# Patient Record
Sex: FEMALE | Race: BLACK OR AFRICAN AMERICAN | Employment: OTHER | URBAN - METROPOLITAN AREA
[De-identification: names, ages, dates, MRNs, and addresses within clinical notes are randomized per-mention and may not be internally consistent; named-entity substitution may affect disease eponyms.]

---

## 2017-02-03 PROBLEM — E87.70 FLUID OVERLOAD: Status: ACTIVE | Noted: 2017-02-03

## 2017-02-04 PROBLEM — I50.33 ACUTE ON CHRONIC DIASTOLIC HEART FAILURE (HCC): Status: ACTIVE | Noted: 2017-02-04

## 2017-03-07 ENCOUNTER — HOSPITAL ENCOUNTER (OUTPATIENT)
Dept: PHYSICAL THERAPY | Age: 67
Discharge: HOME | End: 2017-03-31

## 2017-03-07 DIAGNOSIS — R91.1 SOLITARY PULMONARY NODULE: ICD-10-CM

## 2017-04-01 ENCOUNTER — HOSPITAL ENCOUNTER (OUTPATIENT)
Dept: OTHER | Age: 67
Discharge: HOME | End: 2017-04-30

## 2017-04-21 ENCOUNTER — HOSPITAL ENCOUNTER (OUTPATIENT)
Dept: WOMENS IMAGING | Age: 67
Discharge: HOME | End: 2017-04-21
Attending: FAMILY MEDICINE | Admitting: FAMILY MEDICINE

## 2017-04-21 DIAGNOSIS — Z12.31 VISIT FOR SCREENING MAMMOGRAM: ICD-10-CM

## 2017-05-17 ENCOUNTER — OFFICE VISIT (OUTPATIENT)
Dept: BARIATRICS/WEIGHT MGMT | Age: 67
End: 2017-05-17

## 2017-05-17 VITALS
HEIGHT: 65 IN | SYSTOLIC BLOOD PRESSURE: 143 MMHG | DIASTOLIC BLOOD PRESSURE: 74 MMHG | BODY MASS INDEX: 47.3 KG/M2 | WEIGHT: 283.9 LBS | HEART RATE: 103 BPM

## 2017-05-17 DIAGNOSIS — I87.323 CHRONIC VENOUS HYPERTENSION (IDIOPATHIC) WITH INFLAMMATION OF BILATERAL LOWER EXTREMITY: ICD-10-CM

## 2017-05-17 DIAGNOSIS — I12.9 HYPERTENSIVE KIDNEY DISEASE WITH CKD STAGE III (HCC): ICD-10-CM

## 2017-05-17 DIAGNOSIS — N18.30 HYPERTENSIVE KIDNEY DISEASE WITH CKD STAGE III (HCC): ICD-10-CM

## 2017-05-17 DIAGNOSIS — I25.10 CORONARY ARTERY DISEASE INVOLVING NATIVE CORONARY ARTERY OF NATIVE HEART WITHOUT ANGINA PECTORIS: Chronic | ICD-10-CM

## 2017-05-17 DIAGNOSIS — N18.30 CHRONIC KIDNEY DISEASE, STAGE III (MODERATE) (HCC): ICD-10-CM

## 2017-05-17 DIAGNOSIS — Z79.4 TYPE 2 DIABETES MELLITUS WITHOUT COMPLICATION, WITH LONG-TERM CURRENT USE OF INSULIN (HCC): ICD-10-CM

## 2017-05-17 DIAGNOSIS — E11.29 TYPE 2 DIABETES MELLITUS WITH OTHER KIDNEY COMPLICATION: Chronic | ICD-10-CM

## 2017-05-17 DIAGNOSIS — E11.9 TYPE 2 DIABETES MELLITUS WITHOUT COMPLICATION, WITH LONG-TERM CURRENT USE OF INSULIN (HCC): ICD-10-CM

## 2017-05-17 DIAGNOSIS — E13.9 DIABETES 1.5, MANAGED AS TYPE 2 (HCC): ICD-10-CM

## 2017-05-17 DIAGNOSIS — E66.01 MORBID OBESITY WITH BMI OF 45.0-49.9, ADULT (HCC): Primary | ICD-10-CM

## 2017-05-17 DIAGNOSIS — R53.82 CHRONIC FATIGUE: ICD-10-CM

## 2017-05-17 DIAGNOSIS — R73.9 HYPERGLYCEMIA: ICD-10-CM

## 2017-05-17 DIAGNOSIS — E87.6 HYPOKALEMIA: ICD-10-CM

## 2017-05-17 DIAGNOSIS — E03.9 HYPOTHYROIDISM, ADULT: Chronic | ICD-10-CM

## 2017-05-17 PROCEDURE — MISCLOD MISC IP SERVICE NONBILLABLE: Performed by: SURGERY

## 2017-05-17 ASSESSMENT — ENCOUNTER SYMPTOMS
DIARRHEA: 0
EYE REDNESS: 0
DOUBLE VISION: 0
EYE PAIN: 0
COUGH: 0
BACK PAIN: 1
BLURRED VISION: 0
VOMITING: 0
EYE DISCHARGE: 0
ORTHOPNEA: 0
ABDOMINAL PAIN: 1
CONSTIPATION: 0
HEARTBURN: 1
STRIDOR: 0
HEMOPTYSIS: 0
SHORTNESS OF BREATH: 1
NAUSEA: 1
SPUTUM PRODUCTION: 0
PHOTOPHOBIA: 0
WHEEZING: 0
SORE THROAT: 0
BLOOD IN STOOL: 0

## 2017-05-22 ENCOUNTER — OFFICE VISIT (OUTPATIENT)
Dept: BARIATRICS/WEIGHT MGMT | Age: 67
End: 2017-05-22

## 2017-05-22 VITALS
DIASTOLIC BLOOD PRESSURE: 68 MMHG | BODY MASS INDEX: 47.25 KG/M2 | SYSTOLIC BLOOD PRESSURE: 139 MMHG | HEART RATE: 95 BPM | HEIGHT: 65 IN | WEIGHT: 283.6 LBS

## 2017-05-22 DIAGNOSIS — E66.01 OBESITY, CLASS III, BMI 40-49.9 (MORBID OBESITY) (HCC): Primary | ICD-10-CM

## 2017-05-22 PROCEDURE — 99999 PR OFFICE/OUTPT VISIT,PROCEDURE ONLY: CPT

## 2017-05-30 ENCOUNTER — HOSPITAL ENCOUNTER (OUTPATIENT)
Dept: PHYSICAL THERAPY | Age: 67
Discharge: HOME | End: 2017-05-31
Attending: SURGERY

## 2017-05-30 ENCOUNTER — HOSPITAL ENCOUNTER (OUTPATIENT)
Dept: GENERAL RADIOLOGY | Age: 67
Discharge: HOME | End: 2017-05-30
Attending: SURGERY | Admitting: SURGERY

## 2017-05-30 DIAGNOSIS — R91.1 SOLITARY PULMONARY NODULE: ICD-10-CM

## 2017-05-30 PROCEDURE — 97161 PT EVAL LOW COMPLEX 20 MIN: CPT | Performed by: SURGERY

## 2017-05-30 ASSESSMENT — PAIN DESCRIPTION - LOCATION
LOCATION: BACK
LOCATION: BACK

## 2017-05-30 ASSESSMENT — PAIN DESCRIPTION - PAIN TYPE
TYPE: CHRONIC PAIN
TYPE: CHRONIC PAIN

## 2017-05-30 ASSESSMENT — PAIN DESCRIPTION - DESCRIPTORS: DESCRIPTORS: DULL;ACHING

## 2017-05-30 ASSESSMENT — PAIN DESCRIPTION - ORIENTATION: ORIENTATION: RIGHT;LEFT

## 2017-06-01 ENCOUNTER — HOSPITAL ENCOUNTER (OUTPATIENT)
Dept: OTHER | Age: 67
Discharge: HOME | End: 2017-06-30
Attending: SURGERY | Admitting: PODIATRIST

## 2017-06-02 ENCOUNTER — HOSPITAL ENCOUNTER (OUTPATIENT)
Dept: GENERAL RADIOLOGY | Age: 67
Discharge: HOME | End: 2017-06-02
Attending: INTERNAL MEDICINE | Admitting: INTERNAL MEDICINE

## 2017-06-02 ENCOUNTER — HOSPITAL ENCOUNTER (OUTPATIENT)
Dept: GENERAL RADIOLOGY | Age: 67
Discharge: HOME | End: 2017-06-02
Attending: SURGERY | Admitting: SURGERY

## 2017-06-02 LAB
ALBUMIN SERPL-MCNC: 4.2 GM/DL (ref 3.4–5)
ALP BLD-CCNC: 173 IU/L (ref 40–128)
ALT SERPL-CCNC: 22 U/L (ref 10–40)
ANION GAP SERPL CALCULATED.3IONS-SCNC: 13 MMOL/L (ref 4–16)
AST SERPL-CCNC: 18 IU/L (ref 15–37)
BASOPHILS ABSOLUTE: 0 K/CU MM
BASOPHILS RELATIVE PERCENT: 0.3 % (ref 0–1)
BILIRUB SERPL-MCNC: 0.2 MG/DL (ref 0–1)
BUN BLDV-MCNC: 31 MG/DL (ref 6–23)
CALCIUM SERPL-MCNC: 10.6 MG/DL (ref 8.3–10.6)
CHLORIDE BLD-SCNC: 104 MMOL/L (ref 99–110)
CHOLESTEROL: 156 MG/DL
CO2: 26 MMOL/L (ref 21–32)
CREAT SERPL-MCNC: 2.2 MG/DL (ref 0.6–1.1)
DIFFERENTIAL TYPE: ABNORMAL
EOSINOPHILS ABSOLUTE: 0.2 K/CU MM
EOSINOPHILS RELATIVE PERCENT: 3.3 % (ref 0–3)
ERYTHROCYTE SEDIMENTATION RATE: 19 MM/HR (ref 0–30)
ESTIMATED AVERAGE GLUCOSE: 194 MG/DL
GFR AFRICAN AMERICAN: 27 ML/MIN/1.73M2
GFR NON-AFRICAN AMERICAN: 22 ML/MIN/1.73M2
GLUCOSE BLD-MCNC: 127 MG/DL (ref 70–140)
HBA1C MFR BLD: 8.4 % (ref 4.2–6.3)
HCT VFR BLD CALC: 30.3 % (ref 37–47)
HDLC SERPL-MCNC: 54 MG/DL
HEMOGLOBIN: 9.3 GM/DL (ref 12.5–16)
HIGH SENSITIVE C-REACTIVE PROTEIN: 2 MG/L
IMMATURE NEUTROPHIL %: 0.3 % (ref 0–0.43)
LDL CHOLESTEROL DIRECT: 77 MG/DL
LYMPHOCYTES ABSOLUTE: 1.3 K/CU MM
LYMPHOCYTES RELATIVE PERCENT: 21.2 % (ref 24–44)
MCH RBC QN AUTO: 25.4 PG (ref 27–31)
MCHC RBC AUTO-ENTMCNC: 30.7 % (ref 32–36)
MCV RBC AUTO: 82.8 FL (ref 78–100)
MONOCYTES ABSOLUTE: 0.6 K/CU MM
MONOCYTES RELATIVE PERCENT: 9.1 % (ref 0–4)
NUCLEATED RBC %: 0 %
PDW BLD-RTO: 20 % (ref 11.7–14.9)
PLATELET # BLD: 247 K/CU MM (ref 140–440)
PMV BLD AUTO: 12.7 FL (ref 7.5–11.1)
POTASSIUM SERPL-SCNC: 4.3 MMOL/L (ref 3.5–5.1)
RBC # BLD: 3.66 M/CU MM (ref 4.2–5.4)
SEGMENTED NEUTROPHILS ABSOLUTE COUNT: 4 K/CU MM
SEGMENTED NEUTROPHILS RELATIVE PERCENT: 65.8 % (ref 36–66)
SODIUM BLD-SCNC: 143 MMOL/L (ref 135–145)
TOTAL IMMATURE NEUTOROPHIL: 0.02 K/CU MM
TOTAL NUCLEATED RBC: 0 K/CU MM
TOTAL PROTEIN: 6.4 GM/DL (ref 6.4–8.2)
TRIGL SERPL-MCNC: 149 MG/DL
TSH HIGH SENSITIVITY: 4.06 UIU/ML (ref 0.27–4.2)
WBC # BLD: 6 K/CU MM (ref 4–10.5)

## 2017-06-04 LAB — PARATHYROID HORMONE INTACT: 139

## 2017-06-22 ENCOUNTER — OFFICE VISIT (OUTPATIENT)
Dept: BARIATRICS/WEIGHT MGMT | Age: 67
End: 2017-06-22

## 2017-06-22 VITALS
WEIGHT: 290.3 LBS | SYSTOLIC BLOOD PRESSURE: 126 MMHG | OXYGEN SATURATION: 95 % | HEART RATE: 101 BPM | BODY MASS INDEX: 49.56 KG/M2 | HEIGHT: 64 IN | DIASTOLIC BLOOD PRESSURE: 72 MMHG

## 2017-06-22 DIAGNOSIS — I25.10 CORONARY ARTERY DISEASE INVOLVING NATIVE CORONARY ARTERY OF NATIVE HEART WITHOUT ANGINA PECTORIS: Chronic | ICD-10-CM

## 2017-06-22 DIAGNOSIS — Z01.818 PRE-OP EVALUATION: ICD-10-CM

## 2017-06-22 DIAGNOSIS — N18.30 HYPERTENSIVE KIDNEY DISEASE WITH CKD STAGE III (HCC): ICD-10-CM

## 2017-06-22 DIAGNOSIS — E87.79 OTHER HYPERVOLEMIA: ICD-10-CM

## 2017-06-22 DIAGNOSIS — I12.9 HYPERTENSIVE KIDNEY DISEASE WITH CKD STAGE III (HCC): ICD-10-CM

## 2017-06-22 DIAGNOSIS — I87.323 CHRONIC VENOUS HYPERTENSION (IDIOPATHIC) WITH INFLAMMATION OF BILATERAL LOWER EXTREMITY: ICD-10-CM

## 2017-06-22 DIAGNOSIS — G47.33 OSA (OBSTRUCTIVE SLEEP APNEA): ICD-10-CM

## 2017-06-22 DIAGNOSIS — Z79.4 TYPE 2 DIABETES MELLITUS WITHOUT COMPLICATION, WITH LONG-TERM CURRENT USE OF INSULIN (HCC): ICD-10-CM

## 2017-06-22 DIAGNOSIS — E66.09 NON MORBID OBESITY DUE TO EXCESS CALORIES: Primary | ICD-10-CM

## 2017-06-22 DIAGNOSIS — E11.9 TYPE 2 DIABETES MELLITUS WITHOUT COMPLICATION, WITH LONG-TERM CURRENT USE OF INSULIN (HCC): ICD-10-CM

## 2017-06-22 PROCEDURE — 99214 OFFICE O/P EST MOD 30 MIN: CPT | Performed by: NURSE PRACTITIONER

## 2017-06-22 ASSESSMENT — ENCOUNTER SYMPTOMS
BACK PAIN: 1
EYE PAIN: 0
RHINORRHEA: 0
SHORTNESS OF BREATH: 0
TROUBLE SWALLOWING: 0
WHEEZING: 0
ABDOMINAL PAIN: 0
CHEST TIGHTNESS: 0
NAUSEA: 0
DIARRHEA: 0
ABDOMINAL DISTENTION: 0

## 2017-07-01 ENCOUNTER — HOSPITAL ENCOUNTER (OUTPATIENT)
Dept: OTHER | Age: 67
Discharge: HOME | End: 2017-07-31
Attending: SURGERY | Admitting: PODIATRIST

## 2017-08-02 ENCOUNTER — OFFICE VISIT (OUTPATIENT)
Dept: BARIATRICS/WEIGHT MGMT | Age: 67
End: 2017-08-02

## 2017-08-02 VITALS
RESPIRATION RATE: 16 BRPM | HEART RATE: 81 BPM | WEIGHT: 289.3 LBS | DIASTOLIC BLOOD PRESSURE: 56 MMHG | BODY MASS INDEX: 48.2 KG/M2 | HEIGHT: 65 IN | SYSTOLIC BLOOD PRESSURE: 111 MMHG

## 2017-08-02 DIAGNOSIS — I25.10 CORONARY ARTERY DISEASE INVOLVING NATIVE CORONARY ARTERY OF NATIVE HEART WITHOUT ANGINA PECTORIS: Chronic | ICD-10-CM

## 2017-08-02 DIAGNOSIS — E83.52 HYPERCALCEMIA: ICD-10-CM

## 2017-08-02 DIAGNOSIS — N18.30 CHRONIC KIDNEY DISEASE, STAGE III (MODERATE) (HCC): ICD-10-CM

## 2017-08-02 DIAGNOSIS — E87.6 HYPOKALEMIA: ICD-10-CM

## 2017-08-02 DIAGNOSIS — R53.82 CHRONIC FATIGUE: ICD-10-CM

## 2017-08-02 DIAGNOSIS — R73.9 HYPERGLYCEMIA: ICD-10-CM

## 2017-08-02 DIAGNOSIS — E66.01 MORBID OBESITY WITH BMI OF 40.0-44.9, ADULT (HCC): ICD-10-CM

## 2017-08-02 DIAGNOSIS — N17.9 ACUTE ON CHRONIC RENAL FAILURE (HCC): ICD-10-CM

## 2017-08-02 DIAGNOSIS — I12.9 HYPERTENSIVE KIDNEY DISEASE WITH CKD STAGE III (HCC): ICD-10-CM

## 2017-08-02 DIAGNOSIS — N18.30 HYPERTENSIVE KIDNEY DISEASE WITH CKD STAGE III (HCC): ICD-10-CM

## 2017-08-02 DIAGNOSIS — I10 ESSENTIAL HYPERTENSION: Chronic | ICD-10-CM

## 2017-08-02 DIAGNOSIS — N18.9 ACUTE ON CHRONIC RENAL FAILURE (HCC): ICD-10-CM

## 2017-08-02 DIAGNOSIS — R52 PAIN: ICD-10-CM

## 2017-08-02 DIAGNOSIS — G43.A0 CYCLICAL VOMITING WITH NAUSEA, INTRACTABILITY OF VOMITING NOT SPECIFIED: ICD-10-CM

## 2017-08-02 DIAGNOSIS — E86.0 DEHYDRATION: ICD-10-CM

## 2017-08-02 DIAGNOSIS — E11.9 TYPE 2 DIABETES MELLITUS WITHOUT COMPLICATION, WITH LONG-TERM CURRENT USE OF INSULIN (HCC): ICD-10-CM

## 2017-08-02 DIAGNOSIS — E87.70 HYPERVOLEMIA, UNSPECIFIED HYPERVOLEMIA TYPE: ICD-10-CM

## 2017-08-02 DIAGNOSIS — Z79.4 TYPE 2 DIABETES MELLITUS WITHOUT COMPLICATION, WITH LONG-TERM CURRENT USE OF INSULIN (HCC): ICD-10-CM

## 2017-08-02 DIAGNOSIS — R91.1 SOLITARY PULMONARY NODULE: ICD-10-CM

## 2017-08-02 DIAGNOSIS — E66.09 NON MORBID OBESITY DUE TO EXCESS CALORIES: ICD-10-CM

## 2017-08-02 DIAGNOSIS — E13.9 DIABETES 1.5, MANAGED AS TYPE 2 (HCC): ICD-10-CM

## 2017-08-02 DIAGNOSIS — I50.33 ACUTE ON CHRONIC DIASTOLIC HEART FAILURE (HCC): Primary | ICD-10-CM

## 2017-08-02 DIAGNOSIS — E03.9 HYPOTHYROIDISM, ADULT: Chronic | ICD-10-CM

## 2017-08-02 DIAGNOSIS — I87.323 CHRONIC VENOUS HYPERTENSION (IDIOPATHIC) WITH INFLAMMATION OF BILATERAL LOWER EXTREMITY: ICD-10-CM

## 2017-08-02 DIAGNOSIS — E66.01 MORBID OBESITY WITH BMI OF 45.0-49.9, ADULT (HCC): ICD-10-CM

## 2017-08-02 PROCEDURE — 99214 OFFICE O/P EST MOD 30 MIN: CPT | Performed by: SURGERY

## 2017-08-02 RX ORDER — SIMETHICONE 80 MG
TABLET,CHEWABLE ORAL
COMMUNITY
Start: 2017-08-01 | End: 2018-03-05

## 2017-08-02 RX ORDER — POLYETHYLENE GLYCOL 3350 17 G/17G
POWDER, FOR SOLUTION ORAL
COMMUNITY
Start: 2017-08-01 | End: 2018-03-05

## 2017-08-02 RX ORDER — LINACLOTIDE 290 UG/1
1 CAPSULE, GELATIN COATED ORAL DAILY
COMMUNITY
Start: 2017-08-01 | End: 2018-03-05

## 2017-08-02 RX ORDER — BISACODYL 5 MG
TABLET, DELAYED RELEASE (ENTERIC COATED) ORAL
COMMUNITY
Start: 2017-08-01 | End: 2018-03-05

## 2017-08-02 ASSESSMENT — ENCOUNTER SYMPTOMS
COUGH: 0
TROUBLE SWALLOWING: 0
PHOTOPHOBIA: 0
ABDOMINAL DISTENTION: 1
DIARRHEA: 0
NAUSEA: 0
BLOOD IN STOOL: 0
CONSTIPATION: 0
SORE THROAT: 0
SHORTNESS OF BREATH: 1
VOMITING: 0
BACK PAIN: 1
ANAL BLEEDING: 0
COLOR CHANGE: 0
ABDOMINAL PAIN: 1
WHEEZING: 0
VOICE CHANGE: 0

## 2017-09-01 ENCOUNTER — TELEPHONE (OUTPATIENT)
Dept: BARIATRICS/WEIGHT MGMT | Age: 67
End: 2017-09-01

## 2017-09-08 ENCOUNTER — OFFICE VISIT (OUTPATIENT)
Dept: BARIATRICS/WEIGHT MGMT | Age: 67
End: 2017-09-08

## 2017-09-08 VITALS
HEIGHT: 64 IN | OXYGEN SATURATION: 97 % | BODY MASS INDEX: 46.51 KG/M2 | WEIGHT: 272.4 LBS | HEART RATE: 76 BPM | DIASTOLIC BLOOD PRESSURE: 58 MMHG | SYSTOLIC BLOOD PRESSURE: 105 MMHG

## 2017-09-08 DIAGNOSIS — I12.9 HYPERTENSIVE KIDNEY DISEASE WITH CKD STAGE III (HCC): ICD-10-CM

## 2017-09-08 DIAGNOSIS — Z01.818 PRE-OP EVALUATION: ICD-10-CM

## 2017-09-08 DIAGNOSIS — N18.30 HYPERTENSIVE KIDNEY DISEASE WITH CKD STAGE III (HCC): ICD-10-CM

## 2017-09-08 DIAGNOSIS — F32.A DEPRESSION, UNSPECIFIED DEPRESSION TYPE: ICD-10-CM

## 2017-09-08 DIAGNOSIS — E13.9 DIABETES 1.5, MANAGED AS TYPE 2 (HCC): ICD-10-CM

## 2017-09-08 DIAGNOSIS — E66.01 MORBID OBESITY WITH BMI OF 45.0-49.9, ADULT (HCC): Primary | ICD-10-CM

## 2017-09-08 DIAGNOSIS — E11.29 TYPE 2 DIABETES MELLITUS WITH OTHER KIDNEY COMPLICATION, UNSPECIFIED LONG TERM INSULIN USE STATUS: Chronic | ICD-10-CM

## 2017-09-08 PROCEDURE — 99214 OFFICE O/P EST MOD 30 MIN: CPT | Performed by: NURSE PRACTITIONER

## 2017-09-08 ASSESSMENT — ENCOUNTER SYMPTOMS
TROUBLE SWALLOWING: 0
CHEST TIGHTNESS: 0
SHORTNESS OF BREATH: 0
ABDOMINAL PAIN: 0
NAUSEA: 0
RHINORRHEA: 0
EYE PAIN: 0
BACK PAIN: 1
ABDOMINAL DISTENTION: 0
DIARRHEA: 0
WHEEZING: 0

## 2017-09-11 ENCOUNTER — TELEPHONE (OUTPATIENT)
Dept: BARIATRICS/WEIGHT MGMT | Age: 67
End: 2017-09-11

## 2017-09-19 PROBLEM — J96.02 ACUTE HYPERCAPNIC RESPIRATORY FAILURE (HCC): Status: ACTIVE | Noted: 2017-09-19

## 2017-09-19 PROBLEM — I50.32 CHRONIC DIASTOLIC HEART FAILURE (HCC): Chronic | Status: ACTIVE | Noted: 2017-09-19

## 2017-09-19 PROBLEM — G93.41 ACUTE METABOLIC ENCEPHALOPATHY: Chronic | Status: ACTIVE | Noted: 2017-09-19

## 2017-10-11 PROBLEM — N17.9 ACUTE KIDNEY INJURY (HCC): Status: ACTIVE | Noted: 2017-10-11

## 2017-10-13 PROBLEM — G93.40 ACUTE ENCEPHALOPATHY: Status: ACTIVE | Noted: 2017-10-13

## 2017-10-13 PROBLEM — N17.9 ACUTE RENAL FAILURE (ARF) (HCC): Status: ACTIVE | Noted: 2017-10-13

## 2017-10-20 ENCOUNTER — HOSPITAL ENCOUNTER (OUTPATIENT)
Dept: ULTRASOUND IMAGING | Age: 67
Discharge: HOME | End: 2017-10-20
Attending: NURSE PRACTITIONER | Admitting: NURSE PRACTITIONER

## 2017-10-20 DIAGNOSIS — R91.1 SOLITARY PULMONARY NODULE: ICD-10-CM

## 2017-10-20 DIAGNOSIS — Z01.818 PREOP EXAMINATION: ICD-10-CM

## 2017-11-06 ENCOUNTER — OFFICE VISIT (OUTPATIENT)
Dept: BARIATRICS/WEIGHT MGMT | Age: 67
End: 2017-11-06

## 2017-11-06 VITALS
WEIGHT: 278.1 LBS | HEIGHT: 64 IN | DIASTOLIC BLOOD PRESSURE: 60 MMHG | BODY MASS INDEX: 47.48 KG/M2 | OXYGEN SATURATION: 98 % | SYSTOLIC BLOOD PRESSURE: 128 MMHG | HEART RATE: 85 BPM

## 2017-11-06 DIAGNOSIS — I87.323 CHRONIC VENOUS HYPERTENSION (IDIOPATHIC) WITH INFLAMMATION OF BILATERAL LOWER EXTREMITY: ICD-10-CM

## 2017-11-06 DIAGNOSIS — N18.30 CHRONIC KIDNEY DISEASE, STAGE III (MODERATE) (HCC): Chronic | ICD-10-CM

## 2017-11-06 DIAGNOSIS — E87.70 HYPERVOLEMIA, UNSPECIFIED HYPERVOLEMIA TYPE: ICD-10-CM

## 2017-11-06 DIAGNOSIS — G93.41 ACUTE METABOLIC ENCEPHALOPATHY: Chronic | ICD-10-CM

## 2017-11-06 DIAGNOSIS — N17.9 ACUTE KIDNEY INJURY (HCC): Primary | ICD-10-CM

## 2017-11-06 DIAGNOSIS — E83.52 HYPERCALCEMIA: Chronic | ICD-10-CM

## 2017-11-06 DIAGNOSIS — I50.33 ACUTE ON CHRONIC DIASTOLIC HEART FAILURE (HCC): ICD-10-CM

## 2017-11-06 DIAGNOSIS — R73.9 HYPERGLYCEMIA: ICD-10-CM

## 2017-11-06 DIAGNOSIS — R53.82 CHRONIC FATIGUE: ICD-10-CM

## 2017-11-06 DIAGNOSIS — I12.9 HYPERTENSIVE KIDNEY DISEASE WITH CKD STAGE III (HCC): ICD-10-CM

## 2017-11-06 DIAGNOSIS — E11.29 TYPE 2 DIABETES MELLITUS WITH OTHER KIDNEY COMPLICATION, UNSPECIFIED LONG TERM INSULIN USE STATUS: Chronic | ICD-10-CM

## 2017-11-06 DIAGNOSIS — E66.01 MORBID OBESITY WITH BMI OF 40.0-44.9, ADULT (HCC): ICD-10-CM

## 2017-11-06 DIAGNOSIS — R91.1 SOLITARY PULMONARY NODULE: ICD-10-CM

## 2017-11-06 DIAGNOSIS — E87.6 HYPOKALEMIA: ICD-10-CM

## 2017-11-06 DIAGNOSIS — E11.9 TYPE 2 DIABETES MELLITUS WITHOUT COMPLICATION, WITH LONG-TERM CURRENT USE OF INSULIN (HCC): ICD-10-CM

## 2017-11-06 DIAGNOSIS — I50.32 CHRONIC DIASTOLIC HEART FAILURE (HCC): Chronic | ICD-10-CM

## 2017-11-06 DIAGNOSIS — G43.A0 CYCLICAL VOMITING WITH NAUSEA, INTRACTABILITY OF VOMITING NOT SPECIFIED: ICD-10-CM

## 2017-11-06 DIAGNOSIS — Z79.4 TYPE 2 DIABETES MELLITUS WITHOUT COMPLICATION, WITH LONG-TERM CURRENT USE OF INSULIN (HCC): ICD-10-CM

## 2017-11-06 DIAGNOSIS — N18.30 HYPERTENSIVE KIDNEY DISEASE WITH CKD STAGE III (HCC): ICD-10-CM

## 2017-11-06 DIAGNOSIS — E03.9 HYPOTHYROIDISM, ADULT: Chronic | ICD-10-CM

## 2017-11-06 DIAGNOSIS — E66.01 MORBID OBESITY WITH BMI OF 45.0-49.9, ADULT (HCC): ICD-10-CM

## 2017-11-06 DIAGNOSIS — E86.0 DEHYDRATION: ICD-10-CM

## 2017-11-06 DIAGNOSIS — I25.10 CORONARY ARTERY DISEASE INVOLVING NATIVE CORONARY ARTERY OF NATIVE HEART WITHOUT ANGINA PECTORIS: Chronic | ICD-10-CM

## 2017-11-06 DIAGNOSIS — I10 ESSENTIAL HYPERTENSION: Chronic | ICD-10-CM

## 2017-11-06 DIAGNOSIS — R52 PAIN: ICD-10-CM

## 2017-11-06 DIAGNOSIS — E13.9 DIABETES 1.5, MANAGED AS TYPE 2 (HCC): ICD-10-CM

## 2017-11-06 PROCEDURE — G8427 DOCREV CUR MEDS BY ELIG CLIN: HCPCS | Performed by: SURGERY

## 2017-11-06 PROCEDURE — 3045F PR MOST RECENT HEMOGLOBIN A1C LEVEL 7.0-9.0%: CPT | Performed by: SURGERY

## 2017-11-06 PROCEDURE — G8484 FLU IMMUNIZE NO ADMIN: HCPCS | Performed by: SURGERY

## 2017-11-06 PROCEDURE — 1111F DSCHRG MED/CURRENT MED MERGE: CPT | Performed by: SURGERY

## 2017-11-06 PROCEDURE — 99214 OFFICE O/P EST MOD 30 MIN: CPT | Performed by: SURGERY

## 2017-11-06 PROCEDURE — 1090F PRES/ABSN URINE INCON ASSESS: CPT | Performed by: SURGERY

## 2017-11-06 PROCEDURE — G8399 PT W/DXA RESULTS DOCUMENT: HCPCS | Performed by: SURGERY

## 2017-11-06 PROCEDURE — G8417 CALC BMI ABV UP PARAM F/U: HCPCS | Performed by: SURGERY

## 2017-11-06 PROCEDURE — 4040F PNEUMOC VAC/ADMIN/RCVD: CPT | Performed by: SURGERY

## 2017-11-06 PROCEDURE — 3017F COLORECTAL CA SCREEN DOC REV: CPT | Performed by: SURGERY

## 2017-11-06 PROCEDURE — 1036F TOBACCO NON-USER: CPT | Performed by: SURGERY

## 2017-11-06 PROCEDURE — G8598 ASA/ANTIPLAT THER USED: HCPCS | Performed by: SURGERY

## 2017-11-06 PROCEDURE — 3014F SCREEN MAMMO DOC REV: CPT | Performed by: SURGERY

## 2017-11-06 PROCEDURE — 1123F ACP DISCUSS/DSCN MKR DOCD: CPT | Performed by: SURGERY

## 2017-11-06 RX ORDER — LUBIPROSTONE 8 UG/1
8 CAPSULE, GELATIN COATED ORAL DAILY
COMMUNITY
End: 2018-03-05

## 2017-11-06 RX ORDER — QUETIAPINE 300 MG/1
50 TABLET, FILM COATED, EXTENDED RELEASE ORAL NIGHTLY
COMMUNITY

## 2017-11-06 ASSESSMENT — ENCOUNTER SYMPTOMS
VOICE CHANGE: 0
ABDOMINAL DISTENTION: 1
TROUBLE SWALLOWING: 0
WHEEZING: 0
SORE THROAT: 0
DIARRHEA: 0
ABDOMINAL PAIN: 1
SHORTNESS OF BREATH: 1
COUGH: 0
CONSTIPATION: 0
BLOOD IN STOOL: 0
NAUSEA: 0
COLOR CHANGE: 0
BACK PAIN: 1
VOMITING: 0
ANAL BLEEDING: 0
PHOTOPHOBIA: 0

## 2017-11-06 NOTE — PROGRESS NOTES
disease) ([facility]     Degenerative disc disease     C4,5,6,7    Diabetes mellitus ([facility]     type II    Hypertension     Obesity     Osteoarthritis     right thumb, left foot    Thyroid disease       Past Surgical History:   Procedure Laterality Date    APPENDECTOMY      BACK SURGERY      CARPAL TUNNEL RELEASE      right release     SECTION      HYSTERECTOMY      LUMBAR FUSION      ROTATOR CUFF REPAIR      left shoulder    THYROIDECTOMY       Current Outpatient Prescriptions   Medication Sig Dispense Refill    QUEtiapine (SEROQUEL XR) 300 MG extended release tablet Take 300 mg by mouth nightly      lubiprostone (AMITIZA) 8 MCG CAPS capsule Take 8 mcg by mouth daily      cloNIDine (CATAPRES) 0.1 MG tablet TAKE ONE TABLET BY MOUTH TWICE A DAY AS NEEDED FOR SYSTOLIC BLOOD PRESSURE > 165 60 tablet 2    buPROPion (WELLBUTRIN XL) 300 MG extended release tablet Take 300 mg by mouth every morning      loratadine (CLARITIN) 10 MG capsule Take 10 mg by mouth daily      losartan (COZAAR) 50 MG tablet Take 50 mg by mouth daily      potassium chloride (MICRO-K) 10 MEQ extended release capsule Take 10 mEq by mouth 2 times daily 2 capsules each day      predniSONE (DELTASONE) 5 MG tablet Take 5 mg by mouth daily      linagliptin (TRADJENTA) 5 MG tablet Take 5 mg by mouth daily      metoprolol succinate (TOPROL XL) 100 MG extended release tablet Take 100 mg by mouth daily      levomilnacipran (FETZIMA) 40 MG CP24 extended release capsule Take 1 capsule by mouth daily (Patient taking differently: Take 20 mg by mouth daily ) 30 capsule 0    chlorthalidone (HYGROTON) 25 MG tablet Take 1 tablet by mouth daily 30 tablet 0    insulin lispro (HUMALOG) 100 UNIT/ML injection vial Inject 0-12 Units into the skin 3 times daily (with meals) 1 vial 0    allopurinol (ZYLOPRIM) 100 MG tablet Take 3 tablets by mouth daily 30 tablet 0    insulin glargine (LANTUS SOLOSTAR) 100 UNIT/ML injection pen failure ([address]    4. Coronary artery disease involving native coronary artery of native heart without angina pectoris    5. Chronic diastolic heart failure ([address]    6. Chronic fatigue    7. Chronic kidney disease, stage III (moderate)    8. Chronic venous hypertension (idiopathic) with inflammation of bilateral lower extremity    9. Dehydration    10. Diabetes 1.5, managed as type 2 ([address]    11. Type 2 diabetes mellitus with other kidney complication, unspecified long term insulin use status ([address]    12. Hypervolemia, unspecified hypervolemia type    13. Essential hypertension    14. Hypercalcemia    15. Hyperglycemia    16. Hypertensive kidney disease with CKD stage III    17. Hypokalemia-mild    18. Hypothyroidism, adult    23. Morbid obesity with BMI of 40.0-44.9, adult ([address]    20. Morbid obesity with BMI of 45.0-49.9, adult ([address]    21. Cyclical vomiting with nausea, intractability of vomiting not specified    22. Pain    23. Solitary pulmonary nodule    24. Type 2 diabetes mellitus without complication, with long-term current use of insulin (HCC)         MOST likely fluid shifts account for the wt gain - counseled in length - NO salt would be advisable, her last K+ 2 wks ago was 3.5 despite her elevated Cr. GI referral Dr Talisha Butt / Dr Mohit Núñez for EGD DR. TAFOYA[facility]. Next month bariatric consent. US of thyroid pending. Patient was encouraged to journal all food intake. Keep calorie level at approximately 8515-2861. Protein intake is to be a minimum of 60-80 grams per day. Water drinking was encouraged with a goal of 64oz-128oz daily. Beverages to be calorie free except for milk. Every other beverage should be water. They are to avoid soda. Continue to increase level of physical activity. More than 50% of the office visit today was spent in face to face counseling regarding diet and exercise, in preparation for her planned Robotic Sleeve Gastrectomy.   Counting calories, complying with the dietitian's

## 2017-11-10 ENCOUNTER — HOSPITAL ENCOUNTER (OUTPATIENT)
Dept: NUCLEAR MEDICINE | Age: 67
Discharge: HOME | End: 2017-11-10
Attending: INTERNAL MEDICINE | Admitting: INTERNAL MEDICINE

## 2017-11-10 ENCOUNTER — HOSPITAL ENCOUNTER (OUTPATIENT)
Dept: GENERAL RADIOLOGY | Age: 67
Discharge: HOME | End: 2017-11-10
Attending: INTERNAL MEDICINE | Admitting: INTERNAL MEDICINE

## 2017-11-10 DIAGNOSIS — M54.5 LOW BACK PAIN, UNSPECIFIED BACK PAIN LATERALITY, UNSPECIFIED CHRONICITY, WITH SCIATICA PRESENCE UNSPECIFIED: ICD-10-CM

## 2017-11-10 DIAGNOSIS — E21.0 PRIMARY HYPERPARATHYROIDISM (HCC): ICD-10-CM

## 2017-11-10 RX ADMIN — Medication 20 MILLICURIE: at 13:03

## 2017-11-29 ENCOUNTER — HOSPITAL ENCOUNTER (OUTPATIENT)
Dept: LAB | Age: 67
Discharge: HOME | End: 2017-11-30
Attending: INTERNAL MEDICINE | Admitting: INTERNAL MEDICINE

## 2017-12-01 ENCOUNTER — HOSPITAL ENCOUNTER (OUTPATIENT)
Dept: LAB | Age: 67
Discharge: HOME | End: 2017-12-31
Attending: INTERNAL MEDICINE | Admitting: INTERNAL MEDICINE

## 2017-12-01 LAB
ALBUMIN SERPL-MCNC: 4 GM/DL (ref 3.4–5)
ALP BLD-CCNC: 235 IU/L (ref 40–129)
ALT SERPL-CCNC: 23 U/L (ref 10–40)
ANION GAP SERPL CALCULATED.3IONS-SCNC: 12 MMOL/L (ref 4–16)
ANISOCYTOSIS: ABNORMAL
AST SERPL-CCNC: 24 IU/L (ref 15–37)
BASOPHILS ABSOLUTE: 0 K/CU MM
BASOPHILS RELATIVE PERCENT: 0.4 % (ref 0–1)
BILIRUB SERPL-MCNC: 0.2 MG/DL (ref 0–1)
BUN BLDV-MCNC: 13 MG/DL (ref 6–23)
CALCIUM SERPL-MCNC: 10.6 MG/DL (ref 8.3–10.6)
CHLORIDE BLD-SCNC: 103 MMOL/L (ref 99–110)
CO2: 27 MMOL/L (ref 21–32)
CREAT SERPL-MCNC: 1.6 MG/DL (ref 0.6–1.1)
DIFFERENTIAL TYPE: ABNORMAL
DIFFERENTIAL TYPE: ABNORMAL
EOSINOPHILS ABSOLUTE: 0.4 K/CU MM
EOSINOPHILS RELATIVE PERCENT: 4.8 % (ref 0–3)
FERRITIN: 17 NG/ML (ref 15–150)
FOLATE: >20 NG/ML (ref 3.1–17.5)
GFR AFRICAN AMERICAN: 39 ML/MIN/1.73M2
GFR NON-AFRICAN AMERICAN: 32 ML/MIN/1.73M2
GLUCOSE BLD-MCNC: 135 MG/DL (ref 70–99)
HCT VFR BLD CALC: 32.2 % (ref 37–47)
HEMOGLOBIN: 9.9 GM/DL (ref 12.5–16)
HYPOCHROMIA: ABNORMAL
IMMATURE NEUTROPHIL %: 0.3 % (ref 0–0.43)
IRON: 53 UG/DL (ref 37–145)
LYMPHOCYTES ABSOLUTE: 1.2 K/CU MM
LYMPHOCYTES RELATIVE PERCENT: 16.8 % (ref 24–44)
MCH RBC QN AUTO: 25 PG (ref 27–31)
MCHC RBC AUTO-ENTMCNC: 30.7 % (ref 32–36)
MCV RBC AUTO: 81.3 FL (ref 78–100)
MONOCYTES ABSOLUTE: 0.7 K/CU MM
MONOCYTES RELATIVE PERCENT: 9.5 % (ref 0–4)
NUCLEATED RBC %: 0 %
OVALOCYTES: ABNORMAL
PCT TRANSFERRIN: 14 % (ref 10–44)
PDW BLD-RTO: 18.8 % (ref 11.7–14.9)
PLATELET # BLD: 277 K/CU MM (ref 140–440)
PMV BLD AUTO: 12.2 FL (ref 7.5–11.1)
POLYCHROMASIA: ABNORMAL
POTASSIUM SERPL-SCNC: 3.5 MMOL/L (ref 3.5–5.1)
RBC # BLD: 3.96 M/CU MM (ref 4.2–5.4)
RETICULOCYTE COUNT PCT: 2.2 % (ref 0.2–2.2)
SEGMENTED NEUTROPHILS ABSOLUTE COUNT: 5 K/CU MM
SEGMENTED NEUTROPHILS RELATIVE PERCENT: 68.2 % (ref 36–66)
SODIUM BLD-SCNC: 142 MMOL/L (ref 135–145)
TEAR DROP CELLS: ABNORMAL
TOTAL IMMATURE NEUTOROPHIL: 0.02 K/CU MM
TOTAL IRON BINDING CAPACITY: 367 UG/DL (ref 250–450)
TOTAL NUCLEATED RBC: 0 K/CU MM
TOTAL PROTEIN: 6.4 GM/DL (ref 6.4–8.2)
UNSATURATED IRON BINDING CAPACITY: 314 UG/DL (ref 110–370)
VITAMIN B-12: 559.7 PG/ML (ref 211–911)
WBC # BLD: 7.3 K/CU MM (ref 4–10.5)

## 2017-12-15 ENCOUNTER — TELEPHONE (OUTPATIENT)
Dept: BARIATRICS/WEIGHT MGMT | Age: 67
End: 2017-12-15

## 2018-01-01 ENCOUNTER — HOSPITAL ENCOUNTER (OUTPATIENT)
Dept: LAB | Age: 68
Discharge: HOME | End: 2018-01-31
Attending: INTERNAL MEDICINE | Admitting: INTERNAL MEDICINE

## 2018-01-03 ENCOUNTER — OFFICE VISIT (OUTPATIENT)
Dept: BARIATRICS/WEIGHT MGMT | Age: 68
End: 2018-01-03

## 2018-01-03 VITALS
SYSTOLIC BLOOD PRESSURE: 126 MMHG | BODY MASS INDEX: 45.48 KG/M2 | DIASTOLIC BLOOD PRESSURE: 62 MMHG | HEART RATE: 94 BPM | WEIGHT: 273 LBS | OXYGEN SATURATION: 96 % | HEIGHT: 65 IN

## 2018-01-03 DIAGNOSIS — E66.01 MORBID OBESITY WITH BMI OF 45.0-49.9, ADULT (HCC): Primary | ICD-10-CM

## 2018-01-03 DIAGNOSIS — E13.9 DIABETES 1.5, MANAGED AS TYPE 2 (HCC): ICD-10-CM

## 2018-01-03 PROCEDURE — 1123F ACP DISCUSS/DSCN MKR DOCD: CPT | Performed by: SURGERY

## 2018-01-03 PROCEDURE — 3046F HEMOGLOBIN A1C LEVEL >9.0%: CPT | Performed by: SURGERY

## 2018-01-03 PROCEDURE — 3017F COLORECTAL CA SCREEN DOC REV: CPT | Performed by: SURGERY

## 2018-01-03 PROCEDURE — G8598 ASA/ANTIPLAT THER USED: HCPCS | Performed by: SURGERY

## 2018-01-03 PROCEDURE — G8399 PT W/DXA RESULTS DOCUMENT: HCPCS | Performed by: SURGERY

## 2018-01-03 PROCEDURE — 1090F PRES/ABSN URINE INCON ASSESS: CPT | Performed by: SURGERY

## 2018-01-03 PROCEDURE — 3014F SCREEN MAMMO DOC REV: CPT | Performed by: SURGERY

## 2018-01-03 PROCEDURE — G8417 CALC BMI ABV UP PARAM F/U: HCPCS | Performed by: SURGERY

## 2018-01-03 PROCEDURE — 1036F TOBACCO NON-USER: CPT | Performed by: SURGERY

## 2018-01-03 PROCEDURE — G8484 FLU IMMUNIZE NO ADMIN: HCPCS | Performed by: SURGERY

## 2018-01-03 PROCEDURE — 99214 OFFICE O/P EST MOD 30 MIN: CPT | Performed by: SURGERY

## 2018-01-03 PROCEDURE — G8427 DOCREV CUR MEDS BY ELIG CLIN: HCPCS | Performed by: SURGERY

## 2018-01-03 PROCEDURE — 4040F PNEUMOC VAC/ADMIN/RCVD: CPT | Performed by: SURGERY

## 2018-01-03 RX ORDER — FERROUS SULFATE 325(65) MG
325 TABLET ORAL
COMMUNITY
End: 2018-04-10

## 2018-01-03 NOTE — PROGRESS NOTES
in consideration for bariatric surgery. BMI: Body mass index is 45.43 kg/m². Obesity Classification: 3 Morbid Obesity. Weight History: Wt Readings from Last 3 Encounters:   01/03/18 273 lb (123.8 kg)   12/13/17 269 lb 3.2 oz (122.1 kg)   11/06/17 278 lb 1.6 oz (126.1 kg)     Total weight loss/gain 10.9 Lbs over 1 month. Patient dines out to a sit down restaurant 1 times per month. Patient eats fast food meals 0 times per month. Drinks mostly water    Total daily calories: 1800      Exercises 0   0 min 0 Times per week. Patient was encouraged to journal all food intake. Keep calorie level at approximately 3185-7748. Protein intake is to be a minimum of 60-80 grams per day. Water drinking was encouraged with a goal of 64oz-128oz daily. Beverages to be calorie free except for milk. Every other beverage should be water. They are to avoid soda. Continue to increase level of physical activity. More than 50% of the office visit today (25 min) was spent in face to face counseling regarding diet and exercise, in preparation for her planned Robotic Sleeve Gastrectomy. Counting calories, complying with the dietitian's recommendations, and complying with the preoperative workup including the dietitian counseling, exercise physiologist counseling, cardiologist evaluation and pre-operative optimization, pulmonologist evaluation and pre operative optimization, pre operative EGD evaluation. The patient expressed understanding and willingness to comply nicely; all questions and concerns addressed in details. Patient counseled on the risks, benefits, and alternatives of treatment plan at length while in the office today. Patient states an understanding and willingness to proceed with the plan. Follow Up:  Return in about 4 weeks (around 1/31/2018) for Surgery.       Carleton Collet, MD, FACS, FICS  Member of the [employer] of [employer] and [employer]    [phone]    1/3/18

## 2018-01-07 ASSESSMENT — ENCOUNTER SYMPTOMS
TROUBLE SWALLOWING: 0
ABDOMINAL DISTENTION: 1
WHEEZING: 0
PHOTOPHOBIA: 0
ABDOMINAL PAIN: 1
NAUSEA: 0
BACK PAIN: 1
ANAL BLEEDING: 0
CONSTIPATION: 0
SORE THROAT: 0
VOICE CHANGE: 0
DIARRHEA: 0
VOMITING: 0
SHORTNESS OF BREATH: 1
COLOR CHANGE: 0
COUGH: 0
BLOOD IN STOOL: 0

## 2018-01-08 ENCOUNTER — TELEPHONE (OUTPATIENT)
Dept: BARIATRICS/WEIGHT MGMT | Age: 68
End: 2018-01-08

## 2018-01-09 DIAGNOSIS — E66.01 MORBID OBESITY WITH BMI OF 45.0-49.9, ADULT (HCC): Primary | ICD-10-CM

## 2018-02-02 PROBLEM — G47.33 OSA (OBSTRUCTIVE SLEEP APNEA): Status: ACTIVE | Noted: 2018-02-02

## 2018-02-12 ENCOUNTER — HOSPITAL ENCOUNTER (OUTPATIENT)
Dept: PHYSICAL THERAPY | Age: 68
Discharge: HOME | End: 2018-02-28
Attending: NURSE PRACTITIONER | Admitting: NURSE PRACTITIONER

## 2018-02-12 NOTE — PROGRESS NOTES
Physical Therapy  Initial Assessment  Date: 2018  Patient Name: Rodney Hall  MRN: [id number]  : 1950     Treatment Diagnosis: Obesity; chronic LBP s/p fusion ; generalized weakness with decreased endurance for physical activity    Restrictions       Subjective   General  Patient assessed for rehabilitation services?: Yes  Additional Pertinent Hx: Lumbar fusion ; neck fusion ; Kidney failure; CHF; RA; HTN; DM Type II  Diagnosis: Bariatric Pre-Op; Obesity  Follows Commands: Within Functional Limits  Subjective  Subjective: Pt reporting progressive wt gain over the previous 15-16 months limiting her functional activity level. She also reports she understands losing wt will improve all the medical conditions she takes medication for. Pt reports standing and walking gracy < 15 min secondary to her legs feeling weak and tired. She reports SOB only if she pushes it. Walks 1 - 2 times/week with a wheeled walker through her apt building.  She reports she has no concerns about beginning an exercise program, \"I need to see what I can do\"  Pain Screening  Patient Currently in Pain: No (Intermittent LBP with prolonged sitting or standing)  Vital Signs  Patient Currently in Pain: No (Intermittent LBP with prolonged sitting or standing)    Vision/Hearing  Vision  Vision: Impaired (wears glasses most of the time, but not to drive)  Hearing  Hearing: Exceptions to [facility]  Hearing Exceptions: Hard of hearing/hearing concerns (previous hearing aids)    Orientation  Orientation  Overall Orientation Status: Within Normal Limits    Social/Functional History  Social/Functional History  Lives With: Alone  Type of Home: Apartment  Home Access: Stairs to enter with rails  Entrance Stairs - Number of Steps: 6-7 stairs  Entrance Stairs - Rails: Both  Home Equipment: 4 wheeled walker;Cane (C-PAP; uses a chair on wheels in the kitchen to cook)  ADL Assistance: Needs assistance (care giver Mon - Friday)  Homemaking functional mobility ; Decreased ROM; Decreased strength;Decreased balance;Decreased endurance  Assessment: Pt is a 75 yo female who presents with obesity, chronic LBP s/p fusion and prior neck fusion all of which impacts on all mobility tolerance, endurance, and ADL's;patient's goal is to lose wt, require less medications, improve her standing and walking tolerance, and be able to walk/exercise 3-4 times each week ;patient reports that obesity and pain  limits activities including those noted; PT to address patient's goals, impairments and activity limitations with skilled interventions checked in plan of care;patient's level of function prior to onset of 1-2 years was painful with better endurance for ADL's; did not observe any barriers to learning during PT eval; learning preferences include demonstration, practice, and handouts; patient expressed understanding of HEP; patient appears to be motivated to participate in an active PT program and to be compliant with HEP expectations;patient assisted in developing treatment plan and goals; DME includes SPC and 4 wheeled walker      Current functional level (based on )   :   Treatment Diagnosis: Obesity; chronic LBP s/p fusion 2016; generalized weakness with decreased endurance for physical activity  Prognosis: Fair  Decision Making: Medium Complexity  History: as above  Exam: as above  Clinical Presentation: evolving  Patient Education: Role of PT Pre-Op Bariatric; importance of life long compliance with walking/exercise program  Barriers to Learning: none noted  REQUIRES PT FOLLOW UP: Yes  Activity Tolerance  Activity Tolerance: Patient limited by fatigue         Plan   Plan  Times per week: 2  Plan weeks: 5 weeks  Current Treatment Recommendations: Strengthening, ROM, Balance Training, Functional Mobility Training, Endurance Training, Gait Training, Neuromuscular Re-education, Home Exercise Program    G-Code  PT G-Codes  Functional Assessment Tool Used:  Mod Oswestry

## 2018-03-01 ENCOUNTER — HOSPITAL ENCOUNTER (OUTPATIENT)
Dept: OTHER | Age: 68
Discharge: HOME | End: 2018-03-31
Attending: NURSE PRACTITIONER | Admitting: NURSE PRACTITIONER

## 2018-03-13 ENCOUNTER — TELEPHONE (OUTPATIENT)
Dept: BARIATRICS/WEIGHT MGMT | Age: 68
End: 2018-03-13

## 2018-03-13 NOTE — TELEPHONE ENCOUNTER
Called patient advised that Bety Valenzuela just approved procedure. Gave information to surgery scheduler and she will get patient scheduled and call her with all her date.  Patient voiced understanding

## 2018-03-14 ENCOUNTER — TELEPHONE (OUTPATIENT)
Dept: BARIATRICS/WEIGHT MGMT | Age: 68
End: 2018-03-14

## 2018-03-14 NOTE — TELEPHONE ENCOUNTER
Called patient and went over surgery date and time as well as prep instructions. Patient verbalized understanding.

## 2018-03-30 ENCOUNTER — OFFICE VISIT (OUTPATIENT)
Dept: BARIATRICS/WEIGHT MGMT | Age: 68
End: 2018-03-30

## 2018-03-30 VITALS
SYSTOLIC BLOOD PRESSURE: 141 MMHG | BODY MASS INDEX: 48.82 KG/M2 | WEIGHT: 293 LBS | HEART RATE: 99 BPM | DIASTOLIC BLOOD PRESSURE: 66 MMHG | HEIGHT: 65 IN

## 2018-03-30 DIAGNOSIS — E66.01 MORBID OBESITY WITH BMI OF 45.0-49.9, ADULT (HCC): Primary | ICD-10-CM

## 2018-03-30 PROCEDURE — 99999 PR OFFICE/OUTPT VISIT,PROCEDURE ONLY: CPT

## 2018-03-30 NOTE — PROGRESS NOTES
Outpatient Nutrition Counseling -Surgical Weight Loss Program    REASON FOR VISIT: Pre-Op Diet Education    Chief Complaint:    Chief Complaint   Patient presents with    Weight Management       SUBJECTIVE:  Pt here for instruction on 2 week pre-op diet and post-op diet progression. Pt has gained 25 lbs since last seen in January. Accompanied by [facility] aide at visit. Instructed on 2 week pre-op liquid protein shake diet and post-op diet progression. Provided with binder, recipe book, and fluid activity logs. Pt voiced understanding of all. The patient is a 74 y.o. female being seen for obesity, enrolled in Surgical Weight Loss Program; Madhavi's, Height: 5' 5\" (165.1 cm), Weight: 298 lb 12.8 oz (135.5 kg), Current Body mass index is 49.72 kg/m². The patient's PCP is No primary care provider on file. Comorbid Conditions:  Significant diseases affecting this patient are   Past Medical History:   Diagnosis Date    CAD (coronary artery disease)     Carpal tunnel syndrome, bilateral     [facility]     Chronic back pain     Chronic kidney disease     Congenital heart disease     COPD (chronic obstructive pulmonary disease) ([facility])     Degenerative disc disease     C4,5,6,7    Diabetes mellitus ([facility]     type II    Hypertension     Obesity     Osteoarthritis     right thumb, left foot    Thyroid disease    . Review of Systems - ROS  Otherwise per HPI. Allergies: Allergies   Allergen Reactions    Ibuprofen     Percocet [Oxycodone-Acetaminophen] Nausea Only     Taking with zofran to prevent nausea.      Tramadol Itching    Vicodin [Hydrocodone-Acetaminophen] Itching       Past Surgical History:  Past Surgical History:   Procedure Laterality Date    APPENDECTOMY      BACK SURGERY      CARPAL TUNNEL RELEASE      right release     SECTION      HYSTERECTOMY      LUMBAR FUSION      ROTATOR CUFF REPAIR      left shoulder    THYROIDECTOMY Family History:  Family History   Problem Relation Age of Onset    Diabetes Mother     Heart Disease Mother     Depression Mother     Diabetes Father     Heart Disease Father     Diabetes Sister     High Blood Pressure Sister        Social History:  Social History     Social History    Marital status: Single     Spouse name: N/A    Number of children: 2    Years of education: N/A     Occupational History    Not on file.      Social History Main Topics    Smoking status: Never Smoker    Smokeless tobacco: Never Used    Alcohol use No    Drug use: No    Sexual activity: No     Other Topics Concern    Not on file     Social History Narrative    No narrative on file         OBJECTIVE:  Physical Exam   BP (!) 141/66 (Site: Right Arm, Position: Sitting, Cuff Size: Large Adult)   Pulse 99   Ht 5' 5\" (1.651 m)   Wt 298 lb 12.8 oz (135.5 kg)   BMI 49.72 kg/m²        NUTRITION DIAGNOSIS: Overweight / Obesity   Problem: Increased adiposity compared to reference standard or established norm   Etiology: Excess intake compared to output over time   S/S: Ht: 65\" Wt: 298.8 lbs BMI: 49.72    NUTRITION INTERVENTIONS:    Individualized treatment goals to address nutrition diagnosis:   Instructed on Pre and Post Op diet for Sleeve gastrectomy   Provided sample menus, pt education binder, and recipe book   Encouraged Physical activity as approved by physician    MONITORING/ EVALUATION/ PLAN:   Pt verbalized understanding of all materials covered   Pt asked pertinent questions throughout the session - expect compliance with nutrition guidelines presented   Provided pt with contact information should questions arise prior to next visit   Will f/u with pt weekly  Janeen Singh MS, RDN, LD  3/30/2018

## 2018-04-01 ENCOUNTER — HOSPITAL ENCOUNTER (OUTPATIENT)
Dept: OTHER | Age: 68
Discharge: HOME | End: 2018-04-30
Attending: NURSE PRACTITIONER | Admitting: NURSE PRACTITIONER

## 2018-04-10 ENCOUNTER — TELEPHONE (OUTPATIENT)
Dept: BARIATRICS/WEIGHT MGMT | Age: 68
End: 2018-04-10

## 2018-04-10 ENCOUNTER — HOSPITAL ENCOUNTER (OUTPATIENT)
Dept: PREADMISSION TESTING | Age: 68
Discharge: HOME | End: 2018-04-10
Attending: SURGERY | Admitting: SURGERY

## 2018-04-10 VITALS
WEIGHT: 284.25 LBS | HEART RATE: 80 BPM | SYSTOLIC BLOOD PRESSURE: 130 MMHG | DIASTOLIC BLOOD PRESSURE: 67 MMHG | HEIGHT: 64 IN | OXYGEN SATURATION: 96 % | TEMPERATURE: 96.8 F | BODY MASS INDEX: 48.53 KG/M2 | RESPIRATION RATE: 16 BRPM

## 2018-04-10 LAB
ALBUMIN SERPL-MCNC: 4.1 GM/DL (ref 3.4–5)
ALP BLD-CCNC: 129 IU/L (ref 40–129)
ALT SERPL-CCNC: 26 U/L (ref 10–40)
ANION GAP SERPL CALCULATED.3IONS-SCNC: 11 MMOL/L (ref 4–16)
AST SERPL-CCNC: 28 IU/L (ref 15–37)
BILIRUB SERPL-MCNC: 0.3 MG/DL (ref 0–1)
BUN BLDV-MCNC: 30 MG/DL (ref 6–23)
CALCIUM SERPL-MCNC: 10.8 MG/DL (ref 8.3–10.6)
CHLORIDE BLD-SCNC: 102 MMOL/L (ref 99–110)
CO2: 32 MMOL/L (ref 21–32)
CREAT SERPL-MCNC: 2.6 MG/DL (ref 0.6–1.1)
ESTIMATED AVERAGE GLUCOSE: 157 MG/DL
GFR AFRICAN AMERICAN: 22 ML/MIN/1.73M2
GFR NON-AFRICAN AMERICAN: 18 ML/MIN/1.73M2
GLUCOSE BLD-MCNC: 91 MG/DL (ref 70–99)
HBA1C MFR BLD: 7.1 % (ref 4.2–6.3)
HCT VFR BLD CALC: 34.7 % (ref 37–47)
HEMOGLOBIN: 10.7 GM/DL (ref 12.5–16)
MCH RBC QN AUTO: 27.2 PG (ref 27–31)
MCHC RBC AUTO-ENTMCNC: 30.8 % (ref 32–36)
MCV RBC AUTO: 88.3 FL (ref 78–100)
PDW BLD-RTO: 18.6 % (ref 11.7–14.9)
PLATELET # BLD: 239 K/CU MM (ref 140–440)
PMV BLD AUTO: 11.4 FL (ref 7.5–11.1)
POTASSIUM SERPL-SCNC: 3 MMOL/L (ref 3.5–5.1)
RBC # BLD: 3.93 M/CU MM (ref 4.2–5.4)
SODIUM BLD-SCNC: 145 MMOL/L (ref 135–145)
TOTAL PROTEIN: 6.5 GM/DL (ref 6.4–8.2)
WBC # BLD: 7.8 K/CU MM (ref 4–10.5)

## 2018-04-10 RX ORDER — RANOLAZINE 500 MG/1
500 TABLET, EXTENDED RELEASE ORAL 2 TIMES DAILY
COMMUNITY

## 2018-04-10 RX ORDER — HEPARIN SODIUM 5000 [USP'U]/ML
5000 INJECTION, SOLUTION INTRAVENOUS; SUBCUTANEOUS ONCE
Status: CANCELLED | OUTPATIENT
Start: 2018-04-16

## 2018-04-10 RX ORDER — BUPROPION HYDROCHLORIDE 300 MG/1
300 TABLET ORAL EVERY MORNING
COMMUNITY
End: 2021-02-15

## 2018-04-10 RX ORDER — LORAZEPAM 0.5 MG/1
0.5 TABLET ORAL 2 TIMES DAILY
COMMUNITY

## 2018-04-10 RX ORDER — PREDNISONE 1 MG/1
5 TABLET ORAL 2 TIMES DAILY
Status: ON HOLD | COMMUNITY
End: 2018-04-20 | Stop reason: HOSPADM

## 2018-04-10 RX ORDER — CARVEDILOL 6.25 MG/1
6.25 TABLET ORAL 2 TIMES DAILY
COMMUNITY
End: 2020-10-08

## 2018-04-10 RX ORDER — HYDROXYZINE HYDROCHLORIDE 25 MG/1
25 TABLET, FILM COATED ORAL PRN
Status: ON HOLD | COMMUNITY
End: 2019-07-24 | Stop reason: HOSPADM

## 2018-04-10 RX ORDER — POLYETHYLENE GLYCOL 3350 17 G/17G
17 POWDER, FOR SOLUTION ORAL DAILY
COMMUNITY
End: 2021-06-09

## 2018-04-10 RX ORDER — OMEPRAZOLE 20 MG/1
20 CAPSULE, DELAYED RELEASE ORAL 2 TIMES DAILY
Status: ON HOLD | COMMUNITY
End: 2018-04-20 | Stop reason: HOSPADM

## 2018-04-10 RX ORDER — ATORVASTATIN CALCIUM 80 MG/1
80 TABLET, FILM COATED ORAL NIGHTLY
COMMUNITY

## 2018-04-11 LAB
EKG ATRIAL RATE: 79 BPM
EKG DIAGNOSIS: NORMAL
EKG P AXIS: 62 DEGREES
EKG P-R INTERVAL: 176 MS
EKG Q-T INTERVAL: 444 MS
EKG QRS DURATION: 108 MS
EKG QTC CALCULATION (BAZETT): 509 MS
EKG R AXIS: 96 DEGREES
EKG T AXIS: 43 DEGREES
EKG VENTRICULAR RATE: 79 BPM

## 2018-04-12 ENCOUNTER — OFFICE VISIT (OUTPATIENT)
Dept: BARIATRICS/WEIGHT MGMT | Age: 68
End: 2018-04-12

## 2018-04-12 VITALS
HEART RATE: 99 BPM | BODY MASS INDEX: 46.72 KG/M2 | DIASTOLIC BLOOD PRESSURE: 72 MMHG | WEIGHT: 280.4 LBS | SYSTOLIC BLOOD PRESSURE: 116 MMHG | HEIGHT: 65 IN | RESPIRATION RATE: 16 BRPM

## 2018-04-12 DIAGNOSIS — Z99.89 DEPENDENT ON WALKER FOR AMBULATION: ICD-10-CM

## 2018-04-12 DIAGNOSIS — N18.30 HYPERTENSIVE KIDNEY DISEASE WITH CKD STAGE III (HCC): ICD-10-CM

## 2018-04-12 DIAGNOSIS — E87.6 HYPOKALEMIA: Primary | ICD-10-CM

## 2018-04-12 DIAGNOSIS — E66.01 MORBID OBESITY WITH BMI OF 45.0-49.9, ADULT (HCC): ICD-10-CM

## 2018-04-12 DIAGNOSIS — I12.9 HYPERTENSIVE KIDNEY DISEASE WITH CKD STAGE III (HCC): ICD-10-CM

## 2018-04-12 PROCEDURE — 1036F TOBACCO NON-USER: CPT | Performed by: NURSE PRACTITIONER

## 2018-04-12 PROCEDURE — G8427 DOCREV CUR MEDS BY ELIG CLIN: HCPCS | Performed by: NURSE PRACTITIONER

## 2018-04-12 PROCEDURE — 99213 OFFICE O/P EST LOW 20 MIN: CPT | Performed by: NURSE PRACTITIONER

## 2018-04-12 PROCEDURE — 3017F COLORECTAL CA SCREEN DOC REV: CPT | Performed by: NURSE PRACTITIONER

## 2018-04-12 PROCEDURE — 1123F ACP DISCUSS/DSCN MKR DOCD: CPT | Performed by: NURSE PRACTITIONER

## 2018-04-12 PROCEDURE — G8417 CALC BMI ABV UP PARAM F/U: HCPCS | Performed by: NURSE PRACTITIONER

## 2018-04-12 PROCEDURE — 4040F PNEUMOC VAC/ADMIN/RCVD: CPT | Performed by: NURSE PRACTITIONER

## 2018-04-12 PROCEDURE — 1090F PRES/ABSN URINE INCON ASSESS: CPT | Performed by: NURSE PRACTITIONER

## 2018-04-12 PROCEDURE — G8399 PT W/DXA RESULTS DOCUMENT: HCPCS | Performed by: NURSE PRACTITIONER

## 2018-04-12 PROCEDURE — G8598 ASA/ANTIPLAT THER USED: HCPCS | Performed by: NURSE PRACTITIONER

## 2018-04-12 PROCEDURE — 3014F SCREEN MAMMO DOC REV: CPT | Performed by: NURSE PRACTITIONER

## 2018-04-12 ASSESSMENT — ENCOUNTER SYMPTOMS
CHOKING: 0
RHINORRHEA: 0
ABDOMINAL DISTENTION: 0
NAUSEA: 0
EYE PAIN: 0
CHEST TIGHTNESS: 0
SHORTNESS OF BREATH: 1
TROUBLE SWALLOWING: 0
ABDOMINAL PAIN: 0
COUGH: 0
DIARRHEA: 0
BACK PAIN: 1
WHEEZING: 0

## 2018-04-13 DIAGNOSIS — E87.6 HYPOKALEMIA: ICD-10-CM

## 2018-04-13 LAB — POTASSIUM SERPL-SCNC: 3.1 MMOL/L (ref 3.5–5.1)

## 2018-04-16 PROBLEM — Z98.890 POST-OPERATIVE STATE: Status: ACTIVE | Noted: 2018-04-16

## 2018-04-20 PROBLEM — Z98.84 STATUS POST LAPAROSCOPIC SLEEVE GASTRECTOMY: Status: ACTIVE | Noted: 2018-04-20

## 2018-04-23 ENCOUNTER — TELEPHONE (OUTPATIENT)
Dept: BARIATRICS/WEIGHT MGMT | Age: 68
End: 2018-04-23

## 2018-04-25 ENCOUNTER — OFFICE VISIT (OUTPATIENT)
Dept: BARIATRICS/WEIGHT MGMT | Age: 68
End: 2018-04-25

## 2018-04-25 VITALS
HEIGHT: 65 IN | RESPIRATION RATE: 16 BRPM | WEIGHT: 266.5 LBS | BODY MASS INDEX: 44.4 KG/M2 | DIASTOLIC BLOOD PRESSURE: 68 MMHG | HEART RATE: 93 BPM | SYSTOLIC BLOOD PRESSURE: 125 MMHG

## 2018-04-25 DIAGNOSIS — Z98.84 STATUS POST LAPAROSCOPIC SLEEVE GASTRECTOMY: Primary | ICD-10-CM

## 2018-04-25 DIAGNOSIS — Z98.84 STATUS POST BARIATRIC SURGERY: ICD-10-CM

## 2018-04-25 PROCEDURE — 99024 POSTOP FOLLOW-UP VISIT: CPT | Performed by: SURGERY

## 2018-04-25 RX ORDER — ONDANSETRON 4 MG/1
4 TABLET, ORALLY DISINTEGRATING ORAL EVERY 4 HOURS PRN
Qty: 30 TABLET | Refills: 3 | Status: SHIPPED | OUTPATIENT
Start: 2018-04-25 | End: 2018-05-09 | Stop reason: ALTCHOICE

## 2018-04-25 RX ORDER — PROMETHAZINE HYDROCHLORIDE 25 MG/1
25 TABLET ORAL EVERY 6 HOURS PRN
Qty: 30 TABLET | Refills: 3 | Status: SHIPPED | OUTPATIENT
Start: 2018-04-25 | End: 2018-05-02

## 2018-04-25 RX ORDER — IBUPROFEN 200 MG
TABLET ORAL
Qty: 1 TUBE | Refills: 3 | Status: ON HOLD | OUTPATIENT
Start: 2018-04-25 | End: 2019-07-18

## 2018-04-25 RX ORDER — METOCLOPRAMIDE 10 MG/1
10 TABLET ORAL
Qty: 120 TABLET | Refills: 3 | Status: SHIPPED | OUTPATIENT
Start: 2018-04-25 | End: 2020-03-24

## 2018-05-09 ENCOUNTER — HOSPITAL ENCOUNTER (OUTPATIENT)
Dept: WOUND CARE | Age: 68
Discharge: HOME | End: 2018-05-09
Attending: ORTHOPAEDIC SURGERY | Admitting: ORTHOPAEDIC SURGERY

## 2018-05-09 VITALS
HEIGHT: 65 IN | BODY MASS INDEX: 42.99 KG/M2 | TEMPERATURE: 98.1 F | SYSTOLIC BLOOD PRESSURE: 100 MMHG | HEART RATE: 99 BPM | DIASTOLIC BLOOD PRESSURE: 68 MMHG | WEIGHT: 258 LBS | RESPIRATION RATE: 16 BRPM

## 2018-05-09 DIAGNOSIS — G89.29 CHRONIC BUTTOCK PAIN: ICD-10-CM

## 2018-05-09 DIAGNOSIS — M79.18 CHRONIC BUTTOCK PAIN: ICD-10-CM

## 2018-05-09 DIAGNOSIS — R91.1 SOLITARY PULMONARY NODULE: ICD-10-CM

## 2018-05-10 ENCOUNTER — OFFICE VISIT (OUTPATIENT)
Dept: BARIATRICS/WEIGHT MGMT | Age: 68
End: 2018-05-10

## 2018-05-10 VITALS
SYSTOLIC BLOOD PRESSURE: 101 MMHG | DIASTOLIC BLOOD PRESSURE: 55 MMHG | HEART RATE: 92 BPM | HEIGHT: 65 IN | WEIGHT: 261.1 LBS | BODY MASS INDEX: 43.5 KG/M2 | RESPIRATION RATE: 12 BRPM

## 2018-05-10 DIAGNOSIS — Z98.84 STATUS POST LAPAROSCOPIC SLEEVE GASTRECTOMY: ICD-10-CM

## 2018-05-10 DIAGNOSIS — E66.01 MORBID OBESITY WITH BMI OF 45.0-49.9, ADULT (HCC): Primary | ICD-10-CM

## 2018-05-10 PROCEDURE — 99024 POSTOP FOLLOW-UP VISIT: CPT | Performed by: NURSE PRACTITIONER

## 2018-05-10 ASSESSMENT — ENCOUNTER SYMPTOMS
TROUBLE SWALLOWING: 0
NAUSEA: 0
CHEST TIGHTNESS: 0
EYE PAIN: 0
RHINORRHEA: 0
ABDOMINAL PAIN: 0
WHEEZING: 0
DIARRHEA: 0
SHORTNESS OF BREATH: 0
ABDOMINAL DISTENTION: 0

## 2018-05-15 RX ORDER — DOCUSATE SODIUM -SENNOSIDES 50; 8.6 MG/1; MG/1
TABLET, COATED ORAL
Qty: 20 TABLET | Refills: 0 | Status: SHIPPED | OUTPATIENT
Start: 2018-05-15 | End: 2021-06-09 | Stop reason: ALTCHOICE

## 2018-05-16 PROBLEM — Z98.890 POST-OPERATIVE STATE: Status: RESOLVED | Noted: 2018-04-16 | Resolved: 2018-05-16

## 2018-05-25 ENCOUNTER — OFFICE VISIT (OUTPATIENT)
Dept: BARIATRICS/WEIGHT MGMT | Age: 68
End: 2018-05-25

## 2018-05-25 VITALS
DIASTOLIC BLOOD PRESSURE: 70 MMHG | HEART RATE: 80 BPM | WEIGHT: 259.2 LBS | HEIGHT: 65 IN | BODY MASS INDEX: 43.18 KG/M2 | SYSTOLIC BLOOD PRESSURE: 110 MMHG

## 2018-05-25 DIAGNOSIS — Z98.84 STATUS POST LAPAROSCOPIC SLEEVE GASTRECTOMY: ICD-10-CM

## 2018-05-25 DIAGNOSIS — Z98.84 STATUS POST BARIATRIC SURGERY: Primary | ICD-10-CM

## 2018-05-25 PROCEDURE — 99024 POSTOP FOLLOW-UP VISIT: CPT | Performed by: SURGERY

## 2018-07-02 ENCOUNTER — OFFICE VISIT (OUTPATIENT)
Dept: BARIATRICS/WEIGHT MGMT | Age: 68
End: 2018-07-02

## 2018-07-02 VITALS
BODY MASS INDEX: 43.28 KG/M2 | RESPIRATION RATE: 20 BRPM | SYSTOLIC BLOOD PRESSURE: 150 MMHG | HEIGHT: 65 IN | HEART RATE: 80 BPM | DIASTOLIC BLOOD PRESSURE: 71 MMHG | WEIGHT: 259.8 LBS

## 2018-07-02 DIAGNOSIS — Z98.84 STATUS POST LAPAROSCOPIC SLEEVE GASTRECTOMY: Primary | ICD-10-CM

## 2018-07-02 DIAGNOSIS — E66.01 MORBID OBESITY WITH BMI OF 45.0-49.9, ADULT (HCC): ICD-10-CM

## 2018-07-02 DIAGNOSIS — E13.9 DIABETES 1.5, MANAGED AS TYPE 2 (HCC): ICD-10-CM

## 2018-07-02 PROCEDURE — 99024 POSTOP FOLLOW-UP VISIT: CPT | Performed by: NURSE PRACTITIONER

## 2018-07-02 ASSESSMENT — ENCOUNTER SYMPTOMS
BACK PAIN: 1
NAUSEA: 0
EYE PAIN: 0
DIARRHEA: 0
WHEEZING: 0
ABDOMINAL DISTENTION: 0
SHORTNESS OF BREATH: 0
RHINORRHEA: 0
TROUBLE SWALLOWING: 0
CHEST TIGHTNESS: 0
ABDOMINAL PAIN: 0

## 2018-07-02 NOTE — PROGRESS NOTES
Supplement: Slim Quick  Patient taking multivitamins: Yes  Does patient exercise: intermittently  What prevents you from exercising: inactivity, usually does home PT. Needs to continue PT. Shake in the am.   Eating filler foods- low protein. Counseled. MVI daily  PT, needs reordered. Protein level unsure. Hydration is good. Current Outpatient Prescriptions:     clopidogrel (PLAVIX) 75 MG tablet, Take 75 mg by mouth daily, Disp: , Rfl:     hydroxychloroquine (PLAQUENIL) 200 MG tablet, Take by mouth 2 times daily, Disp: , Rfl:     promethazine (PHENERGAN) 25 MG tablet, Take 25 mg by mouth every 6 hours as needed for Nausea, Disp: , Rfl:     linagliptin (TRADJENTA) 5 MG tablet, Take 5 mg by mouth daily, Disp: , Rfl:     linaclotide (LINZESS) 290 MCG CAPS capsule, Take 290 mcg by mouth every morning (before breakfast), Disp: , Rfl:     Multiple Vitamin (MVI, BARIATRIC ADVANTAGE MULTI-FORMULA, CHEW TAB), Take 1 tablet by mouth daily, Disp: 30 tablet, Rfl: 5    SENEXON-S 8.6-50 MG per tablet, TAKE ONE TABLET BY MOUTH DAILY WHILE ON NARCOTIC PAIN MEDICATION, Disp: 20 tablet, Rfl: 0    Multiple Vitamin (MVI, CELEBRATE, CHEWABLE TABLET), Take 1 tablet by mouth daily, Disp: 30 tablet, Rfl: 3    CARTIA  MG extended release capsule, TAKE ONE CAPSULE BY MOUTH DAILY, Disp: 30 capsule, Rfl: 5    neomycin-bacitracin-polymyxin (NEOSPORIN) 5-400-5000 ointment, Apply topically 4 times daily. , Disp: 1 Tube, Rfl: 3    metoclopramide (REGLAN) 10 MG tablet, Take 1 tablet by mouth 3 times daily (with meals), Disp: 120 tablet, Rfl: 3    acetaminophen (TYLENOL) 325 MG tablet, Take 2 tablets by mouth every 6 hours as needed for Fever (Fever >100.5 F (38 C)), Disp: 28 tablet, Rfl: 0    pantoprazole (PROTONIX) 40 MG tablet, Take 1 tablet by mouth 2 times daily, Disp: 60 tablet, Rfl: 3    LORazepam (ATIVAN) 0.5 MG tablet, Take 0.5 mg by mouth 2 times daily. , Disp: , Rfl:     carvedilol (COREG) 6.25 MG tablet, Take 6.25 mg by mouth 2 times daily, Disp: , Rfl:     hydrOXYzine (ATARAX) 25 MG tablet, Take 25 mg by mouth as needed for Itching, Disp: , Rfl:     Polysaccharide Iron Complex (PROFE) 391.3 (180 Fe) MG CAPS, Take by mouth every morning, Disp: , Rfl:     atorvastatin (LIPITOR) 80 MG tablet, Take 80 mg by mouth nightly, Disp: , Rfl:     buPROPion (WELLBUTRIN XL) 300 MG extended release tablet, Take 300 mg by mouth every morning, Disp: , Rfl:     ranolazine (RANEXA) 500 MG extended release tablet, Take 500 mg by mouth 2 times daily, Disp: , Rfl:     polyethylene glycol (GLYCOLAX) powder, Take 17 g by mouth daily, Disp: , Rfl:     Nebulizer MISC, Inhale into the lungs as needed, Disp: , Rfl:     potassium chloride (KLOR-CON M) 10 MEQ extended release tablet, Take 1 tablet by mouth 2 times daily, Disp: 60 tablet, Rfl: 5    furosemide (LASIX) 20 MG tablet, Take 1 tablet by mouth 2 times daily, Disp: 60 tablet, Rfl: 5    QUEtiapine (SEROQUEL XR) 300 MG extended release tablet, Take 300 mg by mouth nightly, Disp: , Rfl:     loratadine (CLARITIN) 10 MG capsule, Take 10 mg by mouth every morning , Disp: , Rfl:     levomilnacipran (FETZIMA) 40 MG CP24 extended release capsule, Take 1 capsule by mouth daily (Patient taking differently: Take 20 mg by mouth nightly ), Disp: 30 capsule, Rfl: 0    allopurinol (ZYLOPRIM) 100 MG tablet, Take 3 tablets by mouth daily, Disp: 30 tablet, Rfl: 0    tiotropium (SPIRIVA RESPIMAT) 2.5 MCG/ACT AERS inhaler, Inhale 2 puffs into the lungs daily, Disp: 1 Inhaler, Rfl: 6    albuterol sulfate  (90 Base) MCG/ACT inhaler, Inhale 2 puffs into the lungs every 6 hours as needed for Wheezing, Disp: 1 Inhaler, Rfl: 5    folic acid (FOLVITE) 1 MG tablet, Take 1 mg by mouth every morning , Disp: , Rfl:     nitroGLYCERIN (NITROSTAT) 0.4 MG SL tablet, Place 0.4 mg under the tongue every 5 minutes as needed for Chest pain, Disp: , Rfl:     fluticasone (FLONASE) 50 MCG/ACT nasal spray, 2 sprays by Nasal route every morning , Disp: , Rfl:   Past Medical History:   Diagnosis Date    Acid reflux     Anemia     Arthritis     \"Shoulders, Hips And Knees\"    CAD (coronary artery disease)     Sees Dr. Bayron Spence CHF (congestive heart failure) ([facility]     Chronic back pain     Chronic constipation     Chronic kidney disease     Sees Dr. Asim Pulido COPD (chronic obstructive pulmonary disease) ([facility]     Sees Dr. Mayra Cabral Depression     Diabetes mellitus [facility]) Dx     Sees Dr. Kailee Dailey    Emphysema lung [facility])     Glaucoma     \"Both Eyes\"    Gout     Hiatal hernia     History of blood transfusion 's    No Reaction To Blood Transfusion Received    [place] (hard of hearing)     Bilateral Ears    Hyperlipidemia     Hypertension     Itching     \"Whole Body Itches The Majority Of The Time\"    Morbid obesity ([facility]     Rheumatoid arthritis ([facility]     Shortness of breath on exertion     Sleep apnea     Uses CPAP    Teeth missing     Upper And Lower    Thyroid disease     Thyroidectomy In     Urinary incontinence     WD-Chronic buttock pain 2018    Wears glasses       Past Surgical History:   Procedure Laterality Date    APPENDECTOMY  1968    BACK SURGERY  2017    Lower Back With Hardware    BACK SURGERY  2016    Cervical Back With Hardware    CARPAL TUNNEL RELEASE Right 1993     SECTION  3-30-68 And 10-17-69    COLONOSCOPY  2017    Polyps Removed    CORONARY ANGIOPLASTY  2016    Heart Stent D Circ    DENTAL SURGERY      Teeth Extracted In Past    DILATION AND CURETTAGE OF UTERUS  Last Done In [address] [place] [address]    \"Numerous\"    ENDOSCOPY, COLON, DIAGNOSTIC  2017    HYSTERECTOMY  1990    Partial Abdominal Hysterectomy    ROTATOR CUFF REPAIR Left 2010    SLEEVE GASTRECTOMY  2018    robotic assisted gastric sleeve, hiatal hernia repair    THYROIDECTOMY       Social History     Social History    Marital status: Single     Spouse name: N/A    Number of children: 2    Years of education: N/A     Social History Main Topics    Smoking status: Never Smoker    Smokeless tobacco: Never Used    Alcohol use No    Drug use: No    Sexual activity: No     Other Topics Concern    None     Social History Narrative    None     Review of Systems   Constitutional: Negative. Negative for appetite change, fatigue and fever. HENT: Negative for congestion, dental problem, hearing loss, rhinorrhea and trouble swallowing. Eyes: Negative for pain. Respiratory: Negative for chest tightness, shortness of breath and wheezing. Cardiovascular: Negative for chest pain, palpitations and leg swelling. Gastrointestinal: Negative for abdominal distention, abdominal pain, diarrhea and nausea. Endocrine: Negative for cold intolerance and polydipsia. Genitourinary: Negative for difficulty urinating and frequency. Musculoskeletal: Positive for arthralgias and back pain. Negative for gait problem. Skin: Negative for rash. Allergic/Immunologic: Negative for environmental allergies. Neurological: Negative for dizziness, seizures and syncope. Hematological: Does not bruise/bleed easily. Psychiatric/Behavioral: Negative for behavioral problems and suicidal ideas. Physical Exam   Constitutional: She is oriented to person, place, and time. She appears well-developed and well-nourished. Obese   HENT:   Head: Normocephalic and atraumatic. Right Ear: Hearing and ear canal normal.   Left Ear: Hearing and ear canal normal.   Nose: Nose normal.   Mouth/Throat: Uvula is midline and oropharynx is clear and moist.   Eyes: Conjunctivae are normal. Pupils are equal, round, and reactive to light. Neck: Normal range of motion. Cardiovascular: Normal rate, regular rhythm and normal heart sounds. Pulmonary/Chest: Effort normal and breath sounds normal. She has no decreased breath sounds. She has no wheezes. She has no rhonchi. She has no rales.

## 2018-07-06 ENCOUNTER — HOSPITAL ENCOUNTER (OUTPATIENT)
Dept: GENERAL RADIOLOGY | Age: 68
Discharge: HOME | End: 2018-07-06
Attending: NURSE PRACTITIONER | Admitting: NURSE PRACTITIONER

## 2018-07-06 LAB
CHOLESTEROL: 182 MG/DL
ESTIMATED AVERAGE GLUCOSE: 174 MG/DL
FOLATE: >20 NG/ML (ref 3.1–17.5)
HBA1C MFR BLD: 7.7 % (ref 4.2–6.3)
HDLC SERPL-MCNC: 62 MG/DL
LDL CHOLESTEROL DIRECT: 105 MG/DL
TRIGL SERPL-MCNC: 135 MG/DL
TSH HIGH SENSITIVITY: 4.85 UIU/ML (ref 0.27–4.2)
VITAMIN B-12: 759.8 PG/ML (ref 211–911)
VITAMIN D 25-HYDROXY: 17.42 NG/ML

## 2018-07-08 LAB — PARATHYROID HORMONE INTACT: 166

## 2018-07-09 LAB — ZINC: 85

## 2018-07-10 ENCOUNTER — TELEPHONE (OUTPATIENT)
Dept: BARIATRICS/WEIGHT MGMT | Age: 68
End: 2018-07-10

## 2018-07-10 DIAGNOSIS — E66.01 MORBID OBESITY WITH BMI OF 45.0-49.9, ADULT (HCC): Primary | ICD-10-CM

## 2018-07-10 LAB — VITAMIN B1, PLASMA: 117

## 2018-07-10 NOTE — TELEPHONE ENCOUNTER
Patient returned phone call, discussed labs in detail. Aware to start Vit D/Calcium tablet and continue MVI. Aware of A1C level and will schedule with Jawadi regarding insulin. Also notified on TSH- states aware. No further questions in instructed on medications. Aware to call with any questions/concerns.

## 2018-07-18 ENCOUNTER — TELEPHONE (OUTPATIENT)
Dept: BARIATRICS/WEIGHT MGMT | Age: 68
End: 2018-07-18

## 2018-07-30 DIAGNOSIS — E66.01 MORBID OBESITY WITH BMI OF 45.0-49.9, ADULT (HCC): ICD-10-CM

## 2018-07-30 DIAGNOSIS — E13.9 DIABETES 1.5, MANAGED AS TYPE 2 (HCC): ICD-10-CM

## 2018-08-02 LAB — ZINC: 76 UG/DL (ref 60–120)

## 2018-08-21 ENCOUNTER — OFFICE VISIT (OUTPATIENT)
Dept: BARIATRICS/WEIGHT MGMT | Age: 68
End: 2018-08-21

## 2018-08-21 VITALS
BODY MASS INDEX: 42.78 KG/M2 | WEIGHT: 256.8 LBS | DIASTOLIC BLOOD PRESSURE: 74 MMHG | SYSTOLIC BLOOD PRESSURE: 139 MMHG | HEART RATE: 82 BPM | HEIGHT: 65 IN

## 2018-08-21 DIAGNOSIS — E66.01 MORBID OBESITY WITH BMI OF 40.0-44.9, ADULT (HCC): Primary | ICD-10-CM

## 2018-08-21 PROCEDURE — 99999 PR OFFICE/OUTPT VISIT,PROCEDURE ONLY: CPT

## 2018-08-21 NOTE — LETTER
[address] [address]  Phone: [phone]  Fax: [phone]    Sarah Urias MS, RD, LD        August 21, 2018     Patient: Priya Clark   YOB: 1950   Date of Visit: 8/21/2018       To Whom it May Concern:    Soo Goodman was seen in my clinic on 8/21/2018. Patient kept this appointment. If you have any questions or concerns, please don't hesitate to call.     Sincerely,         Sarah Urias MS, RD, LD

## 2018-08-21 NOTE — PROGRESS NOTES
Outpatient Nutrition Counseling    REASON FOR VISIT: Post-Op F/U    Chief Complaint:    Chief Complaint   Patient presents with    Weight Management       SUBJECTIVE:  Pt here for f/u for slow weight loss. Pt is 4 months s/p sleeve gastrectomy and has lost 28 lbs overall. Since her last visit pt has switched back to drinking protein shakes for 1-2 meals daily. Drinks 48-64 oz of fluid daily. Dinner meal is usually protein and vegetables. Pt eats 3 popsicles daily. Total calorie intake estimated to be between 700-800 kcals daily. Encouraged pt to keep within the range of 600-800 consistently. Continue adequate water and if not using shake make sure meals includes good protein choice. Pt voiced understanding. The patient is a 79 y.o. female being seen for morbid obesity, 4 months s/p weight loss surgery; Madhavi's, Height: 5' 5\" (165.1 cm), Weight: 256 lb 12.8 oz (116.5 kg), Current Body mass index is 42.73 kg/m².    The patient's PCP is Susana Coburn MD     Comorbid Conditions:  Significant diseases affecting this patient are   Past Medical History:   Diagnosis Date    Acid reflux     Anemia     Arthritis     \"Shoulders, Hips And Knees\"    CAD (coronary artery disease)     Sees Dr. Evelyne Nunez CHF (congestive [facility] failure[facility]     Chronic back pain     Chronic constipation     Chronic kidney disease     Sees Dr. Bill Thomas COPD (chronic obstructive pulmonary disease) ([facility]     Sees Dr. Marquita Loera Depression     Diabetes mellitus [facility]) Dx 2012    Sees Dr. Tony Hernandez    Emphysema lung [facility])     Glaucoma     \"Both Eyes\"    Gout     Hiatal hernia     History of blood transfusion 1960's    No Reaction To Blood Transfusion Received    [place] (hard of hearing)     Bilateral Ears    Hyperlipidemia     Hypertension     Itching     \"Whole Body Itches The Majority Of The Time\"    Morbid obesity ([address]     Rheumatoid arthritis ([facility]     Shortness of breath on exertion     Sleep apnea     Uses

## 2018-09-05 ENCOUNTER — OFFICE VISIT (OUTPATIENT)
Dept: BARIATRICS/WEIGHT MGMT | Age: 68
End: 2018-09-05

## 2018-09-05 VITALS
HEIGHT: 65 IN | SYSTOLIC BLOOD PRESSURE: 130 MMHG | BODY MASS INDEX: 42.87 KG/M2 | DIASTOLIC BLOOD PRESSURE: 71 MMHG | WEIGHT: 257.3 LBS | HEART RATE: 86 BPM | RESPIRATION RATE: 16 BRPM

## 2018-09-05 DIAGNOSIS — E13.9 DIABETES 1.5, MANAGED AS TYPE 2 (HCC): Primary | ICD-10-CM

## 2018-09-05 DIAGNOSIS — E66.01 MORBID OBESITY WITH BMI OF 40.0-44.9, ADULT (HCC): ICD-10-CM

## 2018-09-05 PROCEDURE — G8417 CALC BMI ABV UP PARAM F/U: HCPCS | Performed by: SURGERY

## 2018-09-05 PROCEDURE — 1090F PRES/ABSN URINE INCON ASSESS: CPT | Performed by: SURGERY

## 2018-09-05 PROCEDURE — 2022F DILAT RTA XM EVC RTNOPTHY: CPT | Performed by: SURGERY

## 2018-09-05 PROCEDURE — 1036F TOBACCO NON-USER: CPT | Performed by: SURGERY

## 2018-09-05 PROCEDURE — G8399 PT W/DXA RESULTS DOCUMENT: HCPCS | Performed by: SURGERY

## 2018-09-05 PROCEDURE — 99214 OFFICE O/P EST MOD 30 MIN: CPT | Performed by: SURGERY

## 2018-09-05 PROCEDURE — 3017F COLORECTAL CA SCREEN DOC REV: CPT | Performed by: SURGERY

## 2018-09-05 PROCEDURE — 3014F SCREEN MAMMO DOC REV: CPT | Performed by: SURGERY

## 2018-09-05 PROCEDURE — 3045F PR MOST RECENT HEMOGLOBIN A1C LEVEL 7.0-9.0%: CPT | Performed by: SURGERY

## 2018-09-05 PROCEDURE — 1101F PT FALLS ASSESS-DOCD LE1/YR: CPT | Performed by: SURGERY

## 2018-09-05 PROCEDURE — 4040F PNEUMOC VAC/ADMIN/RCVD: CPT | Performed by: SURGERY

## 2018-09-05 PROCEDURE — G8427 DOCREV CUR MEDS BY ELIG CLIN: HCPCS | Performed by: SURGERY

## 2018-09-05 PROCEDURE — G8598 ASA/ANTIPLAT THER USED: HCPCS | Performed by: SURGERY

## 2018-09-05 PROCEDURE — 1123F ACP DISCUSS/DSCN MKR DOCD: CPT | Performed by: SURGERY

## 2018-09-05 ASSESSMENT — ENCOUNTER SYMPTOMS
ANAL BLEEDING: 0
ABDOMINAL PAIN: 0
VOMITING: 0
BLOOD IN STOOL: 0
COUGH: 0
SORE THROAT: 0
PHOTOPHOBIA: 0
COLOR CHANGE: 0
WHEEZING: 0
VOICE CHANGE: 0
NAUSEA: 0
DIARRHEA: 0
TROUBLE SWALLOWING: 0
SHORTNESS OF BREATH: 0
CONSTIPATION: 0

## 2018-09-05 NOTE — PROGRESS NOTES
lungs as needed, Disp: , Rfl:     QUEtiapine (SEROQUEL XR) 300 MG extended release tablet, Take 300 mg by mouth nightly, Disp: , Rfl:     loratadine (CLARITIN) 10 MG capsule, Take 10 mg by mouth every morning , Disp: , Rfl:     levomilnacipran (FETZIMA) 40 MG CP24 extended release capsule, Take 1 capsule by mouth daily (Patient taking differently: Take 20 mg by mouth nightly ), Disp: 30 capsule, Rfl: 0    allopurinol (ZYLOPRIM) 100 MG tablet, Take 3 tablets by mouth daily, Disp: 30 tablet, Rfl: 0    tiotropium (SPIRIVA RESPIMAT) 2.5 MCG/ACT AERS inhaler, Inhale 2 puffs into the lungs daily, Disp: 1 Inhaler, Rfl: 6    folic acid (FOLVITE) 1 MG tablet, Take 1 mg by mouth every morning , Disp: , Rfl:     nitroGLYCERIN (NITROSTAT) 0.4 MG SL tablet, Place 0.4 mg under the tongue every 5 minutes as needed for Chest pain, Disp: , Rfl:     fluticasone (FLONASE) 50 MCG/ACT nasal spray, 2 sprays by Nasal route every morning , Disp: , Rfl:   Past Medical History:   Diagnosis Date    Acid reflux     Anemia     Arthritis     \"Shoulders, Hips And Knees\"    CAD (coronary artery disease)     Sees Dr. Francesco Martin CHF (congestive heart failure) ([facility]     Chronic back pain     Chronic constipation     Chronic kidney disease     [facility] Dr. Art Macedo COPD [facility]chronic obstructive [facility] ([facility]     Sees Dr. Amparo Valentine Depression     Diabetes mellitus [facility]) Dx 2012    Sees Dr. Karyle Speak    Emphysema lung [facility])     Glaucoma     \"Both Eyes\"    Gout     Hiatal hernia     History of blood transfusion 1960's    No Reaction To Blood Transfusion Received    [place] (hard of hearing)     Bilateral Ears    Hyperlipidemia     Hypertension     Itching     \"Whole Body Itches The Majority Of The Time\"    Morbid obesity (HCC)     Rheumatoid arthritis (HCC)     Shortness of breath on exertion     Sleep apnea     Uses CPAP    Teeth missing     Upper And Lower    Thyroid disease     Thyroidectomy In 1993   

## 2018-09-05 NOTE — LETTER
[address] [place] [address]  Phone: [phone]  Fax: [phone]    Sharee Jarrett MD        September 5, 2018     Patient: Juanis Cook   YOB: 1950   Date of Visit: 9/5/2018       To Whom it May Concern:    Mere Read was seen in my clinic on 9/5/2018. Patient kept this appointment. If you have any questions or concerns, please don't hesitate to call.     Sincerely,         Sharee Jarrett MD

## 2018-10-21 ENCOUNTER — APPOINTMENT (OUTPATIENT)
Dept: GENERAL RADIOLOGY | Age: 68
DRG: 683 | End: 2018-10-21
Payer: COMMERCIAL

## 2018-10-21 ENCOUNTER — HOSPITAL ENCOUNTER (INPATIENT)
Age: 68
LOS: 7 days | Discharge: HOME HEALTH | DRG: 683 | End: 2018-10-28
Attending: EMERGENCY MEDICINE | Admitting: HOSPITALIST
Payer: COMMERCIAL

## 2018-10-21 ENCOUNTER — APPOINTMENT (OUTPATIENT)
Dept: CT IMAGING | Age: 68
DRG: 683 | End: 2018-10-21
Payer: COMMERCIAL

## 2018-10-21 DIAGNOSIS — N17.9 ACUTE KIDNEY INJURY (HCC): Primary | ICD-10-CM

## 2018-10-21 DIAGNOSIS — R53.1 GENERAL WEAKNESS: ICD-10-CM

## 2018-10-21 DIAGNOSIS — R77.8 ELEVATED TROPONIN: ICD-10-CM
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
## 2018-10-21 LAB
ALBUMIN SERPL-MCNC: 3.8 GM/DL (ref 3.4–5)
ALP BLD-CCNC: 174 IU/L (ref 40–129)
ALT SERPL-CCNC: 21 U/L (ref 10–40)
ANION GAP SERPL CALCULATED.3IONS-SCNC: 13 MMOL/L (ref 4–16)
AST SERPL-CCNC: 16 IU/L (ref 15–37)
BACTERIA: NEGATIVE /HPF
BASOPHILS ABSOLUTE: 0 K/CU MM
BASOPHILS RELATIVE PERCENT: 0.3 % (ref 0–1)
BILIRUB SERPL-MCNC: 0.2 MG/DL (ref 0–1)
BILIRUBIN URINE: NEGATIVE MG/DL
BLOOD, URINE: NEGATIVE
BUN BLDV-MCNC: 59 MG/DL (ref 6–23)
CALCIUM SERPL-MCNC: 10.9 MG/DL (ref 8.3–10.6)
CHLORIDE BLD-SCNC: 99 MMOL/L (ref 99–110)
CLARITY: CLEAR
CO2: 23 MMOL/L (ref 21–32)
COLOR: YELLOW
CREAT SERPL-MCNC: 3.3 MG/DL (ref 0.6–1.1)
DIFFERENTIAL TYPE: ABNORMAL
EOSINOPHILS ABSOLUTE: 0.2 K/CU MM
EOSINOPHILS RELATIVE PERCENT: 2.8 % (ref 0–3)
FOLATE: >20 NG/ML (ref 3.1–17.5)
GFR AFRICAN AMERICAN: 17 ML/MIN/1.73M2
GFR NON-AFRICAN AMERICAN: 14 ML/MIN/1.73M2
GLUCOSE BLD-MCNC: 209 MG/DL (ref 70–99)
GLUCOSE BLD-MCNC: 225 MG/DL (ref 70–99)
GLUCOSE BLD-MCNC: 252 MG/DL (ref 70–99)
GLUCOSE BLD-MCNC: 286 MG/DL (ref 70–99)
GLUCOSE BLD-MCNC: 300 MG/DL (ref 70–99)
GLUCOSE BLD-MCNC: 360 MG/DL (ref 70–99)
GLUCOSE, URINE: NEGATIVE MG/DL
HCT VFR BLD CALC: 34.3 % (ref 37–47)
HEMOGLOBIN: 11 GM/DL (ref 12.5–16)
IMMATURE NEUTROPHIL %: 0.1 % (ref 0–0.43)
KETONES, URINE: NEGATIVE MG/DL
LEUKOCYTE ESTERASE, URINE: ABNORMAL
LYMPHOCYTES ABSOLUTE: 1 K/CU MM
LYMPHOCYTES RELATIVE PERCENT: 14.2 % (ref 24–44)
MCH RBC QN AUTO: 29.7 PG (ref 27–31)
MCHC RBC AUTO-ENTMCNC: 32.1 % (ref 32–36)
MCV RBC AUTO: 92.7 FL (ref 78–100)
MONOCYTES ABSOLUTE: 0.5 K/CU MM
MONOCYTES RELATIVE PERCENT: 7.7 % (ref 0–4)
MUCUS: ABNORMAL HPF
NITRITE URINE, QUANTITATIVE: NEGATIVE
NUCLEATED RBC %: 0 %
PDW BLD-RTO: 14.6 % (ref 11.7–14.9)
PH, URINE: 5 (ref 5–8)
PLATELET # BLD: 175 K/CU MM (ref 140–440)
PMV BLD AUTO: 11.5 FL (ref 7.5–11.1)
POTASSIUM SERPL-SCNC: 4.1 MMOL/L (ref 3.5–5.1)
PROTEIN UA: NEGATIVE MG/DL
RBC # BLD: 3.7 M/CU MM (ref 4.2–5.4)
RBC URINE: <1 /HPF (ref 0–6)
SEGMENTED NEUTROPHILS ABSOLUTE COUNT: 5.2 K/CU MM
SEGMENTED NEUTROPHILS RELATIVE PERCENT: 74.9 % (ref 36–66)
SODIUM BLD-SCNC: 135 MMOL/L (ref 135–145)
SPECIFIC GRAVITY UA: 1.01 (ref 1–1.03)
SQUAMOUS EPITHELIAL: 1 /HPF
TOTAL CK: 108 IU/L (ref 26–140)
TOTAL IMMATURE NEUTOROPHIL: 0.01 K/CU MM
TOTAL NUCLEATED RBC: 0 K/CU MM
TOTAL PROTEIN: 6.7 GM/DL (ref 6.4–8.2)
TRICHOMONAS: ABNORMAL /HPF
TROPONIN T: 0.02 NG/ML
UROBILINOGEN, URINE: NORMAL MG/DL (ref 0.2–1)
VITAMIN B-12: 730.4 PG/ML (ref 211–911)
WBC # BLD: 6.9 K/CU MM (ref 4–10.5)
WBC UA: 1 /HPF (ref 0–5)

## 2018-10-21 PROCEDURE — G8978 MOBILITY CURRENT STATUS: HCPCS

## 2018-10-21 PROCEDURE — 71045 X-RAY EXAM CHEST 1 VIEW: CPT

## 2018-10-21 PROCEDURE — 99285 EMERGENCY DEPT VISIT HI MDM: CPT

## 2018-10-21 PROCEDURE — 84484 ASSAY OF TROPONIN QUANT: CPT

## 2018-10-21 PROCEDURE — 36415 COLL VENOUS BLD VENIPUNCTURE: CPT

## 2018-10-21 PROCEDURE — 93005 ELECTROCARDIOGRAM TRACING: CPT | Performed by: PHYSICIAN ASSISTANT

## 2018-10-21 PROCEDURE — 82525 ASSAY OF COPPER: CPT

## 2018-10-21 PROCEDURE — 73502 X-RAY EXAM HIP UNI 2-3 VIEWS: CPT

## 2018-10-21 PROCEDURE — 6360000002 HC RX W HCPCS: Performed by: HOSPITALIST

## 2018-10-21 PROCEDURE — 72125 CT NECK SPINE W/O DYE: CPT

## 2018-10-21 PROCEDURE — 97530 THERAPEUTIC ACTIVITIES: CPT

## 2018-10-21 PROCEDURE — 1200000000 HC SEMI PRIVATE

## 2018-10-21 PROCEDURE — 2580000003 HC RX 258: Performed by: HOSPITALIST

## 2018-10-21 PROCEDURE — 6370000000 HC RX 637 (ALT 250 FOR IP): Performed by: HOSPITALIST

## 2018-10-21 PROCEDURE — 2580000003 HC RX 258

## 2018-10-21 PROCEDURE — 81001 URINALYSIS AUTO W/SCOPE: CPT

## 2018-10-21 PROCEDURE — 93010 ELECTROCARDIOGRAM REPORT: CPT | Performed by: INTERNAL MEDICINE

## 2018-10-21 PROCEDURE — 82607 VITAMIN B-12: CPT

## 2018-10-21 PROCEDURE — 97162 PT EVAL MOD COMPLEX 30 MIN: CPT

## 2018-10-21 PROCEDURE — 73501 X-RAY EXAM HIP UNI 1 VIEW: CPT

## 2018-10-21 PROCEDURE — 70450 CT HEAD/BRAIN W/O DYE: CPT

## 2018-10-21 PROCEDURE — 82746 ASSAY OF FOLIC ACID SERUM: CPT

## 2018-10-21 PROCEDURE — 85025 COMPLETE CBC W/AUTO DIFF WBC: CPT

## 2018-10-21 PROCEDURE — 80053 COMPREHEN METABOLIC PANEL: CPT

## 2018-10-21 PROCEDURE — G8979 MOBILITY GOAL STATUS: HCPCS

## 2018-10-21 PROCEDURE — 94761 N-INVAS EAR/PLS OXIMETRY MLT: CPT

## 2018-10-21 PROCEDURE — 82550 ASSAY OF CK (CPK): CPT

## 2018-10-21 RX ORDER — ONDANSETRON 2 MG/ML
4 INJECTION INTRAMUSCULAR; INTRAVENOUS EVERY 6 HOURS PRN
Status: DISCONTINUED | OUTPATIENT
Start: 2018-10-21 | End: 2018-10-28 | Stop reason: HOSPADM

## 2018-10-21 RX ORDER — SODIUM CHLORIDE 9 MG/ML
INJECTION, SOLUTION INTRAVENOUS CONTINUOUS
Status: DISPENSED | OUTPATIENT
Start: 2018-10-21 | End: 2018-10-22

## 2018-10-21 RX ORDER — SODIUM CHLORIDE 9 MG/ML
INJECTION, SOLUTION INTRAVENOUS
Status: COMPLETED
Start: 2018-10-21 | End: 2018-10-21

## 2018-10-21 RX ORDER — RANOLAZINE 500 MG/1
500 TABLET, EXTENDED RELEASE ORAL 2 TIMES DAILY
Status: DISCONTINUED | OUTPATIENT
Start: 2018-10-21 | End: 2018-10-28 | Stop reason: HOSPADM

## 2018-10-21 RX ORDER — NICOTINE POLACRILEX 4 MG
15 LOZENGE BUCCAL PRN
Status: DISCONTINUED | OUTPATIENT
Start: 2018-10-21 | End: 2018-10-28 | Stop reason: HOSPADM

## 2018-10-21 RX ORDER — IPRATROPIUM BROMIDE AND ALBUTEROL SULFATE 2.5; .5 MG/3ML; MG/3ML
1 SOLUTION RESPIRATORY (INHALATION) EVERY 4 HOURS PRN
Status: DISCONTINUED | OUTPATIENT
Start: 2018-10-21 | End: 2018-10-28 | Stop reason: HOSPADM

## 2018-10-21 RX ORDER — FOLIC ACID 1 MG/1
1 TABLET ORAL EVERY MORNING
Status: DISCONTINUED | OUTPATIENT
Start: 2018-10-21 | End: 2018-10-28 | Stop reason: HOSPADM

## 2018-10-21 RX ORDER — 0.9 % SODIUM CHLORIDE 0.9 %
1000 INTRAVENOUS SOLUTION INTRAVENOUS ONCE
Status: DISCONTINUED | OUTPATIENT
Start: 2018-10-21 | End: 2018-10-21

## 2018-10-21 RX ORDER — ACETAMINOPHEN 325 MG/1
650 TABLET ORAL EVERY 6 HOURS PRN
Status: DISCONTINUED | OUTPATIENT
Start: 2018-10-21 | End: 2018-10-28 | Stop reason: HOSPADM

## 2018-10-21 RX ORDER — MONTELUKAST SODIUM 10 MG/1
10 TABLET ORAL NIGHTLY
Status: DISCONTINUED | OUTPATIENT
Start: 2018-10-21 | End: 2018-10-28 | Stop reason: HOSPADM

## 2018-10-21 RX ORDER — LORAZEPAM 0.5 MG/1
0.5 TABLET ORAL 2 TIMES DAILY
Status: DISCONTINUED | OUTPATIENT
Start: 2018-10-21 | End: 2018-10-28 | Stop reason: HOSPADM

## 2018-10-21 RX ORDER — CLOPIDOGREL BISULFATE 75 MG/1
75 TABLET ORAL DAILY
Status: DISCONTINUED | OUTPATIENT
Start: 2018-10-21 | End: 2018-10-28 | Stop reason: HOSPADM

## 2018-10-21 RX ORDER — DEXTROSE MONOHYDRATE 50 MG/ML
100 INJECTION, SOLUTION INTRAVENOUS PRN
Status: DISCONTINUED | OUTPATIENT
Start: 2018-10-21 | End: 2018-10-28 | Stop reason: HOSPADM

## 2018-10-21 RX ORDER — ALLOPURINOL 100 MG/1
200 TABLET ORAL DAILY
Status: DISCONTINUED | OUTPATIENT
Start: 2018-10-22 | End: 2018-10-28 | Stop reason: HOSPADM

## 2018-10-21 RX ORDER — SODIUM CHLORIDE 0.9 % (FLUSH) 0.9 %
10 SYRINGE (ML) INJECTION PRN
Status: DISCONTINUED | OUTPATIENT
Start: 2018-10-21 | End: 2018-10-28 | Stop reason: HOSPADM

## 2018-10-21 RX ORDER — OYSTER SHELL CALCIUM WITH VITAMIN D 500; 200 MG/1; [IU]/1
1 TABLET, FILM COATED ORAL 2 TIMES DAILY WITH MEALS
Status: DISCONTINUED | OUTPATIENT
Start: 2018-10-21 | End: 2018-10-28 | Stop reason: HOSPADM

## 2018-10-21 RX ORDER — HEPARIN SODIUM 5000 [USP'U]/ML
5000 INJECTION, SOLUTION INTRAVENOUS; SUBCUTANEOUS EVERY 8 HOURS SCHEDULED
Status: DISCONTINUED | OUTPATIENT
Start: 2018-10-21 | End: 2018-10-28 | Stop reason: HOSPADM

## 2018-10-21 RX ORDER — DEXTROSE MONOHYDRATE 25 G/50ML
12.5 INJECTION, SOLUTION INTRAVENOUS PRN
Status: DISCONTINUED | OUTPATIENT
Start: 2018-10-21 | End: 2018-10-28 | Stop reason: HOSPADM

## 2018-10-21 RX ORDER — SODIUM CHLORIDE 0.9 % (FLUSH) 0.9 %
10 SYRINGE (ML) INJECTION EVERY 12 HOURS SCHEDULED
Status: DISCONTINUED | OUTPATIENT
Start: 2018-10-21 | End: 2018-10-28 | Stop reason: HOSPADM

## 2018-10-21 RX ORDER — QUETIAPINE 150 MG/1
300 TABLET, FILM COATED, EXTENDED RELEASE ORAL NIGHTLY
Status: DISCONTINUED | OUTPATIENT
Start: 2018-10-21 | End: 2018-10-28 | Stop reason: HOSPADM

## 2018-10-21 RX ORDER — NITROGLYCERIN 0.4 MG/1
0.4 TABLET SUBLINGUAL EVERY 5 MIN PRN
Status: DISCONTINUED | OUTPATIENT
Start: 2018-10-21 | End: 2018-10-28 | Stop reason: HOSPADM

## 2018-10-21 RX ORDER — BUPROPION HYDROCHLORIDE 150 MG/1
300 TABLET ORAL EVERY MORNING
Status: DISCONTINUED | OUTPATIENT
Start: 2018-10-21 | End: 2018-10-28 | Stop reason: HOSPADM

## 2018-10-21 RX ORDER — FLUTICASONE PROPIONATE 50 MCG
2 SPRAY, SUSPENSION (ML) NASAL EVERY MORNING
Status: DISCONTINUED | OUTPATIENT
Start: 2018-10-21 | End: 2018-10-28 | Stop reason: HOSPADM

## 2018-10-21 RX ORDER — ALLOPURINOL 300 MG/1
300 TABLET ORAL DAILY
Status: DISCONTINUED | OUTPATIENT
Start: 2018-10-21 | End: 2018-10-21

## 2018-10-21 RX ORDER — ACETAMINOPHEN 325 MG/1
650 TABLET ORAL EVERY 4 HOURS PRN
Status: DISCONTINUED | OUTPATIENT
Start: 2018-10-21 | End: 2018-10-28 | Stop reason: HOSPADM

## 2018-10-21 RX ORDER — ATORVASTATIN CALCIUM 40 MG/1
80 TABLET, FILM COATED ORAL NIGHTLY
Status: DISCONTINUED | OUTPATIENT
Start: 2018-10-21 | End: 2018-10-28 | Stop reason: HOSPADM

## 2018-10-21 RX ORDER — PANTOPRAZOLE SODIUM 40 MG/1
40 TABLET, DELAYED RELEASE ORAL 2 TIMES DAILY
Status: DISCONTINUED | OUTPATIENT
Start: 2018-10-21 | End: 2018-10-28 | Stop reason: HOSPADM

## 2018-10-21 RX ORDER — CARVEDILOL 6.25 MG/1
6.25 TABLET ORAL 2 TIMES DAILY
Status: DISCONTINUED | OUTPATIENT
Start: 2018-10-21 | End: 2018-10-28 | Stop reason: HOSPADM

## 2018-10-21 RX ORDER — HYDROXYCHLOROQUINE SULFATE 200 MG/1
200 TABLET, FILM COATED ORAL 2 TIMES DAILY
Status: DISCONTINUED | OUTPATIENT
Start: 2018-10-21 | End: 2018-10-28 | Stop reason: HOSPADM

## 2018-10-21 RX ORDER — HYDROXYZINE HYDROCHLORIDE 25 MG/1
25 TABLET, FILM COATED ORAL PRN
Status: DISCONTINUED | OUTPATIENT
Start: 2018-10-21 | End: 2018-10-28 | Stop reason: HOSPADM

## 2018-10-21 RX ORDER — DILTIAZEM HYDROCHLORIDE 180 MG/1
180 CAPSULE, COATED, EXTENDED RELEASE ORAL DAILY
Status: DISCONTINUED | OUTPATIENT
Start: 2018-10-21 | End: 2018-10-28 | Stop reason: HOSPADM

## 2018-10-21 RX ADMIN — RANOLAZINE 500 MG: 500 TABLET, FILM COATED, EXTENDED RELEASE ORAL at 09:17

## 2018-10-21 RX ADMIN — BUPROPION HYDROCHLORIDE 300 MG: 150 TABLET, EXTENDED RELEASE ORAL at 09:17

## 2018-10-21 RX ADMIN — DILTIAZEM HYDROCHLORIDE 180 MG: 180 CAPSULE, COATED, EXTENDED RELEASE ORAL at 09:17

## 2018-10-21 RX ADMIN — HEPARIN SODIUM 5000 UNITS: 5000 INJECTION INTRAVENOUS; SUBCUTANEOUS at 09:36

## 2018-10-21 RX ADMIN — SODIUM CHLORIDE: 900 INJECTION INTRAVENOUS at 09:33

## 2018-10-21 RX ADMIN — ACETAMINOPHEN 650 MG: 325 TABLET, FILM COATED ORAL at 15:39

## 2018-10-21 RX ADMIN — CARVEDILOL 6.25 MG: 6.25 TABLET, FILM COATED ORAL at 22:01

## 2018-10-21 RX ADMIN — HEPARIN SODIUM 5000 UNITS: 5000 INJECTION INTRAVENOUS; SUBCUTANEOUS at 22:02

## 2018-10-21 RX ADMIN — HYDROXYCHLOROQUINE SULFATE 200 MG: 200 TABLET, FILM COATED ENTERAL at 09:17

## 2018-10-21 RX ADMIN — CARVEDILOL 6.25 MG: 6.25 TABLET, FILM COATED ORAL at 09:18

## 2018-10-21 RX ADMIN — INSULIN LISPRO 5 UNITS: 100 INJECTION, SOLUTION INTRAVENOUS; SUBCUTANEOUS at 09:36

## 2018-10-21 RX ADMIN — FOLIC ACID 1 MG: 1 TABLET ORAL at 09:17

## 2018-10-21 RX ADMIN — LORAZEPAM 0.5 MG: 0.5 TABLET ORAL at 22:01

## 2018-10-21 RX ADMIN — PANTOPRAZOLE SODIUM 40 MG: 40 TABLET, DELAYED RELEASE ORAL at 09:18

## 2018-10-21 RX ADMIN — PANTOPRAZOLE SODIUM 40 MG: 40 TABLET, DELAYED RELEASE ORAL at 22:00

## 2018-10-21 RX ADMIN — LINAGLIPTIN 5 MG: 5 TABLET, FILM COATED ORAL at 09:18

## 2018-10-21 RX ADMIN — ATORVASTATIN CALCIUM 80 MG: 40 TABLET, FILM COATED ORAL at 22:01

## 2018-10-21 RX ADMIN — RANOLAZINE 500 MG: 500 TABLET, FILM COATED, EXTENDED RELEASE ORAL at 22:00

## 2018-10-21 RX ADMIN — OYSTER SHELL CALCIUM WITH VITAMIN D 1 TABLET: 500; 200 TABLET, FILM COATED ORAL at 09:17

## 2018-10-21 RX ADMIN — OYSTER SHELL CALCIUM WITH VITAMIN D 1 TABLET: 500; 200 TABLET, FILM COATED ORAL at 19:06

## 2018-10-21 RX ADMIN — QUETIAPINE FUMARATE 300 MG: 150 TABLET, EXTENDED RELEASE ORAL at 22:00

## 2018-10-21 RX ADMIN — HEPARIN SODIUM 5000 UNITS: 5000 INJECTION INTRAVENOUS; SUBCUTANEOUS at 19:07

## 2018-10-21 RX ADMIN — SODIUM CHLORIDE, PRESERVATIVE FREE 10 ML: 5 INJECTION INTRAVENOUS at 22:02

## 2018-10-21 RX ADMIN — HYDROXYCHLOROQUINE SULFATE 200 MG: 200 TABLET, FILM COATED ENTERAL at 22:01

## 2018-10-21 RX ADMIN — SODIUM CHLORIDE: 900 INJECTION INTRAVENOUS at 22:07

## 2018-10-21 RX ADMIN — INSULIN LISPRO 2 UNITS: 100 INJECTION, SOLUTION INTRAVENOUS; SUBCUTANEOUS at 22:02

## 2018-10-21 RX ADMIN — CLOPIDOGREL BISULFATE 75 MG: 75 TABLET ORAL at 09:17

## 2018-10-21 RX ADMIN — MONTELUKAST SODIUM 10 MG: 10 TABLET, COATED ORAL at 22:01

## 2018-10-21 RX ADMIN — INSULIN LISPRO 2 UNITS: 100 INJECTION, SOLUTION INTRAVENOUS; SUBCUTANEOUS at 19:07

## 2018-10-21 RX ADMIN — ALLOPURINOL 300 MG: 300 TABLET ORAL at 09:18

## 2018-10-21 RX ADMIN — LORAZEPAM 0.5 MG: 0.5 TABLET ORAL at 09:18

## 2018-10-21 ASSESSMENT — PAIN DESCRIPTION - PAIN TYPE
TYPE: ACUTE PAIN
TYPE: ACUTE PAIN

## 2018-10-21 ASSESSMENT — PAIN SCALES - GENERAL
PAINLEVEL_OUTOF10: 0
PAINLEVEL_OUTOF10: 3
PAINLEVEL_OUTOF10: 8
PAINLEVEL_OUTOF10: 5

## 2018-10-21 ASSESSMENT — PAIN DESCRIPTION - ORIENTATION: ORIENTATION: LOWER;LEFT

## 2018-10-21 ASSESSMENT — PAIN DESCRIPTION - LOCATION
LOCATION: BACK
LOCATION: ABDOMEN

## 2018-10-21 NOTE — CONSULTS
weakness. Monitor electrolytes, BNP in the morning and  follow clinically.         Steven Vo MD    D: 10/21/2018 12:06:11       T: 10/21/2018 16:45:41     [email or web address]  Job#: [id number]     Doc#: [id number]    CC:

## 2018-10-21 NOTE — H&P
her diuretic every other day. Ten point ROS reviewed negative, unless as noted above    Objective:   No intake or output data in the 24 hours ending 10/21/18 0600   Vitals:   Vitals:    10/21/18 0545   BP: 129/78   Pulse: 81   Resp: 18   Temp:    SpO2: 95%     Physical Exam:    GEN Awake female, sitting upright in bed in no apparent distress. Appears given age. EYES Pupils are equally round. No scleral erythema, discharge, or conjunctivitis. HENT Mucous membranes are moist.   NECK No apparent thyromegaly or masses. RESP Clear to auscultation, no wheezes, rales or rhonchi. Symmetric chest movement while on room air. CARDIO/VASC S1/S2 auscultated. Regular rate without appreciable murmurs, rubs, or gallops. trace peripheral edema. GI Abdomen is soft without significant tenderness, masses, or guarding. Bowel sounds are normoactive. Rectal exam deferred.  Mensah catheter is not present. HEME/LYMPH No petechiae or ecchymoses. MSK No gross joint deformities. Spontaneous movement of all extremities  SKIN Normal coloration, warm, dry. NEURO Cranial nerves appear grossly intact, normal speech, no lateralizing weakness. PSYCH Awake, alert, oriented x 4. Affect appropriate.     Past Medical History:      Past Medical History:   Diagnosis Date    Acid reflux     Anemia     Arthritis     \"Shoulders, Hips And Knees\"    CAD (coronary artery disease)     Sees Dr. Vicki Sifuentes CHF (congestive heart failure) ([facility]     Chronic back pain     Chronic constipation     Chronic kidney disease     Sees Dr. Navin Lazcano COPD (chronic obstructive pulmonary disease) ([facility]     Sees Dr. Greyson Reeder Depression     Diabetes mellitus [facility]) Dx 2012    Sees Dr. Chloé Fong    Emphysema lung [facility])     Glaucoma     \"Both Eyes\"    Gout     Hiatal hernia     History of blood transfusion 1960's    No Reaction To Blood Transfusion Received    [place] (hard of hearing)     Bilateral Ears    Hyperlipidemia     Hypertension

## 2018-10-21 NOTE — ED PROVIDER NOTES
I independently examined and evaluated Melissa Parsons. In brief their history revealed frequent falls due to general weakness for approximately a week. Denies any chest pain or shortness of breath. No nausea, vomiting or diarrhea. States she did hit her head. .. Their exam revealed female in no acute distress. Speaks in full sentences. . All diagnostic, treatment, and disposition decisions were made by myself in conjunction with the mid-level provider. Before disposition, questions were sought and answered with the patient. They have voiced understanding and agree with the plan: Patient's vital signs are stable. CBC is within normal limits. Electrolytes show a elevated calcium of 10.9, acute renal injury with a creatinine of 3.3.. Troponin is elevated at 0.024. CT head, CT C-spine, chest x-ray do not show any acute findings. Patient to be admitted for further eval.     For all further details of the patient's emergency department visit, please see the mid-level practitioner's documentation. The Ekg interpreted by me shows  normal sinus rhythm with a rate of 83  Axis is   Normal  QTc is  normal  Intervals and Durations are unremarkable.       ST Segments: no acute change  No significant change from prior EKG dated 4-              Jerri Akbar MD  10/21/18 818 Loc Reddy MD  10/21/18 2241
Ana Solitario, [address], [place]  10/21/18 3953

## 2018-10-21 NOTE — CONSULTS
structures/functions, activity/participation limitations):  · Observation:  Alert, oriented, pleasant, participatory. Some impaired speech clarity, patient offers no change from baseline speech. · Vision:  Fair to [facility]  · Hearing:  [facility]  · Cardiac:  Stable for eval, tolerant of mobility  · Pulmonary:  [facility]  · Musculoskeletal:  PROM:  WFL. AROM:  WFL with slight hip AROM impairment bilat. Strength:  Functionally appropriate for mobility skills. Grossly 4/5 BLE. Fatigues fairly quickly. · Balance:  Sitting:  indep. Standing:  SBA. Dynamic:  CGA. · Neuro:  Recruitment:  WFL. Control:  WFL. Coordination:  Slight impairment d/t weakness. · Mobility/Transfers:  Pattern/Sequence:  Fair to [facility]. Efficiency:  Impiared.     IRF-CALROS:     Walk 10 Feet  Assistance Needed: Partial/moderate assistance  CARE Score: 3  Discharge Goal: Independent  Walk 50 Feet with Two Turns  Discharge Goal: Independent  Walk 150 Feet  Discharge Goal: Independent  Roll Left and Right  Assistance Needed: Independent  CARE Score: 6  Sit to Lying  Assistance Needed: Independent  CARE Score: 6  Lying to Sitting on Side of Bed  Assistance Needed: Independent  CARE Score: 6  Sit to Stand  Assistance Needed: Independent  CARE Score: 6  Chair/Bed-to-Chair Transfer  Assistance Needed: Supervision or touching assistance  CARE Score: 4  Discharge Goal: Independent     AMPAC 6 Clicks Inpatient Mobility:  AM-PAC Mobility Inpatient   How much difficulty turning over in bed?: None  How much difficulty sitting down on / standing up from a chair with arms?: None  How much difficulty moving from lying on back to sitting on side of bed?: None  How much help from another person moving to and from a bed to a chair?: A Little  How much help from another person needed to walk in hospital room?: A Little  How much help from another person for climbing 3-5 steps with a railing?: A Lot  AM-PAC Inpatient Mobility Raw Score : 20  AM-PAC Inpatient T-Scale Score : 1035  Timed treatment minutes: 10   Total treatment time:  20    Electronically signed by:   Princess Yessi PT  10/21/2018, 10:51 AM

## 2018-10-22 LAB
ANION GAP SERPL CALCULATED.3IONS-SCNC: 12 MMOL/L (ref 4–16)
BASOPHILS ABSOLUTE: 0 K/CU MM
BASOPHILS RELATIVE PERCENT: 0.4 % (ref 0–1)
BUN BLDV-MCNC: 39 MG/DL (ref 6–23)
CALCIUM SERPL-MCNC: 11.4 MG/DL (ref 8.3–10.6)
CHLORIDE BLD-SCNC: 106 MMOL/L (ref 99–110)
CO2: 25 MMOL/L (ref 21–32)
CREAT SERPL-MCNC: 2.6 MG/DL (ref 0.6–1.1)
DIFFERENTIAL TYPE: ABNORMAL
EOSINOPHILS ABSOLUTE: 0.2 K/CU MM
EOSINOPHILS RELATIVE PERCENT: 3.6 % (ref 0–3)
GFR AFRICAN AMERICAN: 22 ML/MIN/1.73M2
GFR NON-AFRICAN AMERICAN: 18 ML/MIN/1.73M2
GLUCOSE BLD-MCNC: 177 MG/DL (ref 70–99)
GLUCOSE BLD-MCNC: 196 MG/DL (ref 70–99)
GLUCOSE BLD-MCNC: 229 MG/DL (ref 70–99)
GLUCOSE BLD-MCNC: 245 MG/DL (ref 70–99)
GLUCOSE BLD-MCNC: 326 MG/DL (ref 70–99)
HCT VFR BLD CALC: 34.3 % (ref 37–47)
HEMOGLOBIN: 10.7 GM/DL (ref 12.5–16)
IMMATURE NEUTROPHIL %: 0.2 % (ref 0–0.43)
LYMPHOCYTES ABSOLUTE: 1.4 K/CU MM
LYMPHOCYTES RELATIVE PERCENT: 25 % (ref 24–44)
MCH RBC QN AUTO: 29 PG (ref 27–31)
MCHC RBC AUTO-ENTMCNC: 31.2 % (ref 32–36)
MCV RBC AUTO: 93 FL (ref 78–100)
MONOCYTES ABSOLUTE: 0.5 K/CU MM
MONOCYTES RELATIVE PERCENT: 9 % (ref 0–4)
NUCLEATED RBC %: 0 %
PDW BLD-RTO: 14.5 % (ref 11.7–14.9)
PLATELET # BLD: 191 K/CU MM (ref 140–440)
PMV BLD AUTO: 11.6 FL (ref 7.5–11.1)
POTASSIUM SERPL-SCNC: 4.1 MMOL/L (ref 3.5–5.1)
RBC # BLD: 3.69 M/CU MM (ref 4.2–5.4)
SEGMENTED NEUTROPHILS ABSOLUTE COUNT: 3.4 K/CU MM
SEGMENTED NEUTROPHILS RELATIVE PERCENT: 61.8 % (ref 36–66)
SODIUM BLD-SCNC: 143 MMOL/L (ref 135–145)
TOTAL IMMATURE NEUTOROPHIL: 0.01 K/CU MM
TOTAL NUCLEATED RBC: 0 K/CU MM
WBC # BLD: 5.5 K/CU MM (ref 4–10.5)

## 2018-10-22 PROCEDURE — 84630 ASSAY OF ZINC: CPT

## 2018-10-22 PROCEDURE — G8987 SELF CARE CURRENT STATUS: HCPCS

## 2018-10-22 PROCEDURE — 97530 THERAPEUTIC ACTIVITIES: CPT

## 2018-10-22 PROCEDURE — 6370000000 HC RX 637 (ALT 250 FOR IP): Performed by: INTERNAL MEDICINE

## 2018-10-22 PROCEDURE — 2580000003 HC RX 258: Performed by: INTERNAL MEDICINE

## 2018-10-22 PROCEDURE — 85025 COMPLETE CBC W/AUTO DIFF WBC: CPT

## 2018-10-22 PROCEDURE — 36415 COLL VENOUS BLD VENIPUNCTURE: CPT

## 2018-10-22 PROCEDURE — G8988 SELF CARE GOAL STATUS: HCPCS

## 2018-10-22 PROCEDURE — 82962 GLUCOSE BLOOD TEST: CPT

## 2018-10-22 PROCEDURE — 80048 BASIC METABOLIC PNL TOTAL CA: CPT

## 2018-10-22 PROCEDURE — 97167 OT EVAL HIGH COMPLEX 60 MIN: CPT

## 2018-10-22 PROCEDURE — 2580000003 HC RX 258: Performed by: HOSPITALIST

## 2018-10-22 PROCEDURE — 94761 N-INVAS EAR/PLS OXIMETRY MLT: CPT

## 2018-10-22 PROCEDURE — 6370000000 HC RX 637 (ALT 250 FOR IP): Performed by: HOSPITALIST

## 2018-10-22 PROCEDURE — 1200000000 HC SEMI PRIVATE

## 2018-10-22 PROCEDURE — 6360000002 HC RX W HCPCS: Performed by: HOSPITALIST

## 2018-10-22 RX ADMIN — HEPARIN SODIUM 5000 UNITS: 5000 INJECTION INTRAVENOUS; SUBCUTANEOUS at 14:22

## 2018-10-22 RX ADMIN — HYDROXYCHLOROQUINE SULFATE 200 MG: 200 TABLET, FILM COATED ENTERAL at 22:21

## 2018-10-22 RX ADMIN — RANOLAZINE 500 MG: 500 TABLET, FILM COATED, EXTENDED RELEASE ORAL at 08:50

## 2018-10-22 RX ADMIN — PANTOPRAZOLE SODIUM 40 MG: 40 TABLET, DELAYED RELEASE ORAL at 08:51

## 2018-10-22 RX ADMIN — ATORVASTATIN CALCIUM 80 MG: 40 TABLET, FILM COATED ORAL at 22:21

## 2018-10-22 RX ADMIN — ACETAMINOPHEN 650 MG: 325 TABLET ORAL at 01:49

## 2018-10-22 RX ADMIN — SODIUM CHLORIDE: 900 INJECTION INTRAVENOUS at 15:54

## 2018-10-22 RX ADMIN — LORAZEPAM 0.5 MG: 0.5 TABLET ORAL at 22:21

## 2018-10-22 RX ADMIN — INSULIN LISPRO 1 UNITS: 100 INJECTION, SOLUTION INTRAVENOUS; SUBCUTANEOUS at 22:22

## 2018-10-22 RX ADMIN — BUPROPION HYDROCHLORIDE 300 MG: 150 TABLET, EXTENDED RELEASE ORAL at 08:50

## 2018-10-22 RX ADMIN — SODIUM CHLORIDE, PRESERVATIVE FREE 10 ML: 5 INJECTION INTRAVENOUS at 08:59

## 2018-10-22 RX ADMIN — RANOLAZINE 500 MG: 500 TABLET, FILM COATED, EXTENDED RELEASE ORAL at 22:21

## 2018-10-22 RX ADMIN — OYSTER SHELL CALCIUM WITH VITAMIN D 1 TABLET: 500; 200 TABLET, FILM COATED ORAL at 08:51

## 2018-10-22 RX ADMIN — FOLIC ACID 1 MG: 1 TABLET ORAL at 08:51

## 2018-10-22 RX ADMIN — HEPARIN SODIUM 5000 UNITS: 5000 INJECTION INTRAVENOUS; SUBCUTANEOUS at 22:22

## 2018-10-22 RX ADMIN — DILTIAZEM HYDROCHLORIDE 180 MG: 180 CAPSULE, COATED, EXTENDED RELEASE ORAL at 08:50

## 2018-10-22 RX ADMIN — HEPARIN SODIUM 5000 UNITS: 5000 INJECTION INTRAVENOUS; SUBCUTANEOUS at 05:55

## 2018-10-22 RX ADMIN — PANTOPRAZOLE SODIUM 40 MG: 40 TABLET, DELAYED RELEASE ORAL at 22:22

## 2018-10-22 RX ADMIN — QUETIAPINE FUMARATE 300 MG: 150 TABLET, EXTENDED RELEASE ORAL at 22:21

## 2018-10-22 RX ADMIN — INSULIN LISPRO 5 UNITS: 100 INJECTION, SOLUTION INTRAVENOUS; SUBCUTANEOUS at 17:01

## 2018-10-22 RX ADMIN — HYDROXYCHLOROQUINE SULFATE 200 MG: 200 TABLET, FILM COATED ENTERAL at 08:50

## 2018-10-22 RX ADMIN — OYSTER SHELL CALCIUM WITH VITAMIN D 1 TABLET: 500; 200 TABLET, FILM COATED ORAL at 17:00

## 2018-10-22 RX ADMIN — CARVEDILOL 6.25 MG: 6.25 TABLET, FILM COATED ORAL at 08:50

## 2018-10-22 RX ADMIN — LINAGLIPTIN 5 MG: 5 TABLET, FILM COATED ORAL at 08:51

## 2018-10-22 RX ADMIN — LORAZEPAM 0.5 MG: 0.5 TABLET ORAL at 10:10

## 2018-10-22 RX ADMIN — CARVEDILOL 6.25 MG: 6.25 TABLET, FILM COATED ORAL at 22:21

## 2018-10-22 RX ADMIN — ALLOPURINOL 200 MG: 100 TABLET ORAL at 08:50

## 2018-10-22 RX ADMIN — CLOPIDOGREL BISULFATE 75 MG: 75 TABLET ORAL at 08:50

## 2018-10-22 RX ADMIN — INSULIN LISPRO 4 UNITS: 100 INJECTION, SOLUTION INTRAVENOUS; SUBCUTANEOUS at 13:23

## 2018-10-22 RX ADMIN — FLUTICASONE PROPIONATE 2 SPRAY: 50 SPRAY, METERED NASAL at 08:55

## 2018-10-22 RX ADMIN — MONTELUKAST SODIUM 10 MG: 10 TABLET, COATED ORAL at 22:22

## 2018-10-22 ASSESSMENT — PAIN SCALES - GENERAL
PAINLEVEL_OUTOF10: 0
PAINLEVEL_OUTOF10: 3
PAINLEVEL_OUTOF10: 0
PAINLEVEL_OUTOF10: 0
PAINLEVEL_OUTOF10: 7
PAINLEVEL_OUTOF10: 0
PAINLEVEL_OUTOF10: 0

## 2018-10-22 ASSESSMENT — PAIN DESCRIPTION - PAIN TYPE: TYPE: ACUTE PAIN

## 2018-10-22 ASSESSMENT — PAIN DESCRIPTION - LOCATION: LOCATION: ABDOMEN

## 2018-10-22 NOTE — PROGRESS NOTES
Regular rate. No peripheral edema. GI Abdomen is soft without significant tenderness, Bowel sounds are normoactive. Rectal exam deferred. MSK No gross joint deformities. Spontaneous movement of all extremities  SKIN Normal coloration, warm, dry. NEURO normal speech, no lateralizing weakness. PSYCH Awake, alert, oriented x 4. Affect appropriate.       Medications:   Medications:    atorvastatin  80 mg Oral Nightly    buPROPion  300 mg Oral QAM    calcium-vitamin D  1 tablet Oral BID WC    diltiazem  180 mg Oral Daily    carvedilol  6.25 mg Oral BID    clopidogrel  75 mg Oral Daily    fluticasone  2 spray Nasal QAM    folic acid  1 mg Oral QAM    hydroxychloroquine  200 mg Oral BID    levomilnacipran  20 mg Oral Nightly    linaclotide  290 mcg Oral QAM AC    linagliptin  5 mg Oral Daily    LORazepam  0.5 mg Oral BID    montelukast  10 mg Oral Nightly    pantoprazole  40 mg Oral BID    QUEtiapine  300 mg Oral Nightly    ranolazine  500 mg Oral BID    insulin lispro  0-6 Units Subcutaneous TID WC    insulin lispro  0-3 Units Subcutaneous Nightly    heparin (porcine)  5,000 Units Subcutaneous 3 times per day    sodium chloride flush  10 mL Intravenous 2 times per day    allopurinol  200 mg Oral Daily      Infusions:    dextrose      sodium chloride 75 mL/hr at 10/21/18 2207     PRN Meds:   acetaminophen 650 mg Q6H PRN   hydrOXYzine 25 mg PRN   ipratropium-albuterol 1 vial Q4H PRN   nitroGLYCERIN 0.4 mg Q5 Min PRN   glucose 15 g PRN   dextrose 12.5 g PRN   glucagon (rDNA) 1 mg PRN   dextrose 100 mL/hr PRN   sodium chloride flush 10 mL PRN   magnesium hydroxide 30 mL Daily PRN   ondansetron 4 mg Q6H PRN   acetaminophen 650 mg Q4H PRN         Electronically signed by Carly Harris MD on 10/22/2018 at 11:02 AM

## 2018-10-22 NOTE — PROGRESS NOTES
Mobility  Functional - Mobility Device:  (Support of IV pole)  Assist Level: Minimal assistance  Functional Mobility Comments: 40 ft; unsteady gait; one episode of posterior LOB requiring mod A for postural correction. Recommend use of RW at all times      ADL  Feeding: Independent  Grooming: Supervision;Setup (with seated grooming task; currently does not have the LE strength to stand >3 minutes safely)  UE Bathing: Stand by assistance;Setup  LE Bathing: Moderate assistance;Setup (for reaching distal LEs)  UE Dressing: Stand by assistance;Setup (for donning houserobe EOB)  LE Dressing: Maximum assistance;Setup (dependent with donning socks, SBA for donning house slippers, anticipate max A for pants/shorts)  Toileting:  Moderate assistance;Setup  Additional Comments: Full ADL performance based on functional observation this date       Tone RUE  RUE Tone: Normotonic  Tone LUE  LUE Tone: Normotonic  Coordination  Movements Are Fluid And Coordinated: Yes        Bed mobility  Supine to Sit: Stand by assistance (with HOB elevated, extended time required, min cues for use of bed features)  Transfers  Sit to stand: Contact guard assistance (with min cues for scooting hips forward and pushing through arms)  Stand to sit: Contact guard assistance (with min cues for feeling legs legs against bed and reaching back with arms)        Cognition  Overall Cognitive Status: WFL  Cognition Comment: Grossly WFL for ADL tasks/conversation, mildly decreased insight and safety awareness           Sensation  Overall Sensation Status: WFL  LUE AROM (degrees)  LUE General AROM: Active shoulder flexion grossly 0-40', elbow flexion/extension WFL  RUE AROM (degrees)  RUE General AROM: Active shoulder flexion grossly 0-60', elbow flexion/extension WFL  LUE Strength  Gross LUE Strength: WFL  L Hand Grasp: 4+/5  LUE Strength Comment: 4+/5 elbow flexors and extensors  RUE Strength  Gross RUE Strength: WFL  R Hand Grasp: 4+/5  RUE Strength Comment: History of blood transfusion; [place] (hard of hearing); Hyperlipidemia; Hypertension; Itching; Morbid obesity ([address] Rheumatoid arthritis ([facility] Shortness of breath on exertion; Sleep apnea; Teeth missing; Thyroid disease; Urinary incontinence; WD-Chronic buttock pain; and Wears glasses. Pt admitted following frequent falls at home and diagnosed with an PEDRO LUIS. Pt lives at home alone but has [facility] aides M-F who assist with bathing/dressing and some  IADL tasks. Pt reported she does do some HH kitchen tasks and ambulates without any AD. Pt currently presents with the above impairments, and is at high risk for continued falls. Recommend continued OT services in ARU at d/c. Pt will tolerate 3 hours of therapy per day. Treatment Diagnosis: Acute kidney injury, Frequent falls  Prognosis: Good  Decision Making: High Complexity  Barriers to Learning: None   REQUIRES OT FOLLOW UP: Yes  Activity Tolerance  Activity Tolerance: Patient Tolerated treatment well  Safety Devices  Safety Devices in place: Yes  Type of devices: All fall risk precautions in place; Patient at risk for falls;Gait belt;Nurse notified;Call light within reach; Left in chair;Chair alarm in place          Time In: 1025  Time Out: 1052  Timed Treatment Minutes: 12  Total Treatment Time: 27       Plan   Plan  Times per week: 2x  Times per day: Daily  Current Treatment Recommendations: Strengthening, ROM, Balance Training, Functional Mobility Training, Endurance Training, Neuromuscular Re-education, Pain Management, Safety Education & Training, Patient/Caregiver Education & Training, Self-Care / ADL, Home Management Training, Equipment Evaluation, Education, & procurement      G-Code  OT G-codes  Functional Limitation: Self care  Self Care Current Status (): At least 40 percent but less than 60 percent impaired, limited or restricted  Self Care Goal Status ():  At least 20 percent but less than 40 percent impaired, limited or restricted      Goals  Short

## 2018-10-22 NOTE — PROGRESS NOTES
Nephrology Progress Note  10/22/2018 9:47 AM  Subjective:   Admit Date: 10/21/2018  PCP: Nadege Taylor MD  Interval History: pt present with weakness and fall at home with arf on ckd and state poor intake and hard care for herself. Has YRIS and on cpap at home. Diet: DIET RENAL;  Pain is:Mild      Data:   Scheduled Meds:   atorvastatin  80 mg Oral Nightly    buPROPion  300 mg Oral QAM    calcium-vitamin D  1 tablet Oral BID WC    diltiazem  180 mg Oral Daily    carvedilol  6.25 mg Oral BID    clopidogrel  75 mg Oral Daily    fluticasone  2 spray Nasal QAM    folic acid  1 mg Oral QAM    hydroxychloroquine  200 mg Oral BID    levomilnacipran  20 mg Oral Nightly    linaclotide  290 mcg Oral QAM AC    linagliptin  5 mg Oral Daily    LORazepam  0.5 mg Oral BID    montelukast  10 mg Oral Nightly    pantoprazole  40 mg Oral BID    QUEtiapine  300 mg Oral Nightly    ranolazine  500 mg Oral BID    insulin lispro  0-6 Units Subcutaneous TID WC    insulin lispro  0-3 Units Subcutaneous Nightly    heparin (porcine)  5,000 Units Subcutaneous 3 times per day    sodium chloride flush  10 mL Intravenous 2 times per day    allopurinol  200 mg Oral Daily     Continuous Infusions:   dextrose      sodium chloride 75 mL/hr at 10/21/18 2207     PRN Meds:acetaminophen, hydrOXYzine, ipratropium-albuterol, nitroGLYCERIN, glucose, dextrose, glucagon (rDNA), dextrose, sodium chloride flush, magnesium hydroxide, ondansetron, acetaminophen  I/O last 3 completed shifts: In: 240 [P.O.:240]  Out: 250 [Urine:250]  No intake/output data recorded.     Intake/Output Summary (Last 24 hours) at 10/22/18 1951  Last data filed at 10/22/18 5602   Gross per 24 hour   Intake                0 ml   Output              250 ml   Net             -250 ml     CBC:   Recent Labs      10/21/18   0130  10/22/18   0413   WBC  6.9  5.5   HGB  11.0*  10.7*   PLT  175  191     BMP:  Recent Labs      10/21/18   0130  10/22/18   0413   NA

## 2018-10-23 ENCOUNTER — APPOINTMENT (OUTPATIENT)
Dept: GENERAL RADIOLOGY | Age: 68
DRG: 683 | End: 2018-10-23
Payer: COMMERCIAL

## 2018-10-23 LAB
ALBUMIN SERPL-MCNC: 3.7 GM/DL (ref 3.4–5)
ANION GAP SERPL CALCULATED.3IONS-SCNC: 10 MMOL/L (ref 4–16)
BACTERIA: NEGATIVE /HPF
BILIRUBIN URINE: NEGATIVE MG/DL
BLOOD, URINE: ABNORMAL
BUN BLDV-MCNC: 29 MG/DL (ref 6–23)
CALCIUM SERPL-MCNC: 10.7 MG/DL (ref 8.3–10.6)
CHLORIDE BLD-SCNC: 107 MMOL/L (ref 99–110)
CLARITY: CLEAR
CO2: 26 MMOL/L (ref 21–32)
COLOR: ABNORMAL
CREAT SERPL-MCNC: 2.3 MG/DL (ref 0.6–1.1)
GFR AFRICAN AMERICAN: 26 ML/MIN/1.73M2
GFR NON-AFRICAN AMERICAN: 21 ML/MIN/1.73M2
GLUCOSE BLD-MCNC: 192 MG/DL (ref 70–99)
GLUCOSE BLD-MCNC: 192 MG/DL (ref 70–99)
GLUCOSE BLD-MCNC: 199 MG/DL (ref 70–99)
GLUCOSE BLD-MCNC: 317 MG/DL (ref 70–99)
GLUCOSE, URINE: 50 MG/DL
KETONES, URINE: NEGATIVE MG/DL
LEUKOCYTE ESTERASE, URINE: NEGATIVE
NITRITE URINE, QUANTITATIVE: NEGATIVE
PH, URINE: 6 (ref 5–8)
PHOSPHORUS: 2.7 MG/DL (ref 2.5–4.9)
POTASSIUM SERPL-SCNC: 4 MMOL/L (ref 3.5–5.1)
PROTEIN UA: NEGATIVE MG/DL
RBC URINE: 2 /HPF (ref 0–6)
SODIUM BLD-SCNC: 143 MMOL/L (ref 135–145)
SPECIFIC GRAVITY UA: 1.01 (ref 1–1.03)
SQUAMOUS EPITHELIAL: <1 /HPF
TRICHOMONAS: ABNORMAL /HPF
UROBILINOGEN, URINE: NORMAL MG/DL (ref 0.2–1)
WBC UA: 1 /HPF (ref 0–5)

## 2018-10-23 PROCEDURE — 94761 N-INVAS EAR/PLS OXIMETRY MLT: CPT

## 2018-10-23 PROCEDURE — 6370000000 HC RX 637 (ALT 250 FOR IP): Performed by: INTERNAL MEDICINE

## 2018-10-23 PROCEDURE — 97116 GAIT TRAINING THERAPY: CPT

## 2018-10-23 PROCEDURE — 1200000000 HC SEMI PRIVATE

## 2018-10-23 PROCEDURE — 80069 RENAL FUNCTION PANEL: CPT

## 2018-10-23 PROCEDURE — 2580000003 HC RX 258: Performed by: HOSPITALIST

## 2018-10-23 PROCEDURE — 6370000000 HC RX 637 (ALT 250 FOR IP): Performed by: HOSPITALIST

## 2018-10-23 PROCEDURE — 6360000002 HC RX W HCPCS: Performed by: HOSPITALIST

## 2018-10-23 PROCEDURE — 71045 X-RAY EXAM CHEST 1 VIEW: CPT

## 2018-10-23 PROCEDURE — 97530 THERAPEUTIC ACTIVITIES: CPT

## 2018-10-23 PROCEDURE — 81001 URINALYSIS AUTO W/SCOPE: CPT

## 2018-10-23 PROCEDURE — 94660 CPAP INITIATION&MGMT: CPT

## 2018-10-23 PROCEDURE — 36415 COLL VENOUS BLD VENIPUNCTURE: CPT

## 2018-10-23 PROCEDURE — 82962 GLUCOSE BLOOD TEST: CPT

## 2018-10-23 PROCEDURE — 97164 PT RE-EVAL EST PLAN CARE: CPT

## 2018-10-23 PROCEDURE — 87086 URINE CULTURE/COLONY COUNT: CPT

## 2018-10-23 RX ORDER — ALLOPURINOL 100 MG/1
200 TABLET ORAL DAILY
Status: CANCELLED | OUTPATIENT
Start: 2018-10-24

## 2018-10-23 RX ORDER — FOLIC ACID 1 MG/1
1 TABLET ORAL EVERY MORNING
Status: CANCELLED | OUTPATIENT
Start: 2018-10-24

## 2018-10-23 RX ORDER — BUPROPION HYDROCHLORIDE 150 MG/1
300 TABLET ORAL EVERY MORNING
Status: CANCELLED | OUTPATIENT
Start: 2018-10-24

## 2018-10-23 RX ORDER — ONDANSETRON 2 MG/ML
4 INJECTION INTRAMUSCULAR; INTRAVENOUS EVERY 6 HOURS PRN
Status: CANCELLED | OUTPATIENT
Start: 2018-10-23

## 2018-10-23 RX ORDER — DEXTROSE MONOHYDRATE 25 G/50ML
12.5 INJECTION, SOLUTION INTRAVENOUS PRN
Status: CANCELLED | OUTPATIENT
Start: 2018-10-23

## 2018-10-23 RX ORDER — FLUTICASONE PROPIONATE 50 MCG
2 SPRAY, SUSPENSION (ML) NASAL EVERY MORNING
Status: CANCELLED | OUTPATIENT
Start: 2018-10-24

## 2018-10-23 RX ORDER — CLOPIDOGREL BISULFATE 75 MG/1
75 TABLET ORAL DAILY
Status: CANCELLED | OUTPATIENT
Start: 2018-10-24

## 2018-10-23 RX ORDER — DEXTROSE MONOHYDRATE 50 MG/ML
100 INJECTION, SOLUTION INTRAVENOUS PRN
Status: CANCELLED | OUTPATIENT
Start: 2018-10-23

## 2018-10-23 RX ORDER — SODIUM CHLORIDE 0.9 % (FLUSH) 0.9 %
10 SYRINGE (ML) INJECTION PRN
Status: CANCELLED | OUTPATIENT
Start: 2018-10-23

## 2018-10-23 RX ORDER — NICOTINE POLACRILEX 4 MG
15 LOZENGE BUCCAL PRN
Status: CANCELLED | OUTPATIENT
Start: 2018-10-23

## 2018-10-23 RX ORDER — ACETAMINOPHEN 325 MG/1
650 TABLET ORAL EVERY 4 HOURS PRN
Status: CANCELLED | OUTPATIENT
Start: 2018-10-23

## 2018-10-23 RX ORDER — POLYETHYLENE GLYCOL 3350 17 G/17G
17 POWDER, FOR SOLUTION ORAL ONCE
Status: COMPLETED | OUTPATIENT
Start: 2018-10-23 | End: 2018-10-25

## 2018-10-23 RX ORDER — DILTIAZEM HYDROCHLORIDE 180 MG/1
180 CAPSULE, COATED, EXTENDED RELEASE ORAL DAILY
Status: CANCELLED | OUTPATIENT
Start: 2018-10-24

## 2018-10-23 RX ORDER — QUETIAPINE 150 MG/1
300 TABLET, FILM COATED, EXTENDED RELEASE ORAL NIGHTLY
Status: CANCELLED | OUTPATIENT
Start: 2018-10-23

## 2018-10-23 RX ORDER — CLONIDINE HYDROCHLORIDE 0.1 MG/1
0.1 TABLET ORAL 2 TIMES DAILY
Status: DISCONTINUED | OUTPATIENT
Start: 2018-10-23 | End: 2018-10-28 | Stop reason: HOSPADM

## 2018-10-23 RX ORDER — HYDROXYZINE HYDROCHLORIDE 25 MG/1
25 TABLET, FILM COATED ORAL PRN
Status: CANCELLED | OUTPATIENT
Start: 2018-10-23

## 2018-10-23 RX ORDER — IPRATROPIUM BROMIDE AND ALBUTEROL SULFATE 2.5; .5 MG/3ML; MG/3ML
1 SOLUTION RESPIRATORY (INHALATION) EVERY 4 HOURS PRN
Status: CANCELLED | OUTPATIENT
Start: 2018-10-23

## 2018-10-23 RX ORDER — LORAZEPAM 0.5 MG/1
0.5 TABLET ORAL 2 TIMES DAILY
Status: CANCELLED | OUTPATIENT
Start: 2018-10-23

## 2018-10-23 RX ORDER — RANOLAZINE 500 MG/1
500 TABLET, EXTENDED RELEASE ORAL 2 TIMES DAILY
Status: CANCELLED | OUTPATIENT
Start: 2018-10-23

## 2018-10-23 RX ORDER — MONTELUKAST SODIUM 10 MG/1
10 TABLET ORAL NIGHTLY
Status: CANCELLED | OUTPATIENT
Start: 2018-10-23

## 2018-10-23 RX ORDER — HYDROXYCHLOROQUINE SULFATE 200 MG/1
200 TABLET, FILM COATED ORAL 2 TIMES DAILY
Status: CANCELLED | OUTPATIENT
Start: 2018-10-23

## 2018-10-23 RX ORDER — ACETAMINOPHEN 325 MG/1
650 TABLET ORAL EVERY 6 HOURS PRN
Status: CANCELLED | OUTPATIENT
Start: 2018-10-23

## 2018-10-23 RX ORDER — SODIUM CHLORIDE 0.9 % (FLUSH) 0.9 %
10 SYRINGE (ML) INJECTION EVERY 12 HOURS SCHEDULED
Status: CANCELLED | OUTPATIENT
Start: 2018-10-23

## 2018-10-23 RX ORDER — NITROGLYCERIN 0.4 MG/1
0.4 TABLET SUBLINGUAL EVERY 5 MIN PRN
Status: CANCELLED | OUTPATIENT
Start: 2018-10-23

## 2018-10-23 RX ORDER — PANTOPRAZOLE SODIUM 40 MG/1
40 TABLET, DELAYED RELEASE ORAL 2 TIMES DAILY
Status: CANCELLED | OUTPATIENT
Start: 2018-10-23

## 2018-10-23 RX ORDER — PREDNISONE 1 MG/1
5 TABLET ORAL DAILY
Status: CANCELLED | OUTPATIENT
Start: 2018-10-24

## 2018-10-23 RX ORDER — ATORVASTATIN CALCIUM 40 MG/1
80 TABLET, FILM COATED ORAL NIGHTLY
Status: CANCELLED | OUTPATIENT
Start: 2018-10-23

## 2018-10-23 RX ORDER — PREDNISONE 1 MG/1
5 TABLET ORAL DAILY
Status: DISCONTINUED | OUTPATIENT
Start: 2018-10-23 | End: 2018-10-28 | Stop reason: HOSPADM

## 2018-10-23 RX ORDER — HEPARIN SODIUM 5000 [USP'U]/ML
5000 INJECTION, SOLUTION INTRAVENOUS; SUBCUTANEOUS EVERY 8 HOURS SCHEDULED
Status: CANCELLED | OUTPATIENT
Start: 2018-10-23

## 2018-10-23 RX ORDER — OYSTER SHELL CALCIUM WITH VITAMIN D 500; 200 MG/1; [IU]/1
1 TABLET, FILM COATED ORAL 2 TIMES DAILY WITH MEALS
Status: CANCELLED | OUTPATIENT
Start: 2018-10-23

## 2018-10-23 RX ORDER — CLONIDINE HYDROCHLORIDE 0.1 MG/1
0.1 TABLET ORAL 2 TIMES DAILY
Status: CANCELLED | OUTPATIENT
Start: 2018-10-23

## 2018-10-23 RX ORDER — CARVEDILOL 6.25 MG/1
6.25 TABLET ORAL 2 TIMES DAILY
Status: CANCELLED | OUTPATIENT
Start: 2018-10-23

## 2018-10-23 RX ADMIN — OYSTER SHELL CALCIUM WITH VITAMIN D 1 TABLET: 500; 200 TABLET, FILM COATED ORAL at 15:16

## 2018-10-23 RX ADMIN — ALLOPURINOL 200 MG: 100 TABLET ORAL at 09:17

## 2018-10-23 RX ADMIN — SODIUM CHLORIDE, PRESERVATIVE FREE 10 ML: 5 INJECTION INTRAVENOUS at 09:23

## 2018-10-23 RX ADMIN — BUPROPION HYDROCHLORIDE 300 MG: 150 TABLET, EXTENDED RELEASE ORAL at 09:18

## 2018-10-23 RX ADMIN — CLOPIDOGREL BISULFATE 75 MG: 75 TABLET ORAL at 09:16

## 2018-10-23 RX ADMIN — CARVEDILOL 6.25 MG: 6.25 TABLET, FILM COATED ORAL at 09:18

## 2018-10-23 RX ADMIN — CARVEDILOL 6.25 MG: 6.25 TABLET, FILM COATED ORAL at 21:49

## 2018-10-23 RX ADMIN — ATORVASTATIN CALCIUM 80 MG: 40 TABLET, FILM COATED ORAL at 21:49

## 2018-10-23 RX ADMIN — PANTOPRAZOLE SODIUM 40 MG: 40 TABLET, DELAYED RELEASE ORAL at 21:49

## 2018-10-23 RX ADMIN — CLONIDINE HYDROCHLORIDE 0.1 MG: 0.1 TABLET ORAL at 09:17

## 2018-10-23 RX ADMIN — FLUTICASONE PROPIONATE 2 SPRAY: 50 SPRAY, METERED NASAL at 09:21

## 2018-10-23 RX ADMIN — LORAZEPAM 0.5 MG: 0.5 TABLET ORAL at 21:49

## 2018-10-23 RX ADMIN — RANOLAZINE 500 MG: 500 TABLET, FILM COATED, EXTENDED RELEASE ORAL at 21:49

## 2018-10-23 RX ADMIN — HEPARIN SODIUM 5000 UNITS: 5000 INJECTION INTRAVENOUS; SUBCUTANEOUS at 21:44

## 2018-10-23 RX ADMIN — CLONIDINE HYDROCHLORIDE 0.1 MG: 0.1 TABLET ORAL at 21:48

## 2018-10-23 RX ADMIN — HEPARIN SODIUM 5000 UNITS: 5000 INJECTION INTRAVENOUS; SUBCUTANEOUS at 06:19

## 2018-10-23 RX ADMIN — FOLIC ACID 1 MG: 1 TABLET ORAL at 09:18

## 2018-10-23 RX ADMIN — QUETIAPINE FUMARATE 300 MG: 150 TABLET, EXTENDED RELEASE ORAL at 21:49

## 2018-10-23 RX ADMIN — DILTIAZEM HYDROCHLORIDE 180 MG: 180 CAPSULE, COATED, EXTENDED RELEASE ORAL at 09:17

## 2018-10-23 RX ADMIN — HYDROXYCHLOROQUINE SULFATE 200 MG: 200 TABLET, FILM COATED ENTERAL at 09:16

## 2018-10-23 RX ADMIN — INSULIN LISPRO 1 UNITS: 100 INJECTION, SOLUTION INTRAVENOUS; SUBCUTANEOUS at 09:18

## 2018-10-23 RX ADMIN — HEPARIN SODIUM 5000 UNITS: 5000 INJECTION INTRAVENOUS; SUBCUTANEOUS at 15:16

## 2018-10-23 RX ADMIN — PANTOPRAZOLE SODIUM 40 MG: 40 TABLET, DELAYED RELEASE ORAL at 09:17

## 2018-10-23 RX ADMIN — MONTELUKAST SODIUM 10 MG: 10 TABLET, COATED ORAL at 21:49

## 2018-10-23 RX ADMIN — LORAZEPAM 0.5 MG: 0.5 TABLET ORAL at 09:17

## 2018-10-23 RX ADMIN — OYSTER SHELL CALCIUM WITH VITAMIN D 1 TABLET: 500; 200 TABLET, FILM COATED ORAL at 09:23

## 2018-10-23 RX ADMIN — SODIUM CHLORIDE, PRESERVATIVE FREE 10 ML: 5 INJECTION INTRAVENOUS at 21:41

## 2018-10-23 RX ADMIN — RANOLAZINE 500 MG: 500 TABLET, FILM COATED, EXTENDED RELEASE ORAL at 09:16

## 2018-10-23 RX ADMIN — INSULIN LISPRO 2 UNITS: 100 INJECTION, SOLUTION INTRAVENOUS; SUBCUTANEOUS at 16:42

## 2018-10-23 RX ADMIN — LINAGLIPTIN 5 MG: 5 TABLET, FILM COATED ORAL at 09:17

## 2018-10-23 RX ADMIN — INSULIN LISPRO 2 UNITS: 100 INJECTION, SOLUTION INTRAVENOUS; SUBCUTANEOUS at 21:42

## 2018-10-23 RX ADMIN — HYDROXYCHLOROQUINE SULFATE 200 MG: 200 TABLET, FILM COATED ENTERAL at 21:49

## 2018-10-23 ASSESSMENT — PAIN SCALES - GENERAL
PAINLEVEL_OUTOF10: 0
PAINLEVEL_OUTOF10: 0
PAINLEVEL_OUTOF10: 4
PAINLEVEL_OUTOF10: 0
PAINLEVEL_OUTOF10: 4
PAINLEVEL_OUTOF10: 0
PAINLEVEL_OUTOF10: 0
PAINLEVEL_OUTOF10: 4

## 2018-10-23 ASSESSMENT — PAIN DESCRIPTION - FREQUENCY: FREQUENCY: INTERMITTENT

## 2018-10-23 ASSESSMENT — PAIN DESCRIPTION - PAIN TYPE: TYPE: CHRONIC PAIN

## 2018-10-23 ASSESSMENT — PAIN DESCRIPTION - LOCATION: LOCATION: BACK

## 2018-10-23 NOTE — DISCHARGE SUMMARY
FINDINGS: The lungs are without acute focal process. There is no effusion or pneumothorax. The cardiomediastinal silhouette is stable. The osseous structures are stable. No acute process. Xr Hip 1 Vw W Pelvis Left    Result Date: 10/21/2018  EXAMINATION: SINGLE XRAY VIEW OF THE PELVIS AND 2 XRAY VIEWS LEFT HIP 10/21/2018 1:28 pm COMPARISON: Pelvis radiograph 10/30/2013. HISTORY: ORDERING SYSTEM PROVIDED HISTORY: hip pain TECHNOLOGIST PROVIDED HISTORY: Reason for exam:->hip pain Ordering Physician Provided Reason for Exam: hip pain Acuity: Acute Type of Exam: Initial Mechanism of Injury: fell Relevant Medical/Surgical History: arthritis; diabetes mellitus FINDINGS: Status post posterior fusion from L4 through S1. The bilateral sacroiliac joints and pubic symphysis are intact. Mild bilateral hip degenerative changes. No fracture or dislocation. The soft tissues are unremarkable. No acute osseous abnormality.        Discharge Time of 25 minutes    Electronically signed by Luis Carlos Schwartz MD on 10/23/2018 at 2:42 PM

## 2018-10-23 NOTE — PROGRESS NOTES
Nephrology Progress Note  10/23/2018 8:27 AM  Subjective:   Admit Date: 10/21/2018  PCP: Mick Mcfarland MD  Interval History: pt doing better prefer to dc to ARU if able,     Diet: DIET RENAL;  Pain is:Mild      Data:   Scheduled Meds:   atorvastatin  80 mg Oral Nightly    buPROPion  300 mg Oral QAM    calcium-vitamin D  1 tablet Oral BID WC    diltiazem  180 mg Oral Daily    carvedilol  6.25 mg Oral BID    clopidogrel  75 mg Oral Daily    fluticasone  2 spray Nasal QAM    folic acid  1 mg Oral QAM    hydroxychloroquine  200 mg Oral BID    levomilnacipran  20 mg Oral Nightly    linaclotide  290 mcg Oral QAM AC    linagliptin  5 mg Oral Daily    LORazepam  0.5 mg Oral BID    montelukast  10 mg Oral Nightly    pantoprazole  40 mg Oral BID    QUEtiapine  300 mg Oral Nightly    ranolazine  500 mg Oral BID    insulin lispro  0-6 Units Subcutaneous TID WC    insulin lispro  0-3 Units Subcutaneous Nightly    heparin (porcine)  5,000 Units Subcutaneous 3 times per day    sodium chloride flush  10 mL Intravenous 2 times per day    allopurinol  200 mg Oral Daily     Continuous Infusions:   dextrose       PRN Meds:acetaminophen, hydrOXYzine, ipratropium-albuterol, nitroGLYCERIN, glucose, dextrose, glucagon (rDNA), dextrose, sodium chloride flush, magnesium hydroxide, ondansetron, acetaminophen  No intake/output data recorded. No intake/output data recorded.   No intake or output data in the 24 hours ending 10/23/18 0827  CBC:   Recent Labs      10/21/18   0130  10/22/18   0413   WBC  6.9  5.5   HGB  11.0*  10.7*   PLT  175  191     BMP:    Recent Labs      10/21/18   0130  10/22/18   0413  10/23/18   0545   NA  135  143  143   K  4.1  4.1  4.0   CL  99  106  107   CO2  23  25  26   BUN  59*  39*  29*   CREATININE  3.3*  2.6*  2.3*   GLUCOSE  300*  196*  199*     Hepatic:   Recent Labs      10/21/18   0130   AST  16   ALT  21   BILITOT  0.2   ALKPHOS  174*     Troponin: No results for input(s):

## 2018-10-24 LAB
COPPER: 101
CULTURE: NORMAL
CULTURE: NORMAL
GLUCOSE BLD-MCNC: 171 MG/DL (ref 70–99)
GLUCOSE BLD-MCNC: 235 MG/DL (ref 70–99)
GLUCOSE BLD-MCNC: 242 MG/DL (ref 70–99)
GLUCOSE BLD-MCNC: 265 MG/DL (ref 70–99)
Lab: NORMAL
REPORT STATUS: NORMAL
SPECIMEN: NORMAL
TOTAL COLONY COUNT: NORMAL
ZINC: 64

## 2018-10-24 PROCEDURE — 6360000002 HC RX W HCPCS: Performed by: HOSPITALIST

## 2018-10-24 PROCEDURE — 1200000000 HC SEMI PRIVATE

## 2018-10-24 PROCEDURE — 82962 GLUCOSE BLOOD TEST: CPT

## 2018-10-24 PROCEDURE — 6370000000 HC RX 637 (ALT 250 FOR IP): Performed by: INTERNAL MEDICINE

## 2018-10-24 PROCEDURE — 2580000003 HC RX 258: Performed by: HOSPITALIST

## 2018-10-24 PROCEDURE — 6370000000 HC RX 637 (ALT 250 FOR IP): Performed by: HOSPITALIST

## 2018-10-24 RX ADMIN — HEPARIN SODIUM 5000 UNITS: 5000 INJECTION INTRAVENOUS; SUBCUTANEOUS at 21:18

## 2018-10-24 RX ADMIN — DILTIAZEM HYDROCHLORIDE 180 MG: 180 CAPSULE, COATED, EXTENDED RELEASE ORAL at 08:58

## 2018-10-24 RX ADMIN — OYSTER SHELL CALCIUM WITH VITAMIN D 1 TABLET: 500; 200 TABLET, FILM COATED ORAL at 08:58

## 2018-10-24 RX ADMIN — INSULIN LISPRO 2 UNITS: 100 INJECTION, SOLUTION INTRAVENOUS; SUBCUTANEOUS at 12:03

## 2018-10-24 RX ADMIN — RANOLAZINE 500 MG: 500 TABLET, FILM COATED, EXTENDED RELEASE ORAL at 08:57

## 2018-10-24 RX ADMIN — HEPARIN SODIUM 5000 UNITS: 5000 INJECTION INTRAVENOUS; SUBCUTANEOUS at 06:11

## 2018-10-24 RX ADMIN — HYDROXYCHLOROQUINE SULFATE 200 MG: 200 TABLET, FILM COATED ENTERAL at 21:18

## 2018-10-24 RX ADMIN — HEPARIN SODIUM 5000 UNITS: 5000 INJECTION INTRAVENOUS; SUBCUTANEOUS at 14:16

## 2018-10-24 RX ADMIN — SODIUM CHLORIDE, PRESERVATIVE FREE 10 ML: 5 INJECTION INTRAVENOUS at 09:07

## 2018-10-24 RX ADMIN — RANOLAZINE 500 MG: 500 TABLET, FILM COATED, EXTENDED RELEASE ORAL at 21:18

## 2018-10-24 RX ADMIN — PANTOPRAZOLE SODIUM 40 MG: 40 TABLET, DELAYED RELEASE ORAL at 08:57

## 2018-10-24 RX ADMIN — QUETIAPINE FUMARATE 300 MG: 150 TABLET, EXTENDED RELEASE ORAL at 21:18

## 2018-10-24 RX ADMIN — PREDNISONE 5 MG: 5 TABLET ORAL at 08:57

## 2018-10-24 RX ADMIN — CARVEDILOL 6.25 MG: 6.25 TABLET, FILM COATED ORAL at 21:18

## 2018-10-24 RX ADMIN — LINAGLIPTIN 5 MG: 5 TABLET, FILM COATED ORAL at 08:58

## 2018-10-24 RX ADMIN — BUPROPION HYDROCHLORIDE 300 MG: 150 TABLET, EXTENDED RELEASE ORAL at 08:57

## 2018-10-24 RX ADMIN — CLOPIDOGREL BISULFATE 75 MG: 75 TABLET ORAL at 08:57

## 2018-10-24 RX ADMIN — CLONIDINE HYDROCHLORIDE 0.1 MG: 0.1 TABLET ORAL at 21:18

## 2018-10-24 RX ADMIN — FLUTICASONE PROPIONATE 2 SPRAY: 50 SPRAY, METERED NASAL at 08:56

## 2018-10-24 RX ADMIN — INSULIN LISPRO 2 UNITS: 100 INJECTION, SOLUTION INTRAVENOUS; SUBCUTANEOUS at 21:21

## 2018-10-24 RX ADMIN — OYSTER SHELL CALCIUM WITH VITAMIN D 1 TABLET: 500; 200 TABLET, FILM COATED ORAL at 16:09

## 2018-10-24 RX ADMIN — PANTOPRAZOLE SODIUM 40 MG: 40 TABLET, DELAYED RELEASE ORAL at 21:18

## 2018-10-24 RX ADMIN — HYDROXYCHLOROQUINE SULFATE 200 MG: 200 TABLET, FILM COATED ENTERAL at 08:57

## 2018-10-24 RX ADMIN — ATORVASTATIN CALCIUM 80 MG: 40 TABLET, FILM COATED ORAL at 21:18

## 2018-10-24 RX ADMIN — SODIUM CHLORIDE, PRESERVATIVE FREE 10 ML: 5 INJECTION INTRAVENOUS at 21:19

## 2018-10-24 RX ADMIN — ALLOPURINOL 200 MG: 100 TABLET ORAL at 08:57

## 2018-10-24 RX ADMIN — LORAZEPAM 0.5 MG: 0.5 TABLET ORAL at 21:18

## 2018-10-24 RX ADMIN — MONTELUKAST SODIUM 10 MG: 10 TABLET, COATED ORAL at 21:18

## 2018-10-24 RX ADMIN — INSULIN LISPRO 2 UNITS: 100 INJECTION, SOLUTION INTRAVENOUS; SUBCUTANEOUS at 16:29

## 2018-10-24 RX ADMIN — ACETAMINOPHEN 650 MG: 325 TABLET, FILM COATED ORAL at 23:27

## 2018-10-24 RX ADMIN — INSULIN LISPRO 1 UNITS: 100 INJECTION, SOLUTION INTRAVENOUS; SUBCUTANEOUS at 08:56

## 2018-10-24 RX ADMIN — FOLIC ACID 1 MG: 1 TABLET ORAL at 08:57

## 2018-10-24 RX ADMIN — LORAZEPAM 0.5 MG: 0.5 TABLET ORAL at 08:57

## 2018-10-24 ASSESSMENT — PAIN SCALES - GENERAL
PAINLEVEL_OUTOF10: 0
PAINLEVEL_OUTOF10: 0
PAINLEVEL_OUTOF10: 8
PAINLEVEL_OUTOF10: 0

## 2018-10-25 LAB
GLUCOSE BLD-MCNC: 168 MG/DL (ref 70–99)
GLUCOSE BLD-MCNC: 215 MG/DL (ref 70–99)
GLUCOSE BLD-MCNC: 239 MG/DL (ref 70–99)
GLUCOSE BLD-MCNC: 245 MG/DL (ref 70–99)
GLUCOSE BLD-MCNC: 267 MG/DL (ref 70–99)

## 2018-10-25 PROCEDURE — 82962 GLUCOSE BLOOD TEST: CPT

## 2018-10-25 PROCEDURE — 6370000000 HC RX 637 (ALT 250 FOR IP): Performed by: INTERNAL MEDICINE

## 2018-10-25 PROCEDURE — 1200000000 HC SEMI PRIVATE

## 2018-10-25 PROCEDURE — 6360000002 HC RX W HCPCS: Performed by: HOSPITALIST

## 2018-10-25 PROCEDURE — 2580000003 HC RX 258: Performed by: HOSPITALIST

## 2018-10-25 PROCEDURE — 6370000000 HC RX 637 (ALT 250 FOR IP): Performed by: HOSPITALIST

## 2018-10-25 RX ORDER — LIDOCAINE 4 G/G
1 PATCH TOPICAL DAILY
Status: DISCONTINUED | OUTPATIENT
Start: 2018-10-25 | End: 2018-10-28 | Stop reason: HOSPADM

## 2018-10-25 RX ADMIN — CARVEDILOL 6.25 MG: 6.25 TABLET, FILM COATED ORAL at 20:01

## 2018-10-25 RX ADMIN — OYSTER SHELL CALCIUM WITH VITAMIN D 1 TABLET: 500; 200 TABLET, FILM COATED ORAL at 16:19

## 2018-10-25 RX ADMIN — HYDROXYCHLOROQUINE SULFATE 200 MG: 200 TABLET, FILM COATED ENTERAL at 08:48

## 2018-10-25 RX ADMIN — ALLOPURINOL 200 MG: 100 TABLET ORAL at 08:47

## 2018-10-25 RX ADMIN — RANOLAZINE 500 MG: 500 TABLET, FILM COATED, EXTENDED RELEASE ORAL at 08:48

## 2018-10-25 RX ADMIN — PANTOPRAZOLE SODIUM 40 MG: 40 TABLET, DELAYED RELEASE ORAL at 08:48

## 2018-10-25 RX ADMIN — HEPARIN SODIUM 5000 UNITS: 5000 INJECTION INTRAVENOUS; SUBCUTANEOUS at 14:56

## 2018-10-25 RX ADMIN — INSULIN LISPRO 2 UNITS: 100 INJECTION, SOLUTION INTRAVENOUS; SUBCUTANEOUS at 11:39

## 2018-10-25 RX ADMIN — HEPARIN SODIUM 5000 UNITS: 5000 INJECTION INTRAVENOUS; SUBCUTANEOUS at 20:08

## 2018-10-25 RX ADMIN — FLUTICASONE PROPIONATE 2 SPRAY: 50 SPRAY, METERED NASAL at 08:46

## 2018-10-25 RX ADMIN — HYDROXYCHLOROQUINE SULFATE 200 MG: 200 TABLET, FILM COATED ENTERAL at 20:02

## 2018-10-25 RX ADMIN — HEPARIN SODIUM 5000 UNITS: 5000 INJECTION INTRAVENOUS; SUBCUTANEOUS at 06:08

## 2018-10-25 RX ADMIN — QUETIAPINE FUMARATE 300 MG: 150 TABLET, EXTENDED RELEASE ORAL at 20:03

## 2018-10-25 RX ADMIN — DILTIAZEM HYDROCHLORIDE 180 MG: 180 CAPSULE, COATED, EXTENDED RELEASE ORAL at 08:48

## 2018-10-25 RX ADMIN — CLONIDINE HYDROCHLORIDE 0.1 MG: 0.1 TABLET ORAL at 08:47

## 2018-10-25 RX ADMIN — RANOLAZINE 500 MG: 500 TABLET, FILM COATED, EXTENDED RELEASE ORAL at 20:02

## 2018-10-25 RX ADMIN — OYSTER SHELL CALCIUM WITH VITAMIN D 1 TABLET: 500; 200 TABLET, FILM COATED ORAL at 08:48

## 2018-10-25 RX ADMIN — LORAZEPAM 0.5 MG: 0.5 TABLET ORAL at 10:43

## 2018-10-25 RX ADMIN — MONTELUKAST SODIUM 10 MG: 10 TABLET, COATED ORAL at 20:02

## 2018-10-25 RX ADMIN — PANTOPRAZOLE SODIUM 40 MG: 40 TABLET, DELAYED RELEASE ORAL at 20:03

## 2018-10-25 RX ADMIN — INSULIN LISPRO 2 UNITS: 100 INJECTION, SOLUTION INTRAVENOUS; SUBCUTANEOUS at 16:18

## 2018-10-25 RX ADMIN — SODIUM CHLORIDE, PRESERVATIVE FREE 10 ML: 5 INJECTION INTRAVENOUS at 20:03

## 2018-10-25 RX ADMIN — INSULIN LISPRO 2 UNITS: 100 INJECTION, SOLUTION INTRAVENOUS; SUBCUTANEOUS at 19:57

## 2018-10-25 RX ADMIN — PREDNISONE 5 MG: 5 TABLET ORAL at 08:47

## 2018-10-25 RX ADMIN — CLONIDINE HYDROCHLORIDE 0.1 MG: 0.1 TABLET ORAL at 20:01

## 2018-10-25 RX ADMIN — CARVEDILOL 6.25 MG: 6.25 TABLET, FILM COATED ORAL at 08:47

## 2018-10-25 RX ADMIN — FOLIC ACID 1 MG: 1 TABLET ORAL at 08:47

## 2018-10-25 RX ADMIN — LINAGLIPTIN 5 MG: 5 TABLET, FILM COATED ORAL at 08:47

## 2018-10-25 RX ADMIN — BUPROPION HYDROCHLORIDE 300 MG: 150 TABLET, EXTENDED RELEASE ORAL at 08:48

## 2018-10-25 RX ADMIN — POLYETHYLENE GLYCOL (3350) 17 G: 17 POWDER, FOR SOLUTION ORAL at 08:49

## 2018-10-25 RX ADMIN — ACETAMINOPHEN 650 MG: 325 TABLET, FILM COATED ORAL at 08:46

## 2018-10-25 RX ADMIN — CLOPIDOGREL BISULFATE 75 MG: 75 TABLET ORAL at 08:47

## 2018-10-25 RX ADMIN — ATORVASTATIN CALCIUM 80 MG: 40 TABLET, FILM COATED ORAL at 20:02

## 2018-10-25 RX ADMIN — SODIUM CHLORIDE, PRESERVATIVE FREE 10 ML: 5 INJECTION INTRAVENOUS at 09:01

## 2018-10-25 RX ADMIN — LORAZEPAM 0.5 MG: 0.5 TABLET ORAL at 20:01

## 2018-10-25 RX ADMIN — INSULIN LISPRO 2 UNITS: 100 INJECTION, SOLUTION INTRAVENOUS; SUBCUTANEOUS at 08:55

## 2018-10-25 ASSESSMENT — PAIN DESCRIPTION - ONSET: ONSET: ON-GOING

## 2018-10-25 ASSESSMENT — PAIN SCALES - GENERAL
PAINLEVEL_OUTOF10: 0
PAINLEVEL_OUTOF10: 6
PAINLEVEL_OUTOF10: 6

## 2018-10-25 ASSESSMENT — PAIN DESCRIPTION - PAIN TYPE: TYPE: CHRONIC PAIN

## 2018-10-25 ASSESSMENT — PAIN DESCRIPTION - FREQUENCY: FREQUENCY: CONTINUOUS

## 2018-10-25 ASSESSMENT — PAIN DESCRIPTION - DESCRIPTORS: DESCRIPTORS: ACHING

## 2018-10-25 ASSESSMENT — PAIN DESCRIPTION - ORIENTATION: ORIENTATION: RIGHT

## 2018-10-25 ASSESSMENT — PAIN DESCRIPTION - LOCATION: LOCATION: SHOULDER

## 2018-10-25 ASSESSMENT — PAIN DESCRIPTION - PROGRESSION: CLINICAL_PROGRESSION: NOT CHANGED

## 2018-10-25 NOTE — CARE COORDINATION
[facility]/ARU called and states precert denied. CM met with pt and she would like to try [facility]. Referral called to [facility]. CM await a call back about referral determination. Dondra Najjar R.N.    Ext: [phone]

## 2018-10-25 NOTE — CARE COORDINATION
[facility] still pending with [facility] at this time. Requested call ASAP with determination. Will continue to follow for determination.

## 2018-10-25 NOTE — CARE COORDINATION
Received call from [facility] stating patient has been denied for ARU.   Notified Shirley HENDRICKSON of denial.

## 2018-10-26 LAB
ANION GAP SERPL CALCULATED.3IONS-SCNC: 10 MMOL/L (ref 4–16)
BUN BLDV-MCNC: 18 MG/DL (ref 6–23)
CALCIUM SERPL-MCNC: 10.6 MG/DL (ref 8.3–10.6)
CHLORIDE BLD-SCNC: 106 MMOL/L (ref 99–110)
CO2: 26 MMOL/L (ref 21–32)
CREAT SERPL-MCNC: 2 MG/DL (ref 0.6–1.1)
GFR AFRICAN AMERICAN: 30 ML/MIN/1.73M2
GFR NON-AFRICAN AMERICAN: 25 ML/MIN/1.73M2
GLUCOSE BLD-MCNC: 178 MG/DL (ref 70–99)
GLUCOSE BLD-MCNC: 192 MG/DL (ref 70–99)
GLUCOSE BLD-MCNC: 229 MG/DL (ref 70–99)
GLUCOSE BLD-MCNC: 263 MG/DL (ref 70–99)
GLUCOSE BLD-MCNC: 321 MG/DL (ref 70–99)
POTASSIUM SERPL-SCNC: 4 MMOL/L (ref 3.5–5.1)
SODIUM BLD-SCNC: 142 MMOL/L (ref 135–145)

## 2018-10-26 PROCEDURE — 36415 COLL VENOUS BLD VENIPUNCTURE: CPT

## 2018-10-26 PROCEDURE — 80048 BASIC METABOLIC PNL TOTAL CA: CPT

## 2018-10-26 PROCEDURE — 6370000000 HC RX 637 (ALT 250 FOR IP): Performed by: INTERNAL MEDICINE

## 2018-10-26 PROCEDURE — 94660 CPAP INITIATION&MGMT: CPT

## 2018-10-26 PROCEDURE — 6360000002 HC RX W HCPCS: Performed by: HOSPITALIST

## 2018-10-26 PROCEDURE — 82962 GLUCOSE BLOOD TEST: CPT

## 2018-10-26 PROCEDURE — 2580000003 HC RX 258: Performed by: HOSPITALIST

## 2018-10-26 PROCEDURE — 6370000000 HC RX 637 (ALT 250 FOR IP): Performed by: HOSPITALIST

## 2018-10-26 PROCEDURE — 1200000000 HC SEMI PRIVATE

## 2018-10-26 RX ORDER — PREDNISONE 1 MG/1
5 TABLET ORAL DAILY
Qty: 10 TABLET | Refills: 0 | Status: SHIPPED | OUTPATIENT
Start: 2018-10-27 | End: 2018-11-06

## 2018-10-26 RX ORDER — LIDOCAINE 4 G/G
1 PATCH TOPICAL DAILY
Qty: 30 PATCH | Refills: 0 | Status: SHIPPED | OUTPATIENT
Start: 2018-10-27 | End: 2019-01-18

## 2018-10-26 RX ORDER — CLONIDINE HYDROCHLORIDE 0.1 MG/1
0.1 TABLET ORAL 2 TIMES DAILY
Qty: 60 TABLET | Refills: 3 | Status: SHIPPED | OUTPATIENT
Start: 2018-10-26 | End: 2019-06-05 | Stop reason: DRUGHIGH

## 2018-10-26 RX ADMIN — CLONIDINE HYDROCHLORIDE 0.1 MG: 0.1 TABLET ORAL at 20:18

## 2018-10-26 RX ADMIN — FLUTICASONE PROPIONATE 2 SPRAY: 50 SPRAY, METERED NASAL at 08:18

## 2018-10-26 RX ADMIN — LORAZEPAM 0.5 MG: 0.5 TABLET ORAL at 08:15

## 2018-10-26 RX ADMIN — ALLOPURINOL 200 MG: 100 TABLET ORAL at 08:17

## 2018-10-26 RX ADMIN — CLOPIDOGREL BISULFATE 75 MG: 75 TABLET ORAL at 08:17

## 2018-10-26 RX ADMIN — OYSTER SHELL CALCIUM WITH VITAMIN D 1 TABLET: 500; 200 TABLET, FILM COATED ORAL at 16:24

## 2018-10-26 RX ADMIN — SODIUM CHLORIDE, PRESERVATIVE FREE 10 ML: 5 INJECTION INTRAVENOUS at 08:15

## 2018-10-26 RX ADMIN — PANTOPRAZOLE SODIUM 40 MG: 40 TABLET, DELAYED RELEASE ORAL at 08:18

## 2018-10-26 RX ADMIN — CARVEDILOL 6.25 MG: 6.25 TABLET, FILM COATED ORAL at 08:17

## 2018-10-26 RX ADMIN — PREDNISONE 5 MG: 5 TABLET ORAL at 08:17

## 2018-10-26 RX ADMIN — FOLIC ACID 1 MG: 1 TABLET ORAL at 08:18

## 2018-10-26 RX ADMIN — CLONIDINE HYDROCHLORIDE 0.1 MG: 0.1 TABLET ORAL at 08:17

## 2018-10-26 RX ADMIN — ACETAMINOPHEN 650 MG: 325 TABLET, FILM COATED ORAL at 08:16

## 2018-10-26 RX ADMIN — PANTOPRAZOLE SODIUM 40 MG: 40 TABLET, DELAYED RELEASE ORAL at 20:18

## 2018-10-26 RX ADMIN — SODIUM CHLORIDE, PRESERVATIVE FREE 10 ML: 5 INJECTION INTRAVENOUS at 20:18

## 2018-10-26 RX ADMIN — INSULIN LISPRO 2 UNITS: 100 INJECTION, SOLUTION INTRAVENOUS; SUBCUTANEOUS at 20:17

## 2018-10-26 RX ADMIN — LORAZEPAM 0.5 MG: 0.5 TABLET ORAL at 20:19

## 2018-10-26 RX ADMIN — HYDROXYCHLOROQUINE SULFATE 200 MG: 200 TABLET, FILM COATED ENTERAL at 20:18

## 2018-10-26 RX ADMIN — RANOLAZINE 500 MG: 500 TABLET, FILM COATED, EXTENDED RELEASE ORAL at 20:18

## 2018-10-26 RX ADMIN — MONTELUKAST SODIUM 10 MG: 10 TABLET, COATED ORAL at 20:18

## 2018-10-26 RX ADMIN — OYSTER SHELL CALCIUM WITH VITAMIN D 1 TABLET: 500; 200 TABLET, FILM COATED ORAL at 08:15

## 2018-10-26 RX ADMIN — INSULIN LISPRO 1 UNITS: 100 INJECTION, SOLUTION INTRAVENOUS; SUBCUTANEOUS at 08:26

## 2018-10-26 RX ADMIN — HEPARIN SODIUM 5000 UNITS: 5000 INJECTION INTRAVENOUS; SUBCUTANEOUS at 20:17

## 2018-10-26 RX ADMIN — BUPROPION HYDROCHLORIDE 300 MG: 150 TABLET, EXTENDED RELEASE ORAL at 08:15

## 2018-10-26 RX ADMIN — QUETIAPINE FUMARATE 300 MG: 150 TABLET, EXTENDED RELEASE ORAL at 20:18

## 2018-10-26 RX ADMIN — RANOLAZINE 500 MG: 500 TABLET, FILM COATED, EXTENDED RELEASE ORAL at 08:18

## 2018-10-26 RX ADMIN — ATORVASTATIN CALCIUM 80 MG: 40 TABLET, FILM COATED ORAL at 20:18

## 2018-10-26 RX ADMIN — INSULIN LISPRO 4 UNITS: 100 INJECTION, SOLUTION INTRAVENOUS; SUBCUTANEOUS at 16:53

## 2018-10-26 RX ADMIN — HYDROXYCHLOROQUINE SULFATE 200 MG: 200 TABLET, FILM COATED ENTERAL at 08:17

## 2018-10-26 RX ADMIN — LINAGLIPTIN 5 MG: 5 TABLET, FILM COATED ORAL at 08:15

## 2018-10-26 RX ADMIN — HEPARIN SODIUM 5000 UNITS: 5000 INJECTION INTRAVENOUS; SUBCUTANEOUS at 06:05

## 2018-10-26 RX ADMIN — CARVEDILOL 6.25 MG: 6.25 TABLET, FILM COATED ORAL at 20:18

## 2018-10-26 RX ADMIN — ACETAMINOPHEN 650 MG: 325 TABLET, FILM COATED ORAL at 18:21

## 2018-10-26 RX ADMIN — INSULIN LISPRO 2 UNITS: 100 INJECTION, SOLUTION INTRAVENOUS; SUBCUTANEOUS at 12:39

## 2018-10-26 RX ADMIN — DILTIAZEM HYDROCHLORIDE 180 MG: 180 CAPSULE, COATED, EXTENDED RELEASE ORAL at 08:17

## 2018-10-26 ASSESSMENT — PAIN DESCRIPTION - ONSET: ONSET: ON-GOING

## 2018-10-26 ASSESSMENT — PAIN SCALES - GENERAL
PAINLEVEL_OUTOF10: 7
PAINLEVEL_OUTOF10: 0
PAINLEVEL_OUTOF10: 4

## 2018-10-26 ASSESSMENT — PAIN DESCRIPTION - DESCRIPTORS: DESCRIPTORS: ACHING;DISCOMFORT

## 2018-10-26 ASSESSMENT — PAIN DESCRIPTION - ORIENTATION: ORIENTATION: RIGHT

## 2018-10-26 ASSESSMENT — PAIN DESCRIPTION - LOCATION: LOCATION: SHOULDER

## 2018-10-26 ASSESSMENT — PAIN DESCRIPTION - PAIN TYPE: TYPE: ACUTE PAIN

## 2018-10-26 ASSESSMENT — PAIN DESCRIPTION - PROGRESSION: CLINICAL_PROGRESSION: NOT CHANGED

## 2018-10-26 ASSESSMENT — PAIN DESCRIPTION - FREQUENCY: FREQUENCY: CONTINUOUS

## 2018-10-26 NOTE — PROGRESS NOTES
10/26/18 0107   NIV Type   $NIV $Daily Charge   Equipment Type resmed   Mode Auto-PAP   Mask Type Nasal prongs   Mask Size Small   Bonnet size Small   Settings/Measurements   Comfort Level Good   Using Accessory Muscles No   CPAP 8.6 cmH2O

## 2018-10-26 NOTE — PROGRESS NOTES
Nutrition Assessment    Type and Reason for Visit: Initial (los 5)    Malnutrition Assessment:  · Malnutrition Status: No malnutrition    Nutrition Diagnosis:   · Problem: No nutrition diagnosis at this time    Nutrition Assessment:  · Subjective Assessment: Adequate intake on Renal Diet, consuming 76% to all of meals. No reported decreased intakenor wt loss PTA. Wt appears stable over past 6 months per Epic history, BMI 44.3. Nutrition Risk Level   Risk Level: Low    Nutrition Intervention  Food and/or Delivery: Continue current diet, ONS not indicated  Nutrition Education/Counseling/Coordination of Care:  Continued Inpatient Monitoring, Education not appropriate at this time, Coordination of Care    Patient assessed for nutrition risk. Deemed to be at low risk at this time. Will continue to follow patient.       Electronically signed by Kyra Jiménez RD, HILLARY on 10/26/18 at 4:13 PM    Contact Number: [phone]

## 2018-10-27 LAB
GLUCOSE BLD-MCNC: 159 MG/DL (ref 70–99)
GLUCOSE BLD-MCNC: 206 MG/DL (ref 70–99)
GLUCOSE BLD-MCNC: 221 MG/DL (ref 70–99)
GLUCOSE BLD-MCNC: 280 MG/DL (ref 70–99)

## 2018-10-27 PROCEDURE — 6370000000 HC RX 637 (ALT 250 FOR IP): Performed by: INTERNAL MEDICINE

## 2018-10-27 PROCEDURE — 1200000000 HC SEMI PRIVATE

## 2018-10-27 PROCEDURE — 94660 CPAP INITIATION&MGMT: CPT

## 2018-10-27 PROCEDURE — 82962 GLUCOSE BLOOD TEST: CPT

## 2018-10-27 PROCEDURE — 6360000002 HC RX W HCPCS: Performed by: HOSPITALIST

## 2018-10-27 PROCEDURE — 2580000003 HC RX 258: Performed by: HOSPITALIST

## 2018-10-27 PROCEDURE — 97116 GAIT TRAINING THERAPY: CPT

## 2018-10-27 PROCEDURE — 6370000000 HC RX 637 (ALT 250 FOR IP): Performed by: HOSPITALIST

## 2018-10-27 PROCEDURE — 97530 THERAPEUTIC ACTIVITIES: CPT

## 2018-10-27 RX ADMIN — INSULIN LISPRO 2 UNITS: 100 INJECTION, SOLUTION INTRAVENOUS; SUBCUTANEOUS at 20:23

## 2018-10-27 RX ADMIN — LORAZEPAM 0.5 MG: 0.5 TABLET ORAL at 20:17

## 2018-10-27 RX ADMIN — CLONIDINE HYDROCHLORIDE 0.1 MG: 0.1 TABLET ORAL at 09:44

## 2018-10-27 RX ADMIN — SODIUM CHLORIDE, PRESERVATIVE FREE 10 ML: 5 INJECTION INTRAVENOUS at 09:29

## 2018-10-27 RX ADMIN — LORAZEPAM 0.5 MG: 0.5 TABLET ORAL at 10:07

## 2018-10-27 RX ADMIN — RANOLAZINE 500 MG: 500 TABLET, FILM COATED, EXTENDED RELEASE ORAL at 09:29

## 2018-10-27 RX ADMIN — QUETIAPINE FUMARATE 300 MG: 150 TABLET, EXTENDED RELEASE ORAL at 20:17

## 2018-10-27 RX ADMIN — CLOPIDOGREL BISULFATE 75 MG: 75 TABLET ORAL at 09:27

## 2018-10-27 RX ADMIN — MONTELUKAST SODIUM 10 MG: 10 TABLET, COATED ORAL at 20:17

## 2018-10-27 RX ADMIN — INSULIN LISPRO 2 UNITS: 100 INJECTION, SOLUTION INTRAVENOUS; SUBCUTANEOUS at 11:25

## 2018-10-27 RX ADMIN — CLONIDINE HYDROCHLORIDE 0.1 MG: 0.1 TABLET ORAL at 20:17

## 2018-10-27 RX ADMIN — HEPARIN SODIUM 5000 UNITS: 5000 INJECTION INTRAVENOUS; SUBCUTANEOUS at 20:23

## 2018-10-27 RX ADMIN — ACETAMINOPHEN 650 MG: 325 TABLET, FILM COATED ORAL at 21:57

## 2018-10-27 RX ADMIN — FLUTICASONE PROPIONATE 2 SPRAY: 50 SPRAY, METERED NASAL at 09:29

## 2018-10-27 RX ADMIN — BUPROPION HYDROCHLORIDE 300 MG: 150 TABLET, EXTENDED RELEASE ORAL at 10:10

## 2018-10-27 RX ADMIN — INSULIN LISPRO 1 UNITS: 100 INJECTION, SOLUTION INTRAVENOUS; SUBCUTANEOUS at 10:23

## 2018-10-27 RX ADMIN — OYSTER SHELL CALCIUM WITH VITAMIN D 1 TABLET: 500; 200 TABLET, FILM COATED ORAL at 10:11

## 2018-10-27 RX ADMIN — ATORVASTATIN CALCIUM 80 MG: 40 TABLET, FILM COATED ORAL at 20:17

## 2018-10-27 RX ADMIN — PREDNISONE 5 MG: 5 TABLET ORAL at 09:27

## 2018-10-27 RX ADMIN — CARVEDILOL 6.25 MG: 6.25 TABLET, FILM COATED ORAL at 20:17

## 2018-10-27 RX ADMIN — HYDROXYCHLOROQUINE SULFATE 200 MG: 200 TABLET, FILM COATED ENTERAL at 09:27

## 2018-10-27 RX ADMIN — DILTIAZEM HYDROCHLORIDE 180 MG: 180 CAPSULE, COATED, EXTENDED RELEASE ORAL at 09:29

## 2018-10-27 RX ADMIN — ALLOPURINOL 200 MG: 100 TABLET ORAL at 09:27

## 2018-10-27 RX ADMIN — OYSTER SHELL CALCIUM WITH VITAMIN D 1 TABLET: 500; 200 TABLET, FILM COATED ORAL at 16:22

## 2018-10-27 RX ADMIN — FOLIC ACID 1 MG: 1 TABLET ORAL at 09:26

## 2018-10-27 RX ADMIN — INSULIN LISPRO 2 UNITS: 100 INJECTION, SOLUTION INTRAVENOUS; SUBCUTANEOUS at 16:23

## 2018-10-27 RX ADMIN — CARVEDILOL 6.25 MG: 6.25 TABLET, FILM COATED ORAL at 09:28

## 2018-10-27 RX ADMIN — HEPARIN SODIUM 5000 UNITS: 5000 INJECTION INTRAVENOUS; SUBCUTANEOUS at 06:39

## 2018-10-27 RX ADMIN — HYDROXYCHLOROQUINE SULFATE 200 MG: 200 TABLET, FILM COATED ENTERAL at 20:17

## 2018-10-27 RX ADMIN — SODIUM CHLORIDE, PRESERVATIVE FREE 10 ML: 5 INJECTION INTRAVENOUS at 20:19

## 2018-10-27 RX ADMIN — HEPARIN SODIUM 5000 UNITS: 5000 INJECTION INTRAVENOUS; SUBCUTANEOUS at 14:05

## 2018-10-27 RX ADMIN — RANOLAZINE 500 MG: 500 TABLET, FILM COATED, EXTENDED RELEASE ORAL at 20:18

## 2018-10-27 RX ADMIN — PANTOPRAZOLE SODIUM 40 MG: 40 TABLET, DELAYED RELEASE ORAL at 20:18

## 2018-10-27 RX ADMIN — PANTOPRAZOLE SODIUM 40 MG: 40 TABLET, DELAYED RELEASE ORAL at 09:27

## 2018-10-27 RX ADMIN — LINAGLIPTIN 5 MG: 5 TABLET, FILM COATED ORAL at 09:28

## 2018-10-27 ASSESSMENT — PAIN SCALES - GENERAL: PAINLEVEL_OUTOF10: 3

## 2018-10-27 NOTE — PROGRESS NOTES
Shower/Tub: Tub/Shower unit  Bathroom Toilet: Standard  Bathroom Equipment: Shower chair, Toilet raiser, Hand-held shower  Bathroom Accessibility: Accessible  Home Equipment: Dwight Fall, [address] chair, Reacher  Receives Help From: Home health ([facility] aide M-F 8:00-3:00)  ADL Assistance: Needs assistance ([facility] aides assist with bathing/dressing)  Homemaking Assistance: Needs assistance (Son brings food, [facility] aides manage cleaning/;aundry)  Homemaking Responsibilities: No  Ambulation Assistance: Independent (no AD in house, uses cane in community)  Transfer Assistance: Independent  Active : No  Occupation: Retired  Leisure & Hobbies: TV, Movies          Electronically signed by:    Caesar Rojo [facility] [id number]

## 2018-10-27 NOTE — PROGRESS NOTES
CREATININE  2.0*   GLUCOSE  192*     Hepatic:   No results for input(s): AST, ALT, ALB, BILITOT, ALKPHOS in the last 72 hours. Troponin: No results for input(s): TROPONINI in the last 72 hours. BNP: No results for input(s): BNP in the last 72 hours. Lipids: No results for input(s): CHOL, HDL in the last 72 hours. Invalid input(s): LDLCALCU  ABGs:   Lab Results   Component Value Date    PO2ART 58 09/21/2017    EZY3VZM 44.0 09/21/2017     INR: No results for input(s): INR in the last 72 hours. Renal Labs  Albumin:    Lab Results   Component Value Date    LABALBU 3.7 10/23/2018     Calcium:    Lab Results   Component Value Date    CALCIUM 10.6 10/26/2018     Phosphorus:    Lab Results   Component Value Date    PHOS 2.7 10/23/2018     U/A:    Lab Results   Component Value Date    NITRU NEGATIVE 10/23/2018    NITRU Negative 06/21/2018    COLORU STRAW 10/23/2018    PHUR 7.0 06/21/2018    WBCUA 1 10/23/2018    RBCUA 2 10/23/2018    MUCUS RARE 10/21/2018    TRICHOMONAS NONE SEEN 10/23/2018    BACTERIA NEGATIVE 10/23/2018    CLARITYU CLEAR 10/23/2018    SPECGRAV 1.009 10/23/2018    UROBILINOGEN NORMAL 10/23/2018    BILIRUBINUR NEGATIVE 10/23/2018    BLOODU SMALL 10/23/2018    GLUCOSEU Negative 06/21/2018    KETUA NEGATIVE 10/23/2018     ABG:    Lab Results   Component Value Date    CHT4SVG 44.0 09/21/2017    PO2ART 58 09/21/2017    NLT9NPC 28.5 09/21/2017     HgBA1c:    Lab Results   Component Value Date    LABA1C 7.7 07/06/2018     Microalbumen/Creatinine ratio:  No components found for: RUCREAT          Objective:   Vitals: /77   Pulse 80   Temp 98.5 °F (36.9 °C) (Oral)   Resp 20   Ht 5' 5\" (1.651 m)   Wt 266 lb 15.6 oz (121.1 kg)   SpO2 98%   BMI 44.43 kg/m²   General appearance: awake weak  HEENT: Head: Normal, normocephalic, atraumatic.   Neck: supple, symmetrical, trachea midline  Lungs: diminished breath sounds bilaterally  Heart: S1, S2 normal  Abdomen: abnormal findings:  soft nt  Extremities: edema trace  Neurologic: Mental status: alertness: alert      Patient Active Problem List:     CAD (coronary artery disease)     Nausea & vomiting     Dehydration     Hypothyroidism, adult     Acute on chronic renal failure (HCC)     Hypertensive kidney disease with CKD stage III (HCC)     Type 2 diabetes mellitus without complication (HCC)     Chronic kidney disease, stage III (moderate) (HCC)     Hypercalcemia     Chronic venous hypertension (idiopathic) with inflammation of bilateral lower extremity     Fluid overload     Acute on chronic diastolic heart failure ([facility] [facility]     Morbid obesity with BMI of 45.0-49.9, adult ([facility] [facility]     Diabetes 1.5, managed as type 2 (HCC)     Hyperglycemia     Chronic fatigue     Solitary pulmonary nodule     Acute hypercapnic respiratory failure (HCC)     Acute metabolic encephalopathy     Chronic diastolic heart failure (HCC)     Acute kidney injury ([facility] [address]     Acute renal failure (ARF) (HCC)     Acute encephalopathy     YRIS (obstructive sleep apnea)     Hiatal hernia     Status post laparoscopic sleeve gastrectomy     Status post bariatric surgery     WD-Chronic buttock pain     Fall    Assessment and Plan:      IMP:  arf from atn/pra on ckd 4 from Dm2 and HTN  YRIS  Weakness sp fall  Obesity sp gastric sleeve 4/2018  Dm2  HTN    Plan     1 renal to baseline and stable monitor  2 using cpap  3 agree to SNF as appear not able go aru she state  4 monitor with weight loss  5 glucose need tighter control  6 bp stable  Await snf approval and stable from renal to dc  Lab in am         Danilo Cutler MD

## 2018-10-27 NOTE — PROGRESS NOTES
Hospitalist Progress Note      Name:  Jennifer Vo /Age/Sex: 1950  (24 y.o. female)   MRN & CSN:  [id number] & [id number] Admission Date/Time: 10/21/2018 12:56 AM   Location:  9143/9976-X PCP: Elida Velazquez MD         Hospital Day: 7    Assessment and Plan:   Jennifer Vo is a 79 y.o.  female  who presents with Fall    1. Acute kidney injury on chronic kidney disease: Creatinine improving  2. Multiple falls: Continue PTOT. Requires acute rehab unit but denied. 3. COPD: Not in exacerbation  4. Coronary artery disease: on Plavix and Statin, Ranolazine  5. Diastolic congestive heart failure: Continue medications  6. Type 2 diabetes: Insulin sliding scale. Continue medications   7. Morbid obesity  8. Gout: continue Allopurinol  9. Rheumatoid Arthritis: Continue Prednisone and Hydroxychloroquine  10. IBS: continue Linaclotide   11. Right Shoulder Pain: start Lidocaine patch. Denied for both ARU and SNF. Diet DIET RENAL;   DVT Prophylaxis [] Lovenox, [x]  Heparin, [] SCDs, [] Ambulation   GI Prophylaxis [x] PPI,  [] H2 Blocker,  [] Carafate,  [] Diet/Tube Feeds   Code Status Full Code   Disposition Patient requires continued admission due to pending ARU   MDM [x] Low, [] Moderate,[]  High     History of Present Illness:     Chief Complaint: Fall    Seen and examined. Still with shoulder pain. Ten point ROS reviewed negative, unless as noted above    Objective: Intake/Output Summary (Last 24 hours) at 10/27/18 1310  Last data filed at 10/26/18 1709   Gross per 24 hour   Intake              480 ml   Output                0 ml   Net              480 ml      Vitals:   Vitals:    10/27/18 0944   BP: 121/71   Pulse: 95   Resp: 24   Temp: 98.2 °F (36.8 °C)   SpO2:      Physical Exam:   GEN Awake female, sitting upright in bed in no apparent distress. Appears given age. Obese  EYES Pupils are equally round. No scleral erythema, discharge, or conjunctivitis.   HENT Mucous membranes are

## 2018-10-28 VITALS
BODY MASS INDEX: 44.26 KG/M2 | HEIGHT: 65 IN | SYSTOLIC BLOOD PRESSURE: 125 MMHG | OXYGEN SATURATION: 98 % | HEART RATE: 75 BPM | TEMPERATURE: 98.1 F | RESPIRATION RATE: 20 BRPM | WEIGHT: 265.65 LBS | DIASTOLIC BLOOD PRESSURE: 78 MMHG

## 2018-10-28 LAB
ANION GAP SERPL CALCULATED.3IONS-SCNC: 10 MMOL/L (ref 4–16)
BUN BLDV-MCNC: 21 MG/DL (ref 6–23)
CALCIUM SERPL-MCNC: 10.6 MG/DL (ref 8.3–10.6)
CHLORIDE BLD-SCNC: 106 MMOL/L (ref 99–110)
CO2: 25 MMOL/L (ref 21–32)
CREAT SERPL-MCNC: 2.1 MG/DL (ref 0.6–1.1)
GFR AFRICAN AMERICAN: 29 ML/MIN/1.73M2
GFR NON-AFRICAN AMERICAN: 24 ML/MIN/1.73M2
GLUCOSE BLD-MCNC: 181 MG/DL (ref 70–99)
GLUCOSE BLD-MCNC: 185 MG/DL (ref 70–99)
POTASSIUM SERPL-SCNC: 4.1 MMOL/L (ref 3.5–5.1)
SODIUM BLD-SCNC: 141 MMOL/L (ref 135–145)

## 2018-10-28 PROCEDURE — 6370000000 HC RX 637 (ALT 250 FOR IP): Performed by: HOSPITALIST

## 2018-10-28 PROCEDURE — 82962 GLUCOSE BLOOD TEST: CPT

## 2018-10-28 PROCEDURE — 36415 COLL VENOUS BLD VENIPUNCTURE: CPT

## 2018-10-28 PROCEDURE — 6360000002 HC RX W HCPCS: Performed by: HOSPITALIST

## 2018-10-28 PROCEDURE — 6370000000 HC RX 637 (ALT 250 FOR IP): Performed by: INTERNAL MEDICINE

## 2018-10-28 PROCEDURE — 80048 BASIC METABOLIC PNL TOTAL CA: CPT

## 2018-10-28 RX ADMIN — INSULIN LISPRO 1 UNITS: 100 INJECTION, SOLUTION INTRAVENOUS; SUBCUTANEOUS at 08:30

## 2018-10-28 RX ADMIN — HEPARIN SODIUM 5000 UNITS: 5000 INJECTION INTRAVENOUS; SUBCUTANEOUS at 05:45

## 2018-10-28 RX ADMIN — ALLOPURINOL 200 MG: 100 TABLET ORAL at 08:34

## 2018-10-28 RX ADMIN — BUPROPION HYDROCHLORIDE 300 MG: 150 TABLET, EXTENDED RELEASE ORAL at 08:34

## 2018-10-28 NOTE — PROGRESS NOTES
PRN   dextrose 12.5 g PRN   glucagon (rDNA) 1 mg PRN   dextrose 100 mL/hr PRN   sodium chloride flush 10 mL PRN   magnesium hydroxide 30 mL Daily PRN   ondansetron 4 mg Q6H PRN   acetaminophen 650 mg Q4H PRN       No results for input(s): WBC, HGB, HCT, PLT in the last 72 hours.    Recent Labs      10/26/18   0346  10/28/18   0356   NA  142  141   K  4.0  4.1   CL  106  106   CO2  26  25   BUN  18  21   CREATININE  2.0*  2.1*     Imaging reviewed:    Electronically signed by Bernice Solano MD on 10/28/2018 at 10:13 AM

## 2018-10-28 NOTE — PLAN OF CARE
Problem: Pain:  Goal: Pain level will decrease  Pain level will decrease    Outcome: Ongoing    Goal: Control of acute pain  Control of acute pain    Outcome: Ongoing    Goal: Control of chronic pain  Control of chronic pain    Outcome: Ongoing      Problem: Falls - Risk of:  Goal: Will remain free from falls  Will remain free from falls    Outcome: Ongoing    Goal: Absence of physical injury  Absence of physical injury    Outcome: Ongoing      Problem: Infection:  Goal: Will remain free from infection  Will remain free from infection    Outcome: Ongoing      Problem: Safety:  Goal: Free from accidental physical injury  Free from accidental physical injury    Outcome: Ongoing    Goal: Free from intentional harm  Free from intentional harm    Outcome: Ongoing      Problem: Daily Care:  Goal: Daily care needs are met  Daily care needs are met    Outcome: Ongoing

## 2018-10-28 NOTE — PROGRESS NOTES
Nephrology Progress Note  10/28/2018 9:35 AM  Subjective:   Admit Date: 10/21/2018  PCP: Ricardo Mendoza MD  Interval History: pt doing ok and plan for home today    Diet: DIET RENAL;  Pain is:Mild      Data:   Scheduled Meds:   lidocaine  1 patch Transdermal Daily    cloNIDine  0.1 mg Oral BID    predniSONE  5 mg Oral Daily    atorvastatin  80 mg Oral Nightly    buPROPion  300 mg Oral QAM    calcium-vitamin D  1 tablet Oral BID WC    diltiazem  180 mg Oral Daily    carvedilol  6.25 mg Oral BID    clopidogrel  75 mg Oral Daily    fluticasone  2 spray Nasal QAM    folic acid  1 mg Oral QAM    hydroxychloroquine  200 mg Oral BID    levomilnacipran  20 mg Oral Nightly    linaclotide  290 mcg Oral QAM AC    linagliptin  5 mg Oral Daily    LORazepam  0.5 mg Oral BID    montelukast  10 mg Oral Nightly    pantoprazole  40 mg Oral BID    QUEtiapine  300 mg Oral Nightly    ranolazine  500 mg Oral BID    insulin lispro  0-6 Units Subcutaneous TID WC    insulin lispro  0-3 Units Subcutaneous Nightly    heparin (porcine)  5,000 Units Subcutaneous 3 times per day    sodium chloride flush  10 mL Intravenous 2 times per day    allopurinol  200 mg Oral Daily     Continuous Infusions:   dextrose       PRN Meds:acetaminophen, hydrOXYzine, ipratropium-albuterol, nitroGLYCERIN, glucose, dextrose, glucagon (rDNA), dextrose, sodium chloride flush, magnesium hydroxide, ondansetron, acetaminophen  No intake/output data recorded. No intake/output data recorded. No intake or output data in the 24 hours ending 10/28/18 0935  CBC:   No results for input(s): WBC, HGB, PLT in the last 72 hours. BMP:    Recent Labs      10/26/18   0346  10/28/18   0356   NA  142  141   K  4.0  4.1   CL  106  106   CO2  26  25   BUN  18  21   CREATININE  2.0*  2.1*   GLUCOSE  192*  181*     Hepatic:   No results for input(s): AST, ALT, ALB, BILITOT, ALKPHOS in the last 72 hours.   Troponin: No results for input(s): TROPONINI in the

## 2018-10-29 LAB
EKG ATRIAL RATE: 83 BPM
EKG DIAGNOSIS: NORMAL
EKG P AXIS: 52 DEGREES
EKG P-R INTERVAL: 180 MS
EKG Q-T INTERVAL: 400 MS
EKG QRS DURATION: 108 MS
EKG QTC CALCULATION (BAZETT): 470 MS
EKG R AXIS: 17 DEGREES
EKG T AXIS: 30 DEGREES
EKG VENTRICULAR RATE: 83 BPM
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
## 2019-01-18 PROBLEM — N18.4 CHRONIC KIDNEY DISEASE, STAGE IV (SEVERE) (HCC): Status: ACTIVE | Noted: 2019-01-18

## 2019-05-10 ENCOUNTER — HOSPITAL ENCOUNTER (OUTPATIENT)
Age: 69
Discharge: HOME | End: 2019-05-10
Payer: COMMERCIAL

## 2019-05-10 ENCOUNTER — HOSPITAL ENCOUNTER (OUTPATIENT)
Dept: GENERAL RADIOLOGY | Age: 69
Discharge: HOME | End: 2019-05-10
Payer: COMMERCIAL

## 2019-05-10 DIAGNOSIS — J20.9 ACUTE BRONCHITIS, UNSPECIFIED ORGANISM: ICD-10-CM

## 2019-05-10 PROCEDURE — 71046 X-RAY EXAM CHEST 2 VIEWS: CPT

## 2019-05-28 ENCOUNTER — HOSPITAL ENCOUNTER (OUTPATIENT)
Dept: GENERAL RADIOLOGY | Age: 69
Discharge: HOME | End: 2019-05-28
Payer: COMMERCIAL

## 2019-05-28 ENCOUNTER — HOSPITAL ENCOUNTER (OUTPATIENT)
Age: 69
Discharge: HOME | End: 2019-05-28
Payer: COMMERCIAL

## 2019-05-28 DIAGNOSIS — M25.511 RIGHT SHOULDER PAIN, UNSPECIFIED CHRONICITY: ICD-10-CM

## 2019-05-28 PROCEDURE — 73030 X-RAY EXAM OF SHOULDER: CPT

## 2019-06-05 PROBLEM — I10 ESSENTIAL HYPERTENSION: Status: ACTIVE | Noted: 2019-06-05

## 2019-06-18 ENCOUNTER — HOSPITAL ENCOUNTER (OUTPATIENT)
Dept: WOMENS IMAGING | Age: 69
Discharge: HOME | End: 2019-06-18
Payer: COMMERCIAL

## 2019-06-18 ENCOUNTER — HOSPITAL ENCOUNTER (OUTPATIENT)
Dept: ULTRASOUND IMAGING | Age: 69
Discharge: HOME | End: 2019-06-18
Payer: COMMERCIAL

## 2019-06-18 DIAGNOSIS — N64.52 NIPPLE DISCHARGE: ICD-10-CM

## 2019-06-18 DIAGNOSIS — N64.4 BREAST PAIN: ICD-10-CM

## 2019-06-18 DIAGNOSIS — N64.4 MASTODYNIA: ICD-10-CM

## 2019-06-18 DIAGNOSIS — M81.0 AGE-RELATED OSTEOPOROSIS WITHOUT CURRENT PATHOLOGICAL FRACTURE: ICD-10-CM

## 2019-06-18 PROCEDURE — 77080 DXA BONE DENSITY AXIAL: CPT

## 2019-06-18 PROCEDURE — 76642 ULTRASOUND BREAST LIMITED: CPT

## 2019-06-18 PROCEDURE — 77066 DX MAMMO INCL CAD BI: CPT

## 2019-06-19 RX ORDER — MULTIVITAMIN WITH FOLIC ACID 400 MCG
TABLET ORAL
Qty: 30 TABLET | Refills: 3 | Status: SHIPPED | OUTPATIENT
Start: 2019-06-19 | End: 2019-12-09 | Stop reason: SDUPTHER

## 2019-07-17 ENCOUNTER — APPOINTMENT (OUTPATIENT)
Dept: GENERAL RADIOLOGY | Age: 69
DRG: 699 | End: 2019-07-17
Payer: COMMERCIAL

## 2019-07-17 ENCOUNTER — HOSPITAL ENCOUNTER (INPATIENT)
Age: 69
LOS: 7 days | Discharge: HOME HEALTH | DRG: 699 | End: 2019-07-24
Attending: EMERGENCY MEDICINE | Admitting: INTERNAL MEDICINE
Payer: COMMERCIAL

## 2019-07-17 DIAGNOSIS — N18.9 CHRONIC KIDNEY DISEASE, UNSPECIFIED CKD STAGE: ICD-10-CM

## 2019-07-17 DIAGNOSIS — N28.9 RENAL INSUFFICIENCY: ICD-10-CM

## 2019-07-17 DIAGNOSIS — R79.89 SERUM CREATININE RAISED: ICD-10-CM

## 2019-07-17 DIAGNOSIS — R34 DECREASED URINATION: Primary | ICD-10-CM

## 2019-07-17 PROBLEM — E66.01 CLASS 3 SEVERE OBESITY DUE TO EXCESS CALORIES WITH BODY MASS INDEX (BMI) OF 40.0 TO 44.9 IN ADULT (HCC): Status: ACTIVE | Noted: 2019-07-17

## 2019-07-17 PROBLEM — Z79.4 TYPE 2 DIABETES MELLITUS WITH HYPERGLYCEMIA, WITH LONG-TERM CURRENT USE OF INSULIN (HCC): Status: ACTIVE | Noted: 2019-07-17

## 2019-07-17 PROBLEM — N18.6 ESRD NEEDING DIALYSIS (HCC): Status: ACTIVE | Noted: 2019-07-17

## 2019-07-17 PROBLEM — N18.6 ESRD (END STAGE RENAL DISEASE) (HCC): Status: ACTIVE | Noted: 2019-07-17

## 2019-07-17 PROBLEM — Z99.2 ESRD NEEDING DIALYSIS (HCC): Status: ACTIVE | Noted: 2019-07-17

## 2019-07-17 PROBLEM — E11.65 TYPE 2 DIABETES MELLITUS WITH HYPERGLYCEMIA, WITH LONG-TERM CURRENT USE OF INSULIN (HCC): Status: ACTIVE | Noted: 2019-07-17

## 2019-07-17 LAB
ALBUMIN SERPL-MCNC: 4.1 GM/DL (ref 3.4–5)
ALP BLD-CCNC: 137 IU/L (ref 40–129)
ALT SERPL-CCNC: 32 U/L (ref 10–40)
ANION GAP SERPL CALCULATED.3IONS-SCNC: 19 MMOL/L (ref 4–16)
AST SERPL-CCNC: 26 IU/L (ref 15–37)
BACTERIA: NEGATIVE /HPF
BASOPHILS ABSOLUTE: 0 K/CU MM
BASOPHILS RELATIVE PERCENT: 0.1 % (ref 0–1)
BILIRUB SERPL-MCNC: 0.2 MG/DL (ref 0–1)
BILIRUBIN URINE: NEGATIVE MG/DL
BLOOD, URINE: NEGATIVE
BUN BLDV-MCNC: 124 MG/DL (ref 6–23)
CALCIUM SERPL-MCNC: 11.2 MG/DL (ref 8.3–10.6)
CHLORIDE BLD-SCNC: 91 MMOL/L (ref 99–110)
CLARITY: CLEAR
CO2: 25 MMOL/L (ref 21–32)
COLOR: ABNORMAL
CREAT SERPL-MCNC: 5.7 MG/DL (ref 0.6–1.1)
CREATININE URINE: 33.7 MG/DL (ref 28–217)
DIFFERENTIAL TYPE: ABNORMAL
EOSINOPHILS ABSOLUTE: 0.1 K/CU MM
EOSINOPHILS RELATIVE PERCENT: 0.8 % (ref 0–3)
ESTIMATED AVERAGE GLUCOSE: 151 MG/DL
GFR AFRICAN AMERICAN: 9 ML/MIN/1.73M2
GFR NON-AFRICAN AMERICAN: 7 ML/MIN/1.73M2
GLUCOSE BLD-MCNC: 128 MG/DL (ref 70–99)
GLUCOSE BLD-MCNC: 194 MG/DL (ref 70–99)
GLUCOSE, URINE: NEGATIVE MG/DL
HBA1C MFR BLD: 6.9 % (ref 4.2–6.3)
HCT VFR BLD CALC: 36.9 % (ref 37–47)
HEMOGLOBIN: 11.6 GM/DL (ref 12.5–16)
IMMATURE NEUTROPHIL %: 0.5 % (ref 0–0.43)
KETONES, URINE: NEGATIVE MG/DL
LEUKOCYTE ESTERASE, URINE: ABNORMAL
LYMPHOCYTES ABSOLUTE: 0.8 K/CU MM
LYMPHOCYTES RELATIVE PERCENT: 11.1 % (ref 24–44)
MCH RBC QN AUTO: 29.5 PG (ref 27–31)
MCHC RBC AUTO-ENTMCNC: 31.4 % (ref 32–36)
MCV RBC AUTO: 93.9 FL (ref 78–100)
MONOCYTES ABSOLUTE: 0.5 K/CU MM
MONOCYTES RELATIVE PERCENT: 6.8 % (ref 0–4)
MUCUS: ABNORMAL HPF
NITRITE URINE, QUANTITATIVE: NEGATIVE
NUCLEATED RBC %: 0 %
PDW BLD-RTO: 14 % (ref 11.7–14.9)
PH, URINE: 5 (ref 5–8)
PLATELET # BLD: 222 K/CU MM (ref 140–440)
PMV BLD AUTO: 11.4 FL (ref 7.5–11.1)
POTASSIUM SERPL-SCNC: 3.5 MMOL/L (ref 3.5–5.1)
PROT/CREAT RATIO, UR: NORMAL
PROTEIN UA: NEGATIVE MG/DL
RBC # BLD: 3.93 M/CU MM (ref 4.2–5.4)
RBC URINE: <1 /HPF (ref 0–6)
SEGMENTED NEUTROPHILS ABSOLUTE COUNT: 6.1 K/CU MM
SEGMENTED NEUTROPHILS RELATIVE PERCENT: 80.7 % (ref 36–66)
SODIUM BLD-SCNC: 135 MMOL/L (ref 135–145)
SODIUM URINE: 69 MMOL/L (ref 35–167)
SPECIFIC GRAVITY UA: 1 (ref 1–1.03)
SQUAMOUS EPITHELIAL: 1 /HPF
TOTAL IMMATURE NEUTOROPHIL: 0.04 K/CU MM
TOTAL NUCLEATED RBC: 0 K/CU MM
TOTAL PROTEIN: 6.1 GM/DL (ref 6.4–8.2)
TRICHOMONAS: ABNORMAL /HPF
URINE TOTAL PROTEIN: 4 MG/DL
UROBILINOGEN, URINE: NORMAL MG/DL (ref 0.2–1)
WBC # BLD: 7.5 K/CU MM (ref 4–10.5)
WBC UA: ABNORMAL /HPF (ref 0–5)

## 2019-07-17 PROCEDURE — 83036 HEMOGLOBIN GLYCOSYLATED A1C: CPT

## 2019-07-17 PROCEDURE — 99284 EMERGENCY DEPT VISIT MOD MDM: CPT

## 2019-07-17 PROCEDURE — 2580000003 HC RX 258: Performed by: EMERGENCY MEDICINE

## 2019-07-17 PROCEDURE — 84156 ASSAY OF PROTEIN URINE: CPT

## 2019-07-17 PROCEDURE — 82962 GLUCOSE BLOOD TEST: CPT

## 2019-07-17 PROCEDURE — 6370000000 HC RX 637 (ALT 250 FOR IP): Performed by: NURSE PRACTITIONER

## 2019-07-17 PROCEDURE — 80053 COMPREHEN METABOLIC PANEL: CPT

## 2019-07-17 PROCEDURE — 36415 COLL VENOUS BLD VENIPUNCTURE: CPT

## 2019-07-17 PROCEDURE — 81001 URINALYSIS AUTO W/SCOPE: CPT

## 2019-07-17 PROCEDURE — 6360000002 HC RX W HCPCS: Performed by: NURSE PRACTITIONER

## 2019-07-17 PROCEDURE — 71045 X-RAY EXAM CHEST 1 VIEW: CPT

## 2019-07-17 PROCEDURE — 85025 COMPLETE CBC W/AUTO DIFF WBC: CPT

## 2019-07-17 PROCEDURE — 1200000000 HC SEMI PRIVATE

## 2019-07-17 PROCEDURE — 82570 ASSAY OF URINE CREATININE: CPT

## 2019-07-17 PROCEDURE — 84300 ASSAY OF URINE SODIUM: CPT

## 2019-07-17 RX ORDER — DOCUSATE SODIUM 100 MG/1
100 CAPSULE, LIQUID FILLED ORAL 2 TIMES DAILY
Status: DISCONTINUED | OUTPATIENT
Start: 2019-07-17 | End: 2019-07-24 | Stop reason: HOSPADM

## 2019-07-17 RX ORDER — ONDANSETRON 2 MG/ML
4 INJECTION INTRAMUSCULAR; INTRAVENOUS EVERY 6 HOURS PRN
Status: DISCONTINUED | OUTPATIENT
Start: 2019-07-17 | End: 2019-07-18 | Stop reason: SDUPTHER

## 2019-07-17 RX ORDER — IPRATROPIUM BROMIDE AND ALBUTEROL SULFATE 2.5; .5 MG/3ML; MG/3ML
1 SOLUTION RESPIRATORY (INHALATION) EVERY 4 HOURS
Status: DISCONTINUED | OUTPATIENT
Start: 2019-07-17 | End: 2019-07-18 | Stop reason: SDUPTHER

## 2019-07-17 RX ORDER — CETIRIZINE HYDROCHLORIDE 10 MG/1
10 TABLET ORAL DAILY
Status: DISCONTINUED | OUTPATIENT
Start: 2019-07-18 | End: 2019-07-24 | Stop reason: HOSPADM

## 2019-07-17 RX ORDER — LUBIPROSTONE 24 UG/1
24 CAPSULE, GELATIN COATED ORAL 2 TIMES DAILY
Status: DISCONTINUED | OUTPATIENT
Start: 2019-07-17 | End: 2019-07-24 | Stop reason: HOSPADM

## 2019-07-17 RX ORDER — FOLIC ACID 1 MG/1
1 TABLET ORAL EVERY MORNING
Status: DISCONTINUED | OUTPATIENT
Start: 2019-07-18 | End: 2019-07-24 | Stop reason: HOSPADM

## 2019-07-17 RX ORDER — FLUTICASONE PROPIONATE 50 MCG
2 SPRAY, SUSPENSION (ML) NASAL EVERY MORNING
Status: DISCONTINUED | OUTPATIENT
Start: 2019-07-18 | End: 2019-07-24 | Stop reason: HOSPADM

## 2019-07-17 RX ORDER — IBUPROFEN 200 MG
TABLET ORAL 2 TIMES DAILY
Status: DISCONTINUED | OUTPATIENT
Start: 2019-07-17 | End: 2019-07-24 | Stop reason: HOSPADM

## 2019-07-17 RX ORDER — TRIAMCINOLONE ACETONIDE 5 MG/G
CREAM TOPICAL 3 TIMES DAILY
Status: DISCONTINUED | OUTPATIENT
Start: 2019-07-17 | End: 2019-07-17 | Stop reason: CLARIF

## 2019-07-17 RX ORDER — QUETIAPINE 150 MG/1
300 TABLET, FILM COATED, EXTENDED RELEASE ORAL NIGHTLY
Status: DISCONTINUED | OUTPATIENT
Start: 2019-07-17 | End: 2019-07-24 | Stop reason: HOSPADM

## 2019-07-17 RX ORDER — NICOTINE POLACRILEX 4 MG
15 LOZENGE BUCCAL PRN
Status: DISCONTINUED | OUTPATIENT
Start: 2019-07-17 | End: 2019-07-24 | Stop reason: HOSPADM

## 2019-07-17 RX ORDER — METOCLOPRAMIDE 5 MG/1
5 TABLET ORAL
Status: DISCONTINUED | OUTPATIENT
Start: 2019-07-18 | End: 2019-07-24 | Stop reason: HOSPADM

## 2019-07-17 RX ORDER — TRIAMCINOLONE ACETONIDE 1 MG/G
CREAM TOPICAL 3 TIMES DAILY
Status: DISCONTINUED | OUTPATIENT
Start: 2019-07-17 | End: 2019-07-24 | Stop reason: HOSPADM

## 2019-07-17 RX ORDER — BUPROPION HYDROCHLORIDE 150 MG/1
300 TABLET ORAL EVERY MORNING
Status: DISCONTINUED | OUTPATIENT
Start: 2019-07-18 | End: 2019-07-24 | Stop reason: HOSPADM

## 2019-07-17 RX ORDER — POTASSIUM CHLORIDE 20 MEQ/1
20 TABLET, EXTENDED RELEASE ORAL DAILY
Status: DISCONTINUED | OUTPATIENT
Start: 2019-07-18 | End: 2019-07-19

## 2019-07-17 RX ORDER — ATORVASTATIN CALCIUM 40 MG/1
80 TABLET, FILM COATED ORAL NIGHTLY
Status: DISCONTINUED | OUTPATIENT
Start: 2019-07-17 | End: 2019-07-24 | Stop reason: HOSPADM

## 2019-07-17 RX ORDER — RANOLAZINE 500 MG/1
500 TABLET, EXTENDED RELEASE ORAL 2 TIMES DAILY
Status: DISCONTINUED | OUTPATIENT
Start: 2019-07-17 | End: 2019-07-24 | Stop reason: HOSPADM

## 2019-07-17 RX ORDER — FLUTICASONE PROPIONATE 110 UG/1
1 AEROSOL, METERED RESPIRATORY (INHALATION) DAILY
Status: DISCONTINUED | OUTPATIENT
Start: 2019-07-18 | End: 2019-07-24 | Stop reason: HOSPADM

## 2019-07-17 RX ORDER — POLYVINYL ALCOHOL 14 MG/ML
2 SOLUTION/ DROPS OPHTHALMIC 2 TIMES DAILY
Status: DISCONTINUED | OUTPATIENT
Start: 2019-07-17 | End: 2019-07-24 | Stop reason: HOSPADM

## 2019-07-17 RX ORDER — DEXTROSE MONOHYDRATE 25 G/50ML
12.5 INJECTION, SOLUTION INTRAVENOUS PRN
Status: DISCONTINUED | OUTPATIENT
Start: 2019-07-17 | End: 2019-07-18 | Stop reason: SDUPTHER

## 2019-07-17 RX ORDER — ACETAMINOPHEN 325 MG/1
650 TABLET ORAL EVERY 6 HOURS PRN
Status: DISCONTINUED | OUTPATIENT
Start: 2019-07-17 | End: 2019-07-18

## 2019-07-17 RX ORDER — HEPARIN SODIUM 5000 [USP'U]/ML
5000 INJECTION, SOLUTION INTRAVENOUS; SUBCUTANEOUS EVERY 8 HOURS SCHEDULED
Status: DISCONTINUED | OUTPATIENT
Start: 2019-07-17 | End: 2019-07-24 | Stop reason: HOSPADM

## 2019-07-17 RX ORDER — LEVOTHYROXINE SODIUM 0.1 MG/1
100 TABLET ORAL DAILY
Status: DISCONTINUED | OUTPATIENT
Start: 2019-07-18 | End: 2019-07-24 | Stop reason: HOSPADM

## 2019-07-17 RX ORDER — DEXTROSE MONOHYDRATE 50 MG/ML
100 INJECTION, SOLUTION INTRAVENOUS PRN
Status: DISCONTINUED | OUTPATIENT
Start: 2019-07-17 | End: 2019-07-18 | Stop reason: SDUPTHER

## 2019-07-17 RX ORDER — M-VIT,TX,IRON,MINS/CALC/FOLIC 27MG-0.4MG
1 TABLET ORAL DAILY
Status: DISCONTINUED | OUTPATIENT
Start: 2019-07-18 | End: 2019-07-24 | Stop reason: HOSPADM

## 2019-07-17 RX ORDER — ERYTHROMYCIN 20 MG/ML
SOLUTION TOPICAL DAILY
Status: DISCONTINUED | OUTPATIENT
Start: 2019-07-18 | End: 2019-07-24 | Stop reason: HOSPADM

## 2019-07-17 RX ORDER — INSULIN GLARGINE 100 [IU]/ML
INJECTION, SOLUTION SUBCUTANEOUS 2 TIMES DAILY
COMMUNITY
Start: 2019-04-12 | End: 2019-10-29

## 2019-07-17 RX ORDER — NITROGLYCERIN 0.4 MG/1
0.4 TABLET SUBLINGUAL EVERY 5 MIN PRN
Status: DISCONTINUED | OUTPATIENT
Start: 2019-07-17 | End: 2019-07-18 | Stop reason: SDUPTHER

## 2019-07-17 RX ORDER — SODIUM CHLORIDE 0.9 % (FLUSH) 0.9 %
10 SYRINGE (ML) INJECTION EVERY 12 HOURS SCHEDULED
Status: DISCONTINUED | OUTPATIENT
Start: 2019-07-17 | End: 2019-07-18 | Stop reason: SDUPTHER

## 2019-07-17 RX ORDER — METOLAZONE 5 MG/1
2.5 TABLET ORAL DAILY
Status: DISCONTINUED | OUTPATIENT
Start: 2019-07-18 | End: 2019-07-18

## 2019-07-17 RX ORDER — HYDROXYZINE HYDROCHLORIDE 25 MG/1
25 TABLET, FILM COATED ORAL DAILY PRN
Status: DISCONTINUED | OUTPATIENT
Start: 2019-07-17 | End: 2019-07-18 | Stop reason: SDUPTHER

## 2019-07-17 RX ORDER — LORAZEPAM 0.5 MG/1
0.5 TABLET ORAL 2 TIMES DAILY
Status: DISCONTINUED | OUTPATIENT
Start: 2019-07-17 | End: 2019-07-18 | Stop reason: SDUPTHER

## 2019-07-17 RX ORDER — GLIPIZIDE 5 MG/1
5 TABLET ORAL 2 TIMES DAILY
COMMUNITY
Start: 2019-07-10 | End: 2019-07-17

## 2019-07-17 RX ORDER — POLYETHYLENE GLYCOL 3350 17 G/17G
17 POWDER, FOR SOLUTION ORAL DAILY
Status: DISCONTINUED | OUTPATIENT
Start: 2019-07-18 | End: 2019-07-24 | Stop reason: HOSPADM

## 2019-07-17 RX ORDER — SODIUM CHLORIDE 9 MG/ML
INJECTION, SOLUTION INTRAVENOUS CONTINUOUS
Status: DISCONTINUED | OUTPATIENT
Start: 2019-07-17 | End: 2019-07-18

## 2019-07-17 RX ORDER — CARBOXYMETHYLCELLULOSE SODIUM AND GLYCERIN 5; 9 MG/ML; MG/ML
2 SOLUTION/ DROPS OPHTHALMIC 2 TIMES DAILY
COMMUNITY
Start: 2019-06-17

## 2019-07-17 RX ORDER — KETOCONAZOLE 20 MG/G
CREAM TOPICAL DAILY
Status: DISCONTINUED | OUTPATIENT
Start: 2019-07-18 | End: 2019-07-24 | Stop reason: HOSPADM

## 2019-07-17 RX ORDER — PREDNISONE 1 MG/1
5 TABLET ORAL 2 TIMES DAILY
Status: DISCONTINUED | OUTPATIENT
Start: 2019-07-17 | End: 2019-07-18

## 2019-07-17 RX ORDER — PREDNISONE 1 MG/1
5 TABLET ORAL 2 TIMES DAILY
Status: ON HOLD | COMMUNITY
Start: 2019-07-09 | End: 2019-07-22 | Stop reason: HOSPADM

## 2019-07-17 RX ORDER — SPIRONOLACTONE 25 MG/1
25 TABLET ORAL DAILY
COMMUNITY
Start: 2019-06-26 | End: 2019-07-17 | Stop reason: ALTCHOICE

## 2019-07-17 RX ORDER — GUAIFENESIN 600 MG/1
600 TABLET, EXTENDED RELEASE ORAL 2 TIMES DAILY
Status: DISCONTINUED | OUTPATIENT
Start: 2019-07-17 | End: 2019-07-18

## 2019-07-17 RX ORDER — SODIUM CHLORIDE 0.9 % (FLUSH) 0.9 %
10 SYRINGE (ML) INJECTION PRN
Status: DISCONTINUED | OUTPATIENT
Start: 2019-07-17 | End: 2019-07-18 | Stop reason: SDUPTHER

## 2019-07-17 RX ORDER — PANTOPRAZOLE SODIUM 40 MG/1
40 TABLET, DELAYED RELEASE ORAL 2 TIMES DAILY
Status: DISCONTINUED | OUTPATIENT
Start: 2019-07-17 | End: 2019-07-18

## 2019-07-17 RX ORDER — LEVOTHYROXINE SODIUM 0.1 MG/1
100 TABLET ORAL DAILY
COMMUNITY
Start: 2019-07-13

## 2019-07-17 RX ORDER — ALBUTEROL SULFATE 90 UG/1
2 AEROSOL, METERED RESPIRATORY (INHALATION) EVERY 6 HOURS PRN
Status: DISCONTINUED | OUTPATIENT
Start: 2019-07-17 | End: 2019-07-24 | Stop reason: HOSPADM

## 2019-07-17 RX ORDER — 0.9 % SODIUM CHLORIDE 0.9 %
1000 INTRAVENOUS SOLUTION INTRAVENOUS ONCE
Status: COMPLETED | OUTPATIENT
Start: 2019-07-17 | End: 2019-07-17

## 2019-07-17 RX ORDER — IRON POLYSACCHARIDE COMPLEX 150 MG
1 CAPSULE ORAL EVERY MORNING
Status: DISCONTINUED | OUTPATIENT
Start: 2019-07-18 | End: 2019-07-19

## 2019-07-17 RX ORDER — PROMETHAZINE HYDROCHLORIDE 25 MG/1
25 TABLET ORAL EVERY 6 HOURS PRN
Status: DISCONTINUED | OUTPATIENT
Start: 2019-07-17 | End: 2019-07-24 | Stop reason: HOSPADM

## 2019-07-17 RX ORDER — CETIRIZINE HYDROCHLORIDE 10 MG/1
10 TABLET ORAL DAILY
COMMUNITY
Start: 2019-06-16 | End: 2021-06-09

## 2019-07-17 RX ORDER — INSULIN GLARGINE 100 [IU]/ML
10 INJECTION, SOLUTION SUBCUTANEOUS 2 TIMES DAILY
Status: DISCONTINUED | OUTPATIENT
Start: 2019-07-17 | End: 2019-07-24 | Stop reason: HOSPADM

## 2019-07-17 RX ORDER — CARVEDILOL 6.25 MG/1
6.25 TABLET ORAL 2 TIMES DAILY
Status: DISCONTINUED | OUTPATIENT
Start: 2019-07-17 | End: 2019-07-18

## 2019-07-17 RX ADMIN — HEPARIN SODIUM 5000 UNITS: 5000 INJECTION INTRAVENOUS; SUBCUTANEOUS at 23:03

## 2019-07-17 RX ADMIN — BACITRACIN ZINC, NEOMYCIN SULFATE, POLYMYXIN B SULFATE 1 G: 3.5; 5000; 4 OINTMENT TOPICAL at 23:01

## 2019-07-17 RX ADMIN — QUETIAPINE FUMARATE 300 MG: 150 TABLET, EXTENDED RELEASE ORAL at 23:00

## 2019-07-17 RX ADMIN — LUBIPROSTONE 24 MCG: 24 CAPSULE, GELATIN COATED ORAL at 23:01

## 2019-07-17 RX ADMIN — PANTOPRAZOLE SODIUM 40 MG: 40 TABLET, DELAYED RELEASE ORAL at 23:01

## 2019-07-17 RX ADMIN — INSULIN LISPRO 1 UNITS: 100 INJECTION, SOLUTION INTRAVENOUS; SUBCUTANEOUS at 23:04

## 2019-07-17 RX ADMIN — INSULIN GLARGINE 10 UNITS: 100 INJECTION, SOLUTION SUBCUTANEOUS at 23:03

## 2019-07-17 RX ADMIN — PREDNISONE 5 MG: 5 TABLET ORAL at 23:01

## 2019-07-17 RX ADMIN — RANOLAZINE 500 MG: 500 TABLET, FILM COATED, EXTENDED RELEASE ORAL at 23:00

## 2019-07-17 RX ADMIN — GUAIFENESIN 600 MG: 600 TABLET, EXTENDED RELEASE ORAL at 23:01

## 2019-07-17 RX ADMIN — SODIUM CHLORIDE 1000 ML: 9 INJECTION, SOLUTION INTRAVENOUS at 16:55

## 2019-07-17 RX ADMIN — DOCUSATE SODIUM 100 MG: 100 CAPSULE, LIQUID FILLED ORAL at 23:00

## 2019-07-17 RX ADMIN — ATORVASTATIN CALCIUM 80 MG: 40 TABLET, FILM COATED ORAL at 23:00

## 2019-07-17 RX ADMIN — GUAIFENESIN AND DEXTROMETHORPHAN HYDROBROMIDE 1 TABLET: 600; 30 TABLET, EXTENDED RELEASE ORAL at 23:00

## 2019-07-17 RX ADMIN — LORAZEPAM 0.5 MG: 0.5 TABLET ORAL at 23:01

## 2019-07-17 ASSESSMENT — PAIN SCALES - GENERAL
PAINLEVEL_OUTOF10: 3
PAINLEVEL_OUTOF10: 3
PAINLEVEL_OUTOF10: 4

## 2019-07-17 ASSESSMENT — ENCOUNTER SYMPTOMS
ALLERGIC/IMMUNOLOGIC NEGATIVE: 1
GASTROINTESTINAL NEGATIVE: 1
EYES NEGATIVE: 1
RESPIRATORY NEGATIVE: 1

## 2019-07-17 ASSESSMENT — PAIN DESCRIPTION - PROGRESSION: CLINICAL_PROGRESSION: NOT CHANGED

## 2019-07-17 ASSESSMENT — PAIN DESCRIPTION - PAIN TYPE
TYPE: CHRONIC PAIN
TYPE: CHRONIC PAIN

## 2019-07-17 ASSESSMENT — PAIN DESCRIPTION - LOCATION
LOCATION: BACK

## 2019-07-17 ASSESSMENT — PAIN DESCRIPTION - FREQUENCY: FREQUENCY: CONTINUOUS

## 2019-07-17 ASSESSMENT — PAIN DESCRIPTION - DESCRIPTORS: DESCRIPTORS: ACHING

## 2019-07-17 ASSESSMENT — PAIN DESCRIPTION - ONSET: ONSET: ON-GOING

## 2019-07-17 NOTE — PROGRESS NOTES
Medication History  [facility]    Patient Name: Laurent May 1950     Medication history has been completed by: Kianna Sparrow CPhT    Source(s) of information: patient supplied med list from Dr. Rob Tran, insurance claims and retail pharmacy     Primary Care Physician: [address] [facility] [place]: [facility]    Allergies as of 07/17/2019 - Review Complete 07/17/2019   Allergen Reaction Noted    Percocet [oxycodone-acetaminophen]  05/09/2018    Tramadol Itching     Vicodin [hydrocodone-acetaminophen] Itching     Narcan [naloxone hcl] Anxiety 05/09/2018        Prior to Admission medications    Medication Sig Start Date End Date Taking?  Authorizing Provider   promethazine (PHENERGAN) 25 MG tablet Take 25 mg by mouth every 6 hours as needed for Nausea    Historical Provider, MD   furosemide (LASIX) 20 MG tablet Take 20 mg by mouth daily    Historical Provider, MD   insulin lispro (HUMALOG KWIKPEN) 100 UNIT/ML pen Inject into the skin As prescribed    Historical Provider, MD   REFRESH OPTIVE 0.5-0.9 % SOLN Place 2 drops into both eyes 2 times daily 6/17/19   Historical Provider, MD   cetirizine (ZYRTEC) 10 MG tablet Take 10 mg by mouth daily 6/16/19   Historical Provider, MD   LANTUS 100 UNIT/ML injection vial Inject into the skin 2 times daily 07/17/19 Patient reports she doses per range of BS.  BS< 150 gives herself 10 units >150 gives herself 20 units 4/12/19   Historical Provider, MD   predniSONE (DELTASONE) 5 MG tablet Take 5 mg by mouth 2 times daily 7/9/19   Historical Provider, MD   levothyroxine (SYNTHROID) 100 MCG tablet Take 100 mcg by mouth daily 7/13/19   Historical Provider, MD   guaiFENesin (MUCINEX) 600 MG extended release tablet Take 1 tablet by mouth 2 times daily 7/9/19   Deanna Ni MD   montelukast (SINGULAIR) 10 MG tablet TAKE ONE TABLET BY MOUTH NIGHTLY 6/28/19 7/17/19  Jai Rao MD   torsemide (DEMADEX) 20 MG tablet Take 1 tablet by g by mouth daily    Historical Provider, MD   Nebulizer MISC Inhale into the lungs as needed    Historical Provider, MD   QUEtiapine (SEROQUEL XR) 300 MG extended release tablet Take 300 mg by mouth nightly    Historical Provider, MD   levomilnacipran (FETZIMA) 40 MG CP24 extended release capsule Take 1 capsule by mouth daily  Patient taking differently: Take 20 mg by mouth nightly  10/15/17   Damaris Bryant MD   allopurinol (ZYLOPRIM) 100 MG tablet Take 3 tablets by mouth daily 10/14/17   Damaris Bryant MD   folic acid (FOLVITE) 1 MG tablet Take 1 mg by mouth every morning  8/23/16   Historical Provider, MD   nitroGLYCERIN (NITROSTAT) 0.4 MG SL tablet Place 0.4 mg under the tongue every 5 minutes as needed for Chest pain    Historical Provider, MD   fluticasone (FLONASE) 50 MCG/ACT nasal spray 2 sprays by Nasal route every morning     Historical Provider, MD       Medications added or changed (ex. new medication, dosage change, interval change, formulation change):  Levothyroxine (added)  Lantus (added)  Prednisone (added)  Refresh eye drops (added)  Loratadine changed to cetirizine     Medications removed from list (include reason, ex. noncompliance, medication cost, therapy complete etc.):   Spironolactone discontinued by Dr. Jody Mars last fill date 09/17/18  Clopidogrel last fill date 11/03/17  Methylprednisolone therapy complete  Clonidine discontinued by another discipline     Other Comments:  Medication list per Dr. Hawkins [place] office, insurance claims and retail pharmacy  Unable to assess appropriately.     To my knowledge the above medication history is accurate as of 7/17/2019 6:24 PM.   Katherine Maher CPhT   7/17/2019 6:24 PM

## 2019-07-17 NOTE — ED PROVIDER NOTES
arthritis ([facility] [address]     Shortness of breath on exertion     Sleep apnea     Uses CPAP    Teeth missing     Upper And Lower    Thyroid disease     Thyroidectomy In     Urinary incontinence     WD-Chronic buttock pain 2018    Wears glasses      Past Surgical History:   Procedure Laterality Date    APPENDECTOMY  1968    BACK SURGERY  2017    Lower Back With Hardware    BACK SURGERY  2016    Cervical Back With Hardware    CARPAL TUNNEL RELEASE Right 1993     SECTION  3-30-68 And 10-17-69    COLONOSCOPY  2017    Polyps Removed    CORONARY ANGIOPLASTY  2016    Heart Stent D Circ    DENTAL SURGERY      Teeth Extracted In Past    DILATION AND CURETTAGE OF UTERUS  Last Done In [address] [place] [address] 281    \"Numerous\"    ENDOSCOPY, COLON, DIAGNOSTIC  2017    HYSTERECTOMY  1990    Partial Abdominal Hysterectomy    ROTATOR CUFF REPAIR Left 2010    SLEEVE GASTRECTOMY  2018    robotic assisted gastric sleeve, hiatal hernia repair    THYROIDECTOMY       Family History   Problem Relation Age of Onset    Kidney Disease Mother         \"Kidney Whit Chavez"    Heart Disease Mother         CHF   Tivis Abelson Arthritis Mother     Diabetes Mother     Depression Mother     High Blood Pressure Mother     Obesity Mother     Vision Loss Mother     Heart Attack Father     Heart Disease Father         Heart Attack    Diabetes Father     High Blood Pressure Father     High Blood Pressure Daughter      Social History     Socioeconomic History    Marital status: Single     Spouse name: Not on file    Number of children: 2    Years of education: Not on file    Highest education level: Not on file   Occupational History    Not on file   Social Needs    Financial resource strain: Not on file    Food insecurity:     Worry: Not on file     Inability: Not on file    Transportation needs:     Medical: Not on file     Non-medical: Not on file   Tobacco Use    Smoking status: Never Smoker    Smokeless tobacco: Itching    Vicodin [Hydrocodone-Acetaminophen] Itching    Narcan [Naloxone Hcl] Anxiety     Feels like out of body, things are speeding     Current Facility-Administered Medications   Medication Dose Route Frequency Provider Last Rate Last Dose    0.9 % sodium chloride bolus  1,000 mL Intravenous Once Wuiper, DO 1,000 mL/hr at 07/17/19 1655 1,000 mL at 07/17/19 1655    0.9 % sodium chloride infusion   Intravenous Continuous Alberto Patel MD         Current Outpatient Medications   Medication Sig Dispense Refill    promethazine (PHENERGAN) 25 MG tablet Take 25 mg by mouth every 6 hours as needed for Nausea      furosemide (LASIX) 20 MG tablet Take 20 mg by mouth daily      insulin lispro (HUMALOG KWIKPEN) 100 UNIT/ML pen Inject into the skin As prescribed      guaiFENesin (MUCINEX) 600 MG extended release tablet Take 1 tablet by mouth 2 times daily 60 tablet 3    torsemide (DEMADEX) 20 MG tablet Take 1 tablet by mouth 2 times daily 180 tablet 3    metolazone (ZAROXOLYN) 2.5 MG tablet Take 1 tablet by mouth daily 90 tablet 3    Multiple Vitamin (DAILY-NICOLE) TABS TAKE ONE TABLET BY MOUTH DAILY 30 tablet 3    Dextromethorphan-guaiFENesin (MUCUS RELIEF DM)  MG TB12 TAKE ONE TABLET BY MOUTH TWICE A DAY 60 tablet 0    potassium chloride (KLOR-CON M) 20 MEQ extended release tablet Take 1 tablet by mouth daily Needs to take 10 mEq tabs not 20 mEq unable to swallow 180 tablet 3    lubiprostone (AMITIZA) 24 MCG capsule Take 24 mcg by mouth 2 times daily      albuterol sulfate  (90 Base) MCG/ACT inhaler Inhale 2 puffs into the lungs every 6 hours as needed for Wheezing 1 Inhaler 5    Tiotropium Bromide-Olodaterol (STIOLTO RESPIMAT) 2.5-2.5 MCG/ACT AERS Inhale 2 puffs into the lungs daily 1 Inhaler 3    methylPREDNISolone (MEDROL, SYDNI,) 4 MG tablet Use as directed 1 kit 0    fluticasone propionate (FLOVENT DISKUS) 50 MCG/BLIST AEPB inhaler Inhale 1 puff into the lungs 2 times daily aremis-transcribed.)    DISPOSITION REFERRAL (if applicable):   [address] [place] [address]  [phone]            DISPOSITION MEDICATIONS (if applicable):  New Prescriptions    No medications on file          Manjeet Rey, [facility],   07/17/19 8276

## 2019-07-17 NOTE — H&P
intraductal nodules or masses are identified. No cystic or solid masses are seen in the area of concern. No left axillary adenopathy. 1. Stable and benign-appearing bilateral mammogram. 2. Normal targeted retroareolar left breast ultrasound. 3. There are no features to suggest malignancy. Recommend follow-up on a clinical basis with repeat imaging to be performed if any changes occur on physical exam. BIRADS: BIRADS - CATEGORY 1 Negative, no evidence of malignancy. Normal interval follow-up is recommended in 12 months. OVERALL ASSESSMENT - NEGATIVE A letter of notification will be sent to the patient regarding the results. The [address] [place] [address] of Radiology recommends annual mammograms for women 40 years and older. Us Breast Limited Left    Result Date: 6/18/2019  EXAMINATION: BILATERAL DIGITAL DIAGNOSTIC MAMMOGRAM; TARGETED ULTRASOUND OF THE LEFT BREAST 6/18/2019 12:11 pm; 6/18/2019 12:43 pm TECHNIQUE: Diagnostic mammography of the bilateral breasts was performed. Computer aided detection was utilized in the interpretation of this exam.; Target ultrasound of the left breast was performed. VIEWS: MLO and CC views of each breast as well as left full breast ML projection are presented. Targeted ultrasound performed of the retroareolar left breast and left axilla. COMPARISON: Bilateral mammogram, 04/21/2017 and 08/17/2015 HISTORY: ORDERING SYSTEM PROVIDED HISTORY: Breast pain; ORDERING SYSTEM PROVIDED HISTORY: Nipple discharge FINDINGS: Mammogram: There are scattered areas of fibroglandular density. No dominant masses, areas of architectural distortion or suspicious microcalcifications are identified. No significant interval change is appreciated. Ultrasound: Sonographic images reveal normal fibroglandular breast tissue in the retroareolar left breast.  No dilated ducts are seen in the retroareolar region and no intraductal nodules or masses are identified.   No cystic or solid masses are seen in the area

## 2019-07-18 ENCOUNTER — APPOINTMENT (OUTPATIENT)
Dept: ULTRASOUND IMAGING | Age: 69
DRG: 699 | End: 2019-07-18
Payer: COMMERCIAL

## 2019-07-18 ENCOUNTER — APPOINTMENT (OUTPATIENT)
Dept: INTERVENTIONAL RADIOLOGY/VASCULAR | Age: 69
DRG: 699 | End: 2019-07-18
Payer: COMMERCIAL

## 2019-07-18 LAB
ALBUMIN SERPL-MCNC: 3.7 GM/DL (ref 3.4–5)
ANION GAP SERPL CALCULATED.3IONS-SCNC: 15 MMOL/L (ref 4–16)
BASOPHILS ABSOLUTE: 0 K/CU MM
BASOPHILS RELATIVE PERCENT: 0.1 % (ref 0–1)
BUN BLDV-MCNC: 108 MG/DL (ref 6–23)
CALCIUM SERPL-MCNC: 11.5 MG/DL (ref 8.3–10.6)
CHLORIDE BLD-SCNC: 91 MMOL/L (ref 99–110)
CO2: 33 MMOL/L (ref 21–32)
CREAT SERPL-MCNC: 5.4 MG/DL (ref 0.6–1.1)
DIFFERENTIAL TYPE: ABNORMAL
EOSINOPHILS ABSOLUTE: 0.1 K/CU MM
EOSINOPHILS RELATIVE PERCENT: 1.4 % (ref 0–3)
GFR AFRICAN AMERICAN: 10 ML/MIN/1.73M2
GFR NON-AFRICAN AMERICAN: 8 ML/MIN/1.73M2
GLUCOSE BLD-MCNC: 140 MG/DL (ref 70–99)
GLUCOSE BLD-MCNC: 196 MG/DL (ref 70–99)
GLUCOSE BLD-MCNC: 231 MG/DL (ref 70–99)
GLUCOSE BLD-MCNC: 73 MG/DL (ref 70–99)
GLUCOSE BLD-MCNC: 73 MG/DL (ref 70–99)
HBV SURFACE AB TITR SER: 251.9 {TITER}
HCT VFR BLD CALC: 33.6 % (ref 37–47)
HEMOGLOBIN: 10.9 GM/DL (ref 12.5–16)
HEPATITIS B SURFACE ANTIGEN: NON REACTIVE
IMMATURE NEUTROPHIL %: 0.3 % (ref 0–0.43)
INR BLD: 0.98 INDEX
LYMPHOCYTES ABSOLUTE: 1.1 K/CU MM
LYMPHOCYTES RELATIVE PERCENT: 16.1 % (ref 24–44)
MCH RBC QN AUTO: 29.1 PG (ref 27–31)
MCHC RBC AUTO-ENTMCNC: 32.4 % (ref 32–36)
MCV RBC AUTO: 89.8 FL (ref 78–100)
MONOCYTES ABSOLUTE: 0.7 K/CU MM
MONOCYTES RELATIVE PERCENT: 9.3 % (ref 0–4)
NUCLEATED RBC %: 0 %
PDW BLD-RTO: 13.6 % (ref 11.7–14.9)
PHOSPHORUS: 4.3 MG/DL (ref 2.5–4.9)
PLATELET # BLD: 218 K/CU MM (ref 140–440)
PMV BLD AUTO: 11.4 FL (ref 7.5–11.1)
POTASSIUM SERPL-SCNC: 3.1 MMOL/L (ref 3.5–5.1)
PROTHROMBIN TIME: 11.2 SECONDS (ref 9.12–12.5)
RBC # BLD: 3.74 M/CU MM (ref 4.2–5.4)
SEGMENTED NEUTROPHILS ABSOLUTE COUNT: 5.2 K/CU MM
SEGMENTED NEUTROPHILS RELATIVE PERCENT: 72.8 % (ref 36–66)
SODIUM BLD-SCNC: 139 MMOL/L (ref 135–145)
TOTAL IMMATURE NEUTOROPHIL: 0.02 K/CU MM
TOTAL NUCLEATED RBC: 0 K/CU MM
WBC # BLD: 7.1 K/CU MM (ref 4–10.5)

## 2019-07-18 PROCEDURE — 6370000000 HC RX 637 (ALT 250 FOR IP): Performed by: INTERNAL MEDICINE

## 2019-07-18 PROCEDURE — 2709999900 HC NON-CHARGEABLE SUPPLY

## 2019-07-18 PROCEDURE — 6370000000 HC RX 637 (ALT 250 FOR IP): Performed by: NURSE PRACTITIONER

## 2019-07-18 PROCEDURE — 97530 THERAPEUTIC ACTIVITIES: CPT

## 2019-07-18 PROCEDURE — 85610 PROTHROMBIN TIME: CPT

## 2019-07-18 PROCEDURE — 6360000002 HC RX W HCPCS: Performed by: NURSE PRACTITIONER

## 2019-07-18 PROCEDURE — 02HV33Z INSERTION OF INFUSION DEVICE INTO SUPERIOR VENA CAVA, PERCUTANEOUS APPROACH: ICD-10-PCS | Performed by: RADIOLOGY

## 2019-07-18 PROCEDURE — 97162 PT EVAL MOD COMPLEX 30 MIN: CPT

## 2019-07-18 PROCEDURE — 36415 COLL VENOUS BLD VENIPUNCTURE: CPT

## 2019-07-18 PROCEDURE — 0JH63XZ INSERTION OF TUNNELED VASCULAR ACCESS DEVICE INTO CHEST SUBCUTANEOUS TISSUE AND FASCIA, PERCUTANEOUS APPROACH: ICD-10-PCS | Performed by: RADIOLOGY

## 2019-07-18 PROCEDURE — G0365 VESSEL MAPPING HEMO ACCESS: HCPCS

## 2019-07-18 PROCEDURE — 6360000002 HC RX W HCPCS: Performed by: RADIOLOGY

## 2019-07-18 PROCEDURE — 5A1D70Z PERFORMANCE OF URINARY FILTRATION, INTERMITTENT, LESS THAN 6 HOURS PER DAY: ICD-10-PCS | Performed by: INTERNAL MEDICINE

## 2019-07-18 PROCEDURE — B5181ZA FLUOROSCOPY OF SUPERIOR VENA CAVA USING LOW OSMOLAR CONTRAST, GUIDANCE: ICD-10-PCS | Performed by: RADIOLOGY

## 2019-07-18 PROCEDURE — 80069 RENAL FUNCTION PANEL: CPT

## 2019-07-18 PROCEDURE — 93970 EXTREMITY STUDY: CPT

## 2019-07-18 PROCEDURE — 36558 INSERT TUNNELED CV CATH: CPT

## 2019-07-18 PROCEDURE — 87340 HEPATITIS B SURFACE AG IA: CPT

## 2019-07-18 PROCEDURE — 6360000002 HC RX W HCPCS: Performed by: INTERNAL MEDICINE

## 2019-07-18 PROCEDURE — 77001 FLUOROGUIDE FOR VEIN DEVICE: CPT

## 2019-07-18 PROCEDURE — 76937 US GUIDE VASCULAR ACCESS: CPT

## 2019-07-18 PROCEDURE — 2580000003 HC RX 258: Performed by: INTERNAL MEDICINE

## 2019-07-18 PROCEDURE — 90935 HEMODIALYSIS ONE EVALUATION: CPT

## 2019-07-18 PROCEDURE — C1751 CATH, INF, PER/CENT/MIDLINE: HCPCS

## 2019-07-18 PROCEDURE — 85025 COMPLETE CBC W/AUTO DIFF WBC: CPT

## 2019-07-18 PROCEDURE — 82962 GLUCOSE BLOOD TEST: CPT

## 2019-07-18 PROCEDURE — 94640 AIRWAY INHALATION TREATMENT: CPT

## 2019-07-18 PROCEDURE — B54PZZZ ULTRASONOGRAPHY OF BILATERAL UPPER EXTREMITY VEINS: ICD-10-PCS | Performed by: RADIOLOGY

## 2019-07-18 PROCEDURE — C1894 INTRO/SHEATH, NON-LASER: HCPCS

## 2019-07-18 PROCEDURE — 86706 HEP B SURFACE ANTIBODY: CPT

## 2019-07-18 PROCEDURE — 1200000000 HC SEMI PRIVATE

## 2019-07-18 PROCEDURE — 94761 N-INVAS EAR/PLS OXIMETRY MLT: CPT

## 2019-07-18 RX ORDER — ALLOPURINOL 100 MG/1
200 TABLET ORAL DAILY
Status: DISCONTINUED | OUTPATIENT
Start: 2019-07-18 | End: 2019-07-24 | Stop reason: HOSPADM

## 2019-07-18 RX ORDER — BUPROPION HYDROCHLORIDE 150 MG/1
300 TABLET ORAL EVERY MORNING
Status: DISCONTINUED | OUTPATIENT
Start: 2019-07-19 | End: 2019-07-18 | Stop reason: SDUPTHER

## 2019-07-18 RX ORDER — PANTOPRAZOLE SODIUM 40 MG/1
40 TABLET, DELAYED RELEASE ORAL 2 TIMES DAILY
Status: DISCONTINUED | OUTPATIENT
Start: 2019-07-18 | End: 2019-07-24 | Stop reason: HOSPADM

## 2019-07-18 RX ORDER — HEPARIN SODIUM 1000 [USP'U]/ML
1700 INJECTION, SOLUTION INTRAVENOUS; SUBCUTANEOUS
Status: COMPLETED | OUTPATIENT
Start: 2019-07-18 | End: 2019-07-18

## 2019-07-18 RX ORDER — NITROGLYCERIN 0.4 MG/1
0.4 TABLET SUBLINGUAL EVERY 5 MIN PRN
Status: DISCONTINUED | OUTPATIENT
Start: 2019-07-18 | End: 2019-07-24 | Stop reason: HOSPADM

## 2019-07-18 RX ORDER — ONDANSETRON 2 MG/ML
4 INJECTION INTRAMUSCULAR; INTRAVENOUS EVERY 6 HOURS PRN
Status: DISCONTINUED | OUTPATIENT
Start: 2019-07-18 | End: 2019-07-24 | Stop reason: HOSPADM

## 2019-07-18 RX ORDER — CLONIDINE HYDROCHLORIDE 0.1 MG/1
0.1 TABLET ORAL 2 TIMES DAILY
Status: DISCONTINUED | OUTPATIENT
Start: 2019-07-18 | End: 2019-07-19

## 2019-07-18 RX ORDER — DILTIAZEM HYDROCHLORIDE 180 MG/1
180 CAPSULE, COATED, EXTENDED RELEASE ORAL DAILY
Status: DISCONTINUED | OUTPATIENT
Start: 2019-07-18 | End: 2019-07-19

## 2019-07-18 RX ORDER — HYDROXYCHLOROQUINE SULFATE 200 MG/1
200 TABLET, FILM COATED ORAL 2 TIMES DAILY
Status: DISCONTINUED | OUTPATIENT
Start: 2019-07-18 | End: 2019-07-24 | Stop reason: HOSPADM

## 2019-07-18 RX ORDER — ACETAMINOPHEN 325 MG/1
650 TABLET ORAL EVERY 6 HOURS PRN
Status: DISCONTINUED | OUTPATIENT
Start: 2019-07-18 | End: 2019-07-18 | Stop reason: SDUPTHER

## 2019-07-18 RX ORDER — SODIUM CHLORIDE 0.9 % (FLUSH) 0.9 %
10 SYRINGE (ML) INJECTION EVERY 12 HOURS SCHEDULED
Status: DISCONTINUED | OUTPATIENT
Start: 2019-07-18 | End: 2019-07-24 | Stop reason: HOSPADM

## 2019-07-18 RX ORDER — IPRATROPIUM BROMIDE AND ALBUTEROL SULFATE 2.5; .5 MG/3ML; MG/3ML
1 SOLUTION RESPIRATORY (INHALATION) EVERY 4 HOURS PRN
Status: DISCONTINUED | OUTPATIENT
Start: 2019-07-18 | End: 2019-07-24 | Stop reason: HOSPADM

## 2019-07-18 RX ORDER — FOLIC ACID 1 MG/1
1 TABLET ORAL EVERY MORNING
Status: DISCONTINUED | OUTPATIENT
Start: 2019-07-19 | End: 2019-07-18 | Stop reason: SDUPTHER

## 2019-07-18 RX ORDER — CARVEDILOL 6.25 MG/1
6.25 TABLET ORAL 2 TIMES DAILY
Status: DISCONTINUED | OUTPATIENT
Start: 2019-07-18 | End: 2019-07-19

## 2019-07-18 RX ORDER — HEPARIN SODIUM 1000 [USP'U]/ML
3500 INJECTION, SOLUTION INTRAVENOUS; SUBCUTANEOUS
Status: DISCONTINUED | OUTPATIENT
Start: 2019-07-18 | End: 2019-07-18

## 2019-07-18 RX ORDER — ATORVASTATIN CALCIUM 40 MG/1
80 TABLET, FILM COATED ORAL NIGHTLY
Status: DISCONTINUED | OUTPATIENT
Start: 2019-07-18 | End: 2019-07-18 | Stop reason: SDUPTHER

## 2019-07-18 RX ORDER — HEPARIN SODIUM 5000 [USP'U]/ML
5000 INJECTION, SOLUTION INTRAVENOUS; SUBCUTANEOUS EVERY 8 HOURS SCHEDULED
Status: DISCONTINUED | OUTPATIENT
Start: 2019-07-18 | End: 2019-07-18 | Stop reason: SDUPTHER

## 2019-07-18 RX ORDER — SODIUM CHLORIDE 9 MG/ML
INJECTION, SOLUTION INTRAVENOUS CONTINUOUS
Status: DISCONTINUED | OUTPATIENT
Start: 2019-07-18 | End: 2019-07-20

## 2019-07-18 RX ORDER — MIDODRINE HYDROCHLORIDE 5 MG/1
5 TABLET ORAL
Status: DISCONTINUED | OUTPATIENT
Start: 2019-07-18 | End: 2019-07-19

## 2019-07-18 RX ORDER — PANTOPRAZOLE SODIUM 40 MG/1
40 TABLET, DELAYED RELEASE ORAL
Status: DISCONTINUED | OUTPATIENT
Start: 2019-07-19 | End: 2019-07-18

## 2019-07-18 RX ORDER — DEXTROSE MONOHYDRATE 25 G/50ML
12.5 INJECTION, SOLUTION INTRAVENOUS PRN
Status: DISCONTINUED | OUTPATIENT
Start: 2019-07-18 | End: 2019-07-24 | Stop reason: HOSPADM

## 2019-07-18 RX ORDER — QUETIAPINE 150 MG/1
300 TABLET, FILM COATED, EXTENDED RELEASE ORAL NIGHTLY
Status: DISCONTINUED | OUTPATIENT
Start: 2019-07-18 | End: 2019-07-18 | Stop reason: SDUPTHER

## 2019-07-18 RX ORDER — SODIUM CHLORIDE 0.9 % (FLUSH) 0.9 %
10 SYRINGE (ML) INJECTION PRN
Status: DISCONTINUED | OUTPATIENT
Start: 2019-07-18 | End: 2019-07-24 | Stop reason: HOSPADM

## 2019-07-18 RX ORDER — HYDROXYZINE HYDROCHLORIDE 25 MG/1
25 TABLET, FILM COATED ORAL 4 TIMES DAILY PRN
Status: DISCONTINUED | OUTPATIENT
Start: 2019-07-18 | End: 2019-07-24 | Stop reason: HOSPADM

## 2019-07-18 RX ORDER — ACETAMINOPHEN 325 MG/1
650 TABLET ORAL EVERY 6 HOURS PRN
Status: DISCONTINUED | OUTPATIENT
Start: 2019-07-18 | End: 2019-07-19

## 2019-07-18 RX ORDER — LORAZEPAM 0.5 MG/1
0.5 TABLET ORAL 2 TIMES DAILY
Status: DISCONTINUED | OUTPATIENT
Start: 2019-07-18 | End: 2019-07-24 | Stop reason: HOSPADM

## 2019-07-18 RX ORDER — DEXTROSE MONOHYDRATE 50 MG/ML
100 INJECTION, SOLUTION INTRAVENOUS PRN
Status: DISCONTINUED | OUTPATIENT
Start: 2019-07-18 | End: 2019-07-24 | Stop reason: HOSPADM

## 2019-07-18 RX ORDER — MIDAZOLAM HYDROCHLORIDE 1 MG/ML
2 INJECTION INTRAMUSCULAR; INTRAVENOUS ONCE
Status: COMPLETED | OUTPATIENT
Start: 2019-07-18 | End: 2019-07-18

## 2019-07-18 RX ORDER — PREDNISONE 1 MG/1
5 TABLET ORAL DAILY
Status: DISCONTINUED | OUTPATIENT
Start: 2019-07-18 | End: 2019-07-24 | Stop reason: HOSPADM

## 2019-07-18 RX ORDER — FLUTICASONE PROPIONATE 50 MCG
2 SPRAY, SUSPENSION (ML) NASAL EVERY MORNING
Status: DISCONTINUED | OUTPATIENT
Start: 2019-07-19 | End: 2019-07-18 | Stop reason: SDUPTHER

## 2019-07-18 RX ORDER — CLOPIDOGREL BISULFATE 75 MG/1
75 TABLET ORAL DAILY
Status: DISCONTINUED | OUTPATIENT
Start: 2019-07-18 | End: 2019-07-24 | Stop reason: HOSPADM

## 2019-07-18 RX ORDER — OYSTER SHELL CALCIUM WITH VITAMIN D 500; 200 MG/1; [IU]/1
1 TABLET, FILM COATED ORAL 2 TIMES DAILY WITH MEALS
Status: DISCONTINUED | OUTPATIENT
Start: 2019-07-18 | End: 2019-07-19

## 2019-07-18 RX ORDER — FENTANYL CITRATE 50 UG/ML
50 INJECTION, SOLUTION INTRAMUSCULAR; INTRAVENOUS ONCE
Status: COMPLETED | OUTPATIENT
Start: 2019-07-18 | End: 2019-07-18

## 2019-07-18 RX ORDER — NICOTINE POLACRILEX 4 MG
15 LOZENGE BUCCAL PRN
Status: DISCONTINUED | OUTPATIENT
Start: 2019-07-18 | End: 2019-07-18 | Stop reason: SDUPTHER

## 2019-07-18 RX ORDER — PREDNISONE 1 MG/1
5 TABLET ORAL DAILY
Status: DISCONTINUED | OUTPATIENT
Start: 2019-07-18 | End: 2019-07-18 | Stop reason: SDUPTHER

## 2019-07-18 RX ORDER — PANTOPRAZOLE SODIUM 40 MG/1
40 TABLET, DELAYED RELEASE ORAL
Status: DISCONTINUED | OUTPATIENT
Start: 2019-07-18 | End: 2019-07-18 | Stop reason: SDUPTHER

## 2019-07-18 RX ORDER — MONTELUKAST SODIUM 10 MG/1
10 TABLET ORAL NIGHTLY
Status: DISCONTINUED | OUTPATIENT
Start: 2019-07-18 | End: 2019-07-24 | Stop reason: HOSPADM

## 2019-07-18 RX ORDER — ACETAMINOPHEN 325 MG/1
650 TABLET ORAL EVERY 4 HOURS PRN
Status: DISCONTINUED | OUTPATIENT
Start: 2019-07-18 | End: 2019-07-18 | Stop reason: SDUPTHER

## 2019-07-18 RX ORDER — RANOLAZINE 500 MG/1
500 TABLET, EXTENDED RELEASE ORAL 2 TIMES DAILY
Status: DISCONTINUED | OUTPATIENT
Start: 2019-07-18 | End: 2019-07-18 | Stop reason: SDUPTHER

## 2019-07-18 RX ADMIN — PANTOPRAZOLE SODIUM 40 MG: 40 TABLET, DELAYED RELEASE ORAL at 11:55

## 2019-07-18 RX ADMIN — Medication 150 MG: at 11:59

## 2019-07-18 RX ADMIN — POLYVINYL ALCOHOL 2 DROP: 14 SOLUTION/ DROPS OPHTHALMIC at 11:58

## 2019-07-18 RX ADMIN — LORAZEPAM 0.5 MG: 0.5 TABLET ORAL at 21:59

## 2019-07-18 RX ADMIN — LEVOTHYROXINE SODIUM 100 MCG: 100 TABLET ORAL at 11:56

## 2019-07-18 RX ADMIN — METOCLOPRAMIDE HYDROCHLORIDE 5 MG: 5 TABLET ORAL at 11:56

## 2019-07-18 RX ADMIN — FLUOCINONIDE: 0.5 CREAM TOPICAL at 22:07

## 2019-07-18 RX ADMIN — MIDODRINE HYDROCHLORIDE 5 MG: 5 TABLET ORAL at 19:21

## 2019-07-18 RX ADMIN — ACETAMINOPHEN 650 MG: 325 TABLET ORAL at 22:05

## 2019-07-18 RX ADMIN — MIDODRINE HYDROCHLORIDE 5 MG: 5 TABLET ORAL at 11:55

## 2019-07-18 RX ADMIN — POLYETHYLENE GLYCOL (3350) 17 G: 17 POWDER, FOR SOLUTION ORAL at 11:57

## 2019-07-18 RX ADMIN — BUPROPION HYDROCHLORIDE 300 MG: 150 TABLET, FILM COATED, EXTENDED RELEASE ORAL at 11:54

## 2019-07-18 RX ADMIN — POTASSIUM CHLORIDE 20 MEQ: 20 TABLET, EXTENDED RELEASE ORAL at 11:57

## 2019-07-18 RX ADMIN — FLUOCINONIDE: 0.5 CREAM TOPICAL at 11:59

## 2019-07-18 RX ADMIN — MIDAZOLAM HYDROCHLORIDE 2 MG: 1 INJECTION, SOLUTION INTRAMUSCULAR; INTRAVENOUS at 14:00

## 2019-07-18 RX ADMIN — IPRATROPIUM BROMIDE AND ALBUTEROL SULFATE 3 ML: .5; 3 SOLUTION RESPIRATORY (INHALATION) at 11:40

## 2019-07-18 RX ADMIN — HEPARIN SODIUM 5000 UNITS: 5000 INJECTION INTRAVENOUS; SUBCUTANEOUS at 22:09

## 2019-07-18 RX ADMIN — GUAIFENESIN 600 MG: 600 TABLET, EXTENDED RELEASE ORAL at 11:57

## 2019-07-18 RX ADMIN — FENTANYL CITRATE 50 MCG: 50 INJECTION INTRAMUSCULAR; INTRAVENOUS at 13:59

## 2019-07-18 RX ADMIN — TRIAMCINOLONE ACETONIDE: 1 CREAM TOPICAL at 22:06

## 2019-07-18 RX ADMIN — HEPARIN SODIUM 1700 UNITS: 1000 INJECTION, SOLUTION INTRAVENOUS; SUBCUTANEOUS at 17:16

## 2019-07-18 RX ADMIN — INSULIN LISPRO 2 UNITS: 100 INJECTION, SOLUTION INTRAVENOUS; SUBCUTANEOUS at 12:01

## 2019-07-18 RX ADMIN — IPRATROPIUM BROMIDE AND ALBUTEROL SULFATE 3 ML: .5; 3 SOLUTION RESPIRATORY (INHALATION) at 07:45

## 2019-07-18 RX ADMIN — METOCLOPRAMIDE HYDROCHLORIDE 5 MG: 5 TABLET ORAL at 19:21

## 2019-07-18 RX ADMIN — DOCUSATE SODIUM 100 MG: 100 CAPSULE, LIQUID FILLED ORAL at 21:57

## 2019-07-18 RX ADMIN — Medication 1 PUFF: at 07:45

## 2019-07-18 RX ADMIN — ATORVASTATIN CALCIUM 80 MG: 40 TABLET, FILM COATED ORAL at 21:56

## 2019-07-18 RX ADMIN — PANTOPRAZOLE SODIUM 40 MG: 40 TABLET, DELAYED RELEASE ORAL at 21:58

## 2019-07-18 RX ADMIN — HYDROXYCHLOROQUINE SULFATE 200 MG: 200 TABLET, FILM COATED ORAL at 21:59

## 2019-07-18 RX ADMIN — RANOLAZINE 500 MG: 500 TABLET, FILM COATED, EXTENDED RELEASE ORAL at 11:57

## 2019-07-18 RX ADMIN — MONTELUKAST 10 MG: 10 TABLET, FILM COATED ORAL at 21:59

## 2019-07-18 RX ADMIN — MULTIPLE VITAMINS W/ MINERALS TAB 1 TABLET: TAB at 11:55

## 2019-07-18 RX ADMIN — SODIUM CHLORIDE: 9 INJECTION, SOLUTION INTRAVENOUS at 19:23

## 2019-07-18 RX ADMIN — ALLOPURINOL 200 MG: 100 TABLET ORAL at 21:58

## 2019-07-18 RX ADMIN — INSULIN LISPRO 1 UNITS: 100 INJECTION, SOLUTION INTRAVENOUS; SUBCUTANEOUS at 22:08

## 2019-07-18 RX ADMIN — LINAGLIPTIN 5 MG: 5 TABLET, FILM COATED ORAL at 21:59

## 2019-07-18 RX ADMIN — KETOCONAZOLE: 20 CREAM TOPICAL at 12:00

## 2019-07-18 RX ADMIN — CLOPIDOGREL BISULFATE 75 MG: 75 TABLET ORAL at 21:58

## 2019-07-18 RX ADMIN — ACETAMINOPHEN 650 MG: 325 TABLET ORAL at 12:52

## 2019-07-18 RX ADMIN — RANOLAZINE 500 MG: 500 TABLET, FILM COATED, EXTENDED RELEASE ORAL at 21:58

## 2019-07-18 RX ADMIN — BACITRACIN ZINC, NEOMYCIN SULFATE, POLYMYXIN B SULFATE: 3.5; 5000; 4 OINTMENT TOPICAL at 22:07

## 2019-07-18 RX ADMIN — DILTIAZEM HYDROCHLORIDE 180 MG: 180 CAPSULE, COATED, EXTENDED RELEASE ORAL at 21:56

## 2019-07-18 RX ADMIN — INSULIN GLARGINE 10 UNITS: 100 INJECTION, SOLUTION SUBCUTANEOUS at 22:09

## 2019-07-18 RX ADMIN — FLUTICASONE PROPIONATE 2 SPRAY: 50 SPRAY, METERED NASAL at 11:57

## 2019-07-18 RX ADMIN — INSULIN LISPRO 1 UNITS: 100 INJECTION, SOLUTION INTRAVENOUS; SUBCUTANEOUS at 19:21

## 2019-07-18 RX ADMIN — INSULIN GLARGINE 10 UNITS: 100 INJECTION, SOLUTION SUBCUTANEOUS at 12:00

## 2019-07-18 RX ADMIN — TRIAMCINOLONE ACETONIDE: 1 CREAM TOPICAL at 12:00

## 2019-07-18 RX ADMIN — FOLIC ACID 1 MG: 1 TABLET ORAL at 11:56

## 2019-07-18 RX ADMIN — CETIRIZINE HYDROCHLORIDE 10 MG: 10 TABLET, FILM COATED ORAL at 11:56

## 2019-07-18 RX ADMIN — GUAIFENESIN AND DEXTROMETHORPHAN HYDROBROMIDE 1 TABLET: 600; 30 TABLET, EXTENDED RELEASE ORAL at 21:58

## 2019-07-18 RX ADMIN — LUBIPROSTONE 24 MCG: 24 CAPSULE, GELATIN COATED ORAL at 11:56

## 2019-07-18 RX ADMIN — QUETIAPINE FUMARATE 300 MG: 150 TABLET, EXTENDED RELEASE ORAL at 21:57

## 2019-07-18 RX ADMIN — CARVEDILOL 6.25 MG: 6.25 TABLET, FILM COATED ORAL at 21:56

## 2019-07-18 RX ADMIN — GUAIFENESIN AND DEXTROMETHORPHAN HYDROBROMIDE 1 TABLET: 600; 30 TABLET, EXTENDED RELEASE ORAL at 11:56

## 2019-07-18 RX ADMIN — PREDNISONE 5 MG: 5 TABLET ORAL at 11:57

## 2019-07-18 RX ADMIN — MIDODRINE HYDROCHLORIDE 5 MG: 5 TABLET ORAL at 14:32

## 2019-07-18 RX ADMIN — OYSTER SHELL CALCIUM WITH VITAMIN D 1 TABLET: 500; 200 TABLET, FILM COATED ORAL at 21:59

## 2019-07-18 RX ADMIN — DOCUSATE SODIUM 100 MG: 100 CAPSULE, LIQUID FILLED ORAL at 11:55

## 2019-07-18 RX ADMIN — LORAZEPAM 0.5 MG: 0.5 TABLET ORAL at 11:56

## 2019-07-18 RX ADMIN — LUBIPROSTONE 24 MCG: 24 CAPSULE, GELATIN COATED ORAL at 21:57

## 2019-07-18 ASSESSMENT — PAIN DESCRIPTION - ORIENTATION
ORIENTATION: RIGHT
ORIENTATION: RIGHT;LOWER

## 2019-07-18 ASSESSMENT — PAIN DESCRIPTION - FREQUENCY
FREQUENCY: CONTINUOUS
FREQUENCY: INTERMITTENT

## 2019-07-18 ASSESSMENT — PAIN DESCRIPTION - LOCATION
LOCATION: ABDOMEN
LOCATION: INCISION

## 2019-07-18 ASSESSMENT — PAIN DESCRIPTION - PROGRESSION: CLINICAL_PROGRESSION: NOT CHANGED

## 2019-07-18 ASSESSMENT — PAIN SCALES - GENERAL
PAINLEVEL_OUTOF10: 5
PAINLEVEL_OUTOF10: 3
PAINLEVEL_OUTOF10: 0
PAINLEVEL_OUTOF10: 3
PAINLEVEL_OUTOF10: 0
PAINLEVEL_OUTOF10: 0

## 2019-07-18 ASSESSMENT — PAIN - FUNCTIONAL ASSESSMENT: PAIN_FUNCTIONAL_ASSESSMENT: ACTIVITIES ARE NOT PREVENTED

## 2019-07-18 ASSESSMENT — PAIN DESCRIPTION - DESCRIPTORS
DESCRIPTORS: ACHING;DULL;SHARP
DESCRIPTORS: ACHING

## 2019-07-18 ASSESSMENT — PAIN DESCRIPTION - PAIN TYPE
TYPE: ACUTE PAIN
TYPE: SURGICAL PAIN

## 2019-07-18 NOTE — CONSULTS
Consult completed. Therapeutic needs reviewed with Dr. Keira Duke, notified of need to consult Nephrology to [address] [place] [address] or MidLine r/t Hx:ESRD, who states PIV is sufficient to meet pt's therapeutic needs and central access is not necessary at this time. During [facility] evaluation of pt's vasculature Dr. Díaz Drain to bedside & specifically asks that PICC/MidLine not be initiated r/t probable need to begin dialysis to save appropriate vasculature for fistula creation. #24ga Nexiva Diffusics pressure-injectable PIV initiated to LUE hand and sterile dressing with SkinPrep and SwabCap applied. Site flushed with 20ml NS to confirm patency without abnormalities/complications noted. Pt tolerated well and asking for PO fluids - Dr. Keira Duke notified.
Nutrition Education    Type and Reason for Visit: Consult, Patient Education    Nutrition Assessment:  Provided/reviewed Managing Diabetes Nutrition Therapy, Eating Right With Less Salt and Renal Diet (Nutrition Care Manual)    · Verbally reviewed information with Patient. · Written educational materials provided. · Contact name and number provided. · Refer to Patient Education activity for more details.     Electronically signed by Girma Khan RD, LD on 7/18/19 at 11:07 AM    Contact Number: [phone]
activity/participation limitations):  · Observations:  alert and oriented, pleasant and participatory, follows commands appropriately, tolerant of activity, stable telemetry readings and recovers quickly with rest period  · Impairments:  standing dynamic balance, ambulation patterning/sequence and ambulatory balance (only slight impairments, likely related to recent period of bedrest/immobility and related kidney health issues). · Vision and Hearing:  WFL vision and WFL hearing  · Neuro:  grossly WFL for functional mobility   · Musculoskeletal:  Functional AROM:  WFL  PROM:  WFL. Functional strength:  WFL. · Mobility/Transfers:  Pattern/Sequence:  WFL. Efficiency:  WFL. Mobility skills performance:  Performs all bed mob and xfers without physical assist, limited ability to manage telemetry lines/box. CGA via HHA for in-room ambulation   · Balance:  CGA for ambulatory balance, slight unsteady, no overt LOB.   [facility] 6 Clicks Inpatient Mobility:  AM-PAC Mobility Inpatient   How much difficulty turning over in bed?: None  How much difficulty sitting down on / standing up from a chair with arms?: None  How much difficulty moving from lying on back to sitting on side of bed?: None  How much help from another person moving to and from a bed to a chair?: None  How much help from another person needed to walk in hospital room?: A Little  How much help from another person for climbing 3-5 steps with a railing?: A Little  AM-[facility] Inpatient Mobility Raw Score : 22  AM-PAC Inpatient T-Scale Score : 53.28  Mobility Inpatient CMS 0-100% Score: 20.91  Mobility Inpatient CMS G-Code Modifier : [facility] 6 Clicks Goal:  n/a  [facility] Mobility Assessment Tool score:  3  patient requires assistive device and/or staff assistance for ambulation -- use AD and/or staff support  Assessment:  Conditions Requiring Skilled Therapeutic Intervention  Body structures, Functions, Activity limitations: Decreased endurance  Assessment:

## 2019-07-18 NOTE — PLAN OF CARE
Problem: Falls - Risk of:  Goal: Will remain free from falls  Description  Will remain free from falls  Outcome: Ongoing     Problem: Falls - Risk of:  Goal: Absence of physical injury  Description  Absence of physical injury  Outcome: Ongoing     Problem: Tissue Perfusion - Renal, Altered:  Goal: Electrolytes within specified parameters  Description  Electrolytes within specified parameters  Outcome: Ongoing     Problem: Tissue Perfusion - Renal, Altered:  Goal: Urine creatinine clearance will be within specified parameters  Description  Urine creatinine clearance will be within specified parameters  Outcome: Ongoing     Problem: Tissue Perfusion - Renal, Altered:  Goal: Serum creatinine will be within specified parameters  Description  Serum creatinine will be within specified parameters  Outcome: Ongoing     Problem: Tissue Perfusion - Renal, Altered:  Goal: Ability to achieve a balanced intake and output will improve  Description  Ability to achieve a balanced intake and output will improve  Outcome: Ongoing

## 2019-07-18 NOTE — PROGRESS NOTES
Microalbumen/Creatinine ratio:  No components found for: RUCREAT          Objective:   Vitals: /71   Pulse 90   Temp 98.5 °F (36.9 °C) (Oral)   Resp 16   Ht 5' 4\" (1.626 m)   Wt 234 lb (106.1 kg)   SpO2 97%   BMI 40.17 kg/m²   General appearance: awake weak  HEENT: Head: Normal, normocephalic, atraumatic.   Neck: supple, symmetrical, trachea midline  Lungs: diminished breath sounds bilaterally  Heart: S1, S2 normal  Abdomen: abnormal findings:  soft nt obese  Extremities: edema +  Neurologic: Mental status: alertness: alert      Patient Active Problem List:     CAD (coronary artery disease)     Nausea & vomiting     Hypothyroidism, adult     Acute on chronic renal failure (HCC)     Hypertensive kidney disease with CKD stage III (HCC)     Type 2 diabetes mellitus without complication (HCC)     Hypercalcemia     Chronic venous hypertension (idiopathic) with inflammation of bilateral lower extremity     Fluid overload     Acute on chronic diastolic heart failure (HCC)     Morbid obesity with BMI of 45.0-49.9, adult (HCC)     Diabetes 1.5, managed as type 2 (HCC)     Hyperglycemia     Chronic fatigue     Solitary pulmonary nodule     Acute hypercapnic respiratory failure (HCC)     Acute metabolic encephalopathy     Chronic diastolic heart failure (HCC)     Acute kidney injury (HCC)     Acute renal failure (ARF) (HCC)     Acute encephalopathy     YRIS (obstructive sleep apnea)     Hiatal hernia     Status post laparoscopic sleeve gastrectomy     Status post bariatric surgery     WD-Chronic buttock pain     Chronic kidney disease, stage IV (severe) (HCC)     Essential hypertension     ESRD (end stage renal disease) (HCC)     ESRD needing dialysis ([facility] [address]     Type 2 diabetes mellitus with hyperglycemia, with long-term current use of insulin (HCC)     Class 3 severe obesity due to excess calories with body mass index (BMI) of 40.0 to 44.9 in adult [facility])    Assessment and Plan:      IMP:  1 esrd from Dm2 and cardiorenal  2 sp gastric sleeve with weight loss  3 YRIS  4 weak with fall  5 Dm2 -C  6 HTN  7 hypercalcemia  8 hypokalemia    Plan     '1 do first hd today and get tunnel catheter and vein mapping.  Will arrange outpt dialysis with davita at [facility] but need MW chair otherwise to [facility]  2 monitor weight and appetite   3 use cpap at night  4 pt/ot eval and for now want try go home  5 ssi and improved overall  6 bp low and hold bp meds and prn proamatine  7 ca monitor off diuretic and with dialysis low ca bath  8 replete K and 4K bath today  9 monitor hb  10 phos stable  Working on outpt dialysis and hd today and in am             Carlos Crowley MD

## 2019-07-18 NOTE — PROGRESS NOTES
chest movement while on room air. CARDIO/VASC S1/S2 auscultated. Regular rate without appreciable murmurs, rubs, or gallops. No JVD or carotid bruits. Peripheral pulses equal bilaterally and palpable. +ve peripheral edema. GI Abdomen is soft without significant tenderness, masses, or guarding. Bowel sounds are normoactive. Rectal exam deferred. MSK No gross joint deformities. + tenderness, bilateral calf  SKIN Normal coloration, warm, dry. NEURO Cranial nerves appear grossly intact, normal speech, no lateralizing weakness. PSYCH Awake, alert, oriented x 4. Affect appropriate.     Medications:   Medications:    midodrine  5 mg Oral TID WC    predniSONE  5 mg Oral Daily    pantoprazole  40 mg Oral QAM AC    [START ON 7/19/2019] pneumococcal 13-valent conjugate  0.5 mL Intramuscular Once    sodium chloride flush  10 mL Intravenous 2 times per day    docusate sodium  100 mg Oral BID    heparin (porcine)  5,000 Units Subcutaneous 3 times per day    insulin lispro  0-12 Units Subcutaneous TID     insulin lispro  0-6 Units Subcutaneous Nightly    atorvastatin  80 mg Oral Nightly    buPROPion  300 mg Oral QAM    cetirizine  10 mg Oral Daily    dextromethorphan-guaiFENesin  1 tablet Oral BID    erythromycin with ethanol   Topical Daily    fluocinonide   Topical BID    fluticasone  2 spray Nasal QAM    fluticasone  1 puff Inhalation Daily    folic acid  1 mg Oral QAM    guaiFENesin  600 mg Oral BID    ipratropium-albuterol  1 vial Inhalation Q4H    ketoconazole   Topical Daily    insulin glargine  10 Units Subcutaneous BID    levomilnacipran  40 mg Oral Daily    levothyroxine  100 mcg Oral Daily    linaclotide  290 mcg Oral QAM AC    LORazepam  0.5 mg Oral BID    lubiprostone  24 mcg Oral BID    polyvinyl alcohol  2 drop Both Eyes BID    ranolazine  500 mg Oral BID    QUEtiapine  300 mg Oral Nightly    potassium chloride  20 mEq Oral Daily    iron polysaccharides  1 capsule Oral QAM   

## 2019-07-19 LAB
ALBUMIN SERPL-MCNC: 3.6 GM/DL (ref 3.4–5)
ANION GAP SERPL CALCULATED.3IONS-SCNC: 15 MMOL/L (ref 4–16)
BASOPHILS ABSOLUTE: 0 K/CU MM
BASOPHILS RELATIVE PERCENT: 0.3 % (ref 0–1)
BUN BLDV-MCNC: 72 MG/DL (ref 6–23)
CALCIUM SERPL-MCNC: 10.3 MG/DL (ref 8.3–10.6)
CHLORIDE BLD-SCNC: 96 MMOL/L (ref 99–110)
CO2: 27 MMOL/L (ref 21–32)
CREAT SERPL-MCNC: 4.1 MG/DL (ref 0.6–1.1)
DIFFERENTIAL TYPE: ABNORMAL
EOSINOPHILS ABSOLUTE: 0.1 K/CU MM
EOSINOPHILS RELATIVE PERCENT: 1.4 % (ref 0–3)
GFR AFRICAN AMERICAN: 13 ML/MIN/1.73M2
GFR NON-AFRICAN AMERICAN: 11 ML/MIN/1.73M2
GLUCOSE BLD-MCNC: 116 MG/DL (ref 70–99)
GLUCOSE BLD-MCNC: 132 MG/DL (ref 70–99)
GLUCOSE BLD-MCNC: 139 MG/DL (ref 70–99)
GLUCOSE BLD-MCNC: 167 MG/DL (ref 70–99)
GLUCOSE BLD-MCNC: 95 MG/DL (ref 70–99)
HCT VFR BLD CALC: 37 % (ref 37–47)
HEMOGLOBIN: 11.7 GM/DL (ref 12.5–16)
IMMATURE NEUTROPHIL %: 0.4 % (ref 0–0.43)
LYMPHOCYTES ABSOLUTE: 1.4 K/CU MM
LYMPHOCYTES RELATIVE PERCENT: 14.1 % (ref 24–44)
MCH RBC QN AUTO: 29.4 PG (ref 27–31)
MCHC RBC AUTO-ENTMCNC: 31.6 % (ref 32–36)
MCV RBC AUTO: 93 FL (ref 78–100)
MONOCYTES ABSOLUTE: 1 K/CU MM
MONOCYTES RELATIVE PERCENT: 10.8 % (ref 0–4)
NUCLEATED RBC %: 0 %
PDW BLD-RTO: 13.9 % (ref 11.7–14.9)
PHOSPHORUS: 4 MG/DL (ref 2.5–4.9)
PLATELET # BLD: 204 K/CU MM (ref 140–440)
PMV BLD AUTO: 11.6 FL (ref 7.5–11.1)
POTASSIUM SERPL-SCNC: 3.8 MMOL/L (ref 3.5–5.1)
RBC # BLD: 3.98 M/CU MM (ref 4.2–5.4)
SEGMENTED NEUTROPHILS ABSOLUTE COUNT: 7 K/CU MM
SEGMENTED NEUTROPHILS RELATIVE PERCENT: 73 % (ref 36–66)
SODIUM BLD-SCNC: 138 MMOL/L (ref 135–145)
TOTAL IMMATURE NEUTOROPHIL: 0.04 K/CU MM
TOTAL NUCLEATED RBC: 0 K/CU MM
WBC # BLD: 9.6 K/CU MM (ref 4–10.5)

## 2019-07-19 PROCEDURE — 82962 GLUCOSE BLOOD TEST: CPT

## 2019-07-19 PROCEDURE — 94640 AIRWAY INHALATION TREATMENT: CPT

## 2019-07-19 PROCEDURE — 90670 PCV13 VACCINE IM: CPT | Performed by: INTERNAL MEDICINE

## 2019-07-19 PROCEDURE — 85025 COMPLETE CBC W/AUTO DIFF WBC: CPT

## 2019-07-19 PROCEDURE — G0009 ADMIN PNEUMOCOCCAL VACCINE: HCPCS | Performed by: INTERNAL MEDICINE

## 2019-07-19 PROCEDURE — 6360000002 HC RX W HCPCS: Performed by: INTERNAL MEDICINE

## 2019-07-19 PROCEDURE — 6370000000 HC RX 637 (ALT 250 FOR IP): Performed by: NURSE PRACTITIONER

## 2019-07-19 PROCEDURE — 6370000000 HC RX 637 (ALT 250 FOR IP): Performed by: INTERNAL MEDICINE

## 2019-07-19 PROCEDURE — 2580000003 HC RX 258: Performed by: INTERNAL MEDICINE

## 2019-07-19 PROCEDURE — 80069 RENAL FUNCTION PANEL: CPT

## 2019-07-19 PROCEDURE — 94761 N-INVAS EAR/PLS OXIMETRY MLT: CPT

## 2019-07-19 PROCEDURE — 6360000002 HC RX W HCPCS: Performed by: NURSE PRACTITIONER

## 2019-07-19 PROCEDURE — 1200000000 HC SEMI PRIVATE

## 2019-07-19 PROCEDURE — 36415 COLL VENOUS BLD VENIPUNCTURE: CPT

## 2019-07-19 PROCEDURE — 90935 HEMODIALYSIS ONE EVALUATION: CPT

## 2019-07-19 RX ORDER — KETOROLAC TROMETHAMINE 30 MG/ML
7.5 INJECTION, SOLUTION INTRAMUSCULAR; INTRAVENOUS ONCE
Status: COMPLETED | OUTPATIENT
Start: 2019-07-19 | End: 2019-07-19

## 2019-07-19 RX ORDER — ACETAMINOPHEN 325 MG/1
650 TABLET ORAL EVERY 4 HOURS PRN
Status: DISCONTINUED | OUTPATIENT
Start: 2019-07-19 | End: 2019-07-24 | Stop reason: HOSPADM

## 2019-07-19 RX ORDER — MIDODRINE HYDROCHLORIDE 5 MG/1
10 TABLET ORAL
Status: DISCONTINUED | OUTPATIENT
Start: 2019-07-19 | End: 2019-07-19

## 2019-07-19 RX ORDER — MIDODRINE HYDROCHLORIDE 5 MG/1
10 TABLET ORAL
Status: DISCONTINUED | OUTPATIENT
Start: 2019-07-19 | End: 2019-07-24 | Stop reason: HOSPADM

## 2019-07-19 RX ORDER — HEPARIN SODIUM 1000 [USP'U]/ML
3500 INJECTION, SOLUTION INTRAVENOUS; SUBCUTANEOUS
Status: COMPLETED | OUTPATIENT
Start: 2019-07-19 | End: 2019-07-19

## 2019-07-19 RX ADMIN — PANTOPRAZOLE SODIUM 40 MG: 40 TABLET, DELAYED RELEASE ORAL at 21:23

## 2019-07-19 RX ADMIN — GUAIFENESIN AND DEXTROMETHORPHAN HYDROBROMIDE 1 TABLET: 600; 30 TABLET, EXTENDED RELEASE ORAL at 21:23

## 2019-07-19 RX ADMIN — ALLOPURINOL 200 MG: 100 TABLET ORAL at 13:32

## 2019-07-19 RX ADMIN — Medication 1 PUFF: at 09:02

## 2019-07-19 RX ADMIN — METOCLOPRAMIDE HYDROCHLORIDE 5 MG: 5 TABLET ORAL at 18:49

## 2019-07-19 RX ADMIN — PNEUMOCOCCAL 13-VALENT CONJUGATE VACCINE 0.5 ML: 2.2; 2.2; 2.2; 2.2; 2.2; 4.4; 2.2; 2.2; 2.2; 2.2; 2.2; 2.2; 2.2 INJECTION, SUSPENSION INTRAMUSCULAR at 18:49

## 2019-07-19 RX ADMIN — RANOLAZINE 500 MG: 500 TABLET, FILM COATED, EXTENDED RELEASE ORAL at 21:23

## 2019-07-19 RX ADMIN — METOCLOPRAMIDE HYDROCHLORIDE 5 MG: 5 TABLET ORAL at 13:32

## 2019-07-19 RX ADMIN — LORAZEPAM 0.5 MG: 0.5 TABLET ORAL at 13:32

## 2019-07-19 RX ADMIN — CETIRIZINE HYDROCHLORIDE 10 MG: 10 TABLET, FILM COATED ORAL at 13:33

## 2019-07-19 RX ADMIN — HEPARIN SODIUM 3500 UNITS: 1000 INJECTION, SOLUTION INTRAVENOUS; SUBCUTANEOUS at 12:44

## 2019-07-19 RX ADMIN — PROMETHAZINE HYDROCHLORIDE 25 MG: 25 TABLET ORAL at 13:33

## 2019-07-19 RX ADMIN — BUPROPION HYDROCHLORIDE 300 MG: 150 TABLET, FILM COATED, EXTENDED RELEASE ORAL at 13:33

## 2019-07-19 RX ADMIN — HEPARIN SODIUM 5000 UNITS: 5000 INJECTION INTRAVENOUS; SUBCUTANEOUS at 14:43

## 2019-07-19 RX ADMIN — DOCUSATE SODIUM 100 MG: 100 CAPSULE, LIQUID FILLED ORAL at 13:31

## 2019-07-19 RX ADMIN — TRIAMCINOLONE ACETONIDE: 1 CREAM TOPICAL at 13:42

## 2019-07-19 RX ADMIN — ACETAMINOPHEN 650 MG: 325 TABLET ORAL at 03:24

## 2019-07-19 RX ADMIN — KETOROLAC TROMETHAMINE 7.5 MG: 30 INJECTION, SOLUTION INTRAMUSCULAR; INTRAVENOUS at 13:34

## 2019-07-19 RX ADMIN — PREDNISONE 5 MG: 5 TABLET ORAL at 13:33

## 2019-07-19 RX ADMIN — HEPARIN SODIUM 5000 UNITS: 5000 INJECTION INTRAVENOUS; SUBCUTANEOUS at 21:26

## 2019-07-19 RX ADMIN — HYDROXYCHLOROQUINE SULFATE 200 MG: 200 TABLET, FILM COATED ORAL at 21:22

## 2019-07-19 RX ADMIN — FLUOCINONIDE: 0.5 CREAM TOPICAL at 13:42

## 2019-07-19 RX ADMIN — ATORVASTATIN CALCIUM 80 MG: 40 TABLET, FILM COATED ORAL at 21:22

## 2019-07-19 RX ADMIN — FLUOCINONIDE: 0.5 CREAM TOPICAL at 21:20

## 2019-07-19 RX ADMIN — GUAIFENESIN AND DEXTROMETHORPHAN HYDROBROMIDE 1 TABLET: 600; 30 TABLET, EXTENDED RELEASE ORAL at 13:31

## 2019-07-19 RX ADMIN — HYDROXYCHLOROQUINE SULFATE 200 MG: 200 TABLET, FILM COATED ORAL at 13:33

## 2019-07-19 RX ADMIN — MIDODRINE HYDROCHLORIDE 10 MG: 5 TABLET ORAL at 14:38

## 2019-07-19 RX ADMIN — RANOLAZINE 500 MG: 500 TABLET, FILM COATED, EXTENDED RELEASE ORAL at 13:32

## 2019-07-19 RX ADMIN — MONTELUKAST 10 MG: 10 TABLET, FILM COATED ORAL at 21:23

## 2019-07-19 RX ADMIN — TRIAMCINOLONE ACETONIDE: 1 CREAM TOPICAL at 21:20

## 2019-07-19 RX ADMIN — LINAGLIPTIN 5 MG: 5 TABLET, FILM COATED ORAL at 13:31

## 2019-07-19 RX ADMIN — MIDODRINE HYDROCHLORIDE 10 MG: 5 TABLET ORAL at 18:49

## 2019-07-19 RX ADMIN — MIDODRINE HYDROCHLORIDE 5 MG: 5 TABLET ORAL at 09:18

## 2019-07-19 RX ADMIN — LEVOTHYROXINE SODIUM 100 MCG: 100 TABLET ORAL at 13:31

## 2019-07-19 RX ADMIN — POLYETHYLENE GLYCOL (3350) 17 G: 17 POWDER, FOR SOLUTION ORAL at 13:33

## 2019-07-19 RX ADMIN — Medication 10 ML: at 21:21

## 2019-07-19 RX ADMIN — POLYVINYL ALCOHOL 2 DROP: 14 SOLUTION/ DROPS OPHTHALMIC at 13:34

## 2019-07-19 RX ADMIN — PANTOPRAZOLE SODIUM 40 MG: 40 TABLET, DELAYED RELEASE ORAL at 13:32

## 2019-07-19 RX ADMIN — LORAZEPAM 0.5 MG: 0.5 TABLET ORAL at 21:23

## 2019-07-19 RX ADMIN — KETOCONAZOLE: 20 CREAM TOPICAL at 13:48

## 2019-07-19 RX ADMIN — HEPARIN SODIUM 5000 UNITS: 5000 INJECTION INTRAVENOUS; SUBCUTANEOUS at 06:44

## 2019-07-19 RX ADMIN — QUETIAPINE FUMARATE 300 MG: 150 TABLET, EXTENDED RELEASE ORAL at 21:22

## 2019-07-19 RX ADMIN — FLUTICASONE PROPIONATE 2 SPRAY: 50 SPRAY, METERED NASAL at 13:34

## 2019-07-19 RX ADMIN — MULTIPLE VITAMINS W/ MINERALS TAB 1 TABLET: TAB at 13:31

## 2019-07-19 RX ADMIN — INSULIN GLARGINE 10 UNITS: 100 INJECTION, SOLUTION SUBCUTANEOUS at 21:26

## 2019-07-19 RX ADMIN — FOLIC ACID 1 MG: 1 TABLET ORAL at 13:32

## 2019-07-19 RX ADMIN — LUBIPROSTONE 24 MCG: 24 CAPSULE, GELATIN COATED ORAL at 14:40

## 2019-07-19 RX ADMIN — LUBIPROSTONE 24 MCG: 24 CAPSULE, GELATIN COATED ORAL at 21:23

## 2019-07-19 RX ADMIN — BACITRACIN ZINC, NEOMYCIN SULFATE, POLYMYXIN B SULFATE 1 G: 3.5; 5000; 4 OINTMENT TOPICAL at 21:21

## 2019-07-19 RX ADMIN — INSULIN GLARGINE 10 UNITS: 100 INJECTION, SOLUTION SUBCUTANEOUS at 13:35

## 2019-07-19 RX ADMIN — CLOPIDOGREL BISULFATE 75 MG: 75 TABLET ORAL at 13:32

## 2019-07-19 RX ADMIN — DOCUSATE SODIUM 100 MG: 100 CAPSULE, LIQUID FILLED ORAL at 21:23

## 2019-07-19 ASSESSMENT — PAIN SCALES - GENERAL
PAINLEVEL_OUTOF10: 0
PAINLEVEL_OUTOF10: 0
PAINLEVEL_OUTOF10: 5
PAINLEVEL_OUTOF10: 4
PAINLEVEL_OUTOF10: 0

## 2019-07-19 ASSESSMENT — PAIN DESCRIPTION - FREQUENCY: FREQUENCY: CONTINUOUS

## 2019-07-19 ASSESSMENT — PAIN DESCRIPTION - PAIN TYPE: TYPE: ACUTE PAIN

## 2019-07-19 ASSESSMENT — PAIN DESCRIPTION - LOCATION: LOCATION: NECK

## 2019-07-19 ASSESSMENT — PAIN DESCRIPTION - ONSET: ONSET: AWAKENED FROM SLEEP

## 2019-07-19 ASSESSMENT — PAIN DESCRIPTION - PROGRESSION: CLINICAL_PROGRESSION: NOT CHANGED

## 2019-07-19 ASSESSMENT — PAIN DESCRIPTION - DESCRIPTORS: DESCRIPTORS: ACHING

## 2019-07-19 ASSESSMENT — PAIN DESCRIPTION - ORIENTATION: ORIENTATION: RIGHT

## 2019-07-19 ASSESSMENT — PAIN - FUNCTIONAL ASSESSMENT: PAIN_FUNCTIONAL_ASSESSMENT: ACTIVITIES ARE NOT PREVENTED

## 2019-07-20 LAB
ALBUMIN SERPL-MCNC: 3.4 GM/DL (ref 3.4–5)
ANION GAP SERPL CALCULATED.3IONS-SCNC: 9 MMOL/L (ref 4–16)
BASOPHILS ABSOLUTE: 0 K/CU MM
BASOPHILS RELATIVE PERCENT: 0.3 % (ref 0–1)
BUN BLDV-MCNC: 51 MG/DL (ref 6–23)
CALCIUM SERPL-MCNC: 10.6 MG/DL (ref 8.3–10.6)
CHLORIDE BLD-SCNC: 101 MMOL/L (ref 99–110)
CO2: 31 MMOL/L (ref 21–32)
CREAT SERPL-MCNC: 3.6 MG/DL (ref 0.6–1.1)
DIFFERENTIAL TYPE: ABNORMAL
EOSINOPHILS ABSOLUTE: 0.3 K/CU MM
EOSINOPHILS RELATIVE PERCENT: 3.5 % (ref 0–3)
GFR AFRICAN AMERICAN: 15 ML/MIN/1.73M2
GFR NON-AFRICAN AMERICAN: 13 ML/MIN/1.73M2
GLUCOSE BLD-MCNC: 171 MG/DL (ref 70–99)
GLUCOSE BLD-MCNC: 282 MG/DL (ref 70–99)
GLUCOSE BLD-MCNC: 79 MG/DL (ref 70–99)
GLUCOSE BLD-MCNC: 85 MG/DL (ref 70–99)
GLUCOSE BLD-MCNC: 87 MG/DL (ref 70–99)
HCT VFR BLD CALC: 33.3 % (ref 37–47)
HEMOGLOBIN: 10.4 GM/DL (ref 12.5–16)
IMMATURE NEUTROPHIL %: 0.3 % (ref 0–0.43)
LYMPHOCYTES ABSOLUTE: 1.5 K/CU MM
LYMPHOCYTES RELATIVE PERCENT: 17.3 % (ref 24–44)
MCH RBC QN AUTO: 29.2 PG (ref 27–31)
MCHC RBC AUTO-ENTMCNC: 31.2 % (ref 32–36)
MCV RBC AUTO: 93.5 FL (ref 78–100)
MONOCYTES ABSOLUTE: 1 K/CU MM
MONOCYTES RELATIVE PERCENT: 12.1 % (ref 0–4)
NUCLEATED RBC %: 0 %
PDW BLD-RTO: 14 % (ref 11.7–14.9)
PHOSPHORUS: 3.7 MG/DL (ref 2.5–4.9)
PLATELET # BLD: 192 K/CU MM (ref 140–440)
PMV BLD AUTO: 12 FL (ref 7.5–11.1)
POTASSIUM SERPL-SCNC: 4.1 MMOL/L (ref 3.5–5.1)
RBC # BLD: 3.56 M/CU MM (ref 4.2–5.4)
SEGMENTED NEUTROPHILS ABSOLUTE COUNT: 5.7 K/CU MM
SEGMENTED NEUTROPHILS RELATIVE PERCENT: 66.5 % (ref 36–66)
SODIUM BLD-SCNC: 141 MMOL/L (ref 135–145)
TOTAL IMMATURE NEUTOROPHIL: 0.03 K/CU MM
TOTAL NUCLEATED RBC: 0 K/CU MM
WBC # BLD: 8.6 K/CU MM (ref 4–10.5)

## 2019-07-20 PROCEDURE — 94761 N-INVAS EAR/PLS OXIMETRY MLT: CPT

## 2019-07-20 PROCEDURE — 6370000000 HC RX 637 (ALT 250 FOR IP): Performed by: NURSE PRACTITIONER

## 2019-07-20 PROCEDURE — 90935 HEMODIALYSIS ONE EVALUATION: CPT

## 2019-07-20 PROCEDURE — 6370000000 HC RX 637 (ALT 250 FOR IP): Performed by: INTERNAL MEDICINE

## 2019-07-20 PROCEDURE — 1200000000 HC SEMI PRIVATE

## 2019-07-20 PROCEDURE — 82962 GLUCOSE BLOOD TEST: CPT

## 2019-07-20 PROCEDURE — 85025 COMPLETE CBC W/AUTO DIFF WBC: CPT

## 2019-07-20 PROCEDURE — 6360000002 HC RX W HCPCS: Performed by: NURSE PRACTITIONER

## 2019-07-20 PROCEDURE — 80069 RENAL FUNCTION PANEL: CPT

## 2019-07-20 PROCEDURE — 36415 COLL VENOUS BLD VENIPUNCTURE: CPT

## 2019-07-20 PROCEDURE — 6360000002 HC RX W HCPCS: Performed by: INTERNAL MEDICINE

## 2019-07-20 RX ORDER — HEPARIN SODIUM 1000 [USP'U]/ML
3500 INJECTION, SOLUTION INTRAVENOUS; SUBCUTANEOUS
Status: COMPLETED | OUTPATIENT
Start: 2019-07-20 | End: 2019-07-20

## 2019-07-20 RX ORDER — DEXTROSE MONOHYDRATE 50 MG/ML
INJECTION, SOLUTION INTRAVENOUS CONTINUOUS
Status: DISCONTINUED | OUTPATIENT
Start: 2019-07-20 | End: 2019-07-20

## 2019-07-20 RX ADMIN — RANOLAZINE 500 MG: 500 TABLET, FILM COATED, EXTENDED RELEASE ORAL at 15:19

## 2019-07-20 RX ADMIN — BACITRACIN ZINC, NEOMYCIN SULFATE, POLYMYXIN B SULFATE 1 G: 3.5; 5000; 4 OINTMENT TOPICAL at 22:01

## 2019-07-20 RX ADMIN — HEPARIN SODIUM 3500 UNITS: 1000 INJECTION INTRAVENOUS; SUBCUTANEOUS at 13:10

## 2019-07-20 RX ADMIN — HEPARIN SODIUM 5000 UNITS: 5000 INJECTION INTRAVENOUS; SUBCUTANEOUS at 22:04

## 2019-07-20 RX ADMIN — LUBIPROSTONE 24 MCG: 24 CAPSULE, GELATIN COATED ORAL at 15:17

## 2019-07-20 RX ADMIN — LINAGLIPTIN 5 MG: 5 TABLET, FILM COATED ORAL at 15:15

## 2019-07-20 RX ADMIN — LORAZEPAM 0.5 MG: 0.5 TABLET ORAL at 15:13

## 2019-07-20 RX ADMIN — DOCUSATE SODIUM 100 MG: 100 CAPSULE, LIQUID FILLED ORAL at 21:59

## 2019-07-20 RX ADMIN — PREDNISONE 5 MG: 5 TABLET ORAL at 15:13

## 2019-07-20 RX ADMIN — CETIRIZINE HYDROCHLORIDE 10 MG: 10 TABLET, FILM COATED ORAL at 15:19

## 2019-07-20 RX ADMIN — INSULIN GLARGINE 10 UNITS: 100 INJECTION, SOLUTION SUBCUTANEOUS at 22:04

## 2019-07-20 RX ADMIN — INSULIN LISPRO 2 UNITS: 100 INJECTION, SOLUTION INTRAVENOUS; SUBCUTANEOUS at 22:05

## 2019-07-20 RX ADMIN — ALLOPURINOL 200 MG: 100 TABLET ORAL at 15:18

## 2019-07-20 RX ADMIN — METOCLOPRAMIDE HYDROCHLORIDE 5 MG: 5 TABLET ORAL at 15:18

## 2019-07-20 RX ADMIN — MIDODRINE HYDROCHLORIDE 10 MG: 5 TABLET ORAL at 09:27

## 2019-07-20 RX ADMIN — HEPARIN SODIUM 5000 UNITS: 5000 INJECTION INTRAVENOUS; SUBCUTANEOUS at 06:48

## 2019-07-20 RX ADMIN — MONTELUKAST 10 MG: 10 TABLET, FILM COATED ORAL at 22:01

## 2019-07-20 RX ADMIN — QUETIAPINE FUMARATE 300 MG: 150 TABLET, EXTENDED RELEASE ORAL at 21:59

## 2019-07-20 RX ADMIN — HEPARIN SODIUM 5000 UNITS: 5000 INJECTION INTRAVENOUS; SUBCUTANEOUS at 15:26

## 2019-07-20 RX ADMIN — CLOPIDOGREL BISULFATE 75 MG: 75 TABLET ORAL at 15:19

## 2019-07-20 RX ADMIN — MIDODRINE HYDROCHLORIDE 10 MG: 5 TABLET ORAL at 15:13

## 2019-07-20 RX ADMIN — RANOLAZINE 500 MG: 500 TABLET, FILM COATED, EXTENDED RELEASE ORAL at 22:00

## 2019-07-20 RX ADMIN — POLYVINYL ALCOHOL 2 DROP: 14 SOLUTION/ DROPS OPHTHALMIC at 15:24

## 2019-07-20 RX ADMIN — PANTOPRAZOLE SODIUM 40 MG: 40 TABLET, DELAYED RELEASE ORAL at 22:02

## 2019-07-20 RX ADMIN — ATORVASTATIN CALCIUM 80 MG: 40 TABLET, FILM COATED ORAL at 22:00

## 2019-07-20 RX ADMIN — TRIAMCINOLONE ACETONIDE: 1 CREAM TOPICAL at 21:58

## 2019-07-20 RX ADMIN — LEVOTHYROXINE SODIUM 100 MCG: 100 TABLET ORAL at 15:15

## 2019-07-20 RX ADMIN — LORAZEPAM 0.5 MG: 0.5 TABLET ORAL at 22:01

## 2019-07-20 RX ADMIN — MULTIPLE VITAMINS W/ MINERALS TAB 1 TABLET: TAB at 15:20

## 2019-07-20 RX ADMIN — HYDROXYCHLOROQUINE SULFATE 200 MG: 200 TABLET, FILM COATED ORAL at 15:13

## 2019-07-20 RX ADMIN — INSULIN LISPRO 1 UNITS: 100 INJECTION, SOLUTION INTRAVENOUS; SUBCUTANEOUS at 17:45

## 2019-07-20 RX ADMIN — PANTOPRAZOLE SODIUM 40 MG: 40 TABLET, DELAYED RELEASE ORAL at 15:19

## 2019-07-20 RX ADMIN — GUAIFENESIN AND DEXTROMETHORPHAN HYDROBROMIDE 1 TABLET: 600; 30 TABLET, EXTENDED RELEASE ORAL at 15:19

## 2019-07-20 RX ADMIN — LUBIPROSTONE 24 MCG: 24 CAPSULE, GELATIN COATED ORAL at 22:02

## 2019-07-20 RX ADMIN — PROMETHAZINE HYDROCHLORIDE 25 MG: 25 TABLET ORAL at 18:03

## 2019-07-20 RX ADMIN — GUAIFENESIN AND DEXTROMETHORPHAN HYDROBROMIDE 1 TABLET: 600; 30 TABLET, EXTENDED RELEASE ORAL at 22:00

## 2019-07-20 RX ADMIN — FLUTICASONE PROPIONATE 2 SPRAY: 50 SPRAY, METERED NASAL at 15:23

## 2019-07-20 RX ADMIN — BUPROPION HYDROCHLORIDE 300 MG: 150 TABLET, FILM COATED, EXTENDED RELEASE ORAL at 15:15

## 2019-07-20 RX ADMIN — HYDROXYCHLOROQUINE SULFATE 200 MG: 200 TABLET, FILM COATED ORAL at 22:02

## 2019-07-20 RX ADMIN — FOLIC ACID 1 MG: 1 TABLET ORAL at 15:20

## 2019-07-20 RX ADMIN — HYDROXYZINE HYDROCHLORIDE 25 MG: 25 TABLET, FILM COATED ORAL at 22:02

## 2019-07-20 ASSESSMENT — PAIN SCALES - GENERAL
PAINLEVEL_OUTOF10: 0
PAINLEVEL_OUTOF10: 3

## 2019-07-20 NOTE — PROGRESS NOTES
Hospitalist Progress Note      Name:  Ravin Estes /Age/Sex: 1950  (50 y.o. female)   MRN & CSN:  [id number] & [id number] Admission Date/Time: 2019  2:21 PM   Location:  5000/0700-W PCP: Jayesh Roy Day: 4    Assessment and Plan:   Ravin Estes is a 81 y.o.  female  who presents with ESRD needing dialysis ([facility] [address]    1. End Stage Renal Disease: Likely from DM nephropathy. K 4.1, Na 141, Ca 10.6. Nephrology on consult. S/P Hd. For HD today. For outpatient HD  2. Type 2 DM: Last A1C. Lantus, Sliding scale. Hypoglycemia protocol. Accucheck. Hold oral hypoglycemic agents for now   3. CAD: on Plavix, Statin, BB, Ranexa  4. Hypertension: Blood pressure controlled. Continue medications. 5. Hypothyroidism: last TSH. continue Synthroid. 6. Hyperlipidemia: continue statin   7. IBS: on Linaclotide  8. Obesity  9. YRIS: on CPAP  10. Deconditioning: for PT/OT    Diet DIET RENAL;   DVT Prophylaxis [] Lovenox, [x]  Heparin, [] SCDs, [] Warfarin  [] NOAC     GI Prophylaxis [x] PPI,  [] H2 Blocker,  [] Carafate,  [] Diet/Tube Feeds   Code Status Full Code   MDM [] Low, [x] Moderate,[]  High     History of Present Illness:     Chief Complaint: ESRD needing dialysis ([facility]    She was seen and examined. Feels weaker. No SOB. Still with leg pain. No chest pain. Ten point ROS reviewed negative, unless as noted above    Objective: Intake/Output Summary (Last 24 hours) at 2019 1301  Last data filed at 2019 2200  Gross per 24 hour   Intake 360 ml   Output --   Net 360 ml      Vitals:   Vitals:    19 1200   BP: 103/60   Pulse: 95   Resp:    Temp:    SpO2:      Physical Exam:   GEN Awake female, sitting upright in bed in no apparent distress. Appears given age. EYES Pupils are equally round. No scleral erythema, discharge, or conjunctivitis. HENT Mucous membranes are moist. Oral pharynx without exudates, no evidence of thrush.   NECK Supple, no apparent thyromegaly  ranolazine  500 mg Oral BID    QUEtiapine  300 mg Oral Nightly    polyethylene glycol  17 g Oral Daily    neomycin-bacitracin-polymyxin   Topical BID    therapeutic multivitamin-minerals  1 tablet Oral Daily    metoclopramide  5 mg Oral TID WC    Mirabegron ER  50 mg Oral Daily    triamcinolone   Topical TID      Infusions:    dextrose      sodium chloride 20 mL/hr at 07/19/19 1245    dextrose       PRN Meds:     acetaminophen 650 mg Q4H PRN   magnesium hydroxide 30 mL Daily PRN   ondansetron 4 mg Q6H PRN   sodium chloride flush 10 mL PRN   dextrose 100 mL/hr PRN   dextrose 12.5 g PRN   hydrOXYzine 25 mg 4x Daily PRN   ipratropium-albuterol 1 vial Q4H PRN   nitroGLYCERIN 0.4 mg Q5 Min PRN   glucose 15 g PRN   glucagon (rDNA) 1 mg PRN   albuterol sulfate HFA 2 puff Q6H PRN   promethazine 25 mg Q6H PRN       Recent Labs     07/18/19  0525 07/19/19  0307 07/20/19  0239   WBC 7.1 9.6 8.6   HGB 10.9* 11.7* 10.4*   HCT 33.6* 37.0 33.3*    204 192      Recent Labs     07/18/19  0525 07/19/19  0307 07/20/19  0239    138 141   K 3.1* 3.8 4.1   CL 91* 96* 101   CO2 33* 27 31   PHOS 4.3 4.0 3.7   * 72* 51*   CREATININE 5.4* 4.1* 3.6*     Recent Labs     07/17/19  1440   AST 26   ALT 32   BILITOT 0.2   ALKPHOS 137*     Recent Labs     07/18/19  0525   INR 0.98     Imaging reviewed    Electronically signed by Malena Stern MD on 7/20/2019 at 1:01 PM

## 2019-07-20 NOTE — PROGRESS NOTES
Renal Replacement Therapy note     Seen on RRT, tolerating ok. BP :acceptable  Access: Rt Ij TDC    Blood Flow :250 ml/min  Dialysate Flow :500 ml/min  UF- 1-2 kg   RRT order reviewed.     Israel Ivory   7/20/2019

## 2019-07-21 LAB
ALBUMIN SERPL-MCNC: 3.5 GM/DL (ref 3.4–5)
ANION GAP SERPL CALCULATED.3IONS-SCNC: 11 MMOL/L (ref 4–16)
BASOPHILS ABSOLUTE: 0 K/CU MM
BASOPHILS RELATIVE PERCENT: 0.4 % (ref 0–1)
BUN BLDV-MCNC: 33 MG/DL (ref 6–23)
CALCIUM SERPL-MCNC: 10.9 MG/DL (ref 8.3–10.6)
CHLORIDE BLD-SCNC: 101 MMOL/L (ref 99–110)
CO2: 30 MMOL/L (ref 21–32)
CREAT SERPL-MCNC: 3.4 MG/DL (ref 0.6–1.1)
DIFFERENTIAL TYPE: ABNORMAL
EOSINOPHILS ABSOLUTE: 0.3 K/CU MM
EOSINOPHILS RELATIVE PERCENT: 2.6 % (ref 0–3)
GFR AFRICAN AMERICAN: 16 ML/MIN/1.73M2
GFR NON-AFRICAN AMERICAN: 13 ML/MIN/1.73M2
GLUCOSE BLD-MCNC: 103 MG/DL (ref 70–99)
GLUCOSE BLD-MCNC: 139 MG/DL (ref 70–99)
GLUCOSE BLD-MCNC: 143 MG/DL (ref 70–99)
GLUCOSE BLD-MCNC: 144 MG/DL (ref 70–99)
GLUCOSE BLD-MCNC: 194 MG/DL (ref 70–99)
GLUCOSE BLD-MCNC: 221 MG/DL (ref 70–99)
HCT VFR BLD CALC: 33.7 % (ref 37–47)
HEMOGLOBIN: 10.4 GM/DL (ref 12.5–16)
IMMATURE NEUTROPHIL %: 0.4 % (ref 0–0.43)
LYMPHOCYTES ABSOLUTE: 1.3 K/CU MM
LYMPHOCYTES RELATIVE PERCENT: 11.9 % (ref 24–44)
MCH RBC QN AUTO: 29.2 PG (ref 27–31)
MCHC RBC AUTO-ENTMCNC: 30.9 % (ref 32–36)
MCV RBC AUTO: 94.7 FL (ref 78–100)
MONOCYTES ABSOLUTE: 1.2 K/CU MM
MONOCYTES RELATIVE PERCENT: 10.9 % (ref 0–4)
NUCLEATED RBC %: 0 %
PDW BLD-RTO: 14 % (ref 11.7–14.9)
PHOSPHORUS: 3.5 MG/DL (ref 2.5–4.9)
PLATELET # BLD: 189 K/CU MM (ref 140–440)
PMV BLD AUTO: 11.3 FL (ref 7.5–11.1)
POTASSIUM SERPL-SCNC: 4.1 MMOL/L (ref 3.5–5.1)
RBC # BLD: 3.56 M/CU MM (ref 4.2–5.4)
SEGMENTED NEUTROPHILS ABSOLUTE COUNT: 8.2 K/CU MM
SEGMENTED NEUTROPHILS RELATIVE PERCENT: 73.8 % (ref 36–66)
SODIUM BLD-SCNC: 142 MMOL/L (ref 135–145)
TOTAL IMMATURE NEUTOROPHIL: 0.05 K/CU MM
TOTAL NUCLEATED RBC: 0 K/CU MM
WBC # BLD: 11.2 K/CU MM (ref 4–10.5)

## 2019-07-21 PROCEDURE — 94640 AIRWAY INHALATION TREATMENT: CPT

## 2019-07-21 PROCEDURE — 1200000000 HC SEMI PRIVATE

## 2019-07-21 PROCEDURE — 82962 GLUCOSE BLOOD TEST: CPT

## 2019-07-21 PROCEDURE — 6370000000 HC RX 637 (ALT 250 FOR IP): Performed by: NURSE PRACTITIONER

## 2019-07-21 PROCEDURE — 6370000000 HC RX 637 (ALT 250 FOR IP): Performed by: INTERNAL MEDICINE

## 2019-07-21 PROCEDURE — 36415 COLL VENOUS BLD VENIPUNCTURE: CPT

## 2019-07-21 PROCEDURE — 80069 RENAL FUNCTION PANEL: CPT

## 2019-07-21 PROCEDURE — 94761 N-INVAS EAR/PLS OXIMETRY MLT: CPT

## 2019-07-21 PROCEDURE — 85025 COMPLETE CBC W/AUTO DIFF WBC: CPT

## 2019-07-21 PROCEDURE — 6360000002 HC RX W HCPCS: Performed by: NURSE PRACTITIONER

## 2019-07-21 RX ADMIN — DOCUSATE SODIUM 100 MG: 100 CAPSULE, LIQUID FILLED ORAL at 21:03

## 2019-07-21 RX ADMIN — ATORVASTATIN CALCIUM 80 MG: 40 TABLET, FILM COATED ORAL at 21:03

## 2019-07-21 RX ADMIN — HYDROXYZINE HYDROCHLORIDE 25 MG: 25 TABLET, FILM COATED ORAL at 12:14

## 2019-07-21 RX ADMIN — DOCUSATE SODIUM 100 MG: 100 CAPSULE, LIQUID FILLED ORAL at 10:04

## 2019-07-21 RX ADMIN — HYDROXYCHLOROQUINE SULFATE 200 MG: 200 TABLET, FILM COATED ORAL at 10:04

## 2019-07-21 RX ADMIN — INSULIN GLARGINE 10 UNITS: 100 INJECTION, SOLUTION SUBCUTANEOUS at 21:10

## 2019-07-21 RX ADMIN — LUBIPROSTONE 24 MCG: 24 CAPSULE, GELATIN COATED ORAL at 21:05

## 2019-07-21 RX ADMIN — LORAZEPAM 0.5 MG: 0.5 TABLET ORAL at 21:05

## 2019-07-21 RX ADMIN — BUPROPION HYDROCHLORIDE 300 MG: 150 TABLET, FILM COATED, EXTENDED RELEASE ORAL at 10:02

## 2019-07-21 RX ADMIN — PROMETHAZINE HYDROCHLORIDE 25 MG: 25 TABLET ORAL at 10:11

## 2019-07-21 RX ADMIN — BACITRACIN ZINC, NEOMYCIN SULFATE, POLYMYXIN B SULFATE: 3.5; 5000; 4 OINTMENT TOPICAL at 11:17

## 2019-07-21 RX ADMIN — RANOLAZINE 500 MG: 500 TABLET, FILM COATED, EXTENDED RELEASE ORAL at 10:03

## 2019-07-21 RX ADMIN — FLUOCINONIDE: 0.5 CREAM TOPICAL at 21:14

## 2019-07-21 RX ADMIN — CLOPIDOGREL BISULFATE 75 MG: 75 TABLET ORAL at 10:03

## 2019-07-21 RX ADMIN — METOCLOPRAMIDE HYDROCHLORIDE 5 MG: 5 TABLET ORAL at 10:04

## 2019-07-21 RX ADMIN — HYDROXYZINE HYDROCHLORIDE 25 MG: 25 TABLET, FILM COATED ORAL at 17:54

## 2019-07-21 RX ADMIN — PANTOPRAZOLE SODIUM 40 MG: 40 TABLET, DELAYED RELEASE ORAL at 21:05

## 2019-07-21 RX ADMIN — MIDODRINE HYDROCHLORIDE 10 MG: 5 TABLET ORAL at 17:49

## 2019-07-21 RX ADMIN — POLYVINYL ALCOHOL 2 DROP: 14 SOLUTION/ DROPS OPHTHALMIC at 10:06

## 2019-07-21 RX ADMIN — FLUOCINONIDE: 0.5 CREAM TOPICAL at 10:02

## 2019-07-21 RX ADMIN — POLYVINYL ALCOHOL 2 DROP: 14 SOLUTION/ DROPS OPHTHALMIC at 21:06

## 2019-07-21 RX ADMIN — HEPARIN SODIUM 5000 UNITS: 5000 INJECTION INTRAVENOUS; SUBCUTANEOUS at 06:34

## 2019-07-21 RX ADMIN — FOLIC ACID 1 MG: 1 TABLET ORAL at 10:04

## 2019-07-21 RX ADMIN — MULTIPLE VITAMINS W/ MINERALS TAB 1 TABLET: TAB at 10:04

## 2019-07-21 RX ADMIN — INSULIN LISPRO 1 UNITS: 100 INJECTION, SOLUTION INTRAVENOUS; SUBCUTANEOUS at 14:39

## 2019-07-21 RX ADMIN — QUETIAPINE FUMARATE 300 MG: 150 TABLET, EXTENDED RELEASE ORAL at 21:05

## 2019-07-21 RX ADMIN — LUBIPROSTONE 24 MCG: 24 CAPSULE, GELATIN COATED ORAL at 10:03

## 2019-07-21 RX ADMIN — CETIRIZINE HYDROCHLORIDE 10 MG: 10 TABLET, FILM COATED ORAL at 10:04

## 2019-07-21 RX ADMIN — HEPARIN SODIUM 5000 UNITS: 5000 INJECTION INTRAVENOUS; SUBCUTANEOUS at 14:28

## 2019-07-21 RX ADMIN — PREDNISONE 5 MG: 5 TABLET ORAL at 10:03

## 2019-07-21 RX ADMIN — GUAIFENESIN AND DEXTROMETHORPHAN HYDROBROMIDE 1 TABLET: 600; 30 TABLET, EXTENDED RELEASE ORAL at 21:03

## 2019-07-21 RX ADMIN — BACITRACIN ZINC, NEOMYCIN SULFATE, POLYMYXIN B SULFATE: 3.5; 5000; 4 OINTMENT TOPICAL at 21:06

## 2019-07-21 RX ADMIN — GUAIFENESIN AND DEXTROMETHORPHAN HYDROBROMIDE 1 TABLET: 600; 30 TABLET, EXTENDED RELEASE ORAL at 10:04

## 2019-07-21 RX ADMIN — PANTOPRAZOLE SODIUM 40 MG: 40 TABLET, DELAYED RELEASE ORAL at 10:03

## 2019-07-21 RX ADMIN — MONTELUKAST 10 MG: 10 TABLET, FILM COATED ORAL at 21:05

## 2019-07-21 RX ADMIN — METOCLOPRAMIDE HYDROCHLORIDE 5 MG: 5 TABLET ORAL at 14:28

## 2019-07-21 RX ADMIN — RANOLAZINE 500 MG: 500 TABLET, FILM COATED, EXTENDED RELEASE ORAL at 21:05

## 2019-07-21 RX ADMIN — LINAGLIPTIN 5 MG: 5 TABLET, FILM COATED ORAL at 10:03

## 2019-07-21 RX ADMIN — ALLOPURINOL 200 MG: 100 TABLET ORAL at 10:04

## 2019-07-21 RX ADMIN — METOCLOPRAMIDE HYDROCHLORIDE 5 MG: 5 TABLET ORAL at 17:49

## 2019-07-21 RX ADMIN — LORAZEPAM 0.5 MG: 0.5 TABLET ORAL at 10:03

## 2019-07-21 RX ADMIN — Medication 1 PUFF: at 07:33

## 2019-07-21 RX ADMIN — FLUTICASONE PROPIONATE 2 SPRAY: 50 SPRAY, METERED NASAL at 10:06

## 2019-07-21 RX ADMIN — LEVOTHYROXINE SODIUM 100 MCG: 100 TABLET ORAL at 10:04

## 2019-07-21 RX ADMIN — HEPARIN SODIUM 5000 UNITS: 5000 INJECTION INTRAVENOUS; SUBCUTANEOUS at 21:10

## 2019-07-21 RX ADMIN — MIDODRINE HYDROCHLORIDE 10 MG: 5 TABLET ORAL at 14:28

## 2019-07-21 RX ADMIN — INSULIN LISPRO 1 UNITS: 100 INJECTION, SOLUTION INTRAVENOUS; SUBCUTANEOUS at 21:09

## 2019-07-21 RX ADMIN — MIDODRINE HYDROCHLORIDE 10 MG: 5 TABLET ORAL at 10:03

## 2019-07-21 RX ADMIN — HYDROXYCHLOROQUINE SULFATE 200 MG: 200 TABLET, FILM COATED ORAL at 21:04

## 2019-07-21 RX ADMIN — INSULIN GLARGINE 10 UNITS: 100 INJECTION, SOLUTION SUBCUTANEOUS at 10:15

## 2019-07-21 ASSESSMENT — PAIN SCALES - GENERAL
PAINLEVEL_OUTOF10: 0

## 2019-07-21 NOTE — PROGRESS NOTES
Nephrology Progress Note  7/21/2019 12:32 PM        Subjective:   Admit Date: 7/17/2019  PCP: Danny Roman    Interval History: chest pressure during HD yesterday per pt - she only told me today    Diet: better    ROS:  Leg pain better , no cp/ sob now     Data:     Current med's:    midodrine  10 mg Oral TID WC    predniSONE  5 mg Oral Daily    sodium chloride flush  10 mL Intravenous 2 times per day    allopurinol  200 mg Oral Daily    clopidogrel  75 mg Oral Daily    hydroxychloroquine  200 mg Oral BID    insulin lispro  0-6 Units Subcutaneous TID WC    insulin lispro  0-3 Units Subcutaneous Nightly    levomilnacipran  20 mg Oral Nightly    linaclotide  290 mcg Oral QAM AC    linagliptin  5 mg Oral Daily    LORazepam  0.5 mg Oral BID    montelukast  10 mg Oral Nightly    pantoprazole  40 mg Oral BID    docusate sodium  100 mg Oral BID    heparin (porcine)  5,000 Units Subcutaneous 3 times per day    atorvastatin  80 mg Oral Nightly    buPROPion  300 mg Oral QAM    cetirizine  10 mg Oral Daily    dextromethorphan-guaiFENesin  1 tablet Oral BID    erythromycin with ethanol   Topical Daily    fluocinonide   Topical BID    fluticasone  2 spray Nasal QAM    fluticasone  1 puff Inhalation Daily    folic acid  1 mg Oral QAM    ketoconazole   Topical Daily    insulin glargine  10 Units Subcutaneous BID    levothyroxine  100 mcg Oral Daily    lubiprostone  24 mcg Oral BID    polyvinyl alcohol  2 drop Both Eyes BID    ranolazine  500 mg Oral BID    QUEtiapine  300 mg Oral Nightly    polyethylene glycol  17 g Oral Daily    neomycin-bacitracin-polymyxin   Topical BID    therapeutic multivitamin-minerals  1 tablet Oral Daily    metoclopramide  5 mg Oral TID WC    Mirabegron ER  50 mg Oral Daily    triamcinolone   Topical TID      dextrose           I/O last 3 completed shifts:   In: 980 [P.O.:480]  Out: 1133     CBC:   Recent Labs     07/19/19  0307 07/20/19  0239 07/21/19  0410 WBC 9.6 8.6 11.2*   HGB 11.7* 10.4* 10.4*    192 189          Recent Labs     07/19/19  0307 07/20/19  0239 07/21/19  0410    141 142   K 3.8 4.1 4.1   CL 96* 101 101   CO2 27 31 30   BUN 72* 51* 33*   CREATININE 4.1* 3.6* 3.4*   GLUCOSE 167* 85 139*       Lab Results   Component Value Date    CALCIUM 10.9 (H) 07/21/2019    PHOS 3.5 07/21/2019       Objective:     Vitals: BP 96/75   Pulse 102   Temp 98.6 °F (37 °C) (Oral)   Resp 16   Ht 5' 4\" (1.626 m)   Wt 242 lb 1 oz (109.8 kg)   SpO2 98%   BMI 41.55 kg/m²     General appearance:  No distress, semi cushingoid face   HEENT:  + pallor  Neck:  supple  Lungs:  CTA  Heart:  Seems RRr  Abdomen: soft  Extremities:  No overt edema       Problem List :         Impression :     1. Progressive CKD now ESRD on RRT  Sec to HTN/DM  2. DM- BP lower side   3. Intra dialytic chest pressure- watch tomorrow   4. multiple comorbid d z    Recommendation/Plan  :     1. She is waiting for ECF  2. HD in am see how  she does  3. lower dialysate temp tomorrow  4. Follow clinically  5.  Good glycemic control       Lashae Marshall MD

## 2019-07-21 NOTE — PLAN OF CARE
Problem: Falls - Risk of:  Goal: Will remain free from falls  Description  Will remain free from falls  7/21/2019 1055 by Viviana Varela RN  Outcome: Ongoing  7/21/2019 0700 by Viviana Varela RN  Outcome: Not Met This Shift  7/21/2019 0659 by Viviana Varela RN  Outcome: Ongoing  Goal: Absence of physical injury  Description  Absence of physical injury  7/21/2019 1055 by Viviana Varela RN  Outcome: Ongoing  7/21/2019 0700 by Viviana Varela RN  Outcome: Not Met This Shift  7/21/2019 0659 by Viviana Varela RN  Outcome: Ongoing     Problem: Tissue Perfusion - Renal, Altered:  Goal: Electrolytes within specified parameters  Description  Electrolytes within specified parameters  7/21/2019 1055 by Viviana Varela RN  Outcome: Ongoing  7/21/2019 0700 by Viviana Varela RN  Outcome: Not Met This Shift  7/21/2019 0659 by Viviana Varela RN  Outcome: Ongoing  Goal: Urine creatinine clearance will be within specified parameters  Description  Urine creatinine clearance will be within specified parameters  7/21/2019 1055 by Viviana Varela RN  Outcome: Ongoing  7/21/2019 0700 by Viviana Varela RN  Outcome: Not Met This Shift  7/21/2019 0659 by Viviana Varela RN  Outcome: Ongoing  Goal: Serum creatinine will be within specified parameters  Description  Serum creatinine will be within specified parameters  7/21/2019 1055 by Viviana Varela RN  Outcome: Ongoing  7/21/2019 0700 by Viviana Varela RN  Outcome: Not Met This Shift  7/21/2019 0659 by Viviana Varela RN  Outcome: Ongoing  Goal: Ability to achieve a balanced intake and output will improve  Description  Ability to achieve a balanced intake and output will improve  7/21/2019 1055 by Viviana Varela RN  Outcome: Ongoing  7/21/2019 0700 by Viviana Varela RN  Outcome: Not Met This Shift  7/21/2019 0659 by Viviaan Varela RN  Outcome: Ongoing     Problem:  Activity:  Goal: Fatigue will decrease  Description  Fatigue will decrease  7/21/2019 1055 by Viviana Varela RN  Outcome: Ongoing  7/21/2019 0700 by Lexa Mcdonnell RN  Outcome: Not Met This Shift  7/21/2019 0659 by Lexa Mcdonnell RN  Outcome: Ongoing     Problem: Physical Regulation:  Goal: Ability to maintain clinical measurements within normal limits will improve  Description  Ability to maintain clinical measurements within normal limits will improve  7/21/2019 1055 by Lexa Mcdonnell RN  Outcome: Ongoing  7/21/2019 0700 by Lexa Mcdonnell RN  Outcome: Not Met This Shift  7/21/2019 0659 by Lexa Mcdonnell RN  Outcome: Ongoing  Goal: Complications related to the disease process, condition or treatment will be avoided or minimized  Description  Complications related to the disease process, condition or treatment will be avoided or minimized  7/21/2019 1055 by Lexa Mcdonnell RN  Outcome: Ongoing  7/21/2019 0700 by Lexa Mcdonnell RN  Outcome: Not Met This Shift  7/21/2019 0659 by Lexa Mcdonnell RN  Outcome: Ongoing     Problem: Infection:  Goal: Will remain free from infection  Description  Will remain free from infection  7/21/2019 1055 by Lexa Mcdonnell RN  Outcome: Ongoing  7/21/2019 0700 by Lexa Mcdonnell RN  Outcome: Not Met This Shift  7/21/2019 0659 by Lexa Mcdonnell RN  Outcome: Ongoing     Problem: Daily Care:  Goal: Daily care needs are met  Description  Daily care needs are met  7/21/2019 1055 by Lexa Mcdonnell RN  Outcome: Ongoing  7/21/2019 0700 by Lexa Mcdonnell RN  Outcome: Not Met This Shift  7/21/2019 0659 by Lexa Mcdonnell RN  Outcome: Ongoing     Problem: Pain:  Goal: Patient's pain/discomfort is manageable  Description  Patient's pain/discomfort is manageable  7/21/2019 1055 by Lexa Mcdonnell RN  Outcome: Ongoing  7/21/2019 0700 by Lexa Mcdonnell RN  Outcome: Not Met This Shift  7/21/2019 0659 by Lexa Mcdonnell RN  Outcome: Ongoing  Goal: Pain level will decrease  Description  Pain level will decrease  7/21/2019 1055 by Lexa Mcdonnell RN  Outcome: Ongoing  7/21/2019 0700 by Lexa Mcdonnell RN  Outcome: Not Met This

## 2019-07-22 LAB
GLUCOSE BLD-MCNC: 102 MG/DL (ref 70–99)
GLUCOSE BLD-MCNC: 182 MG/DL (ref 70–99)
GLUCOSE BLD-MCNC: 246 MG/DL (ref 70–99)

## 2019-07-22 PROCEDURE — 97530 THERAPEUTIC ACTIVITIES: CPT

## 2019-07-22 PROCEDURE — 97166 OT EVAL MOD COMPLEX 45 MIN: CPT

## 2019-07-22 PROCEDURE — 1200000000 HC SEMI PRIVATE

## 2019-07-22 PROCEDURE — 6360000002 HC RX W HCPCS: Performed by: INTERNAL MEDICINE

## 2019-07-22 PROCEDURE — 90935 HEMODIALYSIS ONE EVALUATION: CPT

## 2019-07-22 PROCEDURE — 82962 GLUCOSE BLOOD TEST: CPT

## 2019-07-22 PROCEDURE — 6370000000 HC RX 637 (ALT 250 FOR IP): Performed by: NURSE PRACTITIONER

## 2019-07-22 PROCEDURE — 94761 N-INVAS EAR/PLS OXIMETRY MLT: CPT

## 2019-07-22 PROCEDURE — 6370000000 HC RX 637 (ALT 250 FOR IP): Performed by: INTERNAL MEDICINE

## 2019-07-22 PROCEDURE — 6360000002 HC RX W HCPCS: Performed by: NURSE PRACTITIONER

## 2019-07-22 RX ORDER — ALLOPURINOL 100 MG/1
200 TABLET ORAL DAILY
Qty: 30 TABLET | Refills: 3 | Status: SHIPPED | OUTPATIENT
Start: 2019-07-23

## 2019-07-22 RX ORDER — PREDNISONE 1 MG/1
5 TABLET ORAL DAILY
Qty: 10 TABLET | Refills: 0 | Status: SHIPPED | OUTPATIENT
Start: 2019-07-23 | End: 2019-08-02

## 2019-07-22 RX ORDER — MIDODRINE HYDROCHLORIDE 10 MG/1
10 TABLET ORAL
Qty: 90 TABLET | Refills: 3 | Status: SHIPPED | OUTPATIENT
Start: 2019-07-22 | End: 2020-03-13 | Stop reason: SDUPTHER

## 2019-07-22 RX ORDER — CLOPIDOGREL BISULFATE 75 MG/1
75 TABLET ORAL DAILY
Qty: 30 TABLET | Refills: 0 | Status: SHIPPED | OUTPATIENT
Start: 2019-07-22 | End: 2021-06-09 | Stop reason: ALTCHOICE

## 2019-07-22 RX ORDER — PSEUDOEPHEDRINE HCL 30 MG
100 TABLET ORAL 2 TIMES DAILY
Qty: 30 CAPSULE | Refills: 0 | Status: SHIPPED | OUTPATIENT
Start: 2019-07-22 | End: 2019-07-24 | Stop reason: HOSPADM

## 2019-07-22 RX ORDER — KETOCONAZOLE 20 MG/ML
SHAMPOO TOPICAL
Status: DISCONTINUED | OUTPATIENT
Start: 2019-07-22 | End: 2019-07-24 | Stop reason: HOSPADM

## 2019-07-22 RX ORDER — KETOCONAZOLE 20 MG/ML
SHAMPOO TOPICAL
Qty: 1 BOTTLE | Refills: 0 | Status: SHIPPED | OUTPATIENT
Start: 2019-07-22

## 2019-07-22 RX ORDER — HEPARIN SODIUM 1000 [USP'U]/ML
3500 INJECTION, SOLUTION INTRAVENOUS; SUBCUTANEOUS
Status: COMPLETED | OUTPATIENT
Start: 2019-07-22 | End: 2019-07-22

## 2019-07-22 RX ADMIN — HYDROXYZINE HYDROCHLORIDE 25 MG: 25 TABLET, FILM COATED ORAL at 00:30

## 2019-07-22 RX ADMIN — MONTELUKAST 10 MG: 10 TABLET, FILM COATED ORAL at 21:11

## 2019-07-22 RX ADMIN — PANTOPRAZOLE SODIUM 40 MG: 40 TABLET, DELAYED RELEASE ORAL at 21:11

## 2019-07-22 RX ADMIN — DOCUSATE SODIUM 100 MG: 100 CAPSULE, LIQUID FILLED ORAL at 21:12

## 2019-07-22 RX ADMIN — LORAZEPAM 0.5 MG: 0.5 TABLET ORAL at 21:12

## 2019-07-22 RX ADMIN — BACITRACIN ZINC, NEOMYCIN SULFATE, POLYMYXIN B SULFATE: 3.5; 5000; 4 OINTMENT TOPICAL at 21:12

## 2019-07-22 RX ADMIN — HYDROXYZINE HYDROCHLORIDE 25 MG: 25 TABLET, FILM COATED ORAL at 21:12

## 2019-07-22 RX ADMIN — LORAZEPAM 0.5 MG: 0.5 TABLET ORAL at 09:51

## 2019-07-22 RX ADMIN — IRON SUCROSE 50 MG: 20 INJECTION, SOLUTION INTRAVENOUS at 16:18

## 2019-07-22 RX ADMIN — POLYVINYL ALCOHOL 2 DROP: 14 SOLUTION/ DROPS OPHTHALMIC at 21:22

## 2019-07-22 RX ADMIN — GUAIFENESIN AND DEXTROMETHORPHAN HYDROBROMIDE 1 TABLET: 600; 30 TABLET, EXTENDED RELEASE ORAL at 21:11

## 2019-07-22 RX ADMIN — RANOLAZINE 500 MG: 500 TABLET, FILM COATED, EXTENDED RELEASE ORAL at 21:11

## 2019-07-22 RX ADMIN — INSULIN GLARGINE 10 UNITS: 100 INJECTION, SOLUTION SUBCUTANEOUS at 09:51

## 2019-07-22 RX ADMIN — POLYVINYL ALCOHOL 2 DROP: 14 SOLUTION/ DROPS OPHTHALMIC at 09:57

## 2019-07-22 RX ADMIN — HEPARIN SODIUM 3500 UNITS: 1000 INJECTION INTRAVENOUS; SUBCUTANEOUS at 17:12

## 2019-07-22 RX ADMIN — HYDROXYZINE HYDROCHLORIDE 25 MG: 25 TABLET, FILM COATED ORAL at 10:05

## 2019-07-22 RX ADMIN — FLUTICASONE PROPIONATE 2 SPRAY: 50 SPRAY, METERED NASAL at 09:57

## 2019-07-22 RX ADMIN — MIDODRINE HYDROCHLORIDE 10 MG: 5 TABLET ORAL at 11:49

## 2019-07-22 RX ADMIN — INSULIN LISPRO 1 UNITS: 100 INJECTION, SOLUTION INTRAVENOUS; SUBCUTANEOUS at 11:32

## 2019-07-22 RX ADMIN — METOCLOPRAMIDE HYDROCHLORIDE 5 MG: 5 TABLET ORAL at 11:49

## 2019-07-22 RX ADMIN — HYDROXYCHLOROQUINE SULFATE 200 MG: 200 TABLET, FILM COATED ORAL at 21:11

## 2019-07-22 RX ADMIN — HEPARIN SODIUM 5000 UNITS: 5000 INJECTION INTRAVENOUS; SUBCUTANEOUS at 06:56

## 2019-07-22 RX ADMIN — ATORVASTATIN CALCIUM 80 MG: 40 TABLET, FILM COATED ORAL at 21:12

## 2019-07-22 RX ADMIN — INSULIN LISPRO 1 UNITS: 100 INJECTION, SOLUTION INTRAVENOUS; SUBCUTANEOUS at 21:16

## 2019-07-22 RX ADMIN — MIDODRINE HYDROCHLORIDE 10 MG: 5 TABLET ORAL at 09:51

## 2019-07-22 RX ADMIN — KETOCONAZOLE: 20 SHAMPOO TOPICAL at 21:13

## 2019-07-22 RX ADMIN — ACETAMINOPHEN 650 MG: 325 TABLET ORAL at 00:30

## 2019-07-22 RX ADMIN — INSULIN GLARGINE 10 UNITS: 100 INJECTION, SOLUTION SUBCUTANEOUS at 21:15

## 2019-07-22 RX ADMIN — EPOETIN ALFA-EPBX 2000 UNITS: 2000 INJECTION, SOLUTION INTRAVENOUS; SUBCUTANEOUS at 16:19

## 2019-07-22 RX ADMIN — LUBIPROSTONE 24 MCG: 24 CAPSULE, GELATIN COATED ORAL at 21:11

## 2019-07-22 RX ADMIN — HEPARIN SODIUM 5000 UNITS: 5000 INJECTION INTRAVENOUS; SUBCUTANEOUS at 21:16

## 2019-07-22 RX ADMIN — EPOETIN ALFA-EPBX 3000 UNITS: 3000 INJECTION, SOLUTION INTRAVENOUS; SUBCUTANEOUS at 16:18

## 2019-07-22 RX ADMIN — QUETIAPINE FUMARATE 300 MG: 150 TABLET, EXTENDED RELEASE ORAL at 21:12

## 2019-07-22 ASSESSMENT — PAIN SCALES - GENERAL
PAINLEVEL_OUTOF10: 0

## 2019-07-22 NOTE — DISCHARGE SUMMARY
range of BS.  BS< 150 gives herself 10 units >150 gives herself 20 units             levomilnacipran (FETZIMA) 40 MG CP24 extended release capsule  Take 1 capsule by mouth daily             levothyroxine (SYNTHROID) 100 MCG tablet  Take 100 mcg by mouth daily             linaclotide (LINZESS) 290 MCG CAPS capsule  Take 290 mcg by mouth every morning (before breakfast)             LORazepam (ATIVAN) 0.5 MG tablet  Take 0.5 mg by mouth 2 times daily.              lubiprostone (AMITIZA) 24 MCG capsule  Take 24 mcg by mouth 2 times daily             metoclopramide (REGLAN) 10 MG tablet  Take 1 tablet by mouth 3 times daily (with meals)             metolazone (ZAROXOLYN) 2.5 MG tablet  Take 1 tablet by mouth daily             Mirabegron ER (MYRBETRIQ) 50 MG TB24  Take 50 mg by mouth daily             Multiple Vitamin (DAILY-NICOLE) TABS  TAKE ONE TABLET BY MOUTH DAILY             Multiple Vitamin (MVI, BARIATRIC ADVANTAGE MULTI-FORMULA, CHEW TAB)  Take 1 tablet by mouth daily             Nebulizer MISC  Inhale into the lungs as needed             nitroGLYCERIN (NITROSTAT) 0.4 MG SL tablet  Place 0.4 mg under the tongue every 5 minutes as needed for Chest pain             pantoprazole (PROTONIX) 40 MG tablet  Take 1 tablet by mouth 2 times daily             polyethylene glycol (GLYCOLAX) powder  Take 17 g by mouth daily             Polysaccharide Iron Complex (PROFE) 391.3 (180 Fe) MG CAPS  Take by mouth every morning             potassium chloride (KLOR-CON M) 20 MEQ extended release tablet  Take 1 tablet by mouth daily Needs to take 10 mEq tabs not 20 mEq unable to swallow             predniSONE (DELTASONE) 5 MG tablet  Take 5 mg by mouth 2 times daily             promethazine (PHENERGAN) 25 MG tablet  Take 25 mg by mouth every 6 hours as needed for Nausea             QUEtiapine (SEROQUEL XR) 300 MG extended release tablet  Take 300 mg by mouth nightly             ranolazine (RANEXA) 500 MG extended release tablet  Take

## 2019-07-22 NOTE — PROGRESS NOTES
10.4*    189          Recent Labs     07/20/19  0239 07/21/19  0410    142   K 4.1 4.1    101   CO2 31 30   BUN 51* 33*   CREATININE 3.6* 3.4*   GLUCOSE 85 139*       Lab Results   Component Value Date    CALCIUM 10.9 (H) 07/21/2019    PHOS 3.5 07/21/2019       Objective:     Vitals: /63   Pulse 103   Temp 98.3 °F (36.8 °C)   Resp 16   Ht 5' 4\" (1.626 m)   Wt 242 lb 1 oz (109.8 kg)   SpO2 97%   BMI 41.55 kg/m²     General appearance:  No distress  HEENT:  + pallor  Neck:  Supple/ cushingoid face   Lungs:  No gross crackles  Heart:  Seems irregular  Abdomen: soft  Extremities:  No overt edema       Problem List :         Impression :     1. ESRD on RRT  Now   2. DM/ low BP/ multiple comorbid d z/ debility    Recommendation/Plan  :     1. HD with low dialysate temp see how she does as she has intra dialytic  hypotension and chest pain   2. Also good diet/ good glycemic control 'she is getting ev al for ECF   3.  Follow clinically 'next HD Wednesday       Israel Ivory MD

## 2019-07-22 NOTE — PROGRESS NOTES
· Strength R/L: WFL all major muscle groups BL UEs  · Sensation: WFL (denies numbness/tingling)  · Tone: Normal  · Coordination: WFL  · Perception: WNL    Activities of Daily Living (ADLs):  · Feeding: Independent  · Grooming: Supervision (in standing at sink)  · UB bathing: Supervision  · LB bathing: Min A (thoroughness with distal LEs-has assistance at baseline. Could improve performance if motivated to trial long handled sponge)  · UB dressing: Supervision (donning clean robe seated EOB)  · LB dressing: Max A (donning BL socks-has assistance at baseline. Could improve performance if motivated to trial sock aid, reacher, shoe horn)  · Toileting: Supervision    Cognitive and Psychosocial Functioning:  · Overall cognitive status: WFL (hyper-verbal with decreased sustained attention to task, mildly decreased overall insight/safety awareness)  · Affect: Normal     Balance:   · Sitting: Independent static sitting EOB  · Standing: Supervision with RW    Functional Mobility:  · Bed Mobility: Independent with all aspects  · Transfers: Independent to and from all surfaces  · Ambulation: Supervision with  ft around nurse's station; steady gait, no LOB, stable vitals. Education on safe placement of RW provided when pt navigating in room. AM-PAC 6 click short form for inpatient daily activity:   How much help from another person does the patient currently need. .. Unable  Dep A Lot  Max A A Lot   Mod A A Little  Min A A Little   CGA  SBA None   Mod I  Indep  Sup   1. Putting on and taking off regular lower body clothing? [] 1    [x] 2   [] 2   [] 3   [] 3   [] 4      2. Bathing (including washing, rinsing, drying)? [] 1   [] 2   [] 2 [x] 3 [] 3 [] 4   3. Toileting, which includes using toilet, bedpan, or urinal? [] 1    [] 2   [] 2   [] 3   [] 3   [x] 4     4. Putting on and taking off regular upper body clothing? [] 1   [] 2   [] 2   [] 3   [] 3    [x] 4      5.  Taking care of personal grooming such as brushing teeth? [] 1   [] 2    [] 2 [] 3    [] 3   [x] 4      6. Eating meals? [] 1   [] 2   [] 2   [] 3   [] 3   [x] 4      Raw Score:  21    [24=0% impaired(CH), 23=1-19%(CI), 20-22=20-39%(CJ), 15-19=40-59%(CK), 10-14=60-79%(CL), 7-9=80-99%(CM), 6=100%(CN)]     Treatment:  Therapeutic Activity Training:   Therapeutic activity training was instructed today. Cues were given for safety, sequence, UE/LE placement, awareness, and balance. Activities performed today included education on use of RW, HH mobility, education on role of OT, POC, importance of OOB activity, dc planning      Safety Measures: Gait belt used, Left in Bed, Alarm in place    Assessment:  Pt is a 81year old female with a past medical history of Acid reflux, Anemia, Arthritis, CAD (coronary artery disease), CHF (congestive heart failure) ([facility]), Chronic back pain, Chronic constipation, Chronic kidney disease, COPD (chronic obstructive pulmonary disease) ([address] Depression, Diabetes mellitus ([address] Emphysema lung ([address] Glaucoma, Gout, Hiatal hernia, History of blood transfusion, [place] (hard of hearing), Hyperlipidemia, Hypertension, Itching, Morbid obesity ([address] Rheumatoid arthritis ([address] Shortness of breath on exertion, Sleep apnea, Teeth missing, Thyroid disease, Urinary incontinence, WD-Chronic buttock pain, and Wears glasses. Pt admitted with decreased urination due to ESRD. Pt lives at home alone and has a caregiver for 7 hours/day for 5 days/week. Pt's reported PLOF is as above. Pt performing at/near baseline level of functioning. Will trial lower body adaptive equipment during remainder to acute care stay. Recommend dc to home with initial assist PRN and [facility] for home safety evaluation. Complexity: Moderate  Prognosis: Good  Plan: 2x/week      Goals:  1. Pt will complete UB/LB bathing with supervision using long handled sponge  2. Pt will complete all aspects of LB dressing with supervision using AE  3.  Pt will tolerate 12 minutes

## 2019-07-23 LAB
GLUCOSE BLD-MCNC: 100 MG/DL (ref 70–99)
GLUCOSE BLD-MCNC: 138 MG/DL (ref 70–99)
GLUCOSE BLD-MCNC: 167 MG/DL (ref 70–99)

## 2019-07-23 PROCEDURE — 6370000000 HC RX 637 (ALT 250 FOR IP): Performed by: INTERNAL MEDICINE

## 2019-07-23 PROCEDURE — 97530 THERAPEUTIC ACTIVITIES: CPT

## 2019-07-23 PROCEDURE — 94640 AIRWAY INHALATION TREATMENT: CPT

## 2019-07-23 PROCEDURE — 82962 GLUCOSE BLOOD TEST: CPT

## 2019-07-23 PROCEDURE — 6360000002 HC RX W HCPCS: Performed by: NURSE PRACTITIONER

## 2019-07-23 PROCEDURE — 6370000000 HC RX 637 (ALT 250 FOR IP): Performed by: NURSE PRACTITIONER

## 2019-07-23 PROCEDURE — 97535 SELF CARE MNGMENT TRAINING: CPT

## 2019-07-23 PROCEDURE — 94761 N-INVAS EAR/PLS OXIMETRY MLT: CPT

## 2019-07-23 PROCEDURE — 1200000000 HC SEMI PRIVATE

## 2019-07-23 RX ADMIN — KETOCONAZOLE: 20 SHAMPOO TOPICAL at 09:23

## 2019-07-23 RX ADMIN — ACETAMINOPHEN 650 MG: 325 TABLET ORAL at 20:33

## 2019-07-23 RX ADMIN — RANOLAZINE 500 MG: 500 TABLET, FILM COATED, EXTENDED RELEASE ORAL at 20:34

## 2019-07-23 RX ADMIN — HYDROXYCHLOROQUINE SULFATE 200 MG: 200 TABLET, FILM COATED ORAL at 09:23

## 2019-07-23 RX ADMIN — LUBIPROSTONE 24 MCG: 24 CAPSULE, GELATIN COATED ORAL at 09:22

## 2019-07-23 RX ADMIN — LORAZEPAM 0.5 MG: 0.5 TABLET ORAL at 09:22

## 2019-07-23 RX ADMIN — RANOLAZINE 500 MG: 500 TABLET, FILM COATED, EXTENDED RELEASE ORAL at 09:22

## 2019-07-23 RX ADMIN — HEPARIN SODIUM 5000 UNITS: 5000 INJECTION INTRAVENOUS; SUBCUTANEOUS at 06:28

## 2019-07-23 RX ADMIN — MIDODRINE HYDROCHLORIDE 10 MG: 5 TABLET ORAL at 09:22

## 2019-07-23 RX ADMIN — FOLIC ACID 1 MG: 1 TABLET ORAL at 09:23

## 2019-07-23 RX ADMIN — MIDODRINE HYDROCHLORIDE 10 MG: 5 TABLET ORAL at 12:01

## 2019-07-23 RX ADMIN — GUAIFENESIN AND DEXTROMETHORPHAN HYDROBROMIDE 1 TABLET: 600; 30 TABLET, EXTENDED RELEASE ORAL at 09:22

## 2019-07-23 RX ADMIN — PANTOPRAZOLE SODIUM 40 MG: 40 TABLET, DELAYED RELEASE ORAL at 09:23

## 2019-07-23 RX ADMIN — BUPROPION HYDROCHLORIDE 300 MG: 150 TABLET, FILM COATED, EXTENDED RELEASE ORAL at 09:22

## 2019-07-23 RX ADMIN — QUETIAPINE FUMARATE 300 MG: 150 TABLET, EXTENDED RELEASE ORAL at 20:34

## 2019-07-23 RX ADMIN — HYDROXYCHLOROQUINE SULFATE 200 MG: 200 TABLET, FILM COATED ORAL at 20:34

## 2019-07-23 RX ADMIN — POLYVINYL ALCOHOL 2 DROP: 14 SOLUTION/ DROPS OPHTHALMIC at 09:21

## 2019-07-23 RX ADMIN — CLOPIDOGREL BISULFATE 75 MG: 75 TABLET ORAL at 09:23

## 2019-07-23 RX ADMIN — POLYETHYLENE GLYCOL (3350) 17 G: 17 POWDER, FOR SOLUTION ORAL at 09:21

## 2019-07-23 RX ADMIN — GUAIFENESIN AND DEXTROMETHORPHAN HYDROBROMIDE 1 TABLET: 600; 30 TABLET, EXTENDED RELEASE ORAL at 20:34

## 2019-07-23 RX ADMIN — BACITRACIN ZINC, NEOMYCIN SULFATE, POLYMYXIN B SULFATE 1 G: 3.5; 5000; 4 OINTMENT TOPICAL at 09:21

## 2019-07-23 RX ADMIN — LINAGLIPTIN 5 MG: 5 TABLET, FILM COATED ORAL at 09:22

## 2019-07-23 RX ADMIN — ALLOPURINOL 200 MG: 100 TABLET ORAL at 09:22

## 2019-07-23 RX ADMIN — LUBIPROSTONE 24 MCG: 24 CAPSULE, GELATIN COATED ORAL at 20:34

## 2019-07-23 RX ADMIN — BACITRACIN ZINC, NEOMYCIN SULFATE, POLYMYXIN B SULFATE 1 G: 3.5; 5000; 4 OINTMENT TOPICAL at 20:50

## 2019-07-23 RX ADMIN — FLUTICASONE PROPIONATE 2 SPRAY: 50 SPRAY, METERED NASAL at 09:21

## 2019-07-23 RX ADMIN — HYDROXYZINE HYDROCHLORIDE 25 MG: 25 TABLET, FILM COATED ORAL at 20:50

## 2019-07-23 RX ADMIN — LEVOTHYROXINE SODIUM 100 MCG: 100 TABLET ORAL at 09:22

## 2019-07-23 RX ADMIN — CETIRIZINE HYDROCHLORIDE 10 MG: 10 TABLET, FILM COATED ORAL at 09:22

## 2019-07-23 RX ADMIN — PREDNISONE 5 MG: 5 TABLET ORAL at 09:23

## 2019-07-23 RX ADMIN — INSULIN GLARGINE 10 UNITS: 100 INJECTION, SOLUTION SUBCUTANEOUS at 09:23

## 2019-07-23 RX ADMIN — METOCLOPRAMIDE HYDROCHLORIDE 5 MG: 5 TABLET ORAL at 09:22

## 2019-07-23 RX ADMIN — LORAZEPAM 0.5 MG: 0.5 TABLET ORAL at 20:34

## 2019-07-23 RX ADMIN — MULTIPLE VITAMINS W/ MINERALS TAB 1 TABLET: TAB at 09:23

## 2019-07-23 RX ADMIN — PANTOPRAZOLE SODIUM 40 MG: 40 TABLET, DELAYED RELEASE ORAL at 20:35

## 2019-07-23 RX ADMIN — DOCUSATE SODIUM 100 MG: 100 CAPSULE, LIQUID FILLED ORAL at 20:34

## 2019-07-23 RX ADMIN — DOCUSATE SODIUM 100 MG: 100 CAPSULE, LIQUID FILLED ORAL at 09:22

## 2019-07-23 RX ADMIN — MONTELUKAST 10 MG: 10 TABLET, FILM COATED ORAL at 20:34

## 2019-07-23 RX ADMIN — Medication 1 PUFF: at 08:30

## 2019-07-23 RX ADMIN — METOCLOPRAMIDE HYDROCHLORIDE 5 MG: 5 TABLET ORAL at 12:01

## 2019-07-23 RX ADMIN — ATORVASTATIN CALCIUM 80 MG: 40 TABLET, FILM COATED ORAL at 20:34

## 2019-07-23 ASSESSMENT — PAIN SCALES - GENERAL
PAINLEVEL_OUTOF10: 0
PAINLEVEL_OUTOF10: 2

## 2019-07-23 NOTE — PROGRESS NOTES
Nephrology Progress Note  7/23/2019 1:22 PM        Subjective:   Admit Date: 7/17/2019  PCP: Slick Frankel    Interval History: tolerated HD well with lower temp- no low BP or     Diet: better    ROS:  No sob/ no cp    Data:     Current med's:    midodrine  10 mg Oral TID WC    predniSONE  5 mg Oral Daily    sodium chloride flush  10 mL Intravenous 2 times per day    allopurinol  200 mg Oral Daily    clopidogrel  75 mg Oral Daily    hydroxychloroquine  200 mg Oral BID    insulin lispro  0-6 Units Subcutaneous TID WC    insulin lispro  0-3 Units Subcutaneous Nightly    levomilnacipran  20 mg Oral Nightly    linaclotide  290 mcg Oral QAM AC    linagliptin  5 mg Oral Daily    LORazepam  0.5 mg Oral BID    montelukast  10 mg Oral Nightly    pantoprazole  40 mg Oral BID    docusate sodium  100 mg Oral BID    heparin (porcine)  5,000 Units Subcutaneous 3 times per day    atorvastatin  80 mg Oral Nightly    buPROPion  300 mg Oral QAM    cetirizine  10 mg Oral Daily    dextromethorphan-guaiFENesin  1 tablet Oral BID    erythromycin with ethanol   Topical Daily    fluocinonide   Topical BID    fluticasone  2 spray Nasal QAM    fluticasone  1 puff Inhalation Daily    folic acid  1 mg Oral QAM    ketoconazole   Topical Daily    insulin glargine  10 Units Subcutaneous BID    levothyroxine  100 mcg Oral Daily    lubiprostone  24 mcg Oral BID    polyvinyl alcohol  2 drop Both Eyes BID    ranolazine  500 mg Oral BID    QUEtiapine  300 mg Oral Nightly    polyethylene glycol  17 g Oral Daily    neomycin-bacitracin-polymyxin   Topical BID    therapeutic multivitamin-minerals  1 tablet Oral Daily    metoclopramide  5 mg Oral TID WC    Mirabegron ER  50 mg Oral Daily    triamcinolone   Topical TID      dextrose           I/O last 3 completed shifts:   In: 740 [P.O.:240]  Out: 2200 [Urine:500]    CBC:   Recent Labs     07/21/19  0410   WBC 11.2*   HGB 10.4*             Recent Labs

## 2019-07-23 NOTE — PROGRESS NOTES
Hospitalist Progress Note      Name:  Ngozi Larios /Age/Sex: 1950  (82 y.o. female)   MRN & CSN:  [id number] & [id number] Admission Date/Time: 2019  2:21 PM   Location:  7191/1050- PCP: Kasandra Copeland Day: 7    Assessment and Plan:   Ngozi Larios is a 81 y.o.  female  who presents with ESRD needing dialysis ([facility]    1. End Stage Renal Disease: Likely from DM nephropathy. K 4.1, Na 142, Ca 10.9. Nephrology on consult. Started on IHD on this admission. S/P daily IHD  - . Next dialysis  per nephrology if still admitted. If discharged today, will start outpx IHD on . Dialysis access is Rt TDC. Dialysis sessions have been c/b by intradialytic hypotension. 2. Type 2 DM: Last A1C. Lantus, Sliding scale. Hypoglycemia protocol. Accucheck. Hold oral hypoglycemic agents for now   3. CAD: on Plavix, Statin, BB, Ranexa  4. Hypertension: Blood pressure controlled. Continue medications. 5. Hypothyroidism: last TSH. continue Synthroid. 6. Hyperlipidemia: continue statin   7. IBS: on Linaclotide  8. Obesity  9. YRIS: on CPAP\  10. Bilateral leg pain. DVT US  - negative. 11. Deconditioning: complained of leg weakness. advised that PT evaluated her with no goals. She would like to go to go home with home health (skilled nursing, PT, OT, aide). Ngozi Larios was evaluated today and a DME order was entered for a standard wheelchair because she requires this to successfully complete daily living tasks of eating, bathing, toileting, personal cares, ambulating, grooming, hygiene, dressing upper body, dressing lower body, meal preparation and taking own medications. A standard manual wheelchair is necessary due to patient's impaired ambulation and mobility restrictions and would be unable to resolve these daily living tasks using a cane or walker.  The patient is capable of using a standard wheelchair safely in their home and can maneuver within their home with adequate access. There is a caregiver available to provide necessary assistance. The need for this equipment was discussed with the patient and she understands, is in agreement, and has not expressed an unwillingness to use the wheelchair. Wheelchair is needed because of weakness due to ESRD on IHD, COPD and CHF. Diet DIET RENAL;   DVT Prophylaxis [] Lovenox, [x]  Heparin, [] SCDs, [] Warfarin  [] NOAC     GI Prophylaxis [x] PPI,  [] H2 Blocker,  [] Carafate,  [] Diet/Tube Feeds   Code Status Full Code   MDM [] Low, [x] Moderate,[]  High     History of Present Illness:     Chief Complaint: ESRD needing dialysis ([facility]    She was seen and examined. Still reports feeling weak in her legs. Has been needing assistance in going to the bathroom. No nausea or vomiting. Ten point ROS reviewed negative, unless as noted above    Objective: Intake/Output Summary (Last 24 hours) at 7/23/2019 1143  Last data filed at 7/22/2019 1745  Gross per 24 hour   Intake 500 ml   Output 2200 ml   Net -1700 ml      Vitals:   Vitals:    07/23/19 0915   BP: 111/60   Pulse: 104   Resp: 16   Temp: 98.3 °F (36.8 °C)   SpO2:      Physical Exam:   GEN Awake female, sitting upright in bed in no apparent distress. EYES Pupils are equally round. No scleral erythema, discharge, or conjunctivitis. HENT Mucous membranes are moist. Oral pharynx without exudates, no evidence of thrush. NECK Supple, no apparent thyromegaly or masses. RT IJ TDC in place, clean and dry exit site. RESP Clear to auscultation, no wheezes, rales or rhonchi. Symmetric chest movement while on room air. CARDIO/VASC S1/S2 auscultated. Regular rate without appreciable murmurs, rubs, or gallops. No JVD or carotid bruits. Peripheral pulses equal bilaterally and palpable. +ve peripheral edema, better  GI Abdomen is soft without significant tenderness, masses, or guarding. Bowel sounds are normoactive. Rectal exam deferred. MSK No gross joint deformities.  + tenderness, bilateral calf - no swelling or redness, has been on going for about a month. SKIN Normal coloration, warm, dry. NEURO Cranial nerves appear grossly intact, normal speech, no lateralizing weakness. PSYCH Awake, alert, oriented x 4. Affect appropriate.     Medications:   Medications:    midodrine  10 mg Oral TID WC    predniSONE  5 mg Oral Daily    sodium chloride flush  10 mL Intravenous 2 times per day    allopurinol  200 mg Oral Daily    clopidogrel  75 mg Oral Daily    hydroxychloroquine  200 mg Oral BID    insulin lispro  0-6 Units Subcutaneous TID WC    insulin lispro  0-3 Units Subcutaneous Nightly    levomilnacipran  20 mg Oral Nightly    linaclotide  290 mcg Oral QAM AC    linagliptin  5 mg Oral Daily    LORazepam  0.5 mg Oral BID    montelukast  10 mg Oral Nightly    pantoprazole  40 mg Oral BID    docusate sodium  100 mg Oral BID    heparin (porcine)  5,000 Units Subcutaneous 3 times per day    atorvastatin  80 mg Oral Nightly    buPROPion  300 mg Oral QAM    cetirizine  10 mg Oral Daily    dextromethorphan-guaiFENesin  1 tablet Oral BID    erythromycin with ethanol   Topical Daily    fluocinonide   Topical BID    fluticasone  2 spray Nasal QAM    fluticasone  1 puff Inhalation Daily    folic acid  1 mg Oral QAM    ketoconazole   Topical Daily    insulin glargine  10 Units Subcutaneous BID    levothyroxine  100 mcg Oral Daily    lubiprostone  24 mcg Oral BID    polyvinyl alcohol  2 drop Both Eyes BID    ranolazine  500 mg Oral BID    QUEtiapine  300 mg Oral Nightly    polyethylene glycol  17 g Oral Daily    neomycin-bacitracin-polymyxin   Topical BID    therapeutic multivitamin-minerals  1 tablet Oral Daily    metoclopramide  5 mg Oral TID     Mirabegron ER  50 mg Oral Daily    triamcinolone   Topical TID      Infusions:    dextrose       PRN Meds:     ketoconazole  Q72H PRN   acetaminophen 650 mg Q4H PRN   magnesium hydroxide 30 mL Daily PRN

## 2019-07-23 NOTE — DISCHARGE SUMMARY
tolerated  Disposition: Discharged to:   []Home, [x]C, []SNF, []Acute Rehab, []Hospice   Condition on discharge: Stable    Discharge Medications:      Sarah Flowers   Home Medication Instructions PWF:[id number]    Printed on:07/27/19 5044   Medication Information                      acetaminophen (TYLENOL) 325 MG tablet  Take 2 tablets by mouth every 6 hours as needed for Fever (Fever >100.5 F (38 C))             albuterol sulfate  (90 Base) MCG/ACT inhaler  Inhale 2 puffs into the lungs every 6 hours as needed for Wheezing             allopurinol (ZYLOPRIM) 100 MG tablet  Take 2 tablets by mouth daily             atorvastatin (LIPITOR) 80 MG tablet  Take 80 mg by mouth nightly             buPROPion (WELLBUTRIN XL) 300 MG extended release tablet  Take 300 mg by mouth every morning             carvedilol (COREG) 6.25 MG tablet  Take 6.25 mg by mouth 2 times daily             cetirizine (ZYRTEC) 10 MG tablet  Take 10 mg by mouth daily             clopidogrel (PLAVIX) 75 MG tablet  Take 1 tablet by mouth daily             Dextromethorphan-guaiFENesin (MUCUS RELIEF DM)  MG TB12  TAKE ONE TABLET BY MOUTH TWICE A DAY             erythromycin with ethanol (THERAMYCIN) 2 % external solution  Apply topically daily Apply topically daily. fluocinonide (LIDEX) 0.05 % cream  Apply topically 2 times daily Apply topically 2 times daily.              fluticasone (FLONASE) 50 MCG/ACT nasal spray  2 sprays by Nasal route every morning              fluticasone propionate (FLOVENT DISKUS) 50 MCG/BLIST AEPB inhaler  Inhale 1 puff into the lungs 2 times daily             folic acid (FOLVITE) 1 MG tablet  Take 1 mg by mouth every morning              guaiFENesin (MUCINEX) 600 MG extended release tablet  Take 1 tablet by mouth 2 times daily             hydroxychloroquine (PLAQUENIL) 200 MG tablet  Take by mouth 2 times daily             hydrOXYzine (ATARAX) 25 MG tablet  Take 1 tablet by mouth nightly promethazine (PHENERGAN) 25 MG tablet  Take 25 mg by mouth every 6 hours as needed for Nausea             QUEtiapine (SEROQUEL XR) 300 MG extended release tablet  Take 300 mg by mouth nightly             ranolazine (RANEXA) 500 MG extended release tablet  Take 500 mg by mouth 2 times daily             REFRESH OPTIVE 0.5-0.9 % SOLN  Place 2 drops into both eyes 2 times daily             SENEXON-S 8.6-50 MG per tablet  TAKE ONE TABLET BY MOUTH DAILY WHILE ON NARCOTIC PAIN MEDICATION             Tiotropium Bromide-Olodaterol (STIOLTO RESPIMAT) 2.5-2.5 MCG/ACT AERS  Inhale 2 puffs into the lungs daily             triamcinolone (ARISTOCORT) 0.5 % cream  Apply topically 3 times daily Apply topically 3 times daily. Objective Findings at Discharge:   BP 99/75   Pulse 101   Temp 98 °F (36.7 °C)   Resp 17   Ht 5' 4\" (1.626 m)   Wt 221 lb 9 oz (100.5 kg)   SpO2 97%   BMI 38.03 kg/m²            PHYSICAL EXAM:   GEN Awake female, sitting upright in bed in no apparent distress. Appears given age. EYES Pupils are equally round. No scleral erythema, discharge, or conjunctivitis. HENT Mucous membranes are moist. Oral pharynx without exudates, no evidence of thrush. NECK Supple, no apparent thyromegaly or masses. Rt IJ TDC, exit site clean and dry. RESP Clear to auscultation, no wheezes, rales or rhonchi. Symmetric chest movement while on room air. CARDIO/VASC S1/S2 auscultated. Regular rate without appreciable murmurs, rubs, or gallops. No JVD or carotid bruits. Peripheral pulses equal bilaterally and palpable. No peripheral edema. GI Abdomen is soft without significant tenderness, masses, or guarding. Bowel sounds are normoactive. Rectal exam deferred.  No costovertebral angle tenderness. Normal appearing external genitalia. Mensah catheter is not present. HEME/LYMPH No palpable cervical lymphadenopathy and no hepatosplenomegaly. No petechiae or ecchymoses.   MSK No gross joint

## 2019-07-24 VITALS
RESPIRATION RATE: 17 BRPM | SYSTOLIC BLOOD PRESSURE: 99 MMHG | DIASTOLIC BLOOD PRESSURE: 75 MMHG | TEMPERATURE: 98 F | HEIGHT: 64 IN | OXYGEN SATURATION: 97 % | BODY MASS INDEX: 37.83 KG/M2 | WEIGHT: 221.56 LBS | HEART RATE: 101 BPM

## 2019-07-24 LAB
GLUCOSE BLD-MCNC: 101 MG/DL (ref 70–99)
GLUCOSE BLD-MCNC: 126 MG/DL (ref 70–99)
GLUCOSE BLD-MCNC: 169 MG/DL (ref 70–99)

## 2019-07-24 PROCEDURE — 90935 HEMODIALYSIS ONE EVALUATION: CPT

## 2019-07-24 PROCEDURE — 6370000000 HC RX 637 (ALT 250 FOR IP): Performed by: NURSE PRACTITIONER

## 2019-07-24 PROCEDURE — 6370000000 HC RX 637 (ALT 250 FOR IP): Performed by: INTERNAL MEDICINE

## 2019-07-24 PROCEDURE — 94640 AIRWAY INHALATION TREATMENT: CPT

## 2019-07-24 PROCEDURE — 6360000002 HC RX W HCPCS: Performed by: INTERNAL MEDICINE

## 2019-07-24 RX ORDER — HYDROXYZINE HYDROCHLORIDE 25 MG/1
25 TABLET, FILM COATED ORAL NIGHTLY
Qty: 30 TABLET | Refills: 0 | Status: SHIPPED | OUTPATIENT
Start: 2019-07-24 | End: 2019-08-23

## 2019-07-24 RX ORDER — HEPARIN SODIUM 1000 [USP'U]/ML
3500 INJECTION, SOLUTION INTRAVENOUS; SUBCUTANEOUS
Status: COMPLETED | OUTPATIENT
Start: 2019-07-24 | End: 2019-07-24

## 2019-07-24 RX ADMIN — BUPROPION HYDROCHLORIDE 300 MG: 150 TABLET, FILM COATED, EXTENDED RELEASE ORAL at 16:48

## 2019-07-24 RX ADMIN — MULTIPLE VITAMINS W/ MINERALS TAB 1 TABLET: TAB at 16:48

## 2019-07-24 RX ADMIN — FOLIC ACID 1 MG: 1 TABLET ORAL at 16:50

## 2019-07-24 RX ADMIN — CLOPIDOGREL BISULFATE 75 MG: 75 TABLET ORAL at 16:49

## 2019-07-24 RX ADMIN — IRON SUCROSE 50 MG: 20 INJECTION, SOLUTION INTRAVENOUS at 15:54

## 2019-07-24 RX ADMIN — MIDODRINE HYDROCHLORIDE 10 MG: 5 TABLET ORAL at 16:49

## 2019-07-24 RX ADMIN — BACITRACIN ZINC, NEOMYCIN SULFATE, POLYMYXIN B SULFATE: 3.5; 5000; 4 OINTMENT TOPICAL at 09:38

## 2019-07-24 RX ADMIN — CETIRIZINE HYDROCHLORIDE 10 MG: 10 TABLET, FILM COATED ORAL at 16:50

## 2019-07-24 RX ADMIN — EPOETIN ALFA-EPBX 4000 UNITS: 4000 INJECTION, SOLUTION INTRAVENOUS; SUBCUTANEOUS at 15:54

## 2019-07-24 RX ADMIN — DOCUSATE SODIUM 100 MG: 100 CAPSULE, LIQUID FILLED ORAL at 16:49

## 2019-07-24 RX ADMIN — METOCLOPRAMIDE HYDROCHLORIDE 5 MG: 5 TABLET ORAL at 16:49

## 2019-07-24 RX ADMIN — ALLOPURINOL 200 MG: 100 TABLET ORAL at 16:48

## 2019-07-24 RX ADMIN — HEPARIN SODIUM 3500 UNITS: 1000 INJECTION, SOLUTION INTRAVENOUS; SUBCUTANEOUS at 15:55

## 2019-07-24 RX ADMIN — LINAGLIPTIN 5 MG: 5 TABLET, FILM COATED ORAL at 16:50

## 2019-07-24 RX ADMIN — INSULIN LISPRO 1 UNITS: 100 INJECTION, SOLUTION INTRAVENOUS; SUBCUTANEOUS at 13:38

## 2019-07-24 RX ADMIN — LEVOTHYROXINE SODIUM 100 MCG: 100 TABLET ORAL at 16:50

## 2019-07-24 RX ADMIN — Medication 1 PUFF: at 08:47

## 2019-07-24 RX ADMIN — GUAIFENESIN AND DEXTROMETHORPHAN HYDROBROMIDE 1 TABLET: 600; 30 TABLET, EXTENDED RELEASE ORAL at 16:48

## 2019-07-24 RX ADMIN — PREDNISONE 5 MG: 5 TABLET ORAL at 16:49

## 2019-07-24 ASSESSMENT — PAIN SCALES - GENERAL
PAINLEVEL_OUTOF10: 0
PAINLEVEL_OUTOF10: 0

## 2019-07-24 NOTE — PROGRESS NOTES
Nephrology Progress Note  7/24/2019 9:02 AM        Subjective:   Admit Date: 7/17/2019  PCP: Rubi Ibrahim    Interval History: no event overnight    Diet: better    ROS:  No sob or cp     Data:     Current meds:    epoetin yessica-epbx  4,000 Units Intravenous Once in dialysis    iron sucrose  50 mg Intravenous Once in dialysis    midodrine  10 mg Oral TID WC    predniSONE  5 mg Oral Daily    sodium chloride flush  10 mL Intravenous 2 times per day    allopurinol  200 mg Oral Daily    clopidogrel  75 mg Oral Daily    hydroxychloroquine  200 mg Oral BID    insulin lispro  0-6 Units Subcutaneous TID WC    insulin lispro  0-3 Units Subcutaneous Nightly    levomilnacipran  20 mg Oral Nightly    linaclotide  290 mcg Oral QAM AC    linagliptin  5 mg Oral Daily    LORazepam  0.5 mg Oral BID    montelukast  10 mg Oral Nightly    pantoprazole  40 mg Oral BID    docusate sodium  100 mg Oral BID    heparin (porcine)  5,000 Units Subcutaneous 3 times per day    atorvastatin  80 mg Oral Nightly    buPROPion  300 mg Oral QAM    cetirizine  10 mg Oral Daily    dextromethorphan-guaiFENesin  1 tablet Oral BID    erythromycin with ethanol   Topical Daily    fluocinonide   Topical BID    fluticasone  2 spray Nasal QAM    fluticasone  1 puff Inhalation Daily    folic acid  1 mg Oral QAM    ketoconazole   Topical Daily    insulin glargine  10 Units Subcutaneous BID    levothyroxine  100 mcg Oral Daily    lubiprostone  24 mcg Oral BID    polyvinyl alcohol  2 drop Both Eyes BID    ranolazine  500 mg Oral BID    QUEtiapine  300 mg Oral Nightly    polyethylene glycol  17 g Oral Daily    neomycin-bacitracin-polymyxin   Topical BID    therapeutic multivitamin-minerals  1 tablet Oral Daily    metoclopramide  5 mg Oral TID WC    Mirabegron ER  50 mg Oral Daily    triamcinolone   Topical TID      dextrose           No intake/output data recorded.     CBC: No results for input(s): WBC, HGB, PLT in

## 2019-07-25 NOTE — CARE COORDINATION
7/25 11:15 AM    Received call from [facility] with [employer] who states they were not notified of patient discharge yesterday and they received a 1 page fax with [employer] order only. This CM printed and faxed orders/info at this time and requested call back if they are not received in the next 15 minutes.
transportation and aide services per [employer]. Explained the above challenges including 8 stairs at the home and likely increased/changed [facility] aide times. Sarah shared pt hospital CM is Justino Alanis with [place] [phone]. Jessica Swan states she will call CM back once transportation is set up.       12:39 PM  Received call back from [facility] with Dr. Juliet Tijerina who states Eddie Dana has sent the approval paperwork to [facility] and pt is able to begin outpatient HD.       AWAITING transportation set up and PS to Dr. Ugo Fuller for wheelchair orders. 2:30 PM  Noted wheelchair order/ADLs, called Radha with [facility] to make aware of wheelchair need. Awaiting transportation set up per [employer] CM and wheelchair delivery. 3:35 PM  Wheelchair delivered and once transportation is set up per Jessica Swan with [employer] pt is able to discharge per conversation with Dr. Ugo Fuller. Called and left message for Sarah HENDRICKSON with [employer]/[phone] and requested she call this CM, Bhargav Saldivar CM or the charge nurse once transportation is secured for patient discharge. Called [facility] and spoke with Vandana/chriss to update on pt likely dc later today vs tomorrow. AWAITING dialysis transportation set up. Pt is active with [facility] [address]. At [place], please call [phone]. Please fax AVS, facesheet, and [facility] order to [phone].

## 2019-09-03 ENCOUNTER — TELEPHONE (OUTPATIENT)
Dept: BARIATRICS/WEIGHT MGMT | Age: 69
End: 2019-09-03

## 2019-09-09 ENCOUNTER — HOSPITAL ENCOUNTER (OUTPATIENT)
Age: 69
Setting detail: SPECIMEN
Discharge: HOME | End: 2019-09-09
Payer: COMMERCIAL

## 2019-09-09 LAB
ANION GAP SERPL CALCULATED.3IONS-SCNC: 15 MMOL/L (ref 4–16)
BUN BLDV-MCNC: 17 MG/DL (ref 6–23)
CALCIUM SERPL-MCNC: 9.6 MG/DL (ref 8.3–10.6)
CHLORIDE BLD-SCNC: 94 MMOL/L (ref 99–110)
CO2: 28 MMOL/L (ref 21–32)
CREAT SERPL-MCNC: 2.7 MG/DL (ref 0.6–1.1)
GFR AFRICAN AMERICAN: 21 ML/MIN/1.73M2
GFR NON-AFRICAN AMERICAN: 18 ML/MIN/1.73M2
GLUCOSE BLD-MCNC: 156 MG/DL (ref 70–99)
HEMOGLOBIN: 12.2 GM/DL (ref 12.5–16)
POTASSIUM SERPL-SCNC: 3.6 MMOL/L (ref 3.5–5.1)
SODIUM BLD-SCNC: 137 MMOL/L (ref 135–145)

## 2019-09-09 PROCEDURE — 80048 BASIC METABOLIC PNL TOTAL CA: CPT

## 2019-09-09 PROCEDURE — 85018 HEMOGLOBIN: CPT

## 2019-09-17 ENCOUNTER — HOSPITAL ENCOUNTER (OUTPATIENT)
Dept: MRI IMAGING | Age: 69
Discharge: HOME | End: 2019-09-17
Payer: COMMERCIAL

## 2019-09-17 DIAGNOSIS — M25.511 ACUTE PAIN OF RIGHT SHOULDER: ICD-10-CM

## 2019-09-17 PROCEDURE — 73221 MRI JOINT UPR EXTREM W/O DYE: CPT

## 2019-10-31 ENCOUNTER — HOSPITAL ENCOUNTER (OUTPATIENT)
Dept: INTERVENTIONAL RADIOLOGY/VASCULAR | Age: 69
Discharge: HOME | End: 2019-10-31
Payer: COMMERCIAL

## 2019-10-31 VITALS
DIASTOLIC BLOOD PRESSURE: 63 MMHG | HEART RATE: 77 BPM | WEIGHT: 221 LBS | RESPIRATION RATE: 18 BRPM | SYSTOLIC BLOOD PRESSURE: 101 MMHG | BODY MASS INDEX: 37.73 KG/M2 | TEMPERATURE: 97.3 F | OXYGEN SATURATION: 99 % | HEIGHT: 64 IN

## 2019-10-31 DIAGNOSIS — N18.6 ESRD (END STAGE RENAL DISEASE) (HCC): ICD-10-CM

## 2019-10-31 PROCEDURE — 36589 REMOVAL TUNNELED CV CATH: CPT

## 2019-10-31 PROCEDURE — 2709999900 HC NON-CHARGEABLE SUPPLY

## 2019-10-31 PROCEDURE — 7100000010 HC PHASE II RECOVERY - FIRST 15 MIN

## 2019-10-31 ASSESSMENT — PAIN - FUNCTIONAL ASSESSMENT: PAIN_FUNCTIONAL_ASSESSMENT: 0-10

## 2019-10-31 ASSESSMENT — PAIN SCALES - GENERAL: PAINLEVEL_OUTOF10: 0

## 2019-12-09 RX ORDER — MULTIVITAMIN WITH FOLIC ACID 400 MCG
TABLET ORAL
Qty: 30 TABLET | Refills: 0 | Status: SHIPPED | OUTPATIENT
Start: 2019-12-09 | End: 2020-01-28

## 2020-01-10 ENCOUNTER — HOSPITAL ENCOUNTER (OUTPATIENT)
Age: 70
Setting detail: SPECIMEN
Discharge: HOME | End: 2020-01-10
Payer: COMMERCIAL

## 2020-01-10 LAB
ALT SERPL-CCNC: 21 U/L (ref 10–40)
AST SERPL-CCNC: 29 IU/L (ref 15–37)

## 2020-01-10 PROCEDURE — 84460 ALANINE AMINO (ALT) (SGPT): CPT

## 2020-01-10 PROCEDURE — 84450 TRANSFERASE (AST) (SGOT): CPT

## 2020-01-10 PROCEDURE — 86200 CCP ANTIBODY: CPT

## 2020-01-10 PROCEDURE — 86430 RHEUMATOID FACTOR TEST QUAL: CPT

## 2020-01-13 ENCOUNTER — HOSPITAL ENCOUNTER (OUTPATIENT)
Age: 70
Setting detail: SPECIMEN
Discharge: HOME | End: 2020-01-13

## 2020-01-13 LAB
CYCLIC CITRULLINATED PEPTIDE ANTIBODY IGG: 2 UNITS (ref 0–19)
CYCLIC CITRULLINATED PEPTIDE ANTIBODY IGG: NORMAL UNITS (ref 0–19)
ERYTHROCYTE SEDIMENTATION RATE: 21 MM/HR (ref 0–30)

## 2020-01-13 PROCEDURE — 85652 RBC SED RATE AUTOMATED: CPT

## 2020-01-14 LAB
RHEUMATOID FACTOR: 126 IU/ML (ref 0–14)
RHEUMATOID FACTOR: ABNORMAL IU/ML (ref 0–14)

## 2020-01-28 RX ORDER — MULTIVITAMIN WITH FOLIC ACID 400 MCG
TABLET ORAL
Qty: 30 TABLET | Refills: 0 | Status: SHIPPED | OUTPATIENT
Start: 2020-01-28

## 2020-02-21 ENCOUNTER — HOSPITAL ENCOUNTER (OUTPATIENT)
Age: 70
Setting detail: SPECIMEN
Discharge: HOME | End: 2020-02-21
Payer: COMMERCIAL

## 2020-02-21 LAB
ALT SERPL-CCNC: 24 U/L (ref 10–40)
AST SERPL-CCNC: 27 IU/L (ref 15–37)
BASOPHILS ABSOLUTE: 0 K/CU MM
BASOPHILS RELATIVE PERCENT: 0.3 % (ref 0–1)
BUN BLDV-MCNC: 48 MG/DL (ref 6–23)
CREAT SERPL-MCNC: 5 MG/DL (ref 0.6–1.1)
DIFFERENTIAL TYPE: ABNORMAL
EOSINOPHILS ABSOLUTE: 0.2 K/CU MM
EOSINOPHILS RELATIVE PERCENT: 2.6 % (ref 0–3)
GFR AFRICAN AMERICAN: 10 ML/MIN/1.73M2
GFR NON-AFRICAN AMERICAN: 9 ML/MIN/1.73M2
HBV SURFACE AB TITR SER: 187.7 {TITER}
HCT VFR BLD CALC: 36.5 % (ref 37–47)
HEMOGLOBIN: 11.8 GM/DL (ref 12.5–16)
HEPATITIS B SURFACE ANTIGEN: NON REACTIVE
HEPATITIS C ANTIBODY: NON REACTIVE
IMMATURE NEUTROPHIL %: 0.3 % (ref 0–0.43)
LYMPHOCYTES ABSOLUTE: 1.2 K/CU MM
LYMPHOCYTES RELATIVE PERCENT: 20.2 % (ref 24–44)
MCH RBC QN AUTO: 29.2 PG (ref 27–31)
MCHC RBC AUTO-ENTMCNC: 32.3 % (ref 32–36)
MCV RBC AUTO: 90.3 FL (ref 78–100)
MONOCYTES ABSOLUTE: 0.6 K/CU MM
MONOCYTES RELATIVE PERCENT: 9.7 % (ref 0–4)
NUCLEATED RBC %: 0 %
PDW BLD-RTO: 13.4 % (ref 11.7–14.9)
PLATELET # BLD: 228 K/CU MM (ref 140–440)
PMV BLD AUTO: 11 FL (ref 7.5–11.1)
RBC # BLD: 4.04 M/CU MM (ref 4.2–5.4)
SEGMENTED NEUTROPHILS ABSOLUTE COUNT: 3.9 K/CU MM
SEGMENTED NEUTROPHILS RELATIVE PERCENT: 66.9 % (ref 36–66)
TOTAL IMMATURE NEUTOROPHIL: 0.02 K/CU MM
TOTAL NUCLEATED RBC: 0 K/CU MM
WBC # BLD: 5.8 K/CU MM (ref 4–10.5)

## 2020-02-21 PROCEDURE — 86704 HEP B CORE ANTIBODY TOTAL: CPT

## 2020-02-21 PROCEDURE — 86707 HEPATITIS BE ANTIBODY: CPT

## 2020-02-21 PROCEDURE — 84460 ALANINE AMINO (ALT) (SGPT): CPT

## 2020-02-21 PROCEDURE — 86803 HEPATITIS C AB TEST: CPT

## 2020-02-21 PROCEDURE — 84450 TRANSFERASE (AST) (SGOT): CPT

## 2020-02-21 PROCEDURE — 87340 HEPATITIS B SURFACE AG IA: CPT

## 2020-02-21 PROCEDURE — 86706 HEP B SURFACE ANTIBODY: CPT

## 2020-02-21 PROCEDURE — 82565 ASSAY OF CREATININE: CPT

## 2020-02-21 PROCEDURE — 85025 COMPLETE CBC W/AUTO DIFF WBC: CPT

## 2020-02-21 PROCEDURE — 84520 ASSAY OF UREA NITROGEN: CPT

## 2020-02-21 PROCEDURE — 87350 HEPATITIS BE AG IA: CPT

## 2020-02-22 LAB
HEPATITIS B CORE TOTAL ANTIBODY: ABNORMAL
HEPATITIS B CORE TOTAL ANTIBODY: POSITIVE

## 2020-02-23 LAB
HEPATITIS BE ANTIBODY: ABNORMAL
HEPATITIS BE ANTIBODY: POSITIVE
HEPATITIS BE ANTIGEN: NEGATIVE
HEPATITIS BE ANTIGEN: NORMAL

## 2020-02-24 ENCOUNTER — HOSPITAL ENCOUNTER (OUTPATIENT)
Age: 70
Setting detail: SPECIMEN
Discharge: HOME | End: 2020-02-24
Payer: COMMERCIAL

## 2020-02-24 LAB
T4 FREE: 1 NG/DL (ref 0.9–1.8)
TSH HIGH SENSITIVITY: 0.47 UIU/ML (ref 0.27–4.2)

## 2020-02-24 PROCEDURE — 84443 ASSAY THYROID STIM HORMONE: CPT

## 2020-02-24 PROCEDURE — 84439 ASSAY OF FREE THYROXINE: CPT

## 2020-03-10 RX ORDER — SODIUM CHLORIDE 0.9 % (FLUSH) 0.9 %
10 SYRINGE (ML) INJECTION PRN
Status: CANCELLED | OUTPATIENT
Start: 2020-03-12

## 2020-03-10 NOTE — PROGRESS NOTES
3/10/20-LM with times, surgery on 3/12/20 @1130, arrival 0930 - bring insurance card, picture ID and a .

## 2020-03-11 RX ORDER — SODIUM CHLORIDE 0.9 % (FLUSH) 0.9 %
10 SYRINGE (ML) INJECTION 2 TIMES DAILY
Status: CANCELLED | OUTPATIENT
Start: 2020-03-11

## 2020-03-16 ENCOUNTER — HOSPITAL ENCOUNTER (OUTPATIENT)
Age: 70
Setting detail: SPECIMEN
Discharge: HOME | End: 2020-03-16

## 2020-03-16 LAB
ANION GAP SERPL CALCULATED.3IONS-SCNC: 15 MMOL/L (ref 4–16)
BUN BLDV-MCNC: 62 MG/DL (ref 6–23)
CALCIUM SERPL-MCNC: 9.9 MG/DL (ref 8.3–10.6)
CHLORIDE BLD-SCNC: 100 MMOL/L (ref 99–110)
CO2: 24 MMOL/L (ref 21–32)
CREAT SERPL-MCNC: 5.5 MG/DL (ref 0.6–1.1)
GFR AFRICAN AMERICAN: 9 ML/MIN/1.73M2
GFR NON-AFRICAN AMERICAN: 8 ML/MIN/1.73M2
GLUCOSE BLD-MCNC: 107 MG/DL (ref 70–99)
POTASSIUM SERPL-SCNC: 3.9 MMOL/L (ref 3.5–5.1)
SODIUM BLD-SCNC: 139 MMOL/L (ref 135–145)

## 2020-03-16 PROCEDURE — 80048 BASIC METABOLIC PNL TOTAL CA: CPT

## 2020-03-24 NOTE — PROGRESS NOTES
.Surgery 3/26/20 @ 1030, arrival 0830                  1. Do not eat or drink anything after 12 midnight - unless instructed by your doctor prior to surgery. This includes                   no water, chewing gum or mints. 2. Follow your directions as prescribed by the doctor for your procedure and medications. 3. Check with your Doctor regarding stopping Plavix, Coumadin, Lovenox,Effient,Pradaxa,Xarelto, Fragmin or other blood thinners and                   follow their instructions. 4. Do not smoke, and do not drink any alcoholic beverages 24 hours prior to surgery. This includes NA Beer. 5. You may brush your teeth and gargle the morning of surgery. DO NOT SWALLOW WATER   6. You MUST make arrangements for a responsible adult to take you home after your surgery and be able to check on you every couple                   hours for the day. You will not be allowed to leave alone or drive yourself home. It is strongly suggested someone stay with you the first 24                   hrs. Your surgery will be cancelled if you do not have a ride home. 7. Please wear simple, loose fitting clothing to the hospital.  Jordi Villatoror not bring valuables (money, credit cards, checkbooks, etc.) Do not wear any                   makeup (including no eye makeup) or nail polish on your fingers or toes. 8. DO NOT wear any jewelry or piercings on day of surgery. All body piercing jewelry must be removed. 9. If you have dentures, they will be removed before going to the OR; we will provide you a container. If you wear contact lenses or glasses,                  they will be removed; please bring a case for them. 10. If you  have a Living Will and Durable Power of  for Healthcare, please bring in a copy. 11. Please bring picture ID,  insurance card, paperwork from the doctors office    (H & P, Consent, & card for implantable devices).            12. Take a shower the night before or

## 2020-03-24 NOTE — PROGRESS NOTES
3/24/20-LM with times, surgery on 3/26/20 @1030, arrival 0830 - bring insurance card, picture ID and a . NPO @ midnight.

## 2020-03-25 PROBLEM — N17.9 ACUTE KIDNEY INJURY (HCC): Status: RESOLVED | Noted: 2017-10-11 | Resolved: 2020-03-24

## 2020-03-26 ENCOUNTER — HOSPITAL ENCOUNTER (OUTPATIENT)
Dept: INTERVENTIONAL RADIOLOGY/VASCULAR | Age: 70
Discharge: HOME | End: 2020-03-26
Payer: COMMERCIAL

## 2020-03-26 VITALS
DIASTOLIC BLOOD PRESSURE: 67 MMHG | TEMPERATURE: 98.1 F | OXYGEN SATURATION: 98 % | HEIGHT: 64 IN | SYSTOLIC BLOOD PRESSURE: 116 MMHG | WEIGHT: 208 LBS | BODY MASS INDEX: 35.51 KG/M2 | HEART RATE: 90 BPM | RESPIRATION RATE: 16 BRPM

## 2020-03-26 LAB
APTT: 30.1 SECONDS (ref 25.1–37.1)
HCT VFR BLD CALC: 33.2 % (ref 37–47)
HEMOGLOBIN: 10.9 GM/DL (ref 12.5–16)
INR BLD: 0.93 INDEX
MCH RBC QN AUTO: 29.9 PG (ref 27–31)
MCHC RBC AUTO-ENTMCNC: 32.8 % (ref 32–36)
MCV RBC AUTO: 91 FL (ref 78–100)
PDW BLD-RTO: 15.8 % (ref 11.7–14.9)
PLATELET # BLD: 306 K/CU MM (ref 140–440)
PMV BLD AUTO: 9.6 FL (ref 7.5–11.1)
POTASSIUM SERPL-SCNC: 4.1 MMOL/L (ref 3.5–5.1)
PROTHROMBIN TIME: 11.3 SECONDS (ref 11.7–14.5)
RBC # BLD: 3.65 M/CU MM (ref 4.2–5.4)
WBC # BLD: 6.2 K/CU MM (ref 4–10.5)

## 2020-03-26 PROCEDURE — 84132 ASSAY OF SERUM POTASSIUM: CPT

## 2020-03-26 PROCEDURE — 6360000004 HC RX CONTRAST MEDICATION: Performed by: INTERNAL MEDICINE

## 2020-03-26 PROCEDURE — C1725 CATH, TRANSLUMIN NON-LASER: HCPCS

## 2020-03-26 PROCEDURE — 36907 BALO ANGIOP CTR DIALYSIS SEG: CPT

## 2020-03-26 PROCEDURE — 7100000011 HC PHASE II RECOVERY - ADDTL 15 MIN

## 2020-03-26 PROCEDURE — 36902 INTRO CATH DIALYSIS CIRCUIT: CPT

## 2020-03-26 PROCEDURE — 2709999900 HC NON-CHARGEABLE SUPPLY

## 2020-03-26 PROCEDURE — C1894 INTRO/SHEATH, NON-LASER: HCPCS

## 2020-03-26 PROCEDURE — 85027 COMPLETE CBC AUTOMATED: CPT

## 2020-03-26 PROCEDURE — C1769 GUIDE WIRE: HCPCS

## 2020-03-26 PROCEDURE — 7100000010 HC PHASE II RECOVERY - FIRST 15 MIN

## 2020-03-26 PROCEDURE — 85610 PROTHROMBIN TIME: CPT

## 2020-03-26 PROCEDURE — 85730 THROMBOPLASTIN TIME PARTIAL: CPT

## 2020-03-26 RX ORDER — SODIUM CHLORIDE 0.9 % (FLUSH) 0.9 %
10 SYRINGE (ML) INJECTION PRN
Status: DISCONTINUED | OUTPATIENT
Start: 2020-03-26 | End: 2020-03-27 | Stop reason: HOSPADM

## 2020-03-26 RX ADMIN — IOPAMIDOL 34 ML: 755 INJECTION, SOLUTION INTRAVENOUS at 12:06

## 2020-03-26 ASSESSMENT — PAIN DESCRIPTION - ORIENTATION: ORIENTATION: LEFT

## 2020-03-26 ASSESSMENT — PAIN SCALES - GENERAL
PAINLEVEL_OUTOF10: 0
PAINLEVEL_OUTOF10: 0

## 2020-03-26 ASSESSMENT — PAIN DESCRIPTION - LOCATION: LOCATION: ARM

## 2020-03-26 ASSESSMENT — PAIN - FUNCTIONAL ASSESSMENT: PAIN_FUNCTIONAL_ASSESSMENT: 0-10

## 2020-03-26 NOTE — H&P
Date:3/26/2020  Name:Madhavi Mckeon   FIJ:   ET#:[id number]    SEX:female   Referring Physician:  Toni Matta  Primary Physician:  None  Chief Complaint:  Elevated pressure in fistula  History of Present Illness:   Elevated pressure in fistula    HISTORY AND PHYSICAL  ESRD (end stage renal disease) ([facility] [N18.6]    Past Medical History:  Past Medical History:   Diagnosis Date    Acid reflux     Anemia     Arthritis     \"Shoulders, Hips And Knees\"    CAD (coronary artery disease)     Sees Dr. Marcy Bryant CHF (congestive heart failure) [facility]     Chronic back pain     Chronic constipation     Chronic kidney disease     Sees Dr. Yolande Pena COPD (chronic obstructive pulmonary disease) ([facility]     Sees Dr. Kimberli Villalba Depression     Diabetes mellitus [facility]) Dx     Sees Dr. Sia Mckeon    Emphysema lung [facility])     Glaucoma     \"Both Eyes\"    Gout     Hiatal hernia     History of blood transfusion 's    No Reaction To Blood Transfusion Received    Yu[place]aviatam (hard of hearing)     Bilateral Ears    Hyperlipidemia     Hypertension     Dr. Ceci Redman    Itching     \"Whole Body Itches The [de-identified] Of The Time\"    Morbid obesity ([facility]     Rheumatoid arthritis ([facility]     Shortness of breath on exertion     Sleep apnea     Uses CPAP    Teeth missing     Upper And Lower    Thyroid disease     Thyroidectomy In     Urinary incontinence     WD-Chronic buttock pain 2018    Wears glasses        Past Surgical History:  Past Surgical History:   Procedure Laterality Date    APPENDECTOMY  1968    BACK SURGERY  2017    Lower Back With Hardware    BACK SURGERY  2016    Cervical Back With Hardware    CARPAL TUNNEL RELEASE Right 1993     SECTION  3-30-68 And 10-17-69    COLONOSCOPY  2017    Polyps Removed    CORONARY ANGIOPLASTY  2016    Heart Stent D Circ    DENTAL SURGERY      Teeth Extracted In Past    DILATION AND CURETTAGE OF UTERUS  Last Done In [id number] [place] [address] ONE TABLET BY MOUTH DAILY WHILE ON NARCOTIC PAIN MEDICATION 20 tablet 0    pantoprazole (PROTONIX) 40 MG tablet Take 1 tablet by mouth 2 times daily 60 tablet 3    LORazepam (ATIVAN) 0.5 MG tablet Take 0.5 mg by mouth 2 times daily.  carvedilol (COREG) 6.25 MG tablet Take 6.25 mg by mouth 2 times daily      atorvastatin (LIPITOR) 80 MG tablet Take 80 mg by mouth nightly      buPROPion (WELLBUTRIN XL) 300 MG extended release tablet Take 300 mg by mouth every morning      ranolazine (RANEXA) 500 MG extended release tablet Take 500 mg by mouth 2 times daily      polyethylene glycol (GLYCOLAX) powder Take 17 g by mouth daily      QUEtiapine (SEROQUEL XR) 300 MG extended release tablet Take 50 mg by mouth nightly       levomilnacipran (FETZIMA) 40 MG CP24 extended release capsule Take 1 capsule by mouth daily (Patient taking differently: Take 20 mg by mouth nightly ) 30 capsule 0    folic acid (FOLVITE) 1 MG tablet Take 1 mg by mouth every morning       nitroGLYCERIN (NITROSTAT) 0.4 MG SL tablet Place 0.4 mg under the tongue every 5 minutes as needed for Chest pain      fluticasone (FLONASE) 50 MCG/ACT nasal spray 2 sprays by Nasal route every morning       Multiple Vitamin (DAILY-NICOLE) TABS TAKE ONE TABLET BY MOUTH DAILY 30 tablet 0    ondansetron (ZOFRAN) 4 MG tablet EVERY 4 HOURS PRN NAUSEA 60 tablet 0    clopidogrel (PLAVIX) 75 MG tablet Take 1 tablet by mouth daily 30 tablet 0    ketoconazole (NIZORAL) 2 % shampoo Apply topically daily as needed. 1 Bottle 0    REFRESH OPTIVE 0.5-0.9 % SOLN Place 2 drops into both eyes 2 times daily      guaiFENesin (MUCINEX) 600 MG extended release tablet Take 1 tablet by mouth 2 times daily 60 tablet 3    ketoconazole (NIZORAL) 2 % cream Apply topically daily Apply topically daily.  erythromycin with ethanol (THERAMYCIN) 2 % external solution Apply topically daily Apply topically daily.       fluocinonide (LIDEX) 0.05 % cream Apply topically 2 times

## 2020-03-26 NOTE — PROGRESS NOTES
1230 In to check on pt. Pt denies c/o or needs. Call light in reach. 5869 In to check on pt pt denies c/o or needs. Call light in reach. PT eager to go home. .4314 Pt up to side of bed and tolerated well. Pt eager to get dressed to go home. Dressing left upper arm dry and intact. Left upper arm soft and no hematoma. Strong bruit and thrill present left upper arm. Pt denies or needs. Call light in reach. 36 Pt discharged to home per wheelchair to private vehicle accompanied by son.

## 2020-04-15 ENCOUNTER — HOSPITAL ENCOUNTER (OUTPATIENT)
Age: 70
Setting detail: SPECIMEN
Discharge: HOME | End: 2020-04-15
Payer: COMMERCIAL

## 2020-04-15 LAB
BASOPHILS ABSOLUTE: 0 K/CU MM
BASOPHILS RELATIVE PERCENT: 0.5 % (ref 0–1)
BUN BLDV-MCNC: 51 MG/DL (ref 6–23)
CREAT SERPL-MCNC: 4.6 MG/DL (ref 0.6–1.1)
DIFFERENTIAL TYPE: ABNORMAL
EOSINOPHILS ABSOLUTE: 0.1 K/CU MM
EOSINOPHILS RELATIVE PERCENT: 1.9 % (ref 0–3)
ERYTHROCYTE SEDIMENTATION RATE: 25 MM/HR (ref 0–30)
GFR AFRICAN AMERICAN: 12 ML/MIN/1.73M2
GFR NON-AFRICAN AMERICAN: 10 ML/MIN/1.73M2
HCT VFR BLD CALC: 29.6 % (ref 37–47)
HEMOGLOBIN: 9.5 GM/DL (ref 12.5–16)
IMMATURE NEUTROPHIL %: 0.2 % (ref 0–0.43)
LYMPHOCYTES ABSOLUTE: 1.4 K/CU MM
LYMPHOCYTES RELATIVE PERCENT: 23.8 % (ref 24–44)
MCH RBC QN AUTO: 30.5 PG (ref 27–31)
MCHC RBC AUTO-ENTMCNC: 32.1 % (ref 32–36)
MCV RBC AUTO: 95.2 FL (ref 78–100)
MONOCYTES ABSOLUTE: 0.5 K/CU MM
MONOCYTES RELATIVE PERCENT: 9.1 % (ref 0–4)
NUCLEATED RBC %: 0 %
PDW BLD-RTO: 18 % (ref 11.7–14.9)
PLATELET # BLD: 296 K/CU MM (ref 140–440)
PMV BLD AUTO: 10 FL (ref 7.5–11.1)
RBC # BLD: 3.11 M/CU MM (ref 4.2–5.4)
SEGMENTED NEUTROPHILS ABSOLUTE COUNT: 3.7 K/CU MM
SEGMENTED NEUTROPHILS RELATIVE PERCENT: 64.5 % (ref 36–66)
TOTAL CK: 65 IU/L (ref 26–140)
TOTAL IMMATURE NEUTOROPHIL: 0.01 K/CU MM
TOTAL NUCLEATED RBC: 0 K/CU MM
WBC # BLD: 5.8 K/CU MM (ref 4–10.5)

## 2020-04-15 PROCEDURE — 82565 ASSAY OF CREATININE: CPT

## 2020-04-15 PROCEDURE — 84520 ASSAY OF UREA NITROGEN: CPT

## 2020-04-15 PROCEDURE — 85652 RBC SED RATE AUTOMATED: CPT

## 2020-04-15 PROCEDURE — 85025 COMPLETE CBC W/AUTO DIFF WBC: CPT

## 2020-04-15 PROCEDURE — 82550 ASSAY OF CK (CPK): CPT

## 2020-04-29 ENCOUNTER — HOSPITAL ENCOUNTER (OUTPATIENT)
Age: 70
Setting detail: SPECIMEN
Discharge: HOME | End: 2020-04-29
Payer: COMMERCIAL

## 2020-04-29 PROCEDURE — 86480 TB TEST CELL IMMUN MEASURE: CPT

## 2020-05-02 LAB
QUANTI TB1 MINUS NIL: 0 IU/ML (ref 0–0.34)
QUANTI TB2 MINUS NIL: 0 IU/ML (ref 0–0.34)
QUANTIFERON (R) TB GOLD (INCUBATED): NEGATIVE
QUANTIFERON MITOGEN MINUS NIL: 6.52 IU/ML
QUANTIFERON NIL: 0.01 IU/ML

## 2020-05-11 PROBLEM — L98.8 FISTULA: Status: ACTIVE | Noted: 2020-05-11

## 2020-06-15 ENCOUNTER — HOSPITAL ENCOUNTER (OUTPATIENT)
Age: 70
Setting detail: SPECIMEN
Discharge: HOME | End: 2020-06-15
Payer: COMMERCIAL

## 2020-06-15 LAB
ALBUMIN SERPL-MCNC: 4.1 GM/DL (ref 3.4–5)
ALP BLD-CCNC: 193 IU/L (ref 40–128)
ALT SERPL-CCNC: 23 U/L (ref 10–40)
ANION GAP SERPL CALCULATED.3IONS-SCNC: 11 MMOL/L (ref 4–16)
AST SERPL-CCNC: 23 IU/L (ref 15–37)
BILIRUB SERPL-MCNC: 0.2 MG/DL (ref 0–1)
BUN BLDV-MCNC: 61 MG/DL (ref 6–23)
CALCIUM SERPL-MCNC: 9.4 MG/DL (ref 8.3–10.6)
CHLORIDE BLD-SCNC: 103 MMOL/L (ref 99–110)
CHOLESTEROL: 163 MG/DL
CO2: 26 MMOL/L (ref 21–32)
CREAT SERPL-MCNC: 7 MG/DL (ref 0.6–1.1)
ESTIMATED AVERAGE GLUCOSE: 117 MG/DL
GFR AFRICAN AMERICAN: 7 ML/MIN/1.73M2
GFR NON-AFRICAN AMERICAN: 6 ML/MIN/1.73M2
GLUCOSE BLD-MCNC: 90 MG/DL (ref 70–99)
HBA1C MFR BLD: 5.7 % (ref 4.2–6.3)
HDLC SERPL-MCNC: 61 MG/DL
LDL CHOLESTEROL DIRECT: 86 MG/DL
POTASSIUM SERPL-SCNC: 4.1 MMOL/L (ref 3.5–5.1)
SODIUM BLD-SCNC: 140 MMOL/L (ref 135–145)
T4 FREE: 0.71 NG/DL (ref 0.9–1.8)
TOTAL PROTEIN: 6 GM/DL (ref 6.4–8.2)
TRIGL SERPL-MCNC: 124 MG/DL
TSH HIGH SENSITIVITY: 0.8 UIU/ML (ref 0.27–4.2)

## 2020-06-15 PROCEDURE — 80053 COMPREHEN METABOLIC PANEL: CPT

## 2020-06-15 PROCEDURE — 80061 LIPID PANEL: CPT

## 2020-06-15 PROCEDURE — 83721 ASSAY OF BLOOD LIPOPROTEIN: CPT

## 2020-06-15 PROCEDURE — 83036 HEMOGLOBIN GLYCOSYLATED A1C: CPT

## 2020-06-15 PROCEDURE — 84439 ASSAY OF FREE THYROXINE: CPT

## 2020-06-15 PROCEDURE — 84443 ASSAY THYROID STIM HORMONE: CPT

## 2020-08-04 ENCOUNTER — HOSPITAL ENCOUNTER (OUTPATIENT)
Dept: PHYSICAL THERAPY | Age: 70
Setting detail: THERAPIES SERIES
Discharge: HOME | End: 2020-08-04
Payer: COMMERCIAL

## 2020-08-04 PROCEDURE — 97530 THERAPEUTIC ACTIVITIES: CPT

## 2020-08-04 PROCEDURE — 97162 PT EVAL MOD COMPLEX 30 MIN: CPT

## 2020-08-04 ASSESSMENT — PAIN DESCRIPTION - DESCRIPTORS: DESCRIPTORS: SHARP;ACHING

## 2020-08-04 ASSESSMENT — PAIN DESCRIPTION - FREQUENCY: FREQUENCY: CONTINUOUS

## 2020-08-04 ASSESSMENT — PAIN DESCRIPTION - ORIENTATION: ORIENTATION: LEFT;ANTERIOR

## 2020-08-04 ASSESSMENT — PAIN SCALES - GENERAL: PAINLEVEL_OUTOF10: 7

## 2020-08-04 ASSESSMENT — PAIN DESCRIPTION - LOCATION: LOCATION: HIP

## 2020-08-04 ASSESSMENT — PAIN DESCRIPTION - PROGRESSION: CLINICAL_PROGRESSION: GRADUALLY WORSENING

## 2020-08-04 ASSESSMENT — PAIN - FUNCTIONAL ASSESSMENT: PAIN_FUNCTIONAL_ASSESSMENT: PREVENTS OR INTERFERES SOME ACTIVE ACTIVITIES AND ADLS

## 2020-08-04 NOTE — FLOWSHEET NOTE
Outpatient Physical Therapy  [place]           [x] Phone: [phone]   Fax: [phone]  [place] [facility]           [] Phone: [phone]   Fax: [phone]        Physical Therapy Daily Treatment Note  Date:  2020    Patient Name:  Cyn Verduzco    :  1950  MRN: [id number]  Restrictions/Precautions:    Diagnosis:   L Leg pain    Date of Injury/Surgery:   Treatment Diagnosis:  L hip weakness, pain, poor gait tolerance, deconditioning     Insurance/Certification information:  [employer], pre-cert required    Referring Physician: Dr. Shady Orr    Next Doctor Visit:    Plan of care signed (Y/N):  N, sent 20   Outcome Measure: LEFS:    90% disability  Visit# / total visits:  1 /10 per POC  Pain level: 7/10   Goals:        Short term goals  Time Frame for Short term goals: Defer to [address] term goals  Time Frame for Long term goals : 5 weeks 20  Long term goal 1: Pt will demo I with HEP/symptom management. Long term goal 2: Pt will demo full L hip A/PROM to ease transfers. Long term goal 3: Pt will demo L SLR and bridge without increase in L hip pain to demo improved strength. Long term goal 4: Pt will demo >4+/5 L LE strength to ease prolonged mobility. Long term goal 5: Pt will demo TUG <15 sec to demo improved gait speed. Long term goal 6: Pt will report >15/80 per LEFS to demo improved functional mobility. Patient Goals   Patient goals : improve mobility, transfers and gait       Summary of Evaluation:  Pt is 78year old female with 2 year onset of L hip pain with history of lumbar fusion further limiting function. Pt now has difficulties completing sleeping, transfers and gait activities with limited tolerance. Pt demo deficits this date that include L hip A/PROM, L hip strength in all directions, standing tolerance, gait, balance and pain. Testing this date indicate signs and symptoms of mild-mod hip OA with deficits exacerbated by deconditioning.  Pt will benefit with PT services with progression of strength/ROM, neuro re-ed and gait training to maximize function. Pt prior to onset of current condition had min pain with able to complete majority of activities. Patient agrees with established plan of care and assisted in the development of their short term and long term goals. Patient had no adverse reaction with initial treatment and there are no barriers to learning. Demonstrates no mental or cognitive disorder. Subjective:  See eval         Any changes in Ambulatory Summary Sheet? None        Objective:  See eval   Prior to today's treatment session, patient was screened for signs and symptoms related to COVID-19 including but not limited to verbally answering questions related to feeling ill, cough, or SOB, along with taking temperature via forehead thermometer. Patient presented with all negative signs and symptoms and had no fever >100 degrees Fahrenheit this date. Exercises: (No more than 4 columns)   Exercise/Equipment 8/4/20 #1 Date Date           WARM UP         Nu step             TABLE      Sitting iso hip add  10x2  2\"     Sitting marches  10x2     hooklying L hip clamshell RTB 10x2      Bridge with black pad       heelslides with SB         Hamstring stretch                         STANDING      Hip ext  10x2                                              PROPRIOCEPTION                                    MODALITIES      MHP/CP to low back or L anterior hip                 Other Therapeutic Activities/Education:  Patient received education on their current pathology and how their condition effects them with their functional activities. Patient understood discussion and questions were answered. Patient understands their activity limitations and understands rational for treatment progression. Home Exercise Program:  HO issued, reviewed and discussed with patient. Pt agreed to comply.         Manual Treatments:  --      Modalities: --      Communication with other providers: POC sent 8/4/20        Assessment:  (Response towards treatment session) (Pain Rating)       Pt is 78year old female with 2 year onset of L hip pain with history of lumbar fusion further limiting function. Pt now has difficulties completing sleeping, transfers and gait activities with limited tolerance. Pt demo deficits this date that include L hip A/PROM, L hip strength in all directions, standing tolerance, gait, balance and pain. Testing this date indicate signs and symptoms of mild-mod hip OA with deficits exacerbated by deconditioning. Pt will benefit with PT services with progression of strength/ROM, neuro re-ed and gait training to maximize function. Pt prior to onset of current condition had min pain with able to complete majority of activities. Patient agrees with established plan of care and assisted in the development of their short term and long term goals. Patient had no adverse reaction with initial treatment and there are no barriers to learning. Demonstrates no mental or cognitive disorder. Plan for Next Session:  REview HEP, open chain strengthening targeting L LE with focus on hip, light balance and weightbearing onto B LE. open chain hip strenthening in standing as well. modalities PRN.       Time In / Time Out: 6033-2024          If [facility] Please Indicate Time In/Out  CPT Code Time in Time out                                                              Timed Code/Total Treatment Minutes:  1 PT deidra, 12' TE      Next Progress Note due:  Deidra 8/4/20  Visit 10       Plan of Care Interventions:  [x] Therapeutic Exercise  [x] Modalities:  [x] Therapeutic Activity     [] Ultrasound  [x] Estim  [x] Gait Training      [] Cervical Traction [] Lumbar Traction  [x] Neuromuscular Re-education    [x] Cold/hotpack [] Iontophoresis   [x] Instruction in HEP      [x] Vasopneumatic   [] Dry Needling    [x] Manual Therapy               [] Aquatic Therapy Electronically signed by:  Gregory Ray, PT, DPT, OCS  8/4/2020, 7:32 AM      8/4/2020 7:32 AM

## 2020-08-04 NOTE — PROGRESS NOTES
Physical Therapy  Initial Assessment  Date: 2020  Patient Name: Darrin Mares  MRN: [id number]  : 1950     Treatment Diagnosis: L hip weakness, pain, poor gait tolerance, deconditioning    Restrictions       Subjective   General  Chart Reviewed: Yes  Patient assessed for rehabilitation services?: Yes  Referring Practitioner: Dr. Ja Matta  Diagnosis: L Leg Pain  Other (Comment): history of fusions into cervical and lumbar region. PT Visit Information  PT Insurance Information: [employer]  Subjective  Subjective: 2 years constant pain with difficulties with transfers, laying down with limited weightbearing tolerance. Worsening symptoms within the last few months as well. Not taking meds with exception of ibuprofen despite not suppose too due to poor kidneys requiring dialysis 3x a week. Have completed x-rays into the hip with OA. Denies N/T into L LE. No follow up scheduled at this time. Pain Screening  Patient Currently in Pain: Yes  Pain Assessment  Pain Assessment: 0-10  Pain Level: 7(Best: 5/10     Worst: >10/10)  Patient's Stated Pain Goal: No pain  Pain Location: Hip  Pain Orientation: Left; Anterior  Pain Descriptors: Erin Mediate; Aching  Pain Frequency: Continuous  Clinical Progression: Gradually worsening  Functional Pain Assessment: Prevents or interferes some active activities and ADLs  Vital Signs  Patient Currently in Pain: Yes    Vision/Hearing  Vision  Vision: Within Functional Limits  Hearing  Hearing: Within functional limits    Orientation  Orientation  Overall Orientation Status: Within Normal Limits    Social/Functional History  Social/Functional History  Type of Home: Assisted living  Home Layout: One level  ADL Assistance: Independent  Homemaking Assistance: Independent  Ambulation Assistance: Independent  Transfer Assistance: Independent  Active : No  Occupation: Retired  Leisure & Hobbies: None    Objective     Observation/Palpation  Palpation: inferior to ASIS pain with soreness surrounding extenral musculature  Observation: shifting left and right in sitting for comfort. Able to stand without UE assistance. Unable to complete full bridge with R hamstring cramp. PROM RLE (degrees)  RLE PROM: WNL  AROM RLE (degrees)  RLE AROM: WNL  PROM LLE (degrees)  LLE General PROM: WFL with pain into low back with full hip flexion. Demo normal IR. AROM LLE (degrees)  LLE General AROM: pain with inability to complete SLR on L, able to complete on R. Lumbar AR OM limited to mid tibia with flex, 25% deficit with ext and min deficit with ROT and SB with pain located into L S1 region, no increase or change in hip with all motions. Strength RLE  Strength RLE: WFL  Comment: 4+/5 in all directions  Strength LLE  Strength LLE: Exception  Comment: No increase in pain with exception of hip flexion  L Hip Flexion: 3-/5  L Hip Extension: 3+/5  L Hip ABduction: 3+/5  L Hip ADduction: 3+/5  L Hip Internal Rotation: 4-/5  L Hip External Rotation: 4-/5  L Knee Flexion: 4/5  L Knee Extension: 4/5  Strength Other  Other: Unable to complete bridge and L SLR secondary to weakness and pain . 5x sit to stand: 45 sec with UE assistance. TU.9 sec without AD. Additional Measures  Other: LEFS: 8/80   90% disability        Balance  Single Leg Stance R Le  Single Leg Stance L Le  Comments: No increase in pain with testing. Assessment   Conditions Requiring Skilled Therapeutic Intervention  Body structures, Functions, Activity limitations: Decreased functional mobility ; Decreased endurance;Decreased ROM; Decreased strength;Decreased balance  Pt is 78year old female with 2 year onset of L hip pain with history of lumbar fusion further limiting function. Pt now has difficulties completing sleeping, transfers and gait activities with limited tolerance. Pt demo deficits this date that include L hip A/PROM, L hip strength in all directions, standing tolerance, gait, balance and pain.   Testing this date indicate signs and symptoms of mild-mod hip OA with deficits exacerbated by deconditioning. Pt will benefit with PT services with progression of strength/ROM, neuro re-ed and gait training to maximize function. Pt prior to onset of current condition had min pain with able to complete majority of activities. Patient agrees with established plan of care and assisted in the development of their short term and long term goals. Patient had no adverse reaction with initial treatment and there are no barriers to learning. Demonstrates no mental or cognitive disorder. Treatment Diagnosis: L hip weakness, pain, poor gait tolerance, deconditioning  Prognosis: Fair  Decision Making: Medium Complexity  REQUIRES PT FOLLOW UP: Yes  Activity Tolerance  Activity Tolerance: Patient limited by fatigue;Patient limited by pain         Plan   Plan  Times per week: 2  Plan weeks: 5  Specific instructions for Next Treatment: REview HEP, open chain strengthening targeting L LE with focus on hip, light balance and weightbearing onto B LE. open chain hip strenthening in standing as well. modalities PRN. Current Treatment Recommendations: Strengthening, ROM, Neuromuscular Re-education, Home Exercise Program, Manual Therapy - Soft Tissue Mobilization, Modalities, Gait Training     Goals  Short term goals  Time Frame for Short term goals: Defer to [address] term goals  Time Frame for Long term goals : 5 weeks 9/11/20  Long term goal 1: Pt will demo I with HEP/symptom management. Long term goal 2: Pt will demo full L hip A/PROM to ease transfers. Long term goal 3: Pt will demo L SLR and bridge without increase in L hip pain to demo improved strength. Long term goal 4: Pt will demo >4+/5 L LE strength to ease prolonged mobility. Long term goal 5: Pt will demo TUG <15 sec to demo improved gait speed. Long term goal 6: Pt will report >15/80 per LEFS to demo improved functional mobility.   Patient Goals   Patient goals : improve mobility, transfers and gait        Jany Sharp, PT, DPT, OCS     8/4/2020 4:06 PM

## 2020-08-04 NOTE — PLAN OF CARE
Outpatient Physical Therapy           [place]           [] Phone: [phone]   Fax: [phone]  [place] [facility]           [] Phone: [phone]   Fax: [phone]     To: Referring Practitioner: Dr. Gabriela Mcelroy    From: Naty Glover, PT, DPT, OCS      Patient: Rayshawn Martinez       : 1950  Diagnosis: Diagnosis: L Leg Pain   Treatment Diagnosis: Treatment Diagnosis: L hip weakness, pain, poor gait tolerance, deconditioning   Date: 2020    Physical Therapy Certification/Re-Certification Form  Dear Dr. Gabriela Mcelroy  The following patient has been evaluated for physical therapy services and for therapy to continue, insurance requires physician review of the treatment plan initially and every 90 days. Please review the attached evaluation and/or summary of the patient's plan of care, and verify that you agree therapy should continue by signing the attached document and sending it back to our office. Assessment:      Pt is 78year old female with 2 year onset of L hip pain with history of lumbar fusion further limiting function. Pt now has difficulties completing sleeping, transfers and gait activities with limited tolerance. Pt demo deficits this date that include L hip A/PROM, L hip strength in all directions, standing tolerance, gait, balance and pain. Testing this date indicate signs and symptoms of mild-mod hip OA with deficits exacerbated by deconditioning. Pt will benefit with PT services with progression of strength/ROM, neuro re-ed and gait training to maximize function. Pt prior to onset of current condition had min pain with able to complete majority of activities. Patient agrees with established plan of care and assisted in the development of their short term and long term goals. Patient had no adverse reaction with initial treatment and there are no barriers to learning. Demonstrates no mental or cognitive disorder.       Plan of Care/Treatment to date:  [x] Therapeutic Exercise  [x] Modalities:  [x] Therapeutic Activity     [] Ultrasound  [x] Electrical Stimulation  [x] Gait Training      [] Cervical Traction [] Lumbar Traction  [x] Neuromuscular Re-education    [x] Cold/hotpack [] Iontophoresis   [x] Instruction in HEP      [] Vasopneumatic     [x] Manual Therapy               [] Aquatic Therapy       Other:          Frequency/Duration:  # Days per week: [] 1 day # Weeks: [] 1 week [x] 5 weeks     [x] 2 days   [] 2 weeks [] 6 weeks     [] 3 days   [] 3 weeks [] 7 weeks     [] 4 days   [] 4 weeks [] 8 weeks         [] 9 weeks [] 10 weeks         [] 11 weeks [] 12 weeks    Rehab Potential/Progress: [] Excellent [x] Good [] Fair  [] Poor     Goals:      Short term goals  Time Frame for Short term goals: Defer to [address] term goals  Time Frame for Long term goals : 5 weeks 9/11/20  Long term goal 1: Pt will demo I with HEP/symptom management. Long term goal 2: Pt will demo full L hip A/PROM to ease transfers. Long term goal 3: Pt will demo L SLR and bridge without increase in L hip pain to demo improved strength. Long term goal 4: Pt will demo >4+/5 L LE strength to ease prolonged mobility. Long term goal 5: Pt will demo TUG <15 sec to demo improved gait speed. Long term goal 6: Pt will report >15/80 per LEFS to demo improved functional mobility. Electronically signed by:  Nina Donahue, PT, DPT, OCS  8/4/2020, 4:06 PM    8/4/2020 4:06 PM         If you have any questions or concerns, please don't hesitate to call.   Thank you for your referral.      Physician Signature:________________________________Date:_________ TIME: _____  By signing above, therapists plan is approved by physician

## 2020-08-18 ENCOUNTER — HOSPITAL ENCOUNTER (OUTPATIENT)
Dept: PHYSICAL THERAPY | Age: 70
Setting detail: THERAPIES SERIES
Discharge: HOME | End: 2020-08-18
Payer: COMMERCIAL

## 2020-08-18 PROCEDURE — 97110 THERAPEUTIC EXERCISES: CPT

## 2020-08-18 PROCEDURE — G0283 ELEC STIM OTHER THAN WOUND: HCPCS

## 2020-08-18 PROCEDURE — 97140 MANUAL THERAPY 1/> REGIONS: CPT

## 2020-08-18 NOTE — FLOWSHEET NOTE
Outpatient Physical Therapy  [place]           [x] Phone: [phone]   Fax: [phone]  [place]           [] Phone: [phone]   Fax: [phone]        Physical Therapy Daily Treatment Note  Date:  2020    Patient Name:  Loli Carpenter    :  1950  MRN: [id number]  Restrictions/Precautions:    Diagnosis:   L Leg pain    Date of Injury/Surgery:   Treatment Diagnosis:  L hip weakness, pain, poor gait tolerance, deconditioning     Insurance/Certification information:  [employer], pre-cert required    Referring Physician: Dr. Wong South    Next Doctor Visit:    Plan of care signed (Y/N):  Yes  Outcome Measure: LEFS:    90% disability  Visit# / total visits:  2/10 approved to 10/4/20   Pain level: 8/10 on R glute  Goals:        Short term goals  Time Frame for Short term goals: Defer to [address] term goals  Time Frame for Long term goals : 5 weeks 20  Long term goal 1: Pt will demo I with HEP/symptom management. Long term goal 2: Pt will demo full L hip A/PROM to ease transfers. Long term goal 3: Pt will demo L SLR and bridge without increase in L hip pain to demo improved strength. Long term goal 4: Pt will demo >4+/5 L LE strength to ease prolonged mobility. Long term goal 5: Pt will demo TUG <15 sec to demo improved gait speed. Long term goal 6: Pt will report >15/80 per LEFS to demo improved functional mobility. Patient Goals   Patient goals : improve mobility, transfers and gait       Summary of Evaluation:  Pt is 78year old female with 2 year onset of L hip pain with history of lumbar fusion further limiting function. Pt now has difficulties completing sleeping, transfers and gait activities with limited tolerance. Pt demo deficits this date that include L hip A/PROM, L hip strength in all directions, standing tolerance, gait, balance and pain. Testing this date indicate signs and symptoms of mild-mod hip OA with deficits exacerbated by deconditioning.  Pt will benefit with PT services with progression of strength/ROM, neuro re-ed and gait training to maximize function. Pt prior to onset of current condition had min pain with able to complete majority of activities. Patient agrees with established plan of care and assisted in the development of their short term and long term goals. Patient had no adverse reaction with initial treatment and there are no barriers to learning. Demonstrates no mental or cognitive disorder. Subjective:  States increase in R buttock pain with \"sciatica\" ~1 week ago. Doing better into L hip currently but troubles sleeping with waking after any motion. Completed a little of HEP with focus on L. Feels unstable now. First flair up in 7 years. Any changes in Ambulatory Summary Sheet? None        Objective:      Prior to today's treatment session, patient was screened for signs and symptoms related to COVID-19 including but not limited to verbally answering questions related to feeling ill, cough, or SOB, along with taking temperature via forehead thermometer. Patient presented with all negative signs and symptoms and had no fever >100 degrees Fahrenheit this date. Entered therapy without AD with poor gait with increased lateral trunk lean, poor step length and reduced weightbearing in stance on R LE   Limited weightbearing tolerance secondary to R glute pain. Difficulty with transition on bed secondary to R glute pain. Pain in sitting \"feels like sitting on bone. \"     Exercises: (No more than 4 columns)   Exercise/Equipment 8/4/20 #1 8/18/20  #2 Date           WARM UP         Nu step   4'  Lv5           TABLE      Sitting iso hip add  10x2  2\" 10x2   2\"     Sitting marches  10x2 10x2    hooklying L hip clamshell RTB 10x2  No resistance 10x2    Bridge with black pad       heelslides with SB         Hamstring stretch                         STANDING      Hip ext  10x2 --                                             PROPRIOCEPTION MODALITIES      MHP/CP to low back or L anterior hip   Estim/MHP to R glute region x15'               Other Therapeutic Activities/Education:  Will send note to referring physician for possible consideration for rollator. Home Exercise Program:  HO issued, reviewed and discussed with patient. Pt agreed to comply. Manual Treatments:  R LE distraction, R piriformis stretch, roller with STM to glute region x15'       Modalities:   Estim to R mid glute in sitting , 80/150 Hz, pre-mod, continuous, 16.5 volts w/ MHP x15' for pain management. No adverse effects. Communication with other providers: POC sent 8/4/20        Assessment:  (Response towards treatment session) (Pain Rating)    Pt entered with elevated pain on R glute with L LE typical pain. Recent breaking of rollator further increasing difficulty with standing and mobility activities. Appears LBP referring pain into glute region further increasing pt disability. Without walker and/or rollator, pt pain likely to continue and safety be compromised. Will send note to referring physician to request rollator for greater functional ease to help manage condition and ease at home. Pt agrees with this plan. Plan for Next Session:    open chain strengthening targeting L LE with focus on hip, light balance and weightbearing onto B LE. open chain hip strenthening in standing as well. modalities PRN.       Time In / Time Out: 1346-  1440      If [facility] Please Indicate Time In/Out  CPT Code Time in Time out                                                              Timed Code/Total Treatment Minutes:   39/54      15' manual, 24' TE, 15' unattended Estim       Next Progress Note due:  Deidra 8/4/20  Visit 10       Plan of Care Interventions:  [x] Therapeutic Exercise  [x] Modalities:  [x] Therapeutic Activity     [] Ultrasound  [x] Estim  [x] Gait Training      [] Cervical Traction [] Lumbar Traction  [x] Neuromuscular

## 2020-08-25 ENCOUNTER — HOSPITAL ENCOUNTER (OUTPATIENT)
Dept: PHYSICAL THERAPY | Age: 70
Discharge: HOME | End: 2020-08-25

## 2020-08-25 ENCOUNTER — APPOINTMENT (OUTPATIENT)
Dept: GENERAL RADIOLOGY | Age: 70
End: 2020-08-25
Payer: COMMERCIAL

## 2020-08-25 ENCOUNTER — HOSPITAL ENCOUNTER (OUTPATIENT)
Dept: CT IMAGING | Age: 70
Discharge: HOME | End: 2020-08-25
Payer: COMMERCIAL

## 2020-08-25 ENCOUNTER — HOSPITAL ENCOUNTER (EMERGENCY)
Age: 70
Discharge: ANOTHER HOSPITAL | End: 2020-08-25
Attending: EMERGENCY MEDICINE
Payer: COMMERCIAL

## 2020-08-25 ENCOUNTER — HOSPITAL ENCOUNTER (OUTPATIENT)
Dept: INTERVENTIONAL RADIOLOGY/VASCULAR | Age: 70
Discharge: HOME | End: 2020-08-25
Payer: COMMERCIAL

## 2020-08-25 VITALS
TEMPERATURE: 98 F | OXYGEN SATURATION: 99 % | RESPIRATION RATE: 18 BRPM | WEIGHT: 204 LBS | DIASTOLIC BLOOD PRESSURE: 84 MMHG | SYSTOLIC BLOOD PRESSURE: 131 MMHG | BODY MASS INDEX: 35.02 KG/M2 | HEART RATE: 90 BPM

## 2020-08-25 VITALS
TEMPERATURE: 97.2 F | HEART RATE: 88 BPM | OXYGEN SATURATION: 100 % | WEIGHT: 204 LBS | SYSTOLIC BLOOD PRESSURE: 137 MMHG | BODY MASS INDEX: 34.83 KG/M2 | HEIGHT: 64 IN | RESPIRATION RATE: 16 BRPM | DIASTOLIC BLOOD PRESSURE: 54 MMHG

## 2020-08-25 LAB
GLUCOSE BLD-MCNC: 65 MG/DL (ref 70–99)
GLUCOSE BLD-MCNC: 91 MG/DL (ref 70–99)
GLUCOSE BLD-MCNC: 91 MG/DL (ref 70–99)

## 2020-08-25 PROCEDURE — 93005 ELECTROCARDIOGRAM TRACING: CPT | Performed by: EMERGENCY MEDICINE

## 2020-08-25 PROCEDURE — C1894 INTRO/SHEATH, NON-LASER: HCPCS

## 2020-08-25 PROCEDURE — 2720000010 HC SURG SUPPLY STERILE

## 2020-08-25 PROCEDURE — C1750 CATH, HEMODIALYSIS,LONG-TERM: HCPCS

## 2020-08-25 PROCEDURE — 2709999900 HC NON-CHARGEABLE SUPPLY

## 2020-08-25 PROCEDURE — 70450 CT HEAD/BRAIN W/O DYE: CPT

## 2020-08-25 PROCEDURE — 2580000003 HC RX 258: Performed by: INTERNAL MEDICINE

## 2020-08-25 PROCEDURE — C1769 GUIDE WIRE: HCPCS

## 2020-08-25 PROCEDURE — 93010 ELECTROCARDIOGRAM REPORT: CPT | Performed by: INTERNAL MEDICINE

## 2020-08-25 PROCEDURE — 82962 GLUCOSE BLOOD TEST: CPT

## 2020-08-25 PROCEDURE — 6360000004 HC RX CONTRAST MEDICATION: Performed by: INTERNAL MEDICINE

## 2020-08-25 PROCEDURE — 6360000002 HC RX W HCPCS: Performed by: RADIOLOGY

## 2020-08-25 PROCEDURE — 36215 PLACE CATHETER IN ARTERY: CPT

## 2020-08-25 PROCEDURE — 96374 THER/PROPH/DIAG INJ IV PUSH: CPT

## 2020-08-25 PROCEDURE — 37195 THROMBOLYTIC THERAPY STROKE: CPT

## 2020-08-25 PROCEDURE — 75710 ARTERY X-RAYS ARM/LEG: CPT

## 2020-08-25 PROCEDURE — 36904 THRMBC/NFS DIALYSIS CIRCUIT: CPT

## 2020-08-25 PROCEDURE — 99285 EMERGENCY DEPT VISIT HI MDM: CPT

## 2020-08-25 PROCEDURE — C1757 CATH, THROMBECTOMY/EMBOLECT: HCPCS

## 2020-08-25 PROCEDURE — 85610 PROTHROMBIN TIME: CPT

## 2020-08-25 PROCEDURE — 6360000002 HC RX W HCPCS: Performed by: EMERGENCY MEDICINE

## 2020-08-25 RX ORDER — DEXTROSE MONOHYDRATE 25 G/50ML
12.5 INJECTION, SOLUTION INTRAVENOUS ONCE
Status: COMPLETED | OUTPATIENT
Start: 2020-08-25 | End: 2020-08-25

## 2020-08-25 RX ORDER — MIDAZOLAM HYDROCHLORIDE 1 MG/ML
2 INJECTION INTRAMUSCULAR; INTRAVENOUS ONCE
Status: COMPLETED | OUTPATIENT
Start: 2020-08-25 | End: 2020-08-25

## 2020-08-25 RX ORDER — FENTANYL CITRATE 50 UG/ML
100 INJECTION, SOLUTION INTRAMUSCULAR; INTRAVENOUS ONCE
Status: COMPLETED | OUTPATIENT
Start: 2020-08-25 | End: 2020-08-25

## 2020-08-25 RX ORDER — SODIUM CHLORIDE 0.9 % (FLUSH) 0.9 %
10 SYRINGE (ML) INJECTION PRN
Status: DISCONTINUED | OUTPATIENT
Start: 2020-08-25 | End: 2020-08-25 | Stop reason: HOSPADM

## 2020-08-25 RX ORDER — SODIUM CHLORIDE 0.9 % (FLUSH) 0.9 %
10 SYRINGE (ML) INJECTION EVERY 12 HOURS SCHEDULED
Status: DISCONTINUED | OUTPATIENT
Start: 2020-08-25 | End: 2020-08-25 | Stop reason: HOSPADM

## 2020-08-25 RX ORDER — DEXTROSE MONOHYDRATE 25 G/50ML
12.5 INJECTION, SOLUTION INTRAVENOUS
Status: DISCONTINUED | OUTPATIENT
Start: 2020-08-25 | End: 2020-08-25 | Stop reason: HOSPADM

## 2020-08-25 RX ORDER — SODIUM CHLORIDE 0.9 % (FLUSH) 0.9 %
10 SYRINGE (ML) INJECTION PRN
Status: DISCONTINUED | OUTPATIENT
Start: 2020-08-25 | End: 2020-08-26 | Stop reason: HOSPADM

## 2020-08-25 RX ORDER — SODIUM CHLORIDE 9 MG/ML
INJECTION, SOLUTION INTRAVENOUS CONTINUOUS
Status: DISCONTINUED | OUTPATIENT
Start: 2020-08-25 | End: 2020-08-25 | Stop reason: HOSPADM

## 2020-08-25 RX ORDER — 0.9 % SODIUM CHLORIDE 0.9 %
50 INTRAVENOUS SOLUTION INTRAVENOUS ONCE
Status: DISCONTINUED | OUTPATIENT
Start: 2020-08-25 | End: 2020-08-25 | Stop reason: HOSPADM

## 2020-08-25 RX ADMIN — DEXTROSE MONOHYDRATE 12.5 G: 25 INJECTION, SOLUTION INTRAVENOUS at 13:45

## 2020-08-25 RX ADMIN — ALTEPLASE 74.9 MG: KIT at 15:18

## 2020-08-25 RX ADMIN — FENTANYL CITRATE 100 MCG: 50 INJECTION INTRAMUSCULAR; INTRAVENOUS at 12:41

## 2020-08-25 RX ADMIN — ALTEPLASE 4 MG: 2.2 INJECTION, POWDER, LYOPHILIZED, FOR SOLUTION INTRAVENOUS at 11:48

## 2020-08-25 RX ADMIN — MIDAZOLAM 2 MG: 1 INJECTION INTRAMUSCULAR; INTRAVENOUS at 12:42

## 2020-08-25 RX ADMIN — IOPAMIDOL 40 ML: 755 INJECTION, SOLUTION INTRAVENOUS at 13:19

## 2020-08-25 RX ADMIN — ALTEPLASE 4 MG: 2.2 INJECTION, POWDER, LYOPHILIZED, FOR SOLUTION INTRAVENOUS at 12:41

## 2020-08-25 ASSESSMENT — PAIN SCALES - GENERAL: PAINLEVEL_OUTOF10: 0

## 2020-08-25 NOTE — PROGRESS NOTES
1312 IR initiated stroke alert. Patient new onset of left facial droop, left tongue deviation, left sided weakness and slurred speech. Dr. David Sanderson and Dr. Doe Mace at bedside. Dr. Nathan Jernigan consulted. Decision made to tx patient to ICU for evaluation by telestroke. Patient placed on ICU monitor and tx to CT with this RN and Vianney Landrum IR RN.     3602 BG 65. Treated with dextrose 50% per hyperglycemia protocol. Rechecked after 15 minutes, recheck 92.      1350 Dr. Christiano Verma evaluating patient on telestroke. Recommending TPA after evaluation. As patient is not admitted as inpatient, must be tx to ED for administration of TPA and tx to [address] [place] [address]. Dr. Christiano Verma in communication with Dr. Colin Mcnulty who is assuming care of the patient.

## 2020-08-25 NOTE — ED PROVIDER NOTES
nausea, vomiting, bloody stools or diarrhea  :  Denies dysuria  Musculoskeletal:  Denies back pain or joint pain  Integument:  Denies rash  Neurologic:  See HPI  \"Remaining review of systems reviewed and negative. I have reviewed the nursing triage documentation and agree unless otherwise noted below. \"      PAST MEDICAL HISTORY:   Past Medical History:   Diagnosis Date    Acid reflux     Anemia     Arthritis     \"Shoulders, Hips And Knees\"    CAD (coronary artery disease)     Sees Dr. Lanning Eisenmenger CHF (congestive heart failure) ([facility]     Chronic back pain     Chronic constipation     Chronic kidney disease     Sees Dr. Joan Ellison COPD (chronic obstructive pulmonary disease) ([facility]     Sees Dr. Christie Smith Depression     Diabetes mellitus [facility]) Dx 2012    Sees Dr. Sheri Fernandez    Emphysema lung [facility])     Glaucoma     \"Both Eyes\"    Gout     Hiatal hernia     History of blood transfusion 's    No Reaction To Blood Transfusion Received    [place] (hard of hearing)     Bilateral Ears    Hyperlipidemia     Hypertension     Dr. Rupa Dillard    Itching     \"Whole Body Itches The [de-identified] Of The Time\"    Morbid obesity ([address]     Rheumatoid arthritis ([facility] [address]     Shortness of breath on exertion     Sleep apnea     Uses CPAP    Teeth missing     Upper And Lower    Thyroid disease     Thyroidectomy In     Urinary incontinence     WD-Chronic buttock pain 2018    Wears glasses        CURRENT MEDICATIONS:   Home medications reviewed.     SURGICAL HISTORY:   Past Surgical History:   Procedure Laterality Date    APPENDECTOMY  1968    BACK SURGERY  2017    Lower Back With Hardware    BACK SURGERY  2016    Cervical Back With Hardware    CARPAL TUNNEL RELEASE Right 1993     SECTION  3-30-68 And 10-17-69    COLONOSCOPY  2017    Polyps Removed    CORONARY ANGIOPLASTY  2016    Heart Stent D Circ    DENTAL SURGERY      Teeth Extracted In Past    DILATION AND CURETTAGE OF UTERUS Relationship status: Not on file    Intimate partner violence     Fear of current or ex partner: Not on file     Emotionally abused: Not on file     Physically abused: Not on file     Forced sexual activity: Not on file   Other Topics Concern    Not on file   Social History Narrative    Not on file       ALLERGIES: Percocet [oxycodone-acetaminophen]; Tramadol; Vicodin [hydrocodone-acetaminophen]; and Narcan [naloxone hcl]    PHYSICAL EXAM:  VITAL SIGNS:   ED Triage Vitals   Enc Vitals Group      BP 08/25/20 1334 125/79      Pulse 08/25/20 1334 95      Resp 08/25/20 1334 16      Temp 08/25/20 1334 98.1 °F (36.7 °C)      Temp Source 08/25/20 1334 Oral      SpO2 08/25/20 1451 100 %      Weight 08/25/20 1433 204 lb (92.5 kg)      Height --       Head Circumference --       Peak Flow --       Pain Score --       Pain Loc --       Pain Edu? --       Excl. in [address]? --      Constitutional:  Non-toxic appearance  HENT: Normocephalic, Atraumatic, Bilateral external ears normal, Oropharynx moist, No oral exudates, Nose normal.  Eyes:  PERRL, EOMI, Conjunctiva normal, No discharge. Neck: Normal range of motion, No tenderness, Supple, No stridor, No lymphadenopathy . Cardiovascular:  Normal heart rate, Normal rhythm  Pulmonary/Chest:  Normal breath sounds, No respiratory distress, No wheezing  Abdomen: Bowel sounds normal, Soft, No tenderness, No masses, No pulsatile masses  Back:  No tenderness, No CVA tenderness  Extremities:  Normal range of motion, Intact distal pulses, No edema, No tenderness  Skin: Warm, Dry, No rash  Neurological:      Mental Status Exam:   Alert and oriented times three, follows commands, speech slurred, language intact    Cranial [id number] Intact.     Cranial nerve II  Visual acuity: normal  Cranial nerve III  Pupils: equal, round, reactive to light  Cranial nerves III, IV, VI  Extraocular Movements: intact  Cranial nerve V  Facial sensation: intact  Cranial nerve VII  Facial strength: decreased on the left  Cranial nerve VIII  Hearing: intact  Cranial nerve IX  Palate: intact  Cranial nerve XI  Shoulder shrug: intact  Cranial nerve XII  Tongue movement: deviated to the left    Motor:   Drift: absent  Motor exam is 5/5/strength RUE and RLE, 4/5 strength LUE and LLE   Tone: normal  Abnormal Movements: Absent    DTRs- intact bilaterally and symmetrical.  Toes-downgoing bilaterally  Sensory:  Light Touch  Right Upper Extremity: normal  Left Upper Extremity: normal  Right Lower Extremity: normal  Left Lower Extremity: normal    NIH Stroke Scale  1a  Level of consciousness: 0=alert; keenly responsive   1b. LOC questions:  0=Performs both tasks correctly   1c. LOC commands: 0=Performs both tasks correctly   2. Best Gaze: 0=normal   3. Visual: 0=No visual loss   4. Facial Palsy: 1=Minor paralysis (flattened nasolabial fold, asymmetric on smiling)   5a. Motor left arm: 1=Drift, limb holds 90 (or 45) degrees but drifts down before full 10 seconds: does not hit bed   5b. Motor right arm: 0=No drift, limb holds 90 (or 45) degrees for full 10 seconds   6a. motor left le=Drift, limb holds 90 (or 45) degrees but drifts down before full 10 seconds: does not hit bed   6b  Motor right le=No drift, limb holds 90 (or 45) degrees for full 10 seconds   7. Limb Ataxia: 0=Absent   8. Sensory: 0=Normal; no sensory loss   9. Best Language:  0=No aphasia, normal   10. Dysarthria: 1=Mild to moderate, patient slurs at least some words and at worst, can be understood with some difficulty   11. Extinction and Inattention: 1=Visual, tactile, auditory, spatial or personal inattention or extinction to bilateral simultaneous stimulation in one of the sensory modalities   12.  Distal motor function: 0=Normal    Total:   4       EKG Interpretation  Interpreted by me  Compared to 10/21/18  Rhythm: normal sinus  Rate: normal 90  Axis: normal  Ectopy: none  Conduction: normal  ST Segments: no acute change  T Waves: no acute change  Clinical Impression: normal sinus rhythm, no acute change    Cardiac Monitor Strip Interpretation  Interpreted by me  Monitor strip interpreted for greater than 10 seconds  Rhythm: normal sinus  Rate: normal  Ectopy: none  ST Segments: normal      Radiology / Procedures:  EXAMINATION:    CT OF THE HEAD WITHOUT CONTRAST  8/25/2020 1:36 pm         TECHNIQUE:    CT of the head was performed without the administration of intravenous    contrast. Dose modulation, iterative reconstruction, and/or weight based    adjustment of the mA/kV was utilized to reduce the radiation dose to as low    as reasonably achievable.         COMPARISON:    10/21/2018         HISTORY:    ORDERING SYSTEM PROVIDED HISTORY: Weakness    TECHNOLOGIST PROVIDED HISTORY:    Reason for exam:->r/ cva    Reason for Exam: left side weakness, r/o cva    Acuity: Acute    Type of Exam: Initial    Additional signs and symptoms: none    Relevant Medical/Surgical History: none         FINDINGS:    BRAIN/VENTRICLES: The ventricles and sulci are diffusely enlarged.  Low    attenuation is seen in the periventricular and subcortical white matter.  No    acute intracranial hemorrhage or acute infarct is identified         ORBITS: The visualized portion of the orbits demonstrate no acute abnormality.         SINUSES: The visualized paranasal sinuses and mastoid air cells demonstrate    no acute abnormality.         SOFT TISSUES/SKULL:  No acute abnormality of the visualized skull or soft    tissues.              Impression    No acute intracranial abnormality.         Findings were discussed with BECCA ANGEL at 2:01 pm on 8/25/2020. Labs Reviewed   CBC WITH AUTO DIFFERENTIAL   COMPREHENSIVE METABOLIC PANEL W/ REFLEX TO MG FOR LOW K   TROPONIN   PROTIME-INR   POCT GLUCOSE   POCT GLUCOSE   POCT GLUCOSE     Stroke alert timing details  1. Physician evaluation performed at: Performed by the hospitalist at 13:15  2. Stroke alert called at: 13:12  3. CT results obtained at: 14:01  4. Decision to administer tPA at: 14:50  5. Patient/family or educated on patient's workup, results, and the decision by neurology in conjunction with emergency physician to recommend that the patient received tPA, family/patient were given TPA information/education sheet, which they reviewed, risks benefits and alternatives were discussed with the patient/family, they understand and are willing to assume all the risks, and did consent to receiving the medication  6. tPA initiated at: Infusion started at 15:18. She received 8 mg during the IR procedure which is equivalent to the bolus dose so she was not given an additional bolus    ED COURSE & MEDICAL DECISION MAKING:  Pertinent Labs & Imaging studies reviewed. (See chart for details)  On exam, the patient is afebrile and nontoxic appearing. She is hemodynamically stable. She has neurological deficits as above. EKG shows a normal sinus rhythm with no ST elevation or depression. Labs are ordered, but blood was unable to be obtained despite multiple attempts. CT head is negative for acute intracranial abnormality. The patient was evaluated by the stroke neurologist at [facility], Dr. Emily Lopez, via the telestroke unit when the patient was in the ICU. He again evaluated her on the robot while she was in the ER. He is recommending a TPA infusion of 74.9 mg.  He does not want the bolus given as she received 8 mg of TPA during the IR procedure. I discussed the risks, benefits and alternatives with the patient and her son Wilberto Alberto ([phone]) who are both in agreement with the TPA. I suspect that the patient has left-sided facial droop and slurred speech secondary to an ischemic stroke. I have a low suspicion for intracranial hemorrhage, seizures, status epilepticus, brain mass, cerebral edema, meningitis or sepsis. I recommended transfer to [facility] for further evaluation and the patient was agreeable.   The patient was accepted to the [facility] ER under Dr. Shahzad Mendoza. Clinical Impression:  1. Cerebrovascular accident (CVA), unspecified mechanism ([address]        Comment: Please note this report has been produced using speech recognition software and may contain errors related to that system including errors in grammar, punctuation, and spelling, as well as words and phrases that may be inappropriate. If there are any questions or concerns please feel free to contact the dictating provider for clarification.                  Annie Atkinson MD  08/26/20 [id number]

## 2020-08-25 NOTE — ED PROVIDER NOTES
I was asked by unit clerk to take a call from the stroke attending at [facility], this patient was in interventional radiology for an outpatient procedure, stroke alert had been called for new facial bruit droop during the procedure, was having a fistulogram.  Our team had actually responded but the hospitalist team and IR team were already there and had things under control, we had come back to the emergency department. Patient was apparently an outpatient having a fistulogram, they thought she was an inpatient and had taken her back to the ICU for the tele-stroke visit, however it was cut off in the middle and they brought her down to the emergency department as she was not an inpatient. They did want her to get TPA and Dr. Marylene Muzzy was calling to clarify the TPA orders as it appears that she received TPA during the procedure. I was able to pull up her chart from earlier, she received 4 mg at 1140 and another 4 mg at 1241. The last known well has been documented at 1245 per the team that was with her and their discussion with the stroke team.  Given this he thinks that she would not need a repeat bolus but that she would need only the IV infusion and then they would plan for transfer to [facility]. While we were discussing all of this she was moved into our bed 37 and care was initiated in the emergency department, I have communicated this plan to Ottoniel Grissom who had evaluated her immediately upon arrival to the room. Dr. Marylene Muzzy did want to get back on the stroke robot and speak with the patient again to clarify last known well and ensure that we were doing the appropriate dosing. Nursing staff was notified and there in with the stroke robot at this time. Ottoniel Grissom will order TPA.       Katerina Marino MD  08/25/20 6486

## 2020-08-25 NOTE — ED NOTES
Bed: ED-37  Expected date:   Expected time:   Means of arrival:   Comments:  IR stroke alert     Jo Redman RN  08/25/20 0103

## 2020-08-25 NOTE — ED NOTES
Pt to the ED from IR. Stroke alert called for left sided facial droop, left sided weakness, and slurred speech. LKW is [phone]. Per pt left sided weakness chronic. Pt did speak to Dr. Chema Ruiz in IR. Pt is a TPA candidate and did get some TPA during procedure.       Lisa Wiggins RN  08/25/20 [address] [facility]  08/25/20 4676

## 2020-08-25 NOTE — FLOWSHEET NOTE
Patients Plan of Care was received and signed. Signed POC was scanned and placed in the patients chart.     Heber Mckeon

## 2020-08-25 NOTE — ED NOTES
Dr. Simon Serrano would like to speak to pt again on the stroke robot.       Leticia Ceballos RN  08/25/20 7312

## 2020-08-25 NOTE — H&P
History Obtained From:  Patient    HISTORY OF PRESENT ILLNESS:      The patient is a 78 y.o. female with significant past medical history of  esrd on hd with a left av graft that is clotted for approx 4 days now. Patient here for declot with possible permacath placement. Past Medical History:    esrd on hd    Past Surgical History:    Left av graft    Allergies:  Percocet [oxycodone-acetaminophen]; Tramadol; Vicodin [hydrocodone-acetaminophen]; and Narcan [naloxone hcl]       REVIEW OF SYSTEMS:  +left av graft clotted    PHYSICAL EXAM:    Vitals:  BP (!) 104/55   Pulse 99   Temp 97.2 °F (36.2 °C) (Temporal)   Resp 16   Ht 5' 4\" (1.626 m)   Wt 204 lb (92.5 kg)   SpO2 98%   BMI 35.02 kg/m²     Elderly AA female in nad resting on bed  +left av graft with no pulse  AAOx3 Intact grossly    ASSESSMENT AND PLAN:      +Left AV graftogram with lysis/interventions with possible placement of permacath. Consent obtained. Risks and benefits explained. D/W Dr. Corina Mcqueen from Nephrology.

## 2020-08-25 NOTE — PROGRESS NOTES
Took patient to cat scan. Placed her to ICU room #7424. Called the patients son Chino Alexis and notified him that his mom was admitted under stroke protocols and we would give him any updates when they become available.

## 2020-08-25 NOTE — ED NOTES
Stroke robot in the pt room and neurologist on at this time     Jose Console, [facility]  08/25/20 4591

## 2020-08-27 ENCOUNTER — HOSPITAL ENCOUNTER (OUTPATIENT)
Dept: PHYSICAL THERAPY | Age: 70
Discharge: HOME | End: 2020-08-27

## 2020-08-28 LAB
EKG ATRIAL RATE: 90 BPM
EKG DIAGNOSIS: NORMAL
EKG P AXIS: 64 DEGREES
EKG P-R INTERVAL: 186 MS
EKG Q-T INTERVAL: 420 MS
EKG QRS DURATION: 102 MS
EKG QTC CALCULATION (BAZETT): 513 MS
EKG R AXIS: 16 DEGREES
EKG T AXIS: 26 DEGREES
EKG VENTRICULAR RATE: 90 BPM

## 2020-09-14 ENCOUNTER — HOSPITAL ENCOUNTER (OUTPATIENT)
Age: 70
Setting detail: SPECIMEN
Discharge: HOME | End: 2020-09-14
Payer: COMMERCIAL

## 2020-09-14 LAB
ALBUMIN SERPL-MCNC: 4 GM/DL (ref 3.4–5)
ALP BLD-CCNC: 232 IU/L (ref 40–128)
ALT SERPL-CCNC: 11 U/L (ref 10–40)
ANION GAP SERPL CALCULATED.3IONS-SCNC: 16 MMOL/L (ref 4–16)
AST SERPL-CCNC: 14 IU/L (ref 15–37)
BILIRUB SERPL-MCNC: 0.2 MG/DL (ref 0–1)
BUN BLDV-MCNC: 59 MG/DL (ref 6–23)
CALCIUM SERPL-MCNC: 9 MG/DL (ref 8.3–10.6)
CHLORIDE BLD-SCNC: 100 MMOL/L (ref 99–110)
CHOLESTEROL: 197 MG/DL
CO2: 24 MMOL/L (ref 21–32)
CREAT SERPL-MCNC: 8.8 MG/DL (ref 0.6–1.1)
ESTIMATED AVERAGE GLUCOSE: 108 MG/DL
GFR AFRICAN AMERICAN: 5 ML/MIN/1.73M2
GFR NON-AFRICAN AMERICAN: 4 ML/MIN/1.73M2
GLUCOSE BLD-MCNC: 96 MG/DL (ref 70–99)
HBA1C MFR BLD: 5.4 % (ref 4.2–6.3)
HDLC SERPL-MCNC: 46 MG/DL
LDL CHOLESTEROL DIRECT: 104 MG/DL
POTASSIUM SERPL-SCNC: 4 MMOL/L (ref 3.5–5.1)
SODIUM BLD-SCNC: 140 MMOL/L (ref 135–145)
T4 FREE: 0.7 NG/DL (ref 0.9–1.8)
TOTAL PROTEIN: 6 GM/DL (ref 6.4–8.2)
TRIGL SERPL-MCNC: 256 MG/DL
TSH HIGH SENSITIVITY: 12.3 UIU/ML (ref 0.27–4.2)

## 2020-09-14 PROCEDURE — 80053 COMPREHEN METABOLIC PANEL: CPT

## 2020-09-14 PROCEDURE — 83036 HEMOGLOBIN GLYCOSYLATED A1C: CPT

## 2020-09-14 PROCEDURE — 84443 ASSAY THYROID STIM HORMONE: CPT

## 2020-09-14 PROCEDURE — 83721 ASSAY OF BLOOD LIPOPROTEIN: CPT

## 2020-09-14 PROCEDURE — 80061 LIPID PANEL: CPT

## 2020-09-14 PROCEDURE — 84439 ASSAY OF FREE THYROXINE: CPT

## 2020-10-06 ENCOUNTER — OFFICE VISIT (OUTPATIENT)
Dept: ORTHOPEDIC SURGERY | Age: 70
End: 2020-10-06
Payer: COMMERCIAL

## 2020-10-06 VITALS
RESPIRATION RATE: 15 BRPM | WEIGHT: 204 LBS | BODY MASS INDEX: 34.83 KG/M2 | HEIGHT: 64 IN | OXYGEN SATURATION: 96 % | HEART RATE: 66 BPM

## 2020-10-06 PROCEDURE — 1123F ACP DISCUSS/DSCN MKR DOCD: CPT | Performed by: ORTHOPAEDIC SURGERY

## 2020-10-06 PROCEDURE — G8417 CALC BMI ABV UP PARAM F/U: HCPCS | Performed by: ORTHOPAEDIC SURGERY

## 2020-10-06 PROCEDURE — G8482 FLU IMMUNIZE ORDER/ADMIN: HCPCS | Performed by: ORTHOPAEDIC SURGERY

## 2020-10-06 PROCEDURE — G8427 DOCREV CUR MEDS BY ELIG CLIN: HCPCS | Performed by: ORTHOPAEDIC SURGERY

## 2020-10-06 PROCEDURE — 1036F TOBACCO NON-USER: CPT | Performed by: ORTHOPAEDIC SURGERY

## 2020-10-06 PROCEDURE — 99203 OFFICE O/P NEW LOW 30 MIN: CPT | Performed by: ORTHOPAEDIC SURGERY

## 2020-10-06 PROCEDURE — 1090F PRES/ABSN URINE INCON ASSESS: CPT | Performed by: ORTHOPAEDIC SURGERY

## 2020-10-06 PROCEDURE — G8399 PT W/DXA RESULTS DOCUMENT: HCPCS | Performed by: ORTHOPAEDIC SURGERY

## 2020-10-06 PROCEDURE — 4040F PNEUMOC VAC/ADMIN/RCVD: CPT | Performed by: ORTHOPAEDIC SURGERY

## 2020-10-06 PROCEDURE — 3017F COLORECTAL CA SCREEN DOC REV: CPT | Performed by: ORTHOPAEDIC SURGERY

## 2020-10-06 RX ORDER — SIMETHICONE 80 MG
TABLET,CHEWABLE ORAL
COMMUNITY
End: 2021-06-09

## 2020-10-06 RX ORDER — LIDOCAINE 4 G/G
PATCH TOPICAL
Status: ON HOLD | COMMUNITY
End: 2021-02-15 | Stop reason: ALTCHOICE

## 2020-10-06 NOTE — PATIENT INSTRUCTIONS
Continue weight-bearing as tolerated. Continue range of motion exercises as instructed. Ice and elevate as needed. Tylenol or Motrin for pain.   Follow up as needed for hip pain    Back pain and neck pain spine  Return back to see Dr. Derek Giraldo for follow up of your lower back

## 2020-10-06 NOTE — PROGRESS NOTES
Pt is here today for left hip pain. Pt states that pain today is a 7/10. Pt states she is in constant pain today. Pt states that she has pain that will start in the lower back and travel into the left leg. Pt states she has a hx of lower back surgery. Pt denies any treatment to the left hip. She has tired ice and tylenol with ibuprofen. Pt denies any injury.

## 2020-10-06 NOTE — DISCHARGE SUMMARY
Outpatient Physical Therapy           [facility]           [] Phone: [phone]   Fax: [phone]  [facility]           [] Phone: [phone]   Fax: [phone]      To:    Dr. Estrada Dacosta         From: Noris Aguirre, PT, DPT, OCS      Patient: Cody Chin                  : 1950  Diagnosis:    L Leg pain        Date: 10/6/2020  Treatment Diagnosis:   L hip weakness, pain, poor gait tolerance, deconditioning       [x]  Discharge note     Evaluation Date:  20   Total Visits to date:  2  Cancels/No-shows to date:  0    Subjective:  Per note on 20   States increase in R buttock pain with \"sciatica\" ~1 week ago. Doing better into L hip currently but troubles sleeping with waking after any motion. Completed a little of HEP with focus on L. Feels unstable now. First flair up in 7 years. Rollator now broken and unable to use, no longer has assive device for mobility. Likes to sit on rollator when needed secondary to fatigue or pain. Will return to referring physician to discuss referral for rollator. Plan of Care/Treatment to date:  [x] Therapeutic Exercise    [x] Modalities:  [x] Therapeutic Activity     [] Ultrasound  [x] Electrical Stimulation  [x] Gait Training      [] Cervical Traction   [] Lumbar Traction  [x] Neuromuscular Re-education  [x] Cold/hotpack [] Iontophoresis  [x] Instruction in HEP      Other:  [x] Manual Therapy       []  Vasopneumatic  [] Aquatic Therapy       []                          Objective/Significant Findings At Last Visit/Comments:    --    Assessment:   Pt entered with elevated pain on R glute with L LE typical pain. Recent breaking of rollator further increasing difficulty with standing and mobility activities. Appears LBP referring pain into glute region further increasing pt disability. Without walker and/or rollator, pt pain likely to continue and safety be compromised.  I (Physical Therapist) does believe that for pt best interest, would benefit with referral for rollator to allow pt greatest mobility and safety while be managing with PT services. I anticipate pt to return to physician to discuss this option. Pt has not returned in >82 days and per policy, will be discharged at this time. Goal Status:  [] Achieved [] Partially Achieved  [x] Not Achieved       Short term goals  Time Frame for Short term goals: Defer to [address] term goals  Time Frame for Long term goals : 5 weeks 9/11/20  Long term goal 1: Pt will demo I with HEP/symptom management. Long term goal 2: Pt will demo full L hip A/PROM to ease transfers. Long term goal 3: Pt will demo L SLR and bridge without increase in L hip pain to demo improved strength. Long term goal 4: Pt will demo >4+/5 L LE strength to ease prolonged mobility. Long term goal 5: Pt will demo TUG <15 sec to demo improved gait speed. Long term goal 6: Pt will report >15/80 per LEFS to demo improved functional mobility. Patient Goals   Patient goals : improve mobility, transfers and gait           Patient Status: [] Continue per initial plan of Care     [x] Patient now discharged     [] Additional visits requested, Please re-certify for additional visits: If we are requesting more visits, we fully anticipate the patient's condition is expected to improve within the treatment timeframe we are requesting. Electronically signed by:  Selvin Butler, PT, DPT, OCS  10/6/2020, 1:39 PM    10/6/2020 1:39 PM     If you have any questions or concerns, please don't hesitate to call.   Thank you for your referral.    Physician Signature:______________________ Date:______ Time: ________  By signing above, therapists plan is approved by physician

## 2020-10-08 PROBLEM — I63.20 CEREBROVASCULAR ACCIDENT (CVA) DUE TO OCCLUSION OF PRECEREBRAL ARTERY (HCC): Status: ACTIVE | Noted: 2020-10-08

## 2020-10-11 ASSESSMENT — ENCOUNTER SYMPTOMS
COLOR CHANGE: 0
CHEST TIGHTNESS: 0
SHORTNESS OF BREATH: 0

## 2020-10-11 NOTE — PROGRESS NOTES
10/6/2020   Chief Complaint   Patient presents with    Hip Pain     left hip pain        History of Present Illness:                             Nay Low is a 78 y.o. female who presents today for evaluation of her lower back and radiating posterior left hip pain. She has pain is constant and travels between her back and her hip mostly on the left side. She denies any new injuries. She has a history of previous lumbar spine pathology and has undergone surgery in the past.  She does complain of radiating pain that travels into the hip and thigh area worse with weightbearing or repetitive activities. Her pain is worse while laying on that side at night. She denies any instability issues of the hip or significant anterior groin pain at this time. Pt is here today for left hip pain. Pt states that pain today is a 7/10. Pt states she is in constant pain today. Pt states that she has pain that will start in the lower back and travel into the left leg. Pt states she has a hx of lower back surgery. Pt denies any treatment to the left hip. She has tired ice and tylenol with ibuprofen. Pt denies any injury. Medical History  Patient's medications, allergies, past medical, surgical, social and family histories were reviewed and updated as appropriate.     Past Medical History:   Diagnosis Date    Acid reflux     Anemia     Arthritis     \"Shoulders, Hips And Knees\"    CAD (coronary artery disease)     Sees Dr. Bereket Mendez CH[facility]congestive [facility] failure[facility] ([facility]     Chronic back pain     Chronic constipation     Chronic kidney disease     Sees Dr. Cece Roberson COPD (chronic obstructive pulmonary disease) ([facility]     Sees Dr. Ken Wilkerson Depression     Diabetes mellitus [facility]) Dx 2012    Sees Dr. Steven Rodriguez    Emphysema lung [facility])     Glaucoma     \"Both Eyes\"    Gout     Hiatal hernia     History of blood transfusion 1960's    No Reaction To Blood Transfusion Received    [place] (hard of hearing)     Bilateral Ears    Hyperlipidemia     Hypertension     Dr. Ann Mullins    Itching     \"Whole Body Itches The [de-identified] Of The Time\"    Morbid obesity ([address]     Rheumatoid arthritis ([address]     Shortness of breath on exertion     Sleep apnea     Uses CPAP    Teeth missing     Upper And Lower    Thyroid disease     Thyroidectomy In 1993    Urinary incontinence     WD-Chronic buttock pain 5/9/2018    Wears glasses      Family History   Problem Relation Age of Onset    Kidney Disease Mother         \"Kidney Failure\"    Heart Disease Mother         CHF    Arthritis Mother     Diabetes Mother     Depression Mother     High Blood Pressure Mother     Obesity Mother     Vision Loss Mother     Heart Attack Father     Heart Disease Father         Heart Attack    Diabetes Father     High Blood Pressure Father     High Blood Pressure Daughter      Social History     Socioeconomic History    Marital status: Single     Spouse name: None    Number of children: 2    Years of education: None    Highest education level: None   Occupational History    None   Social Needs    Financial resource strain: None    Food insecurity     Worry: None     Inability: None    Transportation needs     Medical: None     Non-medical: None   Tobacco Use    Smoking status: Never Smoker    Smokeless tobacco: Never Used   Substance and Sexual Activity    Alcohol use: No    Drug use: No    Sexual activity: Never   Lifestyle    Physical activity     Days per week: None     Minutes per session: None    Stress: None   Relationships    Social connections     Talks on phone: None     Gets together: None     Attends [employer] service: None     Active member of club or organization: None     Attends meetings of clubs or organizations: None     Relationship status: None    Intimate partner violence     Fear of current or ex partner: None     Emotionally abused: None     Physically abused: None     Forced sexual activity: None   Other Topics Concern    None   Social History Narrative    None     Current Outpatient Medications   Medication Sig Dispense Refill    simethicone (MYLICON) 80 MG chewable tablet Gas Relief (simethicone) 80 mg chewable tablet      tiotropium (SPIRIVA RESPIMAT) 2.5 MCG/ACT AERS inhaler Inhale 2 puffs into the lungs      lidocaine 4 % external patch       Polysaccharide Iron Complex 391.3 (180 Fe) MG CAPS       MUCUS RELIEF 600 MG extended release tablet Take 1 tablet by mouth twice daily. 60 tablet 2    montelukast (SINGULAIR) 10 MG tablet Take 1 tablet by mouth nightly. 30 tablet 2    buPROPion (WELLBUTRIN XL) 150 MG extended release tablet Take 300 mg by mouth every morning      metOLazone (ZAROXOLYN) 2.5 MG tablet TAKE ONE TABLET BY MOUTH DAILY 90 tablet 2    midodrine (PROAMATINE) 10 MG tablet Take 1 tablet by mouth three times a week Take one tablet three times a week prior to each dialysis treatment 36 tablet 3    ipratropium-albuterol (DUONEB) 0.5-2.5 (3) MG/3ML SOLN nebulizer solution Inhale 3 mLs into the lungs every 4 hours 360 mL 2    spironolactone (ALDACTONE) 25 MG tablet Take 1 tablet by mouth daily 90 tablet 3    Multiple Vitamin (DAILY-NICOLE) TABS TAKE ONE TABLET BY MOUTH DAILY 30 tablet 0    Tiotropium Bromide-Olodaterol (STIOLTO RESPIMAT) 2.5-2.5 MCG/ACT AERS Inhale 2 puffs into the lungs daily 1 Inhaler 3    albuterol sulfate  (90 Base) MCG/ACT inhaler Inhale 2 puffs into the lungs every 6 hours as needed for Wheezing 1 Inhaler 5    Dextromethorphan-guaiFENesin (MUCINEX DM)  MG TB12 Take 1 tablet by mouth 2 times daily 60 tablet 0    ondansetron (ZOFRAN) 4 MG tablet EVERY 4 HOURS PRN NAUSEA 60 tablet 0    clopidogrel (PLAVIX) 75 MG tablet Take 1 tablet by mouth daily 30 tablet 0    ketoconazole (NIZORAL) 2 % shampoo Apply topically daily as needed.  1 Bottle 0    allopurinol (ZYLOPRIM) 100 MG tablet Take 2 tablets by mouth daily 30 tablet 3    promethazine (PHENERGAN) 25 MG tablet Take 25 mg by mouth every 6 hours as needed for Nausea      REFRESH OPTIVE 0.5-0.9 % SOLN Place 2 drops into both eyes 2 times daily      cetirizine (ZYRTEC) 10 MG tablet Take 10 mg by mouth daily      levothyroxine (SYNTHROID) 100 MCG tablet Take 100 mcg by mouth daily      lubiprostone (AMITIZA) 24 MCG capsule Take 24 mcg by mouth 2 times daily      fluticasone propionate (FLOVENT DISKUS) 50 MCG/BLIST AEPB inhaler Inhale 1 puff into the lungs 2 times daily      Mirabegron ER (MYRBETRIQ) 50 MG TB24 Take 50 mg by mouth daily      ketoconazole (NIZORAL) 2 % cream Apply topically daily Apply topically daily.  erythromycin with ethanol (THERAMYCIN) 2 % external solution Apply topically daily Apply topically daily.  fluocinonide (LIDEX) 0.05 % cream Apply topically 2 times daily Apply topically 2 times daily.  triamcinolone (ARISTOCORT) 0.5 % cream Apply topically 3 times daily Apply topically 3 times daily.  hydroxychloroquine (PLAQUENIL) 200 MG tablet Take by mouth 2 times daily      SENEXON-S 8.6-50 MG per tablet TAKE ONE TABLET BY MOUTH DAILY WHILE ON NARCOTIC PAIN MEDICATION 20 tablet 0    acetaminophen (TYLENOL) 325 MG tablet Take 2 tablets by mouth every 6 hours as needed for Fever (Fever >100.5 F (38 C)) 28 tablet 0    pantoprazole (PROTONIX) 40 MG tablet Take 1 tablet by mouth 2 times daily 60 tablet 3    LORazepam (ATIVAN) 0.5 MG tablet Take 0.5 mg by mouth 2 times daily.       atorvastatin (LIPITOR) 80 MG tablet Take 80 mg by mouth nightly      buPROPion (WELLBUTRIN XL) 300 MG extended release tablet Take 300 mg by mouth every morning      ranolazine (RANEXA) 500 MG extended release tablet Take 500 mg by mouth 2 times daily      polyethylene glycol (GLYCOLAX) powder Take 17 g by mouth daily      Nebulizer MISC Inhale into the lungs as needed      QUEtiapine (SEROQUEL XR) 300 MG extended release tablet Take 50 mg by mouth nightly       swelling, no effusion, no deformity, no erythema, normal alignment, no LCL laxity and no MCL laxity. No medial joint line and no lateral joint line tenderness noted. Lumbar back: She exhibits decreased range of motion, tenderness, pain and spasm. She exhibits no swelling and no deformity. Comments: Left Lower Extremity:  There is mild tenderness to palpation diffusely throughout the hip primarily posteriorly. Range of motion is mildly limited and painful at the extremes of motion with pain referred to the posterior hip and lower lumbar region. Hip flexion present to 120 degrees, abduction 40 degrees, extension 15 degrees, internal rotation 20 degrees, external rotation 30 degrees. Strength is 5 out of 5 with hip flexion, extension, and abduction. DANA and FADIR test reproduces pain to the posterior hip and sacroiliac joint. Sensation is intact to light touch in the left lower extremity. Pulses are intact. Skin is intact without erythema. No lower extremity edema. Skin:     General: Skin is warm and dry. Neurological:      Mental Status: She is alert and oriented to person, place, and time. Psychiatric:         Mood and Affect: Mood normal.         Behavior: Behavior normal.            Diagnostic testing:  X-rays reviewed in office, I independently reviewed the films in the office today:   Narrative    AP pelvis and frog-leg lateral view of the left hip show normal position    alignment of the left hip joint with no evidence of joint space narrowing    or degenerative process.  There is evidence of prior hardware in the    lumbar spine from L4-S1.  No evidence of fracture.  Normal bone density               Office Procedures:  No orders of the defined types were placed in this encounter. Assessment and Plan  1. Lumbar degenerative disease and history of lumbar fusion, Dr. Layo Mcdonnell    2.   Referred left hip pain    I have reassured the patient that there is no evidence of fracture or degenerative process at the left hip joint. I have explained to her that I am concerned about possible ongoing lumbar spine issues that may be causing referred pain to her hip area. I recommended that she follow-up with her spine surgeon regarding this issue to determine if there is any worsening or acute issue that may be responsible for her increased symptoms.     Follow-up here as needed    Electronically signed by Ari Carr MD on 10/11/2020 at 10:35 AM

## 2020-11-30 ENCOUNTER — HOSPITAL ENCOUNTER (OUTPATIENT)
Age: 70
Setting detail: SPECIMEN
Discharge: HOME | End: 2020-11-30
Payer: COMMERCIAL

## 2020-11-30 LAB
ANION GAP SERPL CALCULATED.3IONS-SCNC: 16 MMOL/L (ref 4–16)
BUN BLDV-MCNC: 20 MG/DL (ref 6–23)
CALCIUM SERPL-MCNC: 9.1 MG/DL (ref 8.3–10.6)
CHLORIDE BLD-SCNC: 93 MMOL/L (ref 99–110)
CO2: 26 MMOL/L (ref 21–32)
CREAT SERPL-MCNC: 3.5 MG/DL (ref 0.6–1.1)
GFR AFRICAN AMERICAN: 16 ML/MIN/1.73M2
GFR NON-AFRICAN AMERICAN: 13 ML/MIN/1.73M2
GLUCOSE BLD-MCNC: 171 MG/DL (ref 70–99)
HEMOGLOBIN: 11.6 GM/DL (ref 12.5–16)
POTASSIUM SERPL-SCNC: 3.5 MMOL/L (ref 3.5–5.1)
SODIUM BLD-SCNC: 135 MMOL/L (ref 135–145)

## 2020-11-30 PROCEDURE — 85018 HEMOGLOBIN: CPT

## 2020-11-30 PROCEDURE — 80048 BASIC METABOLIC PNL TOTAL CA: CPT

## 2020-12-29 ENCOUNTER — HOSPITAL ENCOUNTER (OUTPATIENT)
Dept: MRI IMAGING | Age: 70
Discharge: HOME | End: 2020-12-29
Payer: COMMERCIAL

## 2020-12-29 ENCOUNTER — HOSPITAL ENCOUNTER (OUTPATIENT)
Dept: CT IMAGING | Age: 70
Discharge: HOME | End: 2020-12-29
Payer: COMMERCIAL

## 2020-12-29 PROCEDURE — 72141 MRI NECK SPINE W/O DYE: CPT

## 2020-12-29 PROCEDURE — 72125 CT NECK SPINE W/O DYE: CPT

## 2021-01-08 ENCOUNTER — HOSPITAL ENCOUNTER (OUTPATIENT)
Age: 71
Setting detail: SPECIMEN
Discharge: HOME | End: 2021-01-08
Payer: COMMERCIAL

## 2021-01-08 LAB
ALBUMIN SERPL-MCNC: 4.2 GM/DL (ref 3.4–5)
ALP BLD-CCNC: 231 IU/L (ref 40–129)
ALT SERPL-CCNC: 9 U/L (ref 10–40)
ANION GAP SERPL CALCULATED.3IONS-SCNC: 17 MMOL/L (ref 4–16)
AST SERPL-CCNC: 16 IU/L (ref 15–37)
BILIRUB SERPL-MCNC: 0.2 MG/DL (ref 0–1)
BUN BLDV-MCNC: 48 MG/DL (ref 6–23)
CALCIUM SERPL-MCNC: 8.9 MG/DL (ref 8.3–10.6)
CHLORIDE BLD-SCNC: 97 MMOL/L (ref 99–110)
CO2: 24 MMOL/L (ref 21–32)
CREAT SERPL-MCNC: 7.7 MG/DL (ref 0.6–1.1)
ESTIMATED AVERAGE GLUCOSE: 105 MG/DL
GFR AFRICAN AMERICAN: 6 ML/MIN/1.73M2
GFR NON-AFRICAN AMERICAN: 5 ML/MIN/1.73M2
GLUCOSE BLD-MCNC: 78 MG/DL (ref 70–99)
HBA1C MFR BLD: 5.3 % (ref 4.2–6.3)
POTASSIUM SERPL-SCNC: 4.5 MMOL/L (ref 3.5–5.1)
SODIUM BLD-SCNC: 138 MMOL/L (ref 135–145)
T4 FREE: 0.63 NG/DL (ref 0.9–1.8)
TOTAL PROTEIN: 6.4 GM/DL (ref 6.4–8.2)
TSH HIGH SENSITIVITY: 4.24 UIU/ML (ref 0.27–4.2)

## 2021-01-08 PROCEDURE — 83036 HEMOGLOBIN GLYCOSYLATED A1C: CPT

## 2021-01-08 PROCEDURE — 84443 ASSAY THYROID STIM HORMONE: CPT

## 2021-01-08 PROCEDURE — 84439 ASSAY OF FREE THYROXINE: CPT

## 2021-01-08 PROCEDURE — 80053 COMPREHEN METABOLIC PANEL: CPT

## 2021-02-15 ENCOUNTER — APPOINTMENT (OUTPATIENT)
Dept: CT IMAGING | Age: 71
DRG: 069 | End: 2021-02-15
Payer: COMMERCIAL

## 2021-02-15 ENCOUNTER — HOSPITAL ENCOUNTER (INPATIENT)
Age: 71
LOS: 2 days | Discharge: HOME | DRG: 069 | End: 2021-02-17
Attending: EMERGENCY MEDICINE | Admitting: STUDENT IN AN ORGANIZED HEALTH CARE EDUCATION/TRAINING PROGRAM
Payer: COMMERCIAL

## 2021-02-15 ENCOUNTER — APPOINTMENT (OUTPATIENT)
Dept: GENERAL RADIOLOGY | Age: 71
DRG: 069 | End: 2021-02-15
Payer: COMMERCIAL

## 2021-02-15 DIAGNOSIS — R47.1 DYSARTHRIA: ICD-10-CM

## 2021-02-15 DIAGNOSIS — N18.6 END STAGE RENAL DISEASE ON DIALYSIS (HCC): ICD-10-CM

## 2021-02-15 DIAGNOSIS — R47.01 APHASIA: ICD-10-CM

## 2021-02-15 DIAGNOSIS — R07.9 ACUTE CHEST PAIN: Primary | ICD-10-CM

## 2021-02-15 DIAGNOSIS — I21.4 NSTEMI (NON-ST ELEVATED MYOCARDIAL INFARCTION) (HCC): ICD-10-CM

## 2021-02-15 DIAGNOSIS — Z99.2 END STAGE RENAL DISEASE ON DIALYSIS (HCC): ICD-10-CM

## 2021-02-15 PROBLEM — R13.0 APHAGIA: Status: ACTIVE | Noted: 2021-02-15

## 2021-02-15 LAB
ANION GAP SERPL CALCULATED.3IONS-SCNC: 14 MMOL/L (ref 4–16)
BUN BLDV-MCNC: 26 MG/DL (ref 6–23)
CALCIUM SERPL-MCNC: 8.7 MG/DL (ref 8.3–10.6)
CHLORIDE BLD-SCNC: 95 MMOL/L (ref 99–110)
CO2: 25 MMOL/L (ref 21–32)
CREAT SERPL-MCNC: 5.2 MG/DL (ref 0.6–1.1)
GFR AFRICAN AMERICAN: 10 ML/MIN/1.73M2
GFR NON-AFRICAN AMERICAN: 8 ML/MIN/1.73M2
GLUCOSE BLD-MCNC: 119 MG/DL (ref 70–99)
HCT VFR BLD CALC: 33.8 % (ref 37–47)
HEMOGLOBIN: 11 GM/DL (ref 12.5–16)
LACTIC ACID, SEPSIS: 1.7 MMOL/L (ref 0.5–1.9)
MCH RBC QN AUTO: 31 PG (ref 27–31)
MCHC RBC AUTO-ENTMCNC: 32.5 % (ref 32–36)
MCV RBC AUTO: 95.2 FL (ref 78–100)
PDW BLD-RTO: 12.7 % (ref 11.7–14.9)
PLATELET # BLD: 343 K/CU MM (ref 140–440)
PMV BLD AUTO: 9.9 FL (ref 7.5–11.1)
POTASSIUM SERPL-SCNC: 3.9 MMOL/L (ref 3.5–5.1)
RBC # BLD: 3.55 M/CU MM (ref 4.2–5.4)
SODIUM BLD-SCNC: 134 MMOL/L (ref 135–145)
TROPONIN T: 0.03 NG/ML
WBC # BLD: 7.6 K/CU MM (ref 4–10.5)

## 2021-02-15 PROCEDURE — 84484 ASSAY OF TROPONIN QUANT: CPT

## 2021-02-15 PROCEDURE — 6370000000 HC RX 637 (ALT 250 FOR IP): Performed by: STUDENT IN AN ORGANIZED HEALTH CARE EDUCATION/TRAINING PROGRAM

## 2021-02-15 PROCEDURE — 93005 ELECTROCARDIOGRAM TRACING: CPT | Performed by: EMERGENCY MEDICINE

## 2021-02-15 PROCEDURE — 6370000000 HC RX 637 (ALT 250 FOR IP): Performed by: EMERGENCY MEDICINE

## 2021-02-15 PROCEDURE — 87040 BLOOD CULTURE FOR BACTERIA: CPT

## 2021-02-15 PROCEDURE — 80048 BASIC METABOLIC PNL TOTAL CA: CPT

## 2021-02-15 PROCEDURE — 36415 COLL VENOUS BLD VENIPUNCTURE: CPT

## 2021-02-15 PROCEDURE — 99284 EMERGENCY DEPT VISIT MOD MDM: CPT

## 2021-02-15 PROCEDURE — 85027 COMPLETE CBC AUTOMATED: CPT

## 2021-02-15 PROCEDURE — 70450 CT HEAD/BRAIN W/O DYE: CPT

## 2021-02-15 PROCEDURE — 2580000003 HC RX 258: Performed by: STUDENT IN AN ORGANIZED HEALTH CARE EDUCATION/TRAINING PROGRAM

## 2021-02-15 PROCEDURE — 6360000002 HC RX W HCPCS: Performed by: STUDENT IN AN ORGANIZED HEALTH CARE EDUCATION/TRAINING PROGRAM

## 2021-02-15 PROCEDURE — 71046 X-RAY EXAM CHEST 2 VIEWS: CPT

## 2021-02-15 PROCEDURE — 83605 ASSAY OF LACTIC ACID: CPT

## 2021-02-15 PROCEDURE — 1200000000 HC SEMI PRIVATE

## 2021-02-15 RX ORDER — HYDROXYCHLOROQUINE SULFATE 200 MG/1
200 TABLET, FILM COATED ORAL 2 TIMES DAILY
Status: DISCONTINUED | OUTPATIENT
Start: 2021-02-15 | End: 2021-02-17 | Stop reason: HOSPADM

## 2021-02-15 RX ORDER — LORAZEPAM 2 MG/ML
1 INJECTION INTRAMUSCULAR ONCE
Status: DISCONTINUED | OUTPATIENT
Start: 2021-02-15 | End: 2021-02-17 | Stop reason: HOSPADM

## 2021-02-15 RX ORDER — QUETIAPINE FUMARATE 50 MG/1
50 TABLET, EXTENDED RELEASE ORAL NIGHTLY
Status: DISCONTINUED | OUTPATIENT
Start: 2021-02-15 | End: 2021-02-17 | Stop reason: HOSPADM

## 2021-02-15 RX ORDER — ASPIRIN 81 MG/1
324 TABLET, CHEWABLE ORAL ONCE
Status: COMPLETED | OUTPATIENT
Start: 2021-02-15 | End: 2021-02-15

## 2021-02-15 RX ORDER — ACETAMINOPHEN 325 MG/1
650 TABLET ORAL EVERY 6 HOURS PRN
Status: DISCONTINUED | OUTPATIENT
Start: 2021-02-15 | End: 2021-02-17 | Stop reason: HOSPADM

## 2021-02-15 RX ORDER — METOLAZONE 2.5 MG/1
2.5 TABLET ORAL DAILY
Status: DISCONTINUED | OUTPATIENT
Start: 2021-02-16 | End: 2021-02-17

## 2021-02-15 RX ORDER — HEPARIN SODIUM 5000 [USP'U]/ML
5000 INJECTION, SOLUTION INTRAVENOUS; SUBCUTANEOUS EVERY 8 HOURS SCHEDULED
Status: DISCONTINUED | OUTPATIENT
Start: 2021-02-15 | End: 2021-02-17 | Stop reason: HOSPADM

## 2021-02-15 RX ORDER — LORAZEPAM 0.5 MG/1
0.5 TABLET ORAL 2 TIMES DAILY
Status: DISCONTINUED | OUTPATIENT
Start: 2021-02-15 | End: 2021-02-17 | Stop reason: HOSPADM

## 2021-02-15 RX ORDER — SPIRONOLACTONE 25 MG/1
25 TABLET ORAL DAILY
Status: DISCONTINUED | OUTPATIENT
Start: 2021-02-15 | End: 2021-02-17 | Stop reason: HOSPADM

## 2021-02-15 RX ORDER — ACETAMINOPHEN 325 MG/1
650 TABLET ORAL ONCE
Status: COMPLETED | OUTPATIENT
Start: 2021-02-15 | End: 2021-02-15

## 2021-02-15 RX ORDER — CLOPIDOGREL BISULFATE 75 MG/1
75 TABLET ORAL DAILY
Status: DISCONTINUED | OUTPATIENT
Start: 2021-02-15 | End: 2021-02-17 | Stop reason: HOSPADM

## 2021-02-15 RX ORDER — LUBIPROSTONE 24 UG/1
24 CAPSULE, GELATIN COATED ORAL 2 TIMES DAILY
Status: DISCONTINUED | OUTPATIENT
Start: 2021-02-15 | End: 2021-02-17 | Stop reason: HOSPADM

## 2021-02-15 RX ORDER — SODIUM CHLORIDE 0.9 % (FLUSH) 0.9 %
10 SYRINGE (ML) INJECTION EVERY 12 HOURS SCHEDULED
Status: DISCONTINUED | OUTPATIENT
Start: 2021-02-15 | End: 2021-02-17 | Stop reason: HOSPADM

## 2021-02-15 RX ORDER — POLYETHYLENE GLYCOL 3350 17 G/17G
17 POWDER, FOR SOLUTION ORAL DAILY PRN
Status: DISCONTINUED | OUTPATIENT
Start: 2021-02-15 | End: 2021-02-17 | Stop reason: HOSPADM

## 2021-02-15 RX ORDER — PROMETHAZINE HYDROCHLORIDE 25 MG/1
12.5 TABLET ORAL EVERY 6 HOURS PRN
Status: DISCONTINUED | OUTPATIENT
Start: 2021-02-15 | End: 2021-02-17 | Stop reason: HOSPADM

## 2021-02-15 RX ORDER — RANOLAZINE 500 MG/1
500 TABLET, EXTENDED RELEASE ORAL 2 TIMES DAILY
Status: DISCONTINUED | OUTPATIENT
Start: 2021-02-15 | End: 2021-02-17 | Stop reason: HOSPADM

## 2021-02-15 RX ORDER — MIDODRINE HYDROCHLORIDE 5 MG/1
10 TABLET ORAL
Status: DISCONTINUED | OUTPATIENT
Start: 2021-02-15 | End: 2021-02-17 | Stop reason: HOSPADM

## 2021-02-15 RX ORDER — SODIUM CHLORIDE 9 MG/ML
INJECTION, SOLUTION INTRAVENOUS CONTINUOUS
Status: DISCONTINUED | OUTPATIENT
Start: 2021-02-15 | End: 2021-02-16

## 2021-02-15 RX ORDER — CETIRIZINE HYDROCHLORIDE 10 MG/1
10 TABLET ORAL DAILY
Status: DISCONTINUED | OUTPATIENT
Start: 2021-02-15 | End: 2021-02-17

## 2021-02-15 RX ORDER — ATORVASTATIN CALCIUM 80 MG/1
80 TABLET, FILM COATED ORAL NIGHTLY
Status: DISCONTINUED | OUTPATIENT
Start: 2021-02-15 | End: 2021-02-17

## 2021-02-15 RX ORDER — BUPROPION HYDROCHLORIDE 150 MG/1
300 TABLET ORAL EVERY MORNING
Status: DISCONTINUED | OUTPATIENT
Start: 2021-02-16 | End: 2021-02-17 | Stop reason: HOSPADM

## 2021-02-15 RX ORDER — LEVOTHYROXINE SODIUM 0.1 MG/1
100 TABLET ORAL DAILY
Status: DISCONTINUED | OUTPATIENT
Start: 2021-02-16 | End: 2021-02-17 | Stop reason: HOSPADM

## 2021-02-15 RX ORDER — ONDANSETRON 2 MG/ML
4 INJECTION INTRAMUSCULAR; INTRAVENOUS EVERY 6 HOURS PRN
Status: DISCONTINUED | OUTPATIENT
Start: 2021-02-15 | End: 2021-02-17 | Stop reason: HOSPADM

## 2021-02-15 RX ORDER — ACETAMINOPHEN 650 MG/1
650 SUPPOSITORY RECTAL EVERY 6 HOURS PRN
Status: DISCONTINUED | OUTPATIENT
Start: 2021-02-15 | End: 2021-02-17 | Stop reason: HOSPADM

## 2021-02-15 RX ORDER — MONTELUKAST SODIUM 10 MG/1
10 TABLET ORAL NIGHTLY
Status: DISCONTINUED | OUTPATIENT
Start: 2021-02-15 | End: 2021-02-17 | Stop reason: HOSPADM

## 2021-02-15 RX ORDER — ALLOPURINOL 100 MG/1
200 TABLET ORAL DAILY
Status: DISCONTINUED | OUTPATIENT
Start: 2021-02-16 | End: 2021-02-17

## 2021-02-15 RX ORDER — SODIUM CHLORIDE 0.9 % (FLUSH) 0.9 %
10 SYRINGE (ML) INJECTION PRN
Status: DISCONTINUED | OUTPATIENT
Start: 2021-02-15 | End: 2021-02-17 | Stop reason: HOSPADM

## 2021-02-15 RX ADMIN — CETIRIZINE HYDROCHLORIDE 10 MG: 10 TABLET, FILM COATED ORAL at 22:41

## 2021-02-15 RX ADMIN — ACETAMINOPHEN 650 MG: 325 TABLET ORAL at 22:41

## 2021-02-15 RX ADMIN — ATORVASTATIN CALCIUM 80 MG: 80 TABLET, FILM COATED ORAL at 22:41

## 2021-02-15 RX ADMIN — ASPIRIN 243 MG: 81 TABLET, CHEWABLE ORAL at 19:44

## 2021-02-15 RX ADMIN — ACETAMINOPHEN 650 MG: 325 TABLET ORAL at 19:03

## 2021-02-15 RX ADMIN — MIDODRINE HYDROCHLORIDE 10 MG: 5 TABLET ORAL at 22:41

## 2021-02-15 RX ADMIN — LUBIPROSTONE 24 MCG: 24 CAPSULE, GELATIN COATED ORAL at 22:41

## 2021-02-15 RX ADMIN — Medication 10 ML: at 22:41

## 2021-02-15 RX ADMIN — SODIUM CHLORIDE: 9 INJECTION, SOLUTION INTRAVENOUS at 23:19

## 2021-02-15 RX ADMIN — HEPARIN SODIUM 5000 UNITS: 5000 INJECTION INTRAVENOUS; SUBCUTANEOUS at 22:42

## 2021-02-15 RX ADMIN — CLOPIDOGREL BISULFATE 75 MG: 75 TABLET ORAL at 22:41

## 2021-02-15 RX ADMIN — SPIRONOLACTONE 25 MG: 25 TABLET ORAL at 22:41

## 2021-02-15 RX ADMIN — LORAZEPAM 0.5 MG: 0.5 TABLET ORAL at 22:41

## 2021-02-15 RX ADMIN — RANOLAZINE 500 MG: 500 TABLET, FILM COATED, EXTENDED RELEASE ORAL at 22:41

## 2021-02-15 ASSESSMENT — PAIN SCALES - GENERAL
PAINLEVEL_OUTOF10: 5
PAINLEVEL_OUTOF10: 5
PAINLEVEL_OUTOF10: 3

## 2021-02-15 ASSESSMENT — PAIN DESCRIPTION - LOCATION: LOCATION: CHEST

## 2021-02-15 ASSESSMENT — HEART SCORE: ECG: 1

## 2021-02-15 NOTE — ED NOTES
Pt given warm blanket updated on plan of care, no further needs voiced.       Moriah Helms, RN  02/15/21 9524

## 2021-02-15 NOTE — ED PROVIDER NOTES
This EKG was interpreted by me. Rate is 99, rhythm is sinus. GA and QT intervals are within normal limits. There is no ST segment or T wave changes. This EKG was compared to previous EKG from date 8/25/2020. Appears similar to previous EKG.      Chaya Waddell DO  02/15/21 1554

## 2021-02-15 NOTE — ED NOTES
Pt states having a headache and request tylenol. Also states gets anxious with scans and needs some medications for that. Dr. Neo Lentz notified.         Moriah Helms, NIKI  02/15/21 9022

## 2021-02-16 ENCOUNTER — APPOINTMENT (OUTPATIENT)
Dept: NUCLEAR MEDICINE | Age: 71
DRG: 069 | End: 2021-02-16
Payer: COMMERCIAL

## 2021-02-16 ENCOUNTER — APPOINTMENT (OUTPATIENT)
Dept: CT IMAGING | Age: 71
DRG: 069 | End: 2021-02-16
Payer: COMMERCIAL

## 2021-02-16 ENCOUNTER — APPOINTMENT (OUTPATIENT)
Dept: MRI IMAGING | Age: 71
DRG: 069 | End: 2021-02-16
Payer: COMMERCIAL

## 2021-02-16 LAB
ALBUMIN SERPL-MCNC: 4.2 GM/DL (ref 3.4–5)
ALP BLD-CCNC: 187 IU/L (ref 40–128)
ALT SERPL-CCNC: 7 U/L (ref 10–40)
ANION GAP SERPL CALCULATED.3IONS-SCNC: 16 MMOL/L (ref 4–16)
AST SERPL-CCNC: 17 IU/L (ref 15–37)
BASOPHILS ABSOLUTE: 0.1 K/CU MM
BASOPHILS RELATIVE PERCENT: 1.1 % (ref 0–1)
BILIRUB SERPL-MCNC: 0.2 MG/DL (ref 0–1)
BUN BLDV-MCNC: 32 MG/DL (ref 6–23)
CALCIUM SERPL-MCNC: 9.1 MG/DL (ref 8.3–10.6)
CHLORIDE BLD-SCNC: 97 MMOL/L (ref 99–110)
CO2: 23 MMOL/L (ref 21–32)
CREAT SERPL-MCNC: 6 MG/DL (ref 0.6–1.1)
DIFFERENTIAL TYPE: ABNORMAL
EOSINOPHILS ABSOLUTE: 0.4 K/CU MM
EOSINOPHILS RELATIVE PERCENT: 6.3 % (ref 0–3)
GFR AFRICAN AMERICAN: 8 ML/MIN/1.73M2
GFR NON-AFRICAN AMERICAN: 7 ML/MIN/1.73M2
GLUCOSE BLD-MCNC: 63 MG/DL (ref 70–99)
HBV SURFACE AB TITR SER: 103.9 {TITER}
HCT VFR BLD CALC: 34.3 % (ref 37–47)
HEMOGLOBIN: 11.1 GM/DL (ref 12.5–16)
HEPATITIS B SURFACE ANTIGEN: NON REACTIVE
IMMATURE NEUTROPHIL %: 0.2 % (ref 0–0.43)
LV EF: 53 %
LV EF: 60 %
LVEF MODALITY: NORMAL
LVEF MODALITY: NORMAL
LYMPHOCYTES ABSOLUTE: 1.5 K/CU MM
LYMPHOCYTES RELATIVE PERCENT: 23.5 % (ref 24–44)
MCH RBC QN AUTO: 31.3 PG (ref 27–31)
MCHC RBC AUTO-ENTMCNC: 32.4 % (ref 32–36)
MCV RBC AUTO: 96.6 FL (ref 78–100)
MONOCYTES ABSOLUTE: 0.7 K/CU MM
MONOCYTES RELATIVE PERCENT: 10.8 % (ref 0–4)
NUCLEATED RBC %: 0 %
PDW BLD-RTO: 12.8 % (ref 11.7–14.9)
PLATELET # BLD: 323 K/CU MM (ref 140–440)
PMV BLD AUTO: 9.9 FL (ref 7.5–11.1)
POTASSIUM SERPL-SCNC: 4.6 MMOL/L (ref 3.5–5.1)
RBC # BLD: 3.55 M/CU MM (ref 4.2–5.4)
SEGMENTED NEUTROPHILS ABSOLUTE COUNT: 3.8 K/CU MM
SEGMENTED NEUTROPHILS RELATIVE PERCENT: 58.1 % (ref 36–66)
SODIUM BLD-SCNC: 136 MMOL/L (ref 135–145)
TOTAL IMMATURE NEUTOROPHIL: 0.01 K/CU MM
TOTAL NUCLEATED RBC: 0 K/CU MM
TOTAL PROTEIN: 6.3 GM/DL (ref 6.4–8.2)
TROPONIN T: 0.04 NG/ML
TROPONIN T: 0.05 NG/ML
WBC # BLD: 6.6 K/CU MM (ref 4–10.5)

## 2021-02-16 PROCEDURE — A9500 TC99M SESTAMIBI: HCPCS | Performed by: INTERNAL MEDICINE

## 2021-02-16 PROCEDURE — 36415 COLL VENOUS BLD VENIPUNCTURE: CPT

## 2021-02-16 PROCEDURE — 86706 HEP B SURFACE ANTIBODY: CPT

## 2021-02-16 PROCEDURE — 3430000000 HC RX DIAGNOSTIC RADIOPHARMACEUTICAL: Performed by: INTERNAL MEDICINE

## 2021-02-16 PROCEDURE — 6370000000 HC RX 637 (ALT 250 FOR IP): Performed by: INTERNAL MEDICINE

## 2021-02-16 PROCEDURE — 93306 TTE W/DOPPLER COMPLETE: CPT

## 2021-02-16 PROCEDURE — 80053 COMPREHEN METABOLIC PANEL: CPT

## 2021-02-16 PROCEDURE — 87340 HEPATITIS B SURFACE AG IA: CPT

## 2021-02-16 PROCEDURE — 6370000000 HC RX 637 (ALT 250 FOR IP): Performed by: STUDENT IN AN ORGANIZED HEALTH CARE EDUCATION/TRAINING PROGRAM

## 2021-02-16 PROCEDURE — 85025 COMPLETE CBC W/AUTO DIFF WBC: CPT

## 2021-02-16 PROCEDURE — 1200000000 HC SEMI PRIVATE

## 2021-02-16 PROCEDURE — 93017 CV STRESS TEST TRACING ONLY: CPT

## 2021-02-16 PROCEDURE — 6360000002 HC RX W HCPCS: Performed by: INTERNAL MEDICINE

## 2021-02-16 PROCEDURE — 93010 ELECTROCARDIOGRAM REPORT: CPT | Performed by: INTERNAL MEDICINE

## 2021-02-16 PROCEDURE — 70551 MRI BRAIN STEM W/O DYE: CPT

## 2021-02-16 PROCEDURE — 6360000002 HC RX W HCPCS: Performed by: HOSPITALIST

## 2021-02-16 PROCEDURE — 84484 ASSAY OF TROPONIN QUANT: CPT

## 2021-02-16 PROCEDURE — 78452 HT MUSCLE IMAGE SPECT MULT: CPT

## 2021-02-16 PROCEDURE — 2580000003 HC RX 258: Performed by: STUDENT IN AN ORGANIZED HEALTH CARE EDUCATION/TRAINING PROGRAM

## 2021-02-16 PROCEDURE — 6360000002 HC RX W HCPCS: Performed by: STUDENT IN AN ORGANIZED HEALTH CARE EDUCATION/TRAINING PROGRAM

## 2021-02-16 PROCEDURE — 71250 CT THORAX DX C-: CPT

## 2021-02-16 PROCEDURE — 94761 N-INVAS EAR/PLS OXIMETRY MLT: CPT

## 2021-02-16 RX ORDER — LORAZEPAM 2 MG/ML
0.5 INJECTION INTRAMUSCULAR ONCE
Status: COMPLETED | OUTPATIENT
Start: 2021-02-16 | End: 2021-02-16

## 2021-02-16 RX ORDER — AMINOPHYLLINE DIHYDRATE 25 MG/ML
75 INJECTION, SOLUTION INTRAVENOUS ONCE
Status: COMPLETED | OUTPATIENT
Start: 2021-02-16 | End: 2021-02-16

## 2021-02-16 RX ORDER — FLUTICASONE PROPIONATE 50 MCG
1 SPRAY, SUSPENSION (ML) NASAL DAILY
Status: DISCONTINUED | OUTPATIENT
Start: 2021-02-16 | End: 2021-02-17 | Stop reason: HOSPADM

## 2021-02-16 RX ORDER — TORSEMIDE 20 MG/1
100 TABLET ORAL 2 TIMES DAILY
Status: DISCONTINUED | OUTPATIENT
Start: 2021-02-16 | End: 2021-02-17

## 2021-02-16 RX ADMIN — Medication 10 MILLICURIE: at 11:25

## 2021-02-16 RX ADMIN — AMINOPHYLLINE 75 MG: 25 INJECTION, SOLUTION INTRAVENOUS at 13:20

## 2021-02-16 RX ADMIN — RANOLAZINE 500 MG: 500 TABLET, FILM COATED, EXTENDED RELEASE ORAL at 20:46

## 2021-02-16 RX ADMIN — HEPARIN SODIUM 5000 UNITS: 5000 INJECTION INTRAVENOUS; SUBCUTANEOUS at 20:46

## 2021-02-16 RX ADMIN — LORAZEPAM 0.5 MG: 0.5 TABLET ORAL at 20:46

## 2021-02-16 RX ADMIN — LORAZEPAM 0.5 MG: 2 INJECTION, SOLUTION INTRAMUSCULAR; INTRAVENOUS at 14:24

## 2021-02-16 RX ADMIN — LUBIPROSTONE 24 MCG: 24 CAPSULE, GELATIN COATED ORAL at 20:46

## 2021-02-16 RX ADMIN — HEPARIN SODIUM 5000 UNITS: 5000 INJECTION INTRAVENOUS; SUBCUTANEOUS at 05:31

## 2021-02-16 RX ADMIN — LEVOTHYROXINE SODIUM 100 MCG: 100 TABLET ORAL at 05:32

## 2021-02-16 RX ADMIN — ATORVASTATIN CALCIUM 80 MG: 80 TABLET, FILM COATED ORAL at 20:46

## 2021-02-16 RX ADMIN — Medication 30 MILLICURIE: at 13:10

## 2021-02-16 RX ADMIN — TORSEMIDE 100 MG: 20 TABLET ORAL at 20:45

## 2021-02-16 RX ADMIN — MONTELUKAST 10 MG: 10 TABLET, FILM COATED ORAL at 20:46

## 2021-02-16 RX ADMIN — Medication 10 ML: at 20:56

## 2021-02-16 RX ADMIN — REGADENOSON 0.4 MG: 0.08 INJECTION, SOLUTION INTRAVENOUS at 13:18

## 2021-02-16 RX ADMIN — QUETIAPINE FUMARATE 50 MG: 50 TABLET, EXTENDED RELEASE ORAL at 20:56

## 2021-02-16 RX ADMIN — ACETAMINOPHEN 650 MG: 325 TABLET ORAL at 05:32

## 2021-02-16 ASSESSMENT — PAIN SCALES - GENERAL
PAINLEVEL_OUTOF10: 3
PAINLEVEL_OUTOF10: 0
PAINLEVEL_OUTOF10: 3

## 2021-02-16 ASSESSMENT — PAIN DESCRIPTION - LOCATION: LOCATION: HEAD

## 2021-02-16 ASSESSMENT — PAIN DESCRIPTION - PAIN TYPE: TYPE: ACUTE PAIN

## 2021-02-16 NOTE — CONSULTS
Pt seen ,examined,interviewed and chart reviewed. Please see the dictated consult for details     Imp :   1. ESKD on maintenance - on center HD - had HD yesterday- fluid status and electrolytes  are acceptable  2. Sx suggestive of TIA and has high risk so need w/U and  agressive risk reduction  3. Underlying ASCVD DM/HTNetc   4. Doubt pneumonitis - no sx     Plan:  1. Her HD due tomorrow  2. Need good control of BP/BS etc   3. Also may need to rearrange  her diureitcs - as I did not see any loop  4. Also ask about UOP_   5. Finally d/C IVF off course   6. Follow clinically   7.        Thanks for the consult  # [id number]

## 2021-02-16 NOTE — CONSULTS
[address], [address] [facility]                                  CONSULTATION    PATIENT NAME: Isabella Medina                   :        1950  MED REC NO:   [id number]                          ROOM:       4986  ACCOUNT NO:   [de-identified]                           ADMIT DATE: 02/15/2021  PROVIDER:     Edmar Sosa MD    CONSULT DATE:  2021    CONSULT REQUESTED BY:  Carter Najjar, DO    REASON FOR CONSULT:  ESRD, needs maintenance dialysis. BRIEF HISTORY:  The patient is a 53-year-old with ESKD along with other  multiple medical conditions, was brought to the emergency room  apparently with slurry speech for two to three days followed by chest  pain. The patient was dialyzed yesterday. I am not aware of any  symptoms at least I did not get a call from the dialysis unit and I did  not see her though yesterday mainly because of the snowing spells, but I  did see her last week probably Friday. She lives in an assisted living  here in [facility]. In any event in the emergency room, she was hemodynamically stable and  underwent several diagnostic tests, which include biochemical, imaging,  electrographic. EKG did not show any acute changes and imaging study  was also unrevealing except there was some infiltrative process in the  left lower lung base, although she does not have much symptoms of  pneumonitis. She was subsequently admitted for further evaluation with  presumable TIA. When I saw her this morning she does not have any focal neurological  deficit, but from her description it looks like TIA and apparently had a  CVA in the distant past.    The patient has end-stage renal disease more than likely from  progressive diabetes, kidney disease with secondary hypertension, etc.   She started dialysis about more than a year ago or so. She goes to  [facility] and she dialyze Monday, Wednesday and Friday. 04/16/2018.  2.  Partial hysterectomy apparently for menorrhagia, no malignancy. 3.  Several unsuccessful bilateral upper extremity AV fistula creation,  none of them are functioning now. 4.  Left IJ tunneled dialysis catheter. ALLERGIES:  She is listed allergic to PERCOCET, TRAMADOL, VICODIN and  NARCAN, I am assuming those are adverse reaction rather than allergic  reaction. CURRENT MEDICATIONS:  Here in the hospital; she is on normal saline,  which obviously I discontinued. She will need an extra salt and water. She is on allopurinol 20 mg daily, aspirin 324 mg daily, Lipitor 80 at  bedtime, Wellbutrin 300 mg daily, Zyrtec 10 mg daily, Plavix 75 mg  daily, Flonase, subcu heparin, Plaquenil 200 twice a day, Synthroid 100  mcg daily, p.r.n. lorazepam, metolazone, Amitiza, Singulair, she is on  lot of psychotropic medications, Seroquel extended release 50 mg daily,  Ranexa and spironolactone. REVIEW OF SYSTEMS:  She told me that she had little bit of slurry speech  for the last three days, but I was not aware of the symptoms neither I  had a call from the outpatient dialysis unit. Apparently also had some  chest pain and all of them has subsided. I do not see any focal deficit  at this time. No fever, chills or rigor. Rest of the review of systems  is negative or as in previous paragraph. PHYSICAL EXAMINATION:  VITAL SIGNS:  At the time of examination reveals temperature, her blood  pressure little bit in the lower side, although she presented with  little bit of high blood pressure, now is 110/75, pulse 96, respiratory  12 and saturating about 97%. GENERAL:  The patient without any acute distress. She seems to have  little bit of puffy face and had little diabetic face I would say,  little thickened skin almost like a partial lipodystrophy. HEAD AND NECK:  Normocephalic, atraumatic. EYES:  At least 1+ conjunctival pallor. CARDIOVASCULAR:  Seems regular rate and rhythm.   RESPIRATORY:  Clear to auscultation. CHEST:  On examination of the chest of course she has left chest wall IJ  tunneled dialysis catheter. I did not open to see the exit site. I did  not have any sterile gloves with me. ABDOMEN:  Soft. EXTREMITIES:  No edema. NEUROLOGIC:  I do not really see any focal neurological deficits in my  brief neuro exam.  Her speech was rather normal.  She is alert and  oriented and also one can see the failed AV fistula mainly  brachiocephalic in both upper extremities. LABORATORY VALUES AND ANCILLARY SERVICES:  Her electrolyte are  acceptable and CBC also acceptable given her ESKD status, 11.1  hemoglobin. Chest x-ray read as infiltrate, but she really does not  have any symptoms of pneumonitis. IMPRESSION:  A 17-year-old female with EKSD comes with what appears to  me TIA symptoms. 1.  ESKD on maintenance in-center hemodialysis. She has had dialysis  yesterday, fluid status and electrolyte are acceptable. 2.  Symptoms suggestive of TIA and high risk for it of course. Need  some workup as well as aggressive risk reduction. 3.  Underlying atherosclerotic cardiovascular disease, diabetes,  hypertension, etc.  4.  I highly doubt she has pneumonitis. PLAN:  1. Her hemodialysis is due tomorrow. 2.  I would definitely discontinue normal saline and need a very good  blood pressure and blood sugar control. 3.  Also probably need some loop. She is on thiazide, which is not  going to be very effective without loop therapy and she seems to produce  more than half liter a day; otherwise, follow clinically.         Conception MD Re    D: 02/16/2021 8:48:19       T: 02/16/2021 11:39:44     [email or web address]  Job#: [id number]     Doc#: [id number]    CC:

## 2021-02-16 NOTE — CONSULTS
[address], [facility]                                  CONSULTATION    PATIENT NAME: Dariel Case                   :        1950  MED REC NO:   [id number]                          ROOM:       1782  ACCOUNT NO:   [de-identified]                           ADMIT DATE: 02/15/2021  PROVIDER:     Tahmina Monet MD    CONSULT DATE:  2021    INDICATION:  Chest pain and AFib. HISTORY OF PRESENT ILLNESS:  This is a 73-year-old female patient who  had a stroke back in 2020. She has recovered well from that, but she  came in yesterday with having chest pain and also some aphasia symptoms  were noted. She gets dialyzes on Monday, Wednesday, and Friday. She  has had dialysis catheter, temporary catheter placed in the left  subclavian. She has a fistula in the right arm, which is not working. She has another fistula on the left arm, which is also not working. She  came in yesterday with having complaints of having chest pain,  pressures, heaviness present, and also having difficulty with speech,  but has improved right now. No fever, no chills. No cough or sputum  production. No other  or GI complaints present. The patient is undergoing echo this morning. She had an echo done back  in . At that time, her LV function was preserved. Her last stress  test was done in  and her stress test was negative for ischemia at  that time. PAST MEDICAL HISTORY:  The patient has a history of having obstructive  sleep apnea, uses CPAP, history of hypertension, hyperlipidemia,  end-stage renal disease, on dialysis present, and diabetes present. History of CVA and TIA not too long ago in 2020; hypertension,  hyperlipidemia, COPD, sees Dr. Raina Gutierrez for that.     PAST SURGICAL HISTORY:  She has a fistula in both arms, which are  nonfunctional, appendectomy, back surgery, temporary dialysis catheter  placement, hysterectomy, and thyroidectomy. SOCIAL HISTORY:  She does not smoke or drink anymore. ALLERGIES:  PERCOCET, TRAMADOL, VICODIN and NARCAN. MEDICATIONS AT HOME:  She was on inhalers, Singulair, Wellbutrin,  Zaroxolyn, ProAmatine, Aldactone, Amitiza, Plaquenil, Protonix, Ativan,  Lipitor, Ranexa, Seroquel. See the rest of the list.    PHYSICAL EXAMINATION:  GENERAL:  The patient is awake, alert and answering questions, not in  acute distress. VITAL SIGNS:  Temperature is afebrile, pulse is 98, sinus rhythm, blood  pressure 108/75. HEENT:  Head is normocephalic and atraumatic. Pupils are equal and  reactive. CHEST:  Equal expansion. LUNGS:  Clear to auscultation. No wheezing or rhonchi. HEART:  Regular rhythm. ABDOMEN:  Soft and nontender. Bowel sounds are present. No  hepatosplenomegaly or guarding appreciated. EXTREMITIES:  No cyanosis or clubbing noted. NEUROLOGIC:  Cranial nerves II through XII are grossly intact. LABORATORY DATA:  BUN is 32, creatinine 0.6. Troponins are elevated,  but not positive. LFTs are normal.  CBC is within normal range. IMAGING DATA:  CT chest without contrast is pending. MRI of the brain  is pending. Chest x-ray is pending. EKG showed sinus rhythm with no  acute ST changes present. IMPRESSION:  This is a 81-year-old female patient with history of  hypertension, hyperlipidemia, diabetes. From a cardiac stand, I will go  ahead and get a stress test on her today and we will make further  recommendations based on the hospital course.         Daniel Zamudio MD    D: 02/16/2021 9:31:51       T: 02/16/2021 12:08:39     [email or web address]  Job#: [id number]     Doc#: [id number]    CC:

## 2021-02-16 NOTE — CARE COORDINATION
Chart reviewed, in to see pt for dc planning. Introduced self and role explained. Pt was admitted for CP. States she [place] [facility] and uses her rollator at all times though also has a wheelchair. Explained she has HD M/W/F and [employer] provides transportation. Explained she follows with PCP and has insurance with affordable RX co-pays. Discussed HHC and pt declined [facility] need nor any other discharge needs. CM remains available if needs arise. 12:19 PM  Spoke with Caron/admissions for [place] at [phone] who requested to be called on patient is ready for return and for report to be called to same number. CM following.

## 2021-02-16 NOTE — CONSULTS
Dictated -[id number]    Hx of gastric bypass surgery 2018  PCI in past   [facility]  HTN  DM  ESRD on HD    Listed for transplant but due to CVA in 8/2020 on hold   She had a cath in 2015 at [facility] with a a BMS stent and RCA 60% stenosis distal

## 2021-02-16 NOTE — ED PROVIDER NOTES
Emergency Department Encounter    Patient: Obinna Foley  MRN: [id number]  : 1950  Date of Evaluation: 2/15/2021  ED Provider:  Anisha Brown    Triage Chief Complaint:   Chest Pain and Aphasia (resolved Hx of a stroke Aug 25 2020)      [place]:  Obinna Foley is a 78 y.o. female that presents to the emergency department for evaluation of chest pain. Patient admits to history of end-stage renal disease on hemodialysis. Follows with Dr. Leigh Ann Swift and gets dialysis Monday, Wednesday, Friday. Prior to arrival, patient had dialysis. Dialysis went well without any complications. After dialysis, patient developed chest pain. Describes a dull, achy pain localized to the left hemithorax. Over the past 3 days, patient has also had slurred speech. She states that she has difficulty expressing her thoughts at times. She does have a history of CVA in 2020. Due to combination of neurologic symptoms and her chest pain, decided to come to the emergency department for evaluation. Denies double vision, blurry vision, change in vision. No flashes or floaters. No tinnitus, buzzing, ringing in her ears. No facial droop or dysphagia. No difficulty ambulating or ataxia. Denies falls, head trauma, neck trauma. No syncope, dizziness, lightheadedness. No shortness of breath or pleuritic pain. No additional precipitating, modifying, alleviating features. Last known well is 2021. ROS - see HPI, below listed is current ROS at time of my eval:  A complete 14 point review of systems was performed and is as dictated above, otherwise negative.     Past Medical History:   Diagnosis Date    Acid reflux     Anemia     Arthritis     \"Shoulders, Hips And Knees\"    CAD (coronary artery disease)     Sees Dr. Chela Vincent Cerebral artery occlusion with cerebral infarction ([facility] [address]     CHF (congestive heart failure) (HCC)     Chronic back pain     Chronic constipation     Chronic kidney disease     Sees  Alberto Caba    COPD (chronic obstructive pulmonary disease) (HCC)     Sees Dr. Israel Call Depression     Diabetes mellitus [facility]) Dx 2012    Sees Dr. Tess Pickett Emphysema lung [facility])     Glaucoma     \"Both Eyes\"    Gout     Hiatal hernia     History of blood transfusion 's    No Reaction To Blood Transfusion Received    [place] (hard of hearing)     Bilateral Ears    Hyperlipidemia     Hypertension     Dr. Abby Hamilton    Itching     \"Whole Body Itches The [de-identified] Of The Time\"    Morbid obesity ([address]     Rheumatoid arthritis ([address]     Shortness of breath on exertion     Sleep apnea     Uses CPAP    Teeth missing     Upper And Lower    Thyroid disease     Thyroidectomy In     Urinary incontinence     WD-Chronic buttock pain 2018    Wears glasses        Past Surgical History:   Procedure Laterality Date    APPENDECTOMY  1968    BACK SURGERY  2017    Lower Back With Hardware    BACK SURGERY  2016    Cervical Back With Hardware    CARPAL TUNNEL RELEASE Right 1993     SECTION  3-30-68 And 10-17-69    COLONOSCOPY  2017    Polyps Removed    CORONARY ANGIOPLASTY  2016    Heart Stent D Circ    DENTAL SURGERY      Teeth Extracted In Past    DILATION AND CURETTAGE OF UTERUS  Last Done In [address] [place] [address]    \"Numerous\"    ENDOSCOPY, COLON, DIAGNOSTIC  2017    HYSTERECTOMY  1990    Partial Abdominal Hysterectomy    IR TUNNELED CATHETER PLACEMENT GREATER THAN 5 YEARS  2019    IR TUNNELED CATHETER PLACEMENT GREATER THAN 5 YEARS 2019 [address] [facility] SPECIAL PROCEDURES    ROTATOR CUFF REPAIR Left 2010    SLEEVE GASTRECTOMY  2018    robotic assisted gastric sleeve, hiatal hernia repair    THYROIDECTOMY         Family History   Problem Relation Age of Onset    Kidney Disease Mother         \"Kidney Murlene Ser"    Heart Disease Mother         CHF    Arthritis Mother     Diabetes Mother     Depression Mother     High Blood Pressure Mother     Obesity Mother    [address] [place] [address] Vision Loss Mother     Heart Attack Father     Heart Disease Father         Heart Attack    Diabetes Father     High Blood Pressure Father     High Blood Pressure Daughter        Social History     Socioeconomic History    Marital status: Single     Spouse name: Not on file    Number of children: 2    Years of education: Not on file    Highest education level: Not on file   Occupational History    Not on file   Social Needs    Financial resource strain: Not on file    Food insecurity     Worry: Not on file     Inability: Not on file   [employer] needs     Medical: Not on file     Non-medical: Not on file   Tobacco Use    Smoking status: Never Smoker    Smokeless tobacco: Never Used   Substance and Sexual Activity    Alcohol use: No    Drug use: No    Sexual activity: Never   Lifestyle    Physical activity     Days per week: Not on file     Minutes per session: Not on file    Stress: Not on file   Relationships    Social connections     Talks on phone: Not on file     Gets together: Not on file     Attends Voodoo service: Not on file     Active member of club or organization: Not on file     Attends meetings of clubs or organizations: Not on file     Relationship status: Not on file    Intimate partner violence     Fear of current or ex partner: Not on file     Emotionally abused: Not on file     Physically abused: Not on file     Forced sexual activity: Not on file   Other Topics Concern    Not on file   Social History Narrative    Not on file       Current Facility-Administered Medications   Medication Dose Route Frequency Provider Last Rate Last Admin    LORazepam (ATIVAN) injection 1 mg  1 mg Intravenous Once W.W. Robert Applebaum MD, DO   Stopped at 02/15/21 1904    [START ON 2/16/2021] allopurinol (ZYLOPRIM) tablet 200 mg  200 mg Oral Daily Viky Paez DO        atorvastatin (LIPITOR) tablet 80 mg  80 mg Oral Nightly Viky Paez DO        [START ON 2/16/2021] buPROPion (WELLBUTRIN XL) extended release tablet 300 mg  300 mg Oral QAM Viky Paez, DO        cetirizine (ZYRTEC) tablet 10 mg  10 mg Oral Daily W.WRamiro Perez, DO        clopidogrel (PLAVIX) tablet 75 mg  75 mg Oral Daily W.WRamiro Perez, DO        hydroxychloroquine (PLAQUENIL) tablet 200 mg  200 mg Oral BID W.WRamiro Perez, DO        [START ON 2/16/2021] levothyroxine (SYNTHROID) tablet 100 mcg  100 mcg Oral Daily Viky Paez, DO        LORazepam (ATIVAN) tablet 0.5 mg  0.5 mg Oral BID W.WRamiro Perez, DO        lubiprostone (AMITIZA) capsule 24 mcg  24 mcg Oral BID W.WRamiro Perez, DO        [START ON 2/16/2021] metOLazone (ZAROXOLYN) tablet 2.5 mg  2.5 mg Oral Daily Viky Paez, DO        midodrine (PROAMATINE) tablet 10 mg  10 mg Oral Once per day on Mon Wed Fri W.CHRISTINE Perez, DO        montelukast (SINGULAIR) tablet 10 mg  10 mg Oral Nightly W.WRamiro Perez, DO        QUEtiapine (SEROQUEL XR) extended release tablet 50 mg  50 mg Oral Nightly Viky Paez, DO        ranolazine (RANEXA) extended release tablet 500 mg  500 mg Oral BID W.WRamiro MojicaKearney Ana, DO        spironolactone (ALDACTONE) tablet 25 mg  25 mg Oral Daily W.WRamiro MojicaKearney Ana, DO        sodium chloride flush 0.9 % injection 10 mL  10 mL Intravenous 2 times per day W.WRamiro Perez, DO        sodium chloride flush 0.9 % injection 10 mL  10 mL Intravenous PRN W.WRamiro MojicaKearney Ana, DO        heparin (porcine) injection 5,000 Units  5,000 Units Subcutaneous 3 times per day W.WRamiro Perez, DO        promethazine (PHENERGAN) tablet 12.5 mg  12.5 mg Oral Q6H PRN W.WRamiro MojicaKearney Ana, DO        Or    ondansetron (ZOFRAN) injection 4 mg  4 mg Intravenous Q6H PRN Viky Paez, DO        polyethylene glycol (GLYCOLAX) packet 17 g  17 g Oral Daily PRN [employer], DO        acetaminophen (TYLENOL) tablet 650 mg  650 mg Oral Q6H PRN [employer], DO        Or    acetaminophen (TYLENOL) suppository 650 mg  650 mg Rectal Q6H PRN Linda Blue, DO        0.9 % sodium chloride infusion   Intravenous Continuous Viky Paez, DO           Allergies   Allergen Reactions    Percocet [Oxycodone-Acetaminophen]      hallucinations    Tramadol Itching    Vicodin [Hydrocodone-Acetaminophen] Itching    Narcan [Naloxone Hcl] Anxiety     Feels like out of body, things are speeding       Nursing Notes Reviewed    Physical Exam:  Triage VS:    ED Triage Vitals [02/15/21 1520]   Enc Vitals Group      /77      Pulse 100      Resp 16      Temp 97.9 °F (36.6 °C)      Temp Source Oral      SpO2 97 %      Weight 197 lb (89.4 kg)      Height 5' 4\" (1.626 m)        My pulse ox interpretation is - normal    General appearance: Patient is awake, alert, oriented. Sitting upright in the exam gurney. Following commands and answering questions. GCS is 15. Nontoxic in appearance. Skin:  Warm. Dry. Intact. No herpes zoster lesions. Eye: Pupils are equal, round, reactive. Extraocular movements are intact. No horizontal, vertical, rotary nystagmus. No gaze deviation. Funduscopic exam reveals no papilledema. Disc margins are clear. Positive spontaneous venous pulsations. No Layne sign is apparent. No proptosis is appreciated. Head, ears, nose, mouth and throat: Head is normocephalic and atraumatic. No external masses or lesions. No nasal drainage. Airways patent. No tenderness to palpation of the temporal arteries bilaterally. There is no jaw or temporomandibular joint tenderness or trauma. Neck: Supple. No nuchal rigidity. No meningeal signs. Trachea is midline. No masses or thyromegaly. No JVD. No carotid thrills or bruits. Extremity:  No clubbing, cyanosis, or edema. Full ROM in all 4 extremities. Patient is able to ambulate without ataxia. Heart: Tachycardic rate and regular rhythm. Audible S1 and S2. No audible murmurs, rubs, gallops. Perfusion: Symmetric peripheral pulses. Brisk capillary refill. Compartments are soft and compressible. Respiratory: Respirations nonlabored. No rales, rhonchi, wheezes. Abdominal:   Soft. Non-peritoneal.  Bowel sounds in all 4 quadrants. No midline pulsatile abdominal masses. Neurological: Sensation is grossly intact to light touch and two-point discrimination. Cranial nerves II through XII are grossly intact. There is no dysmetria. No dysdiadochokinesis. No pronator drift. No cerebellar signs. Patient is able to ambulate without ataxia or gait instability. NIH stroke scale score is 2 secondary to subjective dysarthria and aphasia. NIH Stroke Scale  NIH Stroke Scale Assessed: Yes  Interval: Hand-off/transfer([place])  Level of Consciousness (1a. ): Alert  LOC Questions (1b. ): Answers both correctly  LOC Commands (1c. ): Performs both tasks correctly  Best Gaze (2. ): Normal  Visual (3. ): No visual loss  Facial Palsy (4. ): Normal symmetrical movement  Motor Arm, Left (5a. ): No drift  Motor Arm, Right (5b. ): No drift  Motor Leg, Left (6a. ): No drift  Motor Leg, Right (6b. ): No drift  Limb Ataxia (7. ): Absent  Sensory (8. ): Normal  Best Language (9. ): No aphasia  Dysarthria (10. ): Normal  Extinction and Inattention (11): No abnormality  Total: 0  Negative Kernig and Brudzinski signs. Psychiatric: Cooperative.     I have reviewed and interpreted all of the currently available diagnostic results from this visit    Labs:  Results for orders placed or performed during the hospital encounter of 02/15/21   Lactate, Sepsis   Result Value Ref Range    Lactic Acid, Sepsis 1.7 0.5 - 1.9 mMOL/L   CBC   Result Value Ref Range    WBC 7.6 4.0 - 10.5 K/CU MM    RBC 3.55 (L) 4.2 - 5.4 M/CU MM    Hemoglobin 11.0 (L) 12.5 - 16.0 GM/DL    Hematocrit 33.8 (L) 37 - 47 %    MCV 95.2 78 - 100 FL    MCH 31.0 27 - 31 PG    MCHC 32.5 32.0 - 36.0 %    RDW 12.7 11.7 - 14.9 %    Platelets 743 803 - 888 K/CU MM    MPV 9.9 7.5 - 11.1 FL   Troponin   Result Value Ref Range    Troponin T 0.034 (H) <0.01 NG/ML   Basic Metabolic Panel w/ Reflex to MG   Result Value Ref Range    Sodium 134 (L) 135 - 145 MMOL/L    Potassium 3.9 3.5 - 5.1 MMOL/L    Chloride 95 (L) 99 - 110 mMol/L    CO2 25 21 - 32 MMOL/L    Anion Gap 14 4 - 16    BUN 26 (H) 6 - 23 MG/DL    CREATININE 5.2 (H) 0.6 - 1.1 MG/DL    Glucose 119 (H) 70 - 99 MG/DL    Calcium 8.7 8.3 - 10.6 MG/DL    GFR Non- 8 (L) >60 mL/min/1.73m2    GFR  10 (L) >60 mL/min/1.73m2   EKG 12 Lead   Result Value Ref Range    Ventricular Rate 90 BPM    Atrial Rate 90 BPM    P-R Interval 190 ms    QRS Duration 94 ms    Q-T Interval 392 ms    QTc Calculation (Bazett) 479 ms    P Axis 58 degrees    R Axis -23 degrees    T Axis 32 degrees    Diagnosis       Normal sinus rhythm  Anterior infarct (cited on or before 21-OCT-2018)  Abnormal ECG  When compared with ECG of 15-FEB-2021 15:19,  No significant change was found          Radiographs:  Radiologist's Report Reviewed:  Xr Chest (2 Vw)    Result Date: 2/15/2021  EXAMINATION: TWO XRAY VIEWS OF THE CHEST 2/15/2021 6:14 pm COMPARISON: 07/17/2019 HISTORY: ORDERING SYSTEM PROVIDED HISTORY: CP TECHNOLOGIST PROVIDED HISTORY: Reason for exam:->CP Reason for Exam: CP Acuity: Acute Type of Exam: Initial Additional signs and symptoms: na Relevant Medical/Surgical History: diabetes, cad, copd, ckd FINDINGS: Monitor wires overlie the chest.  There is a left internal jugular central venous line with the tip in the right atrium. There is no pneumothorax. There is linear density in the right lung base that likely represents calcification of the pleura. There is patchy airspace disease in the left lung base that is concerning for atelectasis or a developing infiltrate. No pleural fluid. There has been previous fusion of lower cervical spine. Right internal jugular dialysis catheter with the tip in the right atrium.  Patchy airspace disease in the left lung base that is concerning for an early infiltrate. Follow up to resolution is suggested. Ct Head Wo Contrast    Result Date: 2/15/2021  EXAMINATION: CT OF THE HEAD WITHOUT CONTRAST  2/15/2021 5:32 pm HISTORY: ORDERING SYSTEM PROVIDED HISTORY: CVA TECHNOLOGIST PROVIDED HISTORY: Reason for exam:->CVA Has a \"code stroke\" or \"stroke alert\" been called? ->No Decision Support Exception->Emergency Medical Condition (MA) Reason for Exam: CVA Acuity: Acute Type of Exam: Initial TECHNIQUE: Noncontrast CT acquisition through the head was performed from the skull base to the skull vertex. Automatic Exposure Control was utilized as a means of radiation dose reduction. COMPARISON: August 25, 2020. FINDINGS: There is cortical and cerebellar volume loss with associated ex vacuo ventricular dilation, appropriate for age. Mild diffuse periventricular and subcortical deep white matter hypoattenuation noted, findings compatible with chronic small vessel ischemic disease. The gray-white matter differentiation is otherwise preserved throughout. There is no acute hemorrhage, mass, or mass effect. No evidence of acute territorial infarct. No abnormal extraaxial fluid collections. No acute calvarial or skull base osseous abnormality. The mastoid air cells are normally aerated. The visualized paranasal sinuses are clear. No evidence of acute intracranial process. Age-appropriate atrophy and mild chronic small vessel ischemic changes noted. EKG: (All EKG's are interpreted by myself in the absence of a cardiologist). EKG performed on 2/15/2021 at 1927 demonstrates ventricular rate of 90 bpm sinus rhythm. No ST ovation's or depressions. No signs of acute ischemia, infarct, high degree heart block. Intervals are otherwise within normal limits. MDM:  Patient was seen and evaluated in the emergency department by myself. A thorough history and physical exam were performed, prior medical records were reviewed.   Upon arrival to the emergency department vital signs were noted and are stable. Patient was connected to continuous cardiopulmonary monitoring. Rhythm strip interpreted by myself demonstrating sinus rhythm. EKG is performed, interpreted by myself, as detailed above. Differential diagnosis and treatment plan are discussed. IV access is established and the patient is hep-locked. Comprehensive neurologic exam is completed by myself. NIH stroke scale score is 2 secondary to dysarthria and aphasia. Last known well is greater than 72 hours ago. Patient is not a candidate for TPA or thrombectomy. Pertinent labs are drawn and radiographic studies are performed. Once results are available they reviewed by myself. Labs demonstrate no leukocytosis. Patient does have normocytic anemia hemoglobin 11.0. No thrombocytopenia. Electrolytes demonstrate hyponatremia sodium 134. Patient is hypochloremic with a chloride of 95. BUN is 26. Creatinine 5.2. Troponin is 0.034. Lactic acid is 1.7. Chest x-ray radiology report reads right internal jugular dialysis catheter with the tip in the right atrium. Patchy airspace disease in the left lung base that is concerning for an early infiltrate. CT brain without IV contrast rated report reads no evidence of acute intercranial process. Age appropriate atrophy and mild chronic small vessel ischemic changes noted. On repeat evaluations, patient remains hemodynamically stable. Repeat neurologic exam remained stable without any additional neurologic sequelae. NIH stroke scale score remains 2. Results of laboratory and radiographic data along with differential diagnosis and treatment plan are discussed with the patient. Presents with chest pain. Symptoms concerning for elevated troponin. Heart Score:  History: 1  EC  Patient Age: 4  *Risk factors for Atherosclerotic disease: Hypercholesterolemia; Hypertension;Coronary Artery Disease;Diabetes Mellitus  Risk Factors: 2  Troponin: 2  Heart Score Total: 8    No EKG changes are apparent. Low clinical suspicion for DVT or PE. In addition, patient does have dysarthria and aphasia. Does have a history of stroke. Patient does have end-stage renal disease and is dialysis dependent. Given accommodation of her symptoms, we recommend admission. Patient is agreeable. Case discussed with admitting hospitalist who is agreeable with treatment plan and accepts admission. Patient is updated bedside. All questions were answered. 324 mg chewable aspirin is provided. Patient is admitted in stable condition. Clinical Impression:  1. Acute chest pain    2. NSTEMI (non-ST elevated myocardial infarction) ([facility] [address]    3. Dysarthria    4. Aphasia    5. End stage renal disease on dialysis [facility])        ED Provider Disposition:  DISPOSITION Admitted 02/15/2021 07:30:47 PM      Comment: Please note this report has been produced using speech recognition software and may contain errors related to that system including errors in grammar, punctuation, and spelling, as well as words and phrases that may be inappropriate. Efforts were made to edit the dictations.        [address] [facility],   02/15/21 [id number]

## 2021-02-16 NOTE — H&P
History and Physical      Name:  Eduar Bolton /Age/Sex: 1950  (89 y.o. female)   MRN & CSN:  [id number] & [id number] Admission Date/Time: 2/15/2021  5:01 PM   Location:  ED30/ED-30 PCP: John Diaz Day: 1    Assessment and Plan:   Eduar Bolton is a 78 y.o.  female  who presents with:    Acute aphagia event. Possible tia   Hx of cva fall   ESRD on dialysis, Dr Charly Hill  CAD - on plavix  CHF - no signs and symptoms of fluid overload. COPD - breathing stable on room air, cont home breathing treatment. hyperlipidemia - cont statin  hypertension - BP stable, cont home BP meds, monitor. gout - hold allopurinol d/t worsening kidney function. RA  thyroid disease - cont synthroid      Restarted home medications  A.m. labs  MRI brain without in the morning  Neurology consulted for dialysis    Diet No diet orders on file   DVT Prophylaxis [] Lovenox, []  Heparin, [] SCDs, [] No VTE prophylaxis, patient ambulating   GI Prophylaxis [] PPI, [] H2 Blocker, [] No GI prophylaxis, patient is receiving diet/Tube Feeds   Code Status Prior   Disposition Patient requires continued admission due to pending MRI and dialysis    discharge to home tomorrow   MDM [] Low, [] Moderate,[x]  High  Patient's risk as above due to     [] One or more chronic illnesses with severe exacerbation or progression    [] Acute or chronic illnesses or injuries that pose a threat to life or bodily function    [x] An abrupt change in neurological status    [] Decision not to resuscitate     [] Drug therapy requiring intensive monitoring for toxicity      History of Present Illness:     Chief Complaint:   Eduar Bolton is a 78 y.o.  female  who presents with now resolved aphasia. Patient states she had an aphasic event last night while talking to her daughter. She states she was unable to get the words out but was aware she could not get the words out. Denies any fevers or chills.   She admits to recent \"Both Eyes\"    Gout     Hiatal hernia     History of blood transfusion     No Reaction To Blood Transfusion Received    [place] (hard of hearing)     Bilateral Ears    Hyperlipidemia     Hypertension     Dr. Anne Davis    Itching     \"Whole Body Itches The [de-identified] Of The Time\"    Morbid obesity ([address]     Rheumatoid arthritis ([address]     Shortness of breath on exertion     Sleep apnea     Uses CPAP    Teeth missing     Upper And Lower    Thyroid disease     Thyroidectomy In     Urinary incontinence     WD-Chronic buttock pain 2018    Wears glasses      PSHx:  has a past surgical history that includes  section (3-30-68 And 10-17-69); Rotator cuff repair (Left, 2010); Carpal tunnel release (Right, ); Thyroidectomy (); Colonoscopy (); Endoscopy, colon, diagnostic (); back surgery (); back surgery (); Dental surgery; Coronary angioplasty (2016); Appendectomy (); Hysterectomy (); Dilation and curettage of uterus (Last Done In [address] [place] [address]); Sleeve Gastrectomy (2018); and IR TUNNELED CVC PLACE WO SQ PORT/PUMP > 5 YEARS (2019). Allergies: Allergies   Allergen Reactions    Percocet [Oxycodone-Acetaminophen]      hallucinations    Tramadol Itching    Vicodin [Hydrocodone-Acetaminophen] Itching    Narcan [Naloxone Hcl] Anxiety     Feels like out of body, things are speeding     Home Medications:   Prior to Admission medications    Medication Sig Start Date End Date Taking? Authorizing Provider   guaiFENesin (MUCUS RELIEF) 600 MG extended release tablet Take 1 tablet by mouth 2 times daily 21   Good Ravi MD   albuterol sulfate  (90 Base) MCG/ACT inhaler Inhale 2 puffs into the lungs every 6 hours as needed for Wheezing 21   Good Ravi MD   montelukast (SINGULAIR) 10 MG tablet Take 1 tablet by mouth nightly.  20   Jai Canales MD   Tiotropium Bromide-Olodaterol (STIOLTO RESPIMAT) 2.5-2.5 MCG/ACT AERS Inhale 2 puffs into the lungs daily 10/27/20   Jai Antoine MD   ipratropium-albuterol (DUONEB) 0.5-2.5 (3) MG/3ML SOLN nebulizer solution Inhale 3 mLs into the lungs every 4 hours 10/27/20   Shyann Webb MD   simethicone (MYLICON) 80 MG chewable tablet Gas Relief (simethicone) 80 mg chewable tablet    Historical Provider, MD   tiotropium (SPIRIVA RESPIMAT) 2.5 MCG/ACT AERS inhaler Inhale 2 puffs into the lungs    Historical Provider, MD   lidocaine 4 % external patch     Historical Provider, MD   Polysaccharide Iron Complex 391.3 (180 Fe) MG CAPS     Historical Provider, MD   buPROPion (WELLBUTRIN XL) 150 MG extended release tablet Take 300 mg by mouth every morning    Historical Provider, MD   metOLazone (ZAROXOLYN) 2.5 MG tablet TAKE ONE TABLET BY MOUTH DAILY 6/25/20   Che Howell MD   midodrine (PROAMATINE) 10 MG tablet Take 1 tablet by mouth three times a week Take one tablet three times a week prior to each dialysis treatment 4/17/20   Rachel Dillon MD   spironolactone (ALDACTONE) 25 MG tablet Take 1 tablet by mouth daily 2/20/20   Che Howell MD   Multiple Vitamin (DAILY-NICOLE) TABS TAKE ONE TABLET BY MOUTH DAILY 1/28/20   Makenzie Rubin MD   Dextromethorphan-guaiFENesin [facility] DM)  MG TB12 Take 1 tablet by mouth 2 times daily 9/17/19   Shyann Webb MD   ondansetron (ZOFRAN) 4 MG tablet EVERY 4 HOURS PRN NAUSEA 8/8/19   Rachel Dillon MD   clopidogrel (PLAVIX) 75 MG tablet Take 1 tablet by mouth daily 7/22/19   Jelly Barakat MD   ketoconazole (NIZORAL) 2 % shampoo Apply topically daily as needed.  7/22/19   Jelly Barakat MD   allopurinol (ZYLOPRIM) 100 MG tablet Take 2 tablets by mouth daily 7/23/19   Jelly Barakat MD   promethazine (PHENERGAN) 25 MG tablet Take 25 mg by mouth every 6 hours as needed for Nausea    Historical Provider, MD   REFRESH OPTIVE 0.5-0.9 % SOLN Place 2 drops into both eyes 2 times daily 6/17/19   Historical Provider, MD   cetirizine (ZYRTEC) 10 MG tablet Take 10 mg by mouth daily 6/16/19   Historical Provider, MD   levothyroxine (SYNTHROID) 100 MCG tablet Take 100 mcg by mouth daily 7/13/19   Historical Provider, MD   lubiprostone (AMITIZA) 24 MCG capsule Take 24 mcg by mouth 2 times daily    Historical Provider, MD   fluticasone propionate (FLOVENT DISKUS) 50 MCG/BLIST AEPB inhaler Inhale 1 puff into the lungs 2 times daily    Historical Provider, MD   Mirabegron ER (MYRBETRIQ) 50 MG TB24 Take 50 mg by mouth daily    Historical Provider, MD   ketoconazole (NIZORAL) 2 % cream Apply topically daily Apply topically daily. Historical Provider, MD   erythromycin with ethanol (THERAMYCIN) 2 % external solution Apply topically daily Apply topically daily. Historical Provider, MD   fluocinonide (LIDEX) 0.05 % cream Apply topically 2 times daily Apply topically 2 times daily. Historical Provider, MD   triamcinolone (ARISTOCORT) 0.5 % cream Apply topically 3 times daily Apply topically 3 times daily. Historical Provider, MD   hydroxychloroquine (PLAQUENIL) 200 MG tablet Take by mouth 2 times daily    Historical Provider, MD   SENEXON-S 8.6-50 MG per tablet TAKE ONE TABLET BY MOUTH DAILY WHILE ON NARCOTIC PAIN MEDICATION 5/15/18   FIDEL Rene CNP   acetaminophen (TYLENOL) 325 MG tablet Take 2 tablets by mouth every 6 hours as needed for Fever (Fever >100.5 F (38 C)) 4/20/18   FIDEL Rene CNP   pantoprazole (PROTONIX) 40 MG tablet Take 1 tablet by mouth 2 times daily 4/20/18   FIDEL Rene CNP   LORazepam (ATIVAN) 0.5 MG tablet Take 0.5 mg by mouth 2 times daily.     Historical Provider, MD   atorvastatin (LIPITOR) 80 MG tablet Take 80 mg by mouth nightly    Historical Provider, MD   buPROPion (WELLBUTRIN XL) 300 MG extended release tablet Take 300 mg by mouth every morning    Historical Provider, MD   ranolazine (RANEXA) 500 MG extended release tablet Take together: None     Attends [employer] service: None     Active member of club or organization: None     Attends meetings of clubs or organizations: None     Relationship status: None    Intimate partner violence     Fear of current or ex partner: None     Emotionally abused: None     Physically abused: None     Forced sexual activity: None   Other Topics Concern    None   Social History Narrative    None       Medications:   Medications:    LORazepam  1 mg Intravenous Once    aspirin  324 mg Oral Once      Infusions:   PRN Meds:        Electronically signed by Pieter Blount DO on 2/15/2021 at 7:24 PM

## 2021-02-17 ENCOUNTER — APPOINTMENT (OUTPATIENT)
Dept: ULTRASOUND IMAGING | Age: 71
DRG: 069 | End: 2021-02-17
Payer: COMMERCIAL

## 2021-02-17 VITALS
DIASTOLIC BLOOD PRESSURE: 95 MMHG | BODY MASS INDEX: 35.28 KG/M2 | HEIGHT: 63 IN | SYSTOLIC BLOOD PRESSURE: 116 MMHG | HEART RATE: 97 BPM | TEMPERATURE: 98.8 F | WEIGHT: 199.1 LBS | OXYGEN SATURATION: 98 % | RESPIRATION RATE: 14 BRPM

## 2021-02-17 LAB
ALBUMIN SERPL-MCNC: 3.7 GM/DL (ref 3.4–5)
ANION GAP SERPL CALCULATED.3IONS-SCNC: 17 MMOL/L (ref 4–16)
BUN BLDV-MCNC: 45 MG/DL (ref 6–23)
CALCIUM SERPL-MCNC: 8.8 MG/DL (ref 8.3–10.6)
CHLORIDE BLD-SCNC: 96 MMOL/L (ref 99–110)
CO2: 21 MMOL/L (ref 21–32)
CREAT SERPL-MCNC: 7.3 MG/DL (ref 0.6–1.1)
FOLATE: 16.9 NG/ML (ref 3.1–17.5)
GFR AFRICAN AMERICAN: 7 ML/MIN/1.73M2
GFR NON-AFRICAN AMERICAN: 6 ML/MIN/1.73M2
GLUCOSE BLD-MCNC: 74 MG/DL (ref 70–99)
HCT VFR BLD CALC: 35 % (ref 37–47)
HEMOGLOBIN: 10.7 GM/DL (ref 12.5–16)
HOMOCYSTEINE: 35.9 UMOL/L (ref 0–10)
MCH RBC QN AUTO: 31.3 PG (ref 27–31)
MCHC RBC AUTO-ENTMCNC: 30.6 % (ref 32–36)
MCV RBC AUTO: 102.3 FL (ref 78–100)
PDW BLD-RTO: 13 % (ref 11.7–14.9)
PHOSPHORUS: 7.6 MG/DL (ref 2.5–4.9)
PLATELET # BLD: 321 K/CU MM (ref 140–440)
PMV BLD AUTO: 9.8 FL (ref 7.5–11.1)
POTASSIUM SERPL-SCNC: 4.9 MMOL/L (ref 3.5–5.1)
RBC # BLD: 3.42 M/CU MM (ref 4.2–5.4)
SODIUM BLD-SCNC: 134 MMOL/L (ref 135–145)
TSH HIGH SENSITIVITY: 2.17 UIU/ML (ref 0.27–4.2)
VITAMIN B-12: 569.4 PG/ML (ref 211–911)
WBC # BLD: 4 K/CU MM (ref 4–10.5)

## 2021-02-17 PROCEDURE — 6370000000 HC RX 637 (ALT 250 FOR IP): Performed by: INTERNAL MEDICINE

## 2021-02-17 PROCEDURE — 36415 COLL VENOUS BLD VENIPUNCTURE: CPT

## 2021-02-17 PROCEDURE — 6370000000 HC RX 637 (ALT 250 FOR IP): Performed by: STUDENT IN AN ORGANIZED HEALTH CARE EDUCATION/TRAINING PROGRAM

## 2021-02-17 PROCEDURE — 6360000002 HC RX W HCPCS: Performed by: STUDENT IN AN ORGANIZED HEALTH CARE EDUCATION/TRAINING PROGRAM

## 2021-02-17 PROCEDURE — 82607 VITAMIN B-12: CPT

## 2021-02-17 PROCEDURE — 80069 RENAL FUNCTION PANEL: CPT

## 2021-02-17 PROCEDURE — 6370000000 HC RX 637 (ALT 250 FOR IP): Performed by: NURSE PRACTITIONER

## 2021-02-17 PROCEDURE — 82746 ASSAY OF FOLIC ACID SERUM: CPT

## 2021-02-17 PROCEDURE — 6370000000 HC RX 637 (ALT 250 FOR IP): Performed by: HOSPITALIST

## 2021-02-17 PROCEDURE — 36556 INSERT NON-TUNNEL CV CATH: CPT

## 2021-02-17 PROCEDURE — 83090 ASSAY OF HOMOCYSTEINE: CPT

## 2021-02-17 PROCEDURE — 94761 N-INVAS EAR/PLS OXIMETRY MLT: CPT

## 2021-02-17 PROCEDURE — 84443 ASSAY THYROID STIM HORMONE: CPT

## 2021-02-17 PROCEDURE — 85027 COMPLETE CBC AUTOMATED: CPT

## 2021-02-17 PROCEDURE — 6360000002 HC RX W HCPCS: Performed by: INTERNAL MEDICINE

## 2021-02-17 PROCEDURE — 5A1D70Z PERFORMANCE OF URINARY FILTRATION, INTERMITTENT, LESS THAN 6 HOURS PER DAY: ICD-10-PCS | Performed by: INTERNAL MEDICINE

## 2021-02-17 PROCEDURE — 93880 EXTRACRANIAL BILAT STUDY: CPT

## 2021-02-17 PROCEDURE — 90935 HEMODIALYSIS ONE EVALUATION: CPT

## 2021-02-17 RX ORDER — QUETIAPINE FUMARATE 25 MG/1
50 TABLET, FILM COATED ORAL ONCE
Status: COMPLETED | OUTPATIENT
Start: 2021-02-17 | End: 2021-02-17

## 2021-02-17 RX ORDER — TORSEMIDE 20 MG/1
20 TABLET ORAL DAILY
Status: DISCONTINUED | OUTPATIENT
Start: 2021-02-17 | End: 2021-02-17 | Stop reason: HOSPADM

## 2021-02-17 RX ORDER — ASPIRIN 81 MG/1
81 TABLET, CHEWABLE ORAL DAILY
Qty: 60 TABLET | Refills: 0 | COMMUNITY
Start: 2021-02-17

## 2021-02-17 RX ORDER — ALLOPURINOL 100 MG/1
100 TABLET ORAL DAILY
Status: DISCONTINUED | OUTPATIENT
Start: 2021-02-17 | End: 2021-02-17 | Stop reason: HOSPADM

## 2021-02-17 RX ORDER — HEPARIN SODIUM 1000 [USP'U]/ML
2600 INJECTION, SOLUTION INTRAVENOUS; SUBCUTANEOUS
Status: COMPLETED | OUTPATIENT
Start: 2021-02-17 | End: 2021-02-17

## 2021-02-17 RX ORDER — ASPIRIN 81 MG/1
81 TABLET, CHEWABLE ORAL DAILY
Status: DISCONTINUED | OUTPATIENT
Start: 2021-02-17 | End: 2021-02-17 | Stop reason: HOSPADM

## 2021-02-17 RX ORDER — ATORVASTATIN CALCIUM 20 MG/1
20 TABLET, FILM COATED ORAL NIGHTLY
Status: DISCONTINUED | OUTPATIENT
Start: 2021-02-17 | End: 2021-02-17 | Stop reason: HOSPADM

## 2021-02-17 RX ADMIN — QUETIAPINE FUMARATE 50 MG: 25 TABLET ORAL at 12:54

## 2021-02-17 RX ADMIN — HEPARIN SODIUM 2600 UNITS: 1000 INJECTION INTRAVENOUS; SUBCUTANEOUS at 12:20

## 2021-02-17 RX ADMIN — RANOLAZINE 500 MG: 500 TABLET, FILM COATED, EXTENDED RELEASE ORAL at 12:52

## 2021-02-17 RX ADMIN — BUPROPION HYDROCHLORIDE 300 MG: 150 TABLET, FILM COATED, EXTENDED RELEASE ORAL at 12:53

## 2021-02-17 RX ADMIN — LORAZEPAM 0.5 MG: 0.5 TABLET ORAL at 12:54

## 2021-02-17 RX ADMIN — FLUTICASONE PROPIONATE 1 SPRAY: 50 SPRAY, METERED NASAL at 12:55

## 2021-02-17 RX ADMIN — ACETAMINOPHEN 650 MG: 325 TABLET ORAL at 12:52

## 2021-02-17 RX ADMIN — LEVOTHYROXINE SODIUM 100 MCG: 100 TABLET ORAL at 06:31

## 2021-02-17 RX ADMIN — HEPARIN SODIUM 5000 UNITS: 5000 INJECTION INTRAVENOUS; SUBCUTANEOUS at 06:31

## 2021-02-17 RX ADMIN — CLOPIDOGREL BISULFATE 75 MG: 75 TABLET ORAL at 12:53

## 2021-02-17 RX ADMIN — ALLOPURINOL 100 MG: 100 TABLET ORAL at 12:51

## 2021-02-17 ASSESSMENT — PAIN DESCRIPTION - PAIN TYPE: TYPE: CHRONIC PAIN

## 2021-02-17 ASSESSMENT — PAIN SCALES - GENERAL
PAINLEVEL_OUTOF10: 3
PAINLEVEL_OUTOF10: 2
PAINLEVEL_OUTOF10: 2
PAINLEVEL_OUTOF10: 5

## 2021-02-17 ASSESSMENT — PAIN DESCRIPTION - LOCATION: LOCATION: SHOULDER

## 2021-02-17 NOTE — PROGRESS NOTES
[address] [place] [address]  HOSPITALIST PROGRESS NOTE                       Name:  Kiana Mcgrath /Age/Sex: 1950  (78 y.o. female)   MRN & CSN:  [id number] & [id number] Admission Date/Time: 2/15/2021  5:01 PM   Location:  [address] [place] [address] Attending:  Sophy Agosto MD                                                  HPI  Kiana Mcgrath is a 78 y.o. female who presents with chest pain/speech difficulty    SUBJECTIVE  Seen at dialysis, no chest pain/shortness of breath, no confusion. Wants to go home after HD    10 point review of systems reviewed and negative unless noted above. ALLERGIES:   Allergies   Allergen Reactions    Percocet [Oxycodone-Acetaminophen]      hallucinations    Tramadol Itching    Vicodin [Hydrocodone-Acetaminophen] Itching    Narcan [Naloxone Hcl] Anxiety     Feels like out of body, things are speeding       PCP: [address] [place] [address], SURGICAL HISTORY, SOCIAL HISTORY and  HOME MEDICATIONS all reviewed. OBJECTIVE  Vitals:    21 0945 21 1000 21 1030 21 1045   BP: 133/79 125/83 123/84 120/77   Pulse: 94 91 98 97   Resp:  14 12 14   Temp:       TempSrc:       SpO2:       Weight:       Height:           PHYSICAL EXAM   GEN Awake female, sitting upright in bed in no apparent distress. EYES Pupils are equally round. No scleral erythema, discharge, or conjunctivitis. HENT Mucous membranes are moist. Oral pharynx without exudates, no evidence of thrush. NECK Supple, no apparent thyromegaly or masses. RESP Unlabored respiration, bilateral rhonchi  CARDIO/VASC S1/S2 auscultated. Regular rate without appreciable murmurs, rubs, or gallops. No JVD or carotid bruits. Peripheral pulses equal bilaterally and palpable. No peripheral edema. GI Abdomen is soft without significant tenderness, masses, or guarding. Bowel sounds are normoactive. Rectal exam deferred.  No costovertebral angle tenderness.  Normal appearing external genitalia. HEME/LYMPH No palpable cervical lymphadenopathy and no hepatosplenomegaly. No petechiae or ecchymoses. MSK Spontaneous movement of all extremities. No gross joint deformities. SKIN Normal coloration, warm, dry. NEURO Cranial nerves appear grossly intact, normal speech, no lateralizing weakness. INTAKE: No intake/output data recorded. OUTPUT: No intake/output data recorded. LABS  Recent Labs     02/15/21  1616 02/16/21  0425 02/17/21  0611   WBC 7.6 6.6 4.0   HGB 11.0* 11.1* 10.7*   HCT 33.8* 34.3* 35.0*    323 321      Recent Labs     02/15/21  1616 02/16/21  0425 02/17/21  0611   * 136 134*   K 3.9 4.6 4.9   CL 95* 97* 96*   CO2 25 23 21   PHOS  --   --  7.6*   BUN 26* 32* 45*   CREATININE 5.2* 6.0* 7.3*     Recent Labs     02/16/21  0425   AST 17   ALT 7*   BILITOT 0.2   ALKPHOS 187*     No results for input(s): INR in the last 72 hours.   Recent Labs     02/15/21  1616 02/16/21  0952 02/16/21  1636   TROPONINT 0.034* 0.047* 0.038*          Abnormal labs for today noted      Imaging:     ECHO:    Microbiology:  Blood culture:    Urine culture:    Sputum culture:    Procedures done this admission:    MEDS  Scheduled Meds:   heparin (porcine)  2,600 Units Intravenous Once in dialysis    allopurinol  100 mg Oral Daily    torsemide  20 mg Oral Daily    heparin (porcine)  2,600 Units Intracatheter Once in dialysis    atorvastatin  20 mg Oral Nightly    fluticasone  1 spray Each Nostril Daily    LORazepam  1 mg Intravenous Once    buPROPion  300 mg Oral QAM    clopidogrel  75 mg Oral Daily    hydroxychloroquine  200 mg Oral BID    levothyroxine  100 mcg Oral Daily    LORazepam  0.5 mg Oral BID    lubiprostone  24 mcg Oral BID    midodrine  10 mg Oral Once per day on Mon Wed Fri    montelukast  10 mg Oral Nightly    QUEtiapine  50 mg Oral Nightly    ranolazine  500 mg Oral BID    spironolactone  25 mg Oral Daily    sodium chloride flush  10 mL Intravenous 2 times per day    heparin (porcine)  5,000 Units Subcutaneous 3 times per day     Continuous Infusions:  PRN Meds:sodium chloride flush, promethazine **OR** ondansetron, polyethylene glycol, acetaminophen **OR** acetaminophen        ASSESSMENT and [facility] Day: 3    1-Chest pain- episodic, with mildly elevated troponin-CT non-acute, stress test reported with no reversible ischemia.   2-?Transient slurred speech- CT head non-acute, MRI with no acute infarct but progression of white matter disease- will wait for neuro input    Other issues  ESRD- HD per nephro  -HTN  -DM  -RA  -HLD        Will discharge today once I get neuro input on the MRI       Disp:     Diet DIET RENAL;   DVT Prophylaxis [] Lovenox, []  Heparin, [] SCDs, [] Ambulation   GI Prophylaxis [] PPI,  [] H2 Blocker,  [] Carafate,  [] Diet/Tube Feeds   Code Status Full Code   Disposition Patient requires continued admission due to chest pain   CMS Level of Risk [] Low, [x] Moderate,[]  High  Patient's risk as above due to chest pain     GERONIMO GREEN MD 2/17/2021 11:12 AM

## 2021-02-17 NOTE — PROGRESS NOTES
Patient received 3 hours of treatment with no issues. 3 L of fluid removed. CVC flushed, heparinized, and capped x2 lumens. Report called to floor RN. Patient Name: Bebo Almonte  Patient : 1950  MRN: [id number]     Acct: [de-identified]  Date of Admission: 2/15/2021  Room/Bed: 3005/3005-A  Code Status:  Full Code  Allergies:    Allergies   Allergen Reactions    Percocet [Oxycodone-Acetaminophen]      hallucinations    Tramadol Itching    Vicodin [Hydrocodone-Acetaminophen] Itching    Narcan [Naloxone Hcl] Anxiety     Feels like out of body, things are speeding     Diagnosis:    Patient Active Problem List   Diagnosis    CAD (coronary artery disease)    Nausea & vomiting    Hypothyroidism, adult    Acute on chronic renal failure ([facility])    Hypertensive kidney disease with CKD stage III    Type 2 diabetes mellitus without complication ([facility])    Hypercalcemia    Chronic venous hypertension (idiopathic) with inflammation of bilateral lower extremity    Fluid overload    Acute on chronic diastolic heart failure ([facility])    Morbid obesity with BMI of 45.0-49.9, adult ([facility])    Diabetes 1.5, managed as type 2 ([facility])    Hyperglycemia    Chronic fatigue    Solitary pulmonary nodule    Acute hypercapnic respiratory failure (HCC)    Acute metabolic encephalopathy    Chronic diastolic heart failure (HCC)    Acute renal failure (ARF) ([facility])    Acute encephalopathy    YRIS (obstructive sleep apnea)    Hiatal hernia    Status post laparoscopic sleeve gastrectomy    Status post bariatric surgery    WD-Chronic buttock pain    Chronic kidney disease, stage IV (severe) ([facility])    Essential hypertension    [facility]) [facility]    ESRD needing dialysis [facility]    Type 2 diabetes mellitus with hyperglycemia, with long-term current use of insulin ([facility])    Class 3 severe obesity due to excess calories with body mass index (BMI) of 40.0 to 44.9 in adult ([facility])    Fistula    Cerebrovascular accident (CVA) due to occlusion of precerebral artery ([address]    Aphagia         Treatment:  Hemodilaysis 2:1  Priority: 1st Time Acute  Location: Acute Room    Diabetic: No  NPO: No  Isolation Precautions: Dialysis     Consent for Treatment Verified: Yes  Blood Consent Verified: Not Applicable     Safety Verified: Identify (I), Consent (C), Equipment (E), HepB Status (B), Orders Complete (O), Access Verified (A) and Timeliness (T)  Time out performed prior to access at 0825 hours. Report Received from Primary RN at 0747 hours. Primary RN (First Initial, Last Name, Title): MILVIA Woody RN  Incapacitated Nurse Education Completed: Not Applicable     HBsAg ONLY:  Date Drawn: February 16, 2021       Results: Negative  HBsAb:  Date Drawn:  February 16, 2021       Results: Immune >10    Order  Dialysis Bath  K+ (Potassium): 3  Ca+ (Calcium): 2.5  Na+ (Sodium): 138  HCO3 (Bicarb): 30     Na+ Modeling: Not Applicable  Dialyzer: [id number]  Dialysate Temperature (C):  35  Blood Flow Rate (BFR):  350   Dialysate Flow Rate (DFR):   700        Access to be Utilized   Access: Non-tunneled Catheter  Location: Internal Jugular  Side: Left   Needle gauge:  Not Applicable  + Bruit/Thrill: Not Applicable    First Use X-ray Verified: Yes  OK to use line order: Yes    Site Assessment:  Signs and Symptoms of Infection/Inflammation: None  If yes: Not Applicable  Dressing: Dry and Intact  Site Prep: Medical Aseptic Technique  Dressing Changed this Treatment: Yes  If yes, by whom: Gabriel Landaverde RN  Date of Last Dressing Change: February 16, 2021  Antimicrobial Patch in place?: Yes  Blue Caps in place?: Yes  Gauze Dressing?: No  Non Dialysis Use?: No  Comment:    Flows: Good, Patent  If access problem, who was notified:     Pre and Post-Assessment  Patient Vitals for the past 8 hrs:   Level of Consciousness Oriented X Heart Rhythm Respiratory Quality/Effort O2 Device Bilateral Breath Sounds Skin Color Skin Condition/Temp Appetite Abdomen Inspection Bowel Sounds (All Quadrants) Edema RLE Edema LLE Edema Comments Pain Level   02/17/21 0600 -- -- -- -- None (Room air) -- -- -- -- -- -- -- -- -- -- --   02/17/21 0727 -- -- -- -- None (Room air) -- -- -- -- -- -- -- -- -- -- --   02/17/21 0834 Alert (0) 4 Regular Unlabored None (Room air) Clear Appropriate for ethnicity Dry; Warm Good Soft Active Right lower extremity; Left lower extremity +1;Pitting +1;Pitting Consent Obtained, Assessment, Time Out, Treatment Started 2   02/17/21 0845 Alert (0) -- -- Unlabored -- -- Appropriate for ethnicity Dry; Warm -- Soft -- Right lower extremity; Left lower extremity +1;Pitting +1;Pitting -- --     Labs  Recent Labs     02/15/21  1616 02/16/21  0425 02/17/21  0611   WBC 7.6 6.6 4.0   HGB 11.0* 11.1* 10.7*   HCT 33.8* 34.3* 35.0*    323 321                                                                  Recent Labs     02/15/21  1616 02/16/21  0425 02/17/21  0611   * 136 134*   K 3.9 4.6 4.9   CL 95* 97* 96*   CO2 25 23 21   BUN 26* 32* 45*   CREATININE 5.2* 6.0* 7.3*   GLUCOSE 119* 63* 74     IV Drips and Rate/Dose     Safety - Before each treatment:   Dialysis Machine No.: [id number]  RO Machine No.: [id number]  Dialyzer Lot No.: [id number]  RO Machine Log Sheet Completed: Yes  Machine Alarm Self Test: Completed; Passed (02/17/21 0834)  Machine Autotest: Completed, Passed  Air Foam Detector: Tested, Proper Function  Extracorporeal Circuit Tested for Integrity: Yes  Machine Conductivity: 13.3  Manual Conductivity: 13.2     Bicarbonate Concentrate Lot No.: [id number]  Acid Concentrate Lot No.: [id number]  Manual Ph: 7.4  Bleach Test (Neg): Yes  Bath Temperature: 95 °F (35 °C)  Tubing Lot#: [id number]  Conductivity Meter Serial #: [id number]  All Connections Secure?: Yes  Venous Parameters Set?: Yes  Arterial Parameters Set?: Yes  Saline Line Double Clamped?: Yes  Air Foam Detector Engaged?: Yes  Machine Functioning Alarm Free?  Yes  Prime Given: 200ml    Chlorine Testing - Before each treatment and every 4 hours:   Treatment  Treatment Number: 1  Time On: 2933  Treatment Goal: 3 hours, 3.5 L  Weight: 203 lb 12.8 oz (92.4 kg) (02/17/21 0834)  1st check: less than 0.1 ppm at: 0630 hours  2nd check: less than 0.1 ppm at: 1025 hours  3rd check: Not Applicable  (if greater than 0.1 ppm, then check every 30 minutes from secondary)    Access Flows and Pressures  Patient Vitals for the past 8 hrs:   Blood Flow Rate (ml/min) Ultrafiltration Rate (ml/hr) Ultrafiltration Total Arterial Pressure (mmHg) Venous Pressure (mmHg) TMP Hemodialysis Conductivity DFR Comments Access Visible   02/17/21 0834 350 ml/min 1170 ml/hr 0 ml -140 mmHg 140 60 14 700 Treatment Started Yes   02/17/21 0845 350 ml/min 1170 ml/hr 290 ml -160 mmHg 130 60 14 700 Resting, Lines secure Yes   02/17/21 0900 350 ml/min 1170 ml/hr 540 ml -160 mmHg 150 70 14.1 700 No change Yes   02/17/21 0915 350 ml/min 1170 ml/hr 863 ml -160 mmHg 120 70 14.1 700 No needs expressed Yes   02/17/21 0930 350 ml/min 1170 ml/hr 1154 ml -160 mmHg 120 80 14 700 Resting, Lines secure Yes   02/17/21 0945 350 ml/min 1170 ml/hr 1409 ml -170 mmHg 120 80 14 700 Resident at bedside Yes   02/17/21 1000 350 ml/min 1170 ml/hr 1703 ml -170 mmHg 120 80 14.1 700 Resting with eyes closed, Bicarb replaced Yes   02/17/21 1015 350 ml/min 1170 ml/hr 2001 ml -170 mmHg 130 80 13.5 700 Resting Yes   02/17/21 1030 350 ml/min 1170 ml/hr 2320 ml -170 mmHg 120 80 13.5 700 No Needs Expressed Yes   02/17/21 1045 350 ml/min 1170 ml/hr 2707 ml -170 mmHg 120 80 13.3 700 Watching TV, Lines secure, New Acid Yes   02/17/21 1100 350 ml/min 1170 ml/hr 3012 ml -170 mmHg 120 80 13.4 -- No change Yes   02/17/21 1115 350 ml/min 1170 ml/hr 3216 ml -180 mmHg 130 80 13.5 700 Resting, Lines secure Yes     Vital Signs  Patient Vitals for the past 8 hrs:   BP Temp Pulse Resp SpO2 Weight Percent Weight Change   02/17/21 0600 116/83 98.2 °F (36.8 °C) 90 12 98 % -- --   02/17/21 0727 -- -- -- 18 -- -- --   02/17/21 0834 111/61 -- 94 14 98 % 203 lb 12.8 oz (92.4 kg) 2.31   02/17/21 0845 110/73 -- 89 12 -- -- --   02/17/21 0900 116/71 -- 89 12 -- -- --   02/17/21 0915 117/74 -- 90 16 -- -- --   02/17/21 0930 121/84 -- 88 15 -- -- --   02/17/21 0945 133/79 -- 94 17 -- -- --   02/17/21 1000 125/83 -- 91 14 -- -- --   02/17/21 1015 (!) 143/94 -- 98 14 -- -- --   02/17/21 1030 123/84 -- 98 12 -- -- --   02/17/21 1045 120/77 -- 97 14 -- -- --   02/17/21 1100 (!) 120/92 -- 100 16 -- -- --   02/17/21 1115 126/86 -- 100 15 -- -- --     Post-Dialysis  Arterial Catheter Locking Solution: Heparin (1000units:1ml)    Volume (ml): 1.3  Venous Catheter Locking Solution: Heparin (1000units:1ml)    Volume (ml): 1.3                                              Provider Notification        Handoff complete and report given to Primary RN at 1155 hours. Primary RN (First Initial, Last Name, Title):  MILVIA Woody RN     Education  Person Educated: Patient   Knowledge Base: Substantial  Barriers to Learning?: None  Preferred method of Learning: Oral  Topic(s): Procedural   Teaching Tools: Explination  Response to Education: Verbalized Understanding     Electronically signed by Cari Khanna RN on 2/17/2021 at 11:27 AM

## 2021-02-17 NOTE — DISCHARGE SUMMARY
Discharge Summary    Name:  Obinna Foley /Age/Sex: 1950  (78 y.o. female)   MRN & CSN:  [id number] & [id number] Admission Date/Time: 2/15/2021  5:01 PM   Attending:  Giuseppe Bryan MD Discharging Physician: Ellen Prajapati MD     HPI and Hospital Course:   Obinna Foley is a 78 y.o.  female  who presented with chest pain    HPI-- as per H and [address]    1-Chest pain- episodic, with mildly elevated troponin-CT non-acute, stress test reported with no reversible ischemia. 2-?Transient slurred speech- CT head non-acute, MRI with no acute infarct but progression of white matter disease-seen by neuro, added aspirin, carotid duplex pending and okay with discharge after its done- will have patient follow up with neuro     Other issues  ESRD- HD per nephro  -HTN  -DM  -RA  -HLD    The patient expressed appropriate understanding of and agreement with the discharge recommendations, medications, and plan.      Consults this admission:  IP CONSULT TO NEPHROLOGY  IP CONSULT TO CARDIOLOGY  IP CONSULT TO NEUROLOGY    Discharge Instruction:   Follow up appointments:   Primary care physician:  within 2 weeks    Diet:  General/cardiac/ADA/as tolerated  Activity: {discharge activity: as tolerated  Disposition: Discharged to:   [x]Home, []C, []SNF, []Acute Rehab, []Hospice   Condition on discharge: Stable    Discharge Medications:      Glenn Shows   Home Medication Instructions AHT:[id number]    Printed on:21 1699   Medication Information                      albuterol sulfate  (90 Base) MCG/ACT inhaler  Inhale 2 puffs into the lungs every 6 hours as needed for Wheezing             allopurinol (ZYLOPRIM) 100 MG tablet  Take 2 tablets by mouth daily             aspirin (ASPIRIN CHILDRENS) 81 MG chewable tablet  Take 1 tablet by mouth daily             atorvastatin (LIPITOR) 80 MG tablet  Take 80 mg by mouth nightly             buPROPion (WELLBUTRIN XL) 150 MG extended release tablet  Take 300 mg by mouth every morning             cetirizine (ZYRTEC) 10 MG tablet  Take 10 mg by mouth daily             clopidogrel (PLAVIX) 75 MG tablet  Take 1 tablet by mouth daily             Dextromethorphan-guaiFENesin (MUCINEX DM)  MG TB12  Take 1 tablet by mouth 2 times daily             erythromycin with ethanol (THERAMYCIN) 2 % external solution  Apply topically daily Apply topically daily. fluocinonide (LIDEX) 0.05 % cream  Apply topically 2 times daily Apply topically 2 times daily. fluticasone (FLONASE) 50 MCG/ACT nasal spray  2 sprays by Nasal route every morning              fluticasone propionate (FLOVENT DISKUS) 50 MCG/BLIST AEPB inhaler  Inhale 1 puff into the lungs 2 times daily             folic acid (FOLVITE) 1 MG tablet  Take 1 mg by mouth every morning              guaiFENesin (MUCUS RELIEF) 600 MG extended release tablet  Take 1 tablet by mouth 2 times daily             hydroxychloroquine (PLAQUENIL) 200 MG tablet  Take by mouth 2 times daily             ipratropium-albuterol (DUONEB) 0.5-2.5 (3) MG/3ML SOLN nebulizer solution  Inhale 3 mLs into the lungs every 4 hours             ketoconazole (NIZORAL) 2 % cream  Apply topically daily Apply topically daily. ketoconazole (NIZORAL) 2 % shampoo  Apply topically daily as needed. levomilnacipran (FETZIMA) 40 MG CP24 extended release capsule  Take 1 capsule by mouth daily             levothyroxine (SYNTHROID) 100 MCG tablet  Take 100 mcg by mouth daily             LORazepam (ATIVAN) 0.5 MG tablet  Take 0.5 mg by mouth 2 times daily.              lubiprostone (AMITIZA) 24 MCG capsule  Take 24 mcg by mouth 2 times daily             metOLazone (ZAROXOLYN) 2.5 MG tablet  TAKE ONE TABLET BY MOUTH DAILY             midodrine (PROAMATINE) 10 MG tablet  Take 1 tablet by mouth three times a week Take one tablet three times a week prior to each dialysis treatment             montelukast (SINGULAIR) 10 MG tablet  Take 1 tablet by mouth nightly. Multiple Vitamin (DAILY-NICOLE) TABS  TAKE ONE TABLET BY MOUTH DAILY             Nebulizer MISC  Inhale into the lungs as needed             nitroGLYCERIN (NITROSTAT) 0.4 MG SL tablet  Place 0.4 mg under the tongue every 5 minutes as needed for Chest pain             ondansetron (ZOFRAN) 4 MG tablet  EVERY 4 HOURS PRN NAUSEA             pantoprazole (PROTONIX) 40 MG tablet  Take 1 tablet by mouth 2 times daily             polyethylene glycol (GLYCOLAX) powder  Take 17 g by mouth daily             Polysaccharide Iron Complex 391.3 (180 Fe) MG CAPS               promethazine (PHENERGAN) 25 MG tablet  Take 25 mg by mouth every 6 hours as needed for Nausea             QUEtiapine (SEROQUEL XR) 300 MG extended release tablet  Take 50 mg by mouth nightly              ranolazine (RANEXA) 500 MG extended release tablet  Take 500 mg by mouth 2 times daily             REFRESH OPTIVE 0.5-0.9 % SOLN  Place 2 drops into both eyes 2 times daily             SENEXON-S 8.6-50 MG per tablet  TAKE ONE TABLET BY MOUTH DAILY WHILE ON NARCOTIC PAIN MEDICATION             simethicone (MYLICON) 80 MG chewable tablet  Gas Relief (simethicone) 80 mg chewable tablet             spironolactone (ALDACTONE) 25 MG tablet  Take 1 tablet by mouth daily             tiotropium (SPIRIVA RESPIMAT) 2.5 MCG/ACT AERS inhaler  Inhale 2 puffs into the lungs             Tiotropium Bromide-Olodaterol (STIOLTO RESPIMAT) 2.5-2.5 MCG/ACT AERS  Inhale 2 puffs into the lungs daily             triamcinolone (ARISTOCORT) 0.5 % cream  Apply topically 3 times daily Apply topically 3 times daily. Objective Findings at Discharge:   BP (!) 148/113   Pulse 102   Temp 98.2 °F (36.8 °C) (Oral)   Resp 13   Ht 5' 3\" (1.6 m)   Wt 203 lb 12.8 oz (92.4 kg)   SpO2 98%   BMI 36.10 kg/m²            PHYSICAL EXAM   GEN Awake female, laying in bed in no apparent distress. EYES Pupils are equally round.   No

## 2021-02-17 NOTE — PROGRESS NOTES
Daily Progress Note    Stable from cardiac stand  Stress test negative for ischemia normal EF  ESRD on HD  Hx of  CVA stable   Hx of gastric bypass  Hx of CAD and PCI in 2015-at [facility]-BMS stent om branch  Elevated trop but no ACS--medical treatment  Keep on plavix and statins      Pt. Awake, alert and feeling ok-seen on HD  HR stable, NSR, BP stable  No CP, SOB today    Chest pain    Hx of CAD s/p PCI in 2015- with mod disease also noted    Complaining of Chest pain yesterday    Echo done and reassuring- EF 50-55%    Stress done and neg. Stable overall    Slurred speech    MRI done to r/o CVA and neg. Resolved now    Can do holter monitoring OP to ensure no PAF    ESRD    ON HD    Renal following    Stable from cardiac standpoint-if D/C today f/u at office in 2 weeks    Echo-2/16/21   Summary   Left ventricular systolic function is normal.   Ejection fraction is visually estimated at 50-55%. Mild left ventricular hypertrophy. Moderately dilated left atrium. No evidence of any pericardial effusion. Grade I diastolic dysfunction . Stress-2/16/21  Summary    No EKG changes suggestive of ischemia with Lexiscan infusion.    Normal perfusion uptake noted    no reversible ischemia noted    gating shows EF 60%         PAST MEDICAL HISTORY:  The patient has a history of having obstructive  sleep apnea, uses CPAP, history of hypertension, hyperlipidemia,  end-stage renal disease, on dialysis present, and diabetes present.    History of CVA and TIA not too long ago in 08/2020; hypertension,  hyperlipidemia, COPD, sees Dr. Hilda Castrejon for that.     PAST SURGICAL HISTORY:  She has a fistula in both arms, which are  nonfunctional, appendectomy, back surgery, temporary dialysis catheter  placement, hysterectomy, and thyroidectomy.     SOCIAL HISTORY:  She does not smoke or drink anymore.     ALLERGIES:  PERCOCET, TRAMADOL, VICODIN and NARCAN.     MEDICATIONS AT HOME:  She was on inhalers, Singulair, 02/17/21  0611   * 136 134*   K 3.9 4.6 4.9   CL 95* 97* 96*   CO2 25 23 21   PHOS  --   --  7.6*   BUN 26* 32* 45*   CREATININE 5.2* 6.0* 7.3*     LIVER PROFILE:   Recent Labs     02/16/21  0425   AST 17   ALT 7*   BILITOT 0.2   ALKPHOS 187*     PT/INR: No results for input(s): PROTIME, INR in the last 72 hours. APTT: No results for input(s): APTT in the last 72 hours. BNP:  No results for input(s): BNP in the last 72 hours.       Assessment:  Patient Active Problem List    Diagnosis Date Noted   Samir Aziza 02/15/2021    Cerebrovascular accident (CVA) due to occlusion of precerebral artery ([address] 10/08/2020    Fistula 05/11/2020    ESRD (end stage renal disease) ([address] 07/17/2019    ESRD needing dialysis ([address] 07/17/2019    Type 2 diabetes mellitus with hyperglycemia, with long-term current use of insulin ([address] 07/17/2019    Class 3 severe obesity due to excess calories with body mass index (BMI) of 40.0 to 44.9 in adult [facility]) 07/17/2019    Essential hypertension 06/05/2019    Chronic kidney disease, stage IV (severe) ([address] 01/18/2019    WD-Chronic buttock pain 05/09/2018    Status post bariatric surgery 04/25/2018    Status post laparoscopic sleeve gastrectomy 04/20/2018    Hiatal hernia     YRIS (obstructive sleep apnea) 02/02/2018    Acute renal failure (ARF) ([address] 10/13/2017    Acute encephalopathy 10/13/2017    Acute hypercapnic respiratory failure ([address] 09/19/2017    Acute metabolic encephalopathy 44/28/3889    Chronic diastolic heart failure ([address] 09/19/2017    Solitary pulmonary nodule     Morbid obesity with BMI of 45.0-49.9, adult ([address] 05/17/2017    Diabetes 1.5, managed as type 2 ([address] 05/17/2017    Hyperglycemia 05/17/2017    Chronic fatigue 05/17/2017    Acute on chronic diastolic heart failure ([facility] 02/04/2017    Fluid overload 02/03/2017    Chronic venous hypertension (idiopathic) with inflammation of bilateral lower extremity 08/05/2016    Hypercalcemia 08/04/2016    Hypertensive kidney disease with CKD stage III 05/31/2016    Type 2 diabetes mellitus without complication ([facility] 93/56/5916    Acute on chronic renal failure ([facility] 02/18/2015    CAD (coronary artery disease) 02/17/2015    Nausea & vomiting 02/17/2015    Hypothyroidism, adult 02/17/2015       Electronically signed by Maris Haley PA-C on 2/17/2021 at 9:52 AM  Electronically signed by Barbara Bloom MD on 2/17/21 at 10:08 AM EST

## 2021-02-17 NOTE — PROGRESS NOTES
Physician Progress Note      Enoch Orta  CSN #:                  [id number]  :                       1950  ADMIT DATE:       2/15/2021 5:01 PM  [address] [place] DATE:  RESPONDING  PROVIDER #:        GERONIMO GREEN MD          QUERY TEXT:    Pt admitted with TIA. H&P states, ? CHF - no signs and symptoms of fluid   overload,? ECHO showed an EF 50-55% with a grade 1 diastolic dysfunction, CT   chest was negative, and pt is receiving hemodialysis and po diuretics. If   possible, please document in progress notes and discharge summary further   specificity regarding the type and acuity of CHF:    The medical record reflects the following:  Risk Factors: HTN, CAD, YRIS, ESRD, advanced age  Clinical Indicators: H&P states, ? CHF - no signs and symptoms of fluid   overload. ? Per nursing flow sheet, +1 pitting edema BLE. ECHO showed an EF   50-55% with a grade 1 diastolic dysfunction. CT chest was negative. Unlabored   breathing  Treatment: po Aldactone, po Demadex, po Zaroxolyn, hemodialysis, labs,   imaging, echo, cardiology & nephrology consults, I&O, daily weights    Thank you,  FAVIAN IgnacioN, RN  Clinical   [phone]  Options provided:  -- Chronic Diastolic CHF/HFpEF  -- Acute on Chronic Diastolic CHF/HFpEF  -- Other - I will add my own diagnosis  -- Disagree - Not applicable / Not valid  -- Disagree - Clinically unable to determine / Unknown  -- Refer to Clinical Documentation Reviewer    PROVIDER RESPONSE TEXT:    This patient has chronic diastolic CHF/HFpEF.     Query created by: Jessica Rodríguez on 2021 1:15 PM      Electronically signed by:  Sis Doss MD 2021 3:57 PM

## 2021-02-17 NOTE — PROGRESS NOTES
Nephrology Progress Note  2/17/2021 8:46 AM  Subjective:      Interval History: Angel Marks is a 78 y.o. female with weakness and state doing better today        Data:   Scheduled Meds:   heparin (porcine)  2,600 Units Intravenous Once in dialysis    allopurinol  100 mg Oral Daily    torsemide  20 mg Oral Daily    fluticasone  1 spray Each Nostril Daily    LORazepam  1 mg Intravenous Once    atorvastatin  80 mg Oral Nightly    buPROPion  300 mg Oral QAM    clopidogrel  75 mg Oral Daily    hydroxychloroquine  200 mg Oral BID    levothyroxine  100 mcg Oral Daily    LORazepam  0.5 mg Oral BID    lubiprostone  24 mcg Oral BID    midodrine  10 mg Oral Once per day on Mon Wed Fri    montelukast  10 mg Oral Nightly    QUEtiapine  50 mg Oral Nightly    ranolazine  500 mg Oral BID    spironolactone  25 mg Oral Daily    sodium chloride flush  10 mL Intravenous 2 times per day    heparin (porcine)  5,000 Units Subcutaneous 3 times per day     Continuous Infusions:        CBC:   Recent Labs     02/15/21  1616 02/16/21  0425 02/17/21  0611   WBC 7.6 6.6 4.0   HGB 11.0* 11.1* 10.7*    323 321     BMP:    Recent Labs     02/15/21  1616 02/16/21  0425 02/17/21  0611   * 136 134*   K 3.9 4.6 4.9   CL 95* 97* 96*   CO2 25 23 21   BUN 26* 32* 45*   CREATININE 5.2* 6.0* 7.3*   GLUCOSE 119* 63* 74       Renal Labs  Albumin:    Lab Results   Component Value Date    LABALBU 3.7 02/17/2021     Calcium:    Lab Results   Component Value Date    CALCIUM 8.8 02/17/2021     Phosphorus:    Lab Results   Component Value Date    PHOS 7.6 02/17/2021     U/A:    Lab Results   Component Value Date    NITRU NEGATIVE 07/17/2019    NITRU Negative 07/10/2019    COLORU STRAW 07/17/2019    PHUR 6.0 07/10/2019    WBCUA NONE SEEN 07/17/2019    RBCUA <1 07/17/2019    MUCUS RARE 07/17/2019    TRICHOMONAS NONE SEEN 07/17/2019    BACTERIA NEGATIVE 07/17/2019    CLARITYU CLEAR 07/17/2019    SPECGRAV 1.005 07/17/2019 UROBILINOGEN NORMAL 07/17/2019    BILIRUBINUR NEGATIVE 07/17/2019    BLOODU NEGATIVE 07/17/2019    GLUCOSEU Negative 07/10/2019    KETUA NEGATIVE 07/17/2019           Objective:   I/O: No intake/output data recorded. Vitals: /83   Pulse 90   Temp 98.2 °F (36.8 °C) (Oral)   Resp 18   Ht 5' 3\" (1.6 m)   Wt 199 lb 3.2 oz (90.4 kg)   SpO2 98%   BMI 35.29 kg/m²   General appearance: awake weak  HEENT: Head: Normal, normocephalic, atraumatic.   Neck: supple, symmetrical, trachea midline  Lungs: diminished breath sounds bilaterally  Heart: S1, S2 normal  Abdomen: abnormal findings:  soft nt  Extremities: edema trace  Neurologic: Mental status: alertness: alert        Assessment and Plan:      IMP:  esrd on hd MWF  TIA with weakness  Neck pain  Cad  htn    Plan     1 doing routine hd today  2 fu plan and ct head negative and adjsuted meds  3 plan surgery [facility] as outpt  4 stress test negative  5 bp stable  Overall improved can dc from cheryl Bernstein MD

## 2021-02-17 NOTE — CONSULTS
Blanca Jiménez MD.  Section of General Neurology - Adult  Consult Note        Reason for Consult:    Requesting Physician:  No referring provider defined for this encounter. Thank you for your kind referral.    CHIEF COMPLAINT:  Speech difficulty         HISTORY OF PRESENT ILLNESS:              The patient is a 78 y.o. female with a history of  female  who presents with now resolved aphasia. Patient states she had an aphasic event last night while talking to her daughter. She states she was unable to get the words out but was aware she could not get the words out. Denies any fevers or chills. She admits to recent history of stroke within the last 6 months. She denies any associated symptoms with the dysphagia; she specifically states no fevers, no chills. No auras or changes in her vision. She admits to no shortness of breath. No radiation of symptoms. Symptoms resolved on their own; no alleviating or exacerbating factors noted. Denies that she was in a high emotional state or felt under distress in any way. Mri brain was positive for small vessel disease which has got worse. No acute infarct seen. pt states she ran out of asa at home a week ago. echo was neg.     Past Medical History:        Diagnosis Date    Acid reflux     Anemia     Arthritis     \"Shoulders, Hips And Knees\"    CAD (coronary artery disease)     Sees Dr. Bert Virk Cerebral artery occlusion with cerebral infarction [facility])     CH[facility]cong[facility] failure) [facility]     Chronic back pain     Chronic constipation     Chronic kidney disease     Sees Dr. Aditi Matta COPD (chronic obstructive pulmonary disease) ([facility]     Sees Dr. Ann-Marie Paul Depression     Diabetes mellitus [facility]) Dx 2012    Sees Dr. Kristi Johnson    Emphysema lung [facility])     Glaucoma     \"Both Eyes\"    Gout     Hiatal hernia     History of blood transfusion 1960's    No Reaction To Blood Transfusion Received    [place] (hard of hearing)     Bilateral Ears    Hyperlipidemia     Hypertension     Dr. Tiarra Cervantes    Itching     \"Whole Body Itches The [de-identified] Of The Time\"    Morbid obesity ([address]     Rheumatoid arthritis ([address]     Shortness of breath on exertion     Sleep apnea     Uses CPAP    Teeth missing     Upper And Lower    Thyroid disease     Thyroidectomy In     Urinary incontinence     WD-Chronic buttock pain 2018    Wears glasses      Past Surgical History:        Procedure Laterality Date    APPENDECTOMY  1968    BACK SURGERY  2017    Lower Back With Hardware    BACK SURGERY  2016    Cervical Back With Hardware    CARPAL TUNNEL RELEASE Right 1993     SECTION  3-30-68 And 10-17-69    COLONOSCOPY  2017    Polyps Removed    CORONARY ANGIOPLASTY  2016    Heart Stent D Circ    DENTAL SURGERY      Teeth Extracted In Past    DILATION AND CURETTAGE OF UTERUS  Last Done In [address] [place] [address]    \"Numerous\"    ENDOSCOPY, COLON, DIAGNOSTIC  2017    HYSTERECTOMY      Partial Abdominal Hysterectomy    IR TUNNELED CATHETER PLACEMENT GREATER THAN 5 YEARS  2019    IR TUNNELED CATHETER PLACEMENT GREATER THAN 5 YEARS 2019 [address] [facility] SPECIAL PROCEDURES    ROTATOR CUFF REPAIR Left 2010    SLEEVE GASTRECTOMY  2018    robotic assisted gastric sleeve, hiatal hernia repair    THYROIDECTOMY       Current Medications:   Current Facility-Administered Medications: allopurinol (ZYLOPRIM) tablet 100 mg, 100 mg, Oral, Daily  torsemide (DEMADEX) tablet 20 mg, 20 mg, Oral, Daily  atorvastatin (LIPITOR) tablet 20 mg, 20 mg, Oral, Nightly  aspirin chewable tablet 81 mg, 81 mg, Oral, Daily  fluticasone (FLONASE) 50 MCG/ACT nasal spray 1 spray, 1 spray, Each Nostril, Daily  LORazepam (ATIVAN) injection 1 mg, 1 mg, Intravenous, Once  buPROPion (WELLBUTRIN XL) extended release tablet 300 mg, 300 mg, Oral, QAM  clopidogrel (PLAVIX) tablet 75 mg, 75 mg, Oral, Daily  hydroxychloroquine (PLAQUENIL) tablet 200 mg, 200 mg, Oral, BID  levothyroxine (SYNTHROID) tablet 100 mcg, 100 mcg, Oral, Daily  LORazepam (ATIVAN) tablet 0.5 mg, 0.5 mg, Oral, BID  lubiprostone (AMITIZA) capsule 24 mcg, 24 mcg, Oral, BID  midodrine (PROAMATINE) tablet 10 mg, 10 mg, Oral, Once per day on Mon Wed Fri  montelukast (SINGULAIR) tablet 10 mg, 10 mg, Oral, Nightly  QUEtiapine (SEROQUEL XR) extended release tablet 50 mg, 50 mg, Oral, Nightly  ranolazine (RANEXA) extended release tablet 500 mg, 500 mg, Oral, BID  spironolactone (ALDACTONE) tablet 25 mg, 25 mg, Oral, Daily  sodium chloride flush 0.9 % injection 10 mL, 10 mL, Intravenous, 2 times per day  sodium chloride flush 0.9 % injection 10 mL, 10 mL, Intravenous, PRN  heparin (porcine) injection 5,000 Units, 5,000 Units, Subcutaneous, 3 times per day  promethazine (PHENERGAN) tablet 12.5 mg, 12.5 mg, Oral, Q6H PRN **OR** ondansetron (ZOFRAN) injection 4 mg, 4 mg, Intravenous, Q6H PRN  polyethylene glycol (GLYCOLAX) packet 17 g, 17 g, Oral, Daily PRN  acetaminophen (TYLENOL) tablet 650 mg, 650 mg, Oral, Q6H PRN **OR** acetaminophen (TYLENOL) suppository 650 mg, 650 mg, Rectal, Q6H PRN  Allergies:  Percocet [oxycodone-acetaminophen], Tramadol, Vicodin [hydrocodone-acetaminophen], and Narcan [naloxone hcl]    Social History:  TOBACCO:   reports that she has never smoked. She has never used smokeless tobacco.  ETOH:   reports no history of alcohol use. DRUGS:   reports no history of drug use.   Family History:       Problem Relation Age of Onset    Kidney Disease Mother         \"Kidney Myles Handler"    Heart Disease Mother         CHF    Arthritis Mother     Diabetes Mother     Depression Mother     High Blood Pressure Mother     Obesity Mother     Vision Loss Mother     Heart Attack Father     Heart Disease Father         Heart Attack    Diabetes Father     High Blood Pressure Father     High Blood Pressure Daughter        REVIEW OF SYSTEMS:  CONSTITUTIONAL:  negative  HEENT:  negative  RESPIRATORY:  negative  CARDIOVASCULAR: negative  GASTROINTESTINAL:  negative  GENITOURINARY:  negative  MUSCULOSKELETAL:  negative  BEHAVIOR/PSYCH:  Negative    ROS neg    Family hx neg    PHYSICAL EXAM  ------------------------  Vitals:  BP (!) 148/113   Pulse 102   Temp 98.2 °F (36.8 °C) (Oral)   Resp 13   Ht 5' 3\" (1.6 m)   Wt 203 lb 12.8 oz (92.4 kg)   SpO2 98%   BMI 36.10 kg/m²      General:  Awake, alert, oriented X 3. Well developed, well nourished, well groomed. No apparent distress. HEENT:  Normocephalic, atraumatic. Pupils equal, round, reactive to light. No scleral icterus. No conjunctival injection. Normal lips, teeth, and gums. No nasal discharge. Neck:  Supple  Heart:  RRR, no murmurs, gallops, rubs  Lungs:  CTA bilaterally, bilat symmetrical expansion, no wheeze, rales, or rhonchi  Abdomen: Bowel sounds present, soft, nontender, no masses, no organomegaly, no peritoneal signs  Extremities:  No clubbing, cyanosis, or edema  Skin:  Warm and dry, no open lesions or rash  Breast: deferred  Rectal: deferred  Genitalia:  deferred    NEUROLOGICAL EXAM  ---------------------------------    Mental Status Exam:             Alert and oriented times three,follows commands,speech and language intact    Cranial Yarvcj-QF-EJM Intact.         Cranial nerve II           Visual acuity:  normal                 Cranial nerve III           Pupils:  equal, round, reactive to light      Cranial nerves III, IV, VI           Extraocular Movements: intact      Cranial nerve V           Facial sensation:  intact      Cranial nerve VII           Facial strength: intact      Cranial nerve VIII           Hearing:  intact      Cranial nerve IX           Palate:  intact      Cranial nerve XI         Shoulder shrug:  intact      Cranial nerve XII          Tongue movement:  normal    Motor:    Drift:  absent  Motor exam is symmetrical 5 out of 5 all extremities bilaterally  Tone:  normal  Abnormal Movements:  Absent    DTRs-2+ biceps,triceps,brachioradialis,knee jerks and ankle jerks bilaterally symmetrical.  Toes-downgoing bilaterally            Sensory:normal sensation              CBC with Differential:    Lab Results   Component Value Date    WBC 4.0 02/17/2021    RBC 3.42 02/17/2021    HGB 10.7 02/17/2021    HCT 35.0 02/17/2021     02/17/2021    .3 02/17/2021    MCH 31.3 02/17/2021    MCHC 30.6 02/17/2021    RDW 13.0 02/17/2021    SEGSPCT 58.1 02/16/2021    BANDSPCT 2 07/10/2019    LYMPHOPCT 23.5 02/16/2021    MONOPCT 10.8 02/16/2021    EOSPCT 2.9 02/16/2011    BASOPCT 1.1 02/16/2021    MONOSABS 0.7 02/16/2021    LYMPHSABS 1.5 02/16/2021    EOSABS 0.4 02/16/2021    BASOSABS 0.1 02/16/2021    DIFFTYPE AUTOMATED DIFFERENTIAL 02/16/2021     CMP:    Lab Results   Component Value Date     02/17/2021    K 4.9 02/17/2021    CL 96 02/17/2021    CO2 21 02/17/2021    BUN 45 02/17/2021    CREATININE 7.3 02/17/2021    GFRAA 7 02/17/2021    AGRATIO 1.8 10/06/2017    LABGLOM 6 02/17/2021    GLUCOSE 74 02/17/2021    PROT 6.3 02/16/2021    PROT 7.1 02/16/2011    LABALBU 3.7 02/17/2021    CALCIUM 8.8 02/17/2021    BILITOT 0.2 02/16/2021    ALKPHOS 187 02/16/2021    AST 17 02/16/2021    ALT 7 02/16/2021     BMP:    Lab Results   Component Value Date     02/17/2021    K 4.9 02/17/2021    CL 96 02/17/2021    CO2 21 02/17/2021    BUN 45 02/17/2021    LABALBU 3.7 02/17/2021    CREATININE 7.3 02/17/2021    CALCIUM 8.8 02/17/2021    GFRAA 7 02/17/2021    LABGLOM 6 02/17/2021    GLUCOSE 74 02/17/2021     PT/INR:    Lab Results   Component Value Date    PROTIME 11.3 03/26/2020    INR 0.93 03/26/2020     PTT:    Lab Results   Component Value Date    APTT 30.1 03/26/2020   [APTT  U/A:    Lab Results   Component Value Date    COLORU STRAW 07/17/2019    PHUR 6.0 07/10/2019    WBCUA NONE SEEN 07/17/2019    RBCUA <1 07/17/2019    MUCUS RARE 07/17/2019    TRICHOMONAS NONE SEEN 07/17/2019    BACTERIA NEGATIVE 07/17/2019    CLARITYU CLEAR 07/17/2019    SPECGRAV 1.005 07/17/2019    LEUKOCYTESUR TRACE 07/17/2019    UROBILINOGEN NORMAL 07/17/2019    BILIRUBINUR NEGATIVE 07/17/2019    BLOODU NEGATIVE 07/17/2019    GLUCOSEU Negative 07/10/2019     TSH:    Lab Results   Component Value Date    TSH 2.19 10/06/2017     VITAMIN B12: No components found for: B12  FOLATE:    Lab Results   Component Value Date    FOLATE >20.0 10/21/2018     RPR:  No results found for: RPR  MADDY:  No results found for: ANATITER, MADDY  Urine Toxicology:  No components found for: IAMMENTA, IBARBIT, IBENZO, ICOCAINE, IMARTHC, IOPIATES, IPHENCYC     IMPRESSION:    TIA r/o cardio carotid embolic event    Small vessel disease    PLAN:    Mri brain as above    C doppler    Echo neg    B 12 folate TSH homocysteine level    Continue plavix    Restart asa 81 mg q d    Discussed dx prognosis meds side effects and above with pt and [facility] and answered all questions. Janeen Uribe MD  BOARD CERTIFIED-NEUROLOGY.

## 2021-02-18 LAB
EKG ATRIAL RATE: 90 BPM
EKG DIAGNOSIS: NORMAL
EKG P AXIS: 58 DEGREES
EKG P-R INTERVAL: 190 MS
EKG Q-T INTERVAL: 392 MS
EKG QRS DURATION: 94 MS
EKG QTC CALCULATION (BAZETT): 479 MS
EKG R AXIS: -23 DEGREES
EKG T AXIS: 32 DEGREES
EKG VENTRICULAR RATE: 90 BPM

## 2021-02-20 LAB
CULTURE: NORMAL
CULTURE: NORMAL
Lab: NORMAL
Lab: NORMAL
SPECIMEN: NORMAL
SPECIMEN: NORMAL

## 2021-05-31 ENCOUNTER — HOSPITAL ENCOUNTER (OUTPATIENT)
Age: 71
Setting detail: SPECIMEN
Discharge: HOME | End: 2021-05-31
Payer: COMMERCIAL

## 2021-05-31 PROCEDURE — 84443 ASSAY THYROID STIM HORMONE: CPT

## 2021-05-31 PROCEDURE — 84439 ASSAY OF FREE THYROXINE: CPT

## 2021-06-02 ENCOUNTER — OFFICE VISIT (OUTPATIENT)
Dept: PHYSICAL MEDICINE AND REHAB | Age: 71
End: 2021-06-02
Payer: COMMERCIAL

## 2021-06-02 VITALS — TEMPERATURE: 97.3 F

## 2021-06-02 DIAGNOSIS — G56.03 BILATERAL CARPAL TUNNEL SYNDROME: ICD-10-CM

## 2021-06-02 DIAGNOSIS — E11.42 DIABETIC SENSORIMOTOR POLYNEUROPATHY (HCC): ICD-10-CM

## 2021-06-02 DIAGNOSIS — M54.12 CERVICAL RADICULOPATHY AT C8: Primary | ICD-10-CM

## 2021-06-02 DIAGNOSIS — R20.2 PARESTHESIA: ICD-10-CM

## 2021-06-02 LAB
T4 FREE: 1.06 NG/DL (ref 0.9–1.8)
TSH HIGH SENSITIVITY: 2.25 UIU/ML (ref 0.27–4.2)

## 2021-06-02 PROCEDURE — 95886 MUSC TEST DONE W/N TEST COMP: CPT | Performed by: PHYSICAL MEDICINE & REHABILITATION

## 2021-06-02 PROCEDURE — 95912 NRV CNDJ TEST 11-12 STUDIES: CPT | Performed by: PHYSICAL MEDICINE & REHABILITATION
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
## 2021-06-02 NOTE — LETTER
June 02, 2021        Jen Zamora MD  [address] [facility] [id number]         Patient Name: Jairo Laguna   MRN Number: [id number]   YOB: 1950   Date Of Visit: 06/02/2021        Dear Jen Zamora MD,      Thank you for referring Jairo Laguna to me for electrodiagnostic testing. Attached are the findings of the EMG and my assessment. If you have any further questions, please do not hesitate to call me. Thank you for this interesting referral.      Sincerely,          ARIAN Velásquez MD.

## 2021-06-21 ENCOUNTER — HOSPITAL ENCOUNTER (OUTPATIENT)
Age: 71
Setting detail: SPECIMEN
Discharge: HOME | End: 2021-06-21
Payer: COMMERCIAL

## 2021-06-21 LAB
ANION GAP SERPL CALCULATED.3IONS-SCNC: 16 MMOL/L (ref 4–16)
BUN BLDV-MCNC: 19 MG/DL (ref 6–23)
CALCIUM SERPL-MCNC: 8.2 MG/DL (ref 8.3–10.6)
CHLORIDE BLD-SCNC: 93 MMOL/L (ref 99–110)
CO2: 26 MMOL/L (ref 21–32)
CREAT SERPL-MCNC: 2.7 MG/DL (ref 0.6–1.1)
GFR AFRICAN AMERICAN: 21 ML/MIN/1.73M2
GFR NON-AFRICAN AMERICAN: 17 ML/MIN/1.73M2
GLUCOSE BLD-MCNC: 127 MG/DL (ref 70–99)
HEMOGLOBIN: 12.1 GM/DL (ref 12.5–16)
POTASSIUM SERPL-SCNC: 3.2 MMOL/L (ref 3.5–5.1)
SODIUM BLD-SCNC: 135 MMOL/L (ref 135–145)

## 2021-06-21 PROCEDURE — 85018 HEMOGLOBIN: CPT

## 2021-06-21 PROCEDURE — 80048 BASIC METABOLIC PNL TOTAL CA: CPT

## 2021-07-15 ENCOUNTER — HOSPITAL ENCOUNTER (OUTPATIENT)
Age: 71
Setting detail: SPECIMEN
Discharge: HOME | End: 2021-07-15

## 2021-07-15 LAB
ALBUMIN SERPL-MCNC: 4.4 GM/DL (ref 3.4–5)
ALP BLD-CCNC: 209 IU/L (ref 40–128)
ALT SERPL-CCNC: 10 U/L (ref 10–40)
ANION GAP SERPL CALCULATED.3IONS-SCNC: 15 MMOL/L (ref 4–16)
AST SERPL-CCNC: 19 IU/L (ref 15–37)
BILIRUB SERPL-MCNC: 0.2 MG/DL (ref 0–1)
BUN BLDV-MCNC: 57 MG/DL (ref 6–23)
CALCIUM SERPL-MCNC: 8 MG/DL (ref 8.3–10.6)
CHLORIDE BLD-SCNC: 100 MMOL/L (ref 99–110)
CO2: 25 MMOL/L (ref 21–32)
CREAT SERPL-MCNC: 7.5 MG/DL (ref 0.6–1.1)
GFR AFRICAN AMERICAN: 7 ML/MIN/1.73M2
GFR NON-AFRICAN AMERICAN: 5 ML/MIN/1.73M2
GLUCOSE BLD-MCNC: 87 MG/DL (ref 70–99)
POTASSIUM SERPL-SCNC: 4 MMOL/L (ref 3.5–5.1)
SODIUM BLD-SCNC: 140 MMOL/L (ref 135–145)
TOTAL PROTEIN: 6.7 GM/DL (ref 6.4–8.2)

## 2021-07-15 PROCEDURE — 80053 COMPREHEN METABOLIC PANEL: CPT

## 2021-08-21 PROBLEM — I89.0 LYMPHEDEMA OF LEFT UPPER EXTREMITY: Status: ACTIVE | Noted: 2021-08-21

## 2021-09-01 ENCOUNTER — HOSPITAL ENCOUNTER (OUTPATIENT)
Dept: PHYSICAL THERAPY | Age: 71
Setting detail: THERAPIES SERIES
Discharge: HOME | End: 2021-09-01
Payer: COMMERCIAL

## 2021-09-01 PROCEDURE — 97161 PT EVAL LOW COMPLEX 20 MIN: CPT

## 2021-09-01 PROCEDURE — 97110 THERAPEUTIC EXERCISES: CPT

## 2021-09-01 ASSESSMENT — PAIN DESCRIPTION - LOCATION: LOCATION: NECK

## 2021-09-01 ASSESSMENT — PAIN DESCRIPTION - DESCRIPTORS: DESCRIPTORS: ACHING;DULL;SHOOTING

## 2021-09-01 ASSESSMENT — PAIN DESCRIPTION - PAIN TYPE: TYPE: ACUTE PAIN

## 2021-09-01 NOTE — PLAN OF CARE
Outpatient Physical Therapy           [place]           [] Phone: [phone]   Fax: [phone]  [facility]           [] Phone: [phone]   Fax: [phone]     To: Referring Practitioner: SHIRA Pabon   From: Josi Parsons, WILLIAM     Patient: Noemí Reid       : 1950  Diagnosis: Diagnosis: cervical spine fusion   Treatment Diagnosis: Treatment Diagnosis: Decreased BLE strength (L>R) and walking endurance   Date: 2021    Physical Therapy Certification/Re-Certification Form  Dear SHIRA Pabon and Esther MOURA, RN  The following patient has been evaluated for physical therapy services and for therapy to continue, insurance requires physician review of the treatment plan initially and every 90 days. Please review the attached evaluation and/or summary of the patient's plan of care, and verify that you agree therapy should continue by signing the attached document and sending it back to our office. Assessment:    Pt is 73year old female who arrives to therapy following a revision C3-T1 posterior cervical fusion. PMH: CVA (L sided weakness), kidney failure, L4-S1 lumbar decompression and fusion, and previous C3-C6 posterior decompression and fusion in . [place] Pt now has difficulties completing walking without AD, rising from a chair, completing chores/tasks around her home before getting fatigued. Pt demo deficits this date that include restricted cervical AROM/PROM, decreased BLE strength in all directions, increased fall risk, reduced tolerance to standing exercises, and decreased endurance. Pt will benefit with PT services with gait/endurance training, BLE strengthening, AROM/PROM of cervical spine once clearance from physician, core stability, and balance to return to PLOF. Patient able to complete 6 MWT today with 952 ft ambulated with rollator. Pt prior to surgery patient had excruciating pain in her neck/shoulder (L) and difficulties completing tasks at home.  Patient received education on their current pathology and how their condition effects them with their functional activities. Also, patient received a requirement to undergo 3 months of physical therapy prior to her kidney transplant; since we did not receive the referral from this doctor, asked patient to reach out to her transplant doctor to provide need for 3 months of therapy for insurance coverage    Plan of Care/Treatment to date:  [x] Therapeutic Exercise  [x] Modalities:  [x] Therapeutic Activity     [] Ultrasound  [] Electrical Stimulation  [x] Gait Training      [] Cervical Traction [] Lumbar Traction  [x] Neuromuscular Re-education    [x] Cold/hotpack [] Iontophoresis   [x] Instruction in HEP      [] Vasopneumatic    [] Dry Needling  [x] Manual Therapy               [] Aquatic Therapy       Other:          Frequency/Duration:  # Days per week: [x] 1 day # Weeks: [] 1 week [] 5 weeks     [x] 2 days   [] 2 weeks [x] 6 weeks     [] 3 days   [] 3 weeks [] 7 weeks     [] 4 days   [] 4 weeks [] 8 weeks         [] 9 weeks [] 10 weeks         [] 11 weeks [] 12 weeks    Rehab Potential/Progress: [] Excellent [] Good [] Fair  [] Poor     Goals:    Patient goals : Decrease tightness/discomfort in cervical spine; improve strength and endurance  Short term goals  Time Frame for Short term goals: 6 weeks  Short term goal 1: Pt demo I w/ HEP and pain management  Short term goal 2: Pt demo Oswestry <25/50 to improve tolerance to ADL's  Short term goal 3: Pt demo >1,000 ft in 6 minutes with rollator around therapy gym and no rest breaks  Short term goal 4: Pt demo ability to ambulate >150 ft with SPC or least restricted AD and no LOB noted  Short term goal 5: Pt demo 5x STS test <20 seconds to improve BLE strengthening         Electronically signed by:  William Blum, PT, DPT, CSCS 9/1/2021, 6:35 PM        If you have any questions or concerns, please don't hesitate to call.   Thank you for your referral.      Physician Signature:________________________________Date:_________ TIME: _____  By signing above, therapists plan is approved by physician

## 2021-09-01 NOTE — PROGRESS NOTES
Physical Therapy  Initial Assessment  Date: 2021  Patient Name: Eduar Bolton  MRN: [id number]  : 1950     Treatment Diagnosis: Decreased BLE strength (L>R) and walking endurance    Restrictions: no bending, lifting >8 lbs, twisting (limit AROM/PROM of cervical spine until patient receives clearance from doctor), Port located on L side of chest     Subjective   General  Chart Reviewed: Yes  Patient assessed for rehabilitation services?: Yes  Referring Practitioner: SHIRA Greco  Diagnosis: cervical spine fusion  Follows Commands: Within Functional Limits  PT Visit Information  PT Insurance Information: Sweetie Parnell  Subjective  Subjective: Patient reports she needs to be able to walk for her kidney transplant. She recently has a cervical fusion in April for ongoing neck pain to her shoulder/back of the head. Had reports she is feeling the same way suddenly the way she felt before surgery; within the last 1-2 weeks. Difficult for her to sit up and reports not having any physical therapy following surgery. Her goals are to feel better and improve her pain in her neck, improve her neck mobility. States she has another visit schedule on . She had a cervical collar that she stopped wearing the collar last month. Precautions in place. She is taking 500 mg of Tylenol and cold packs. PMH:  she had a stroke and reports a small stroke about 3 months. She is having a kidney transplant and needs to walk 6 minutes and complete a stress test. She needs to meet the goals within in 3 months. Pain Screening  Patient Currently in Pain: Yes  Pain Assessment  Pain Assessment: 0-10  Pain Type: Acute pain  Pain Location: Neck  Pain Descriptors: Aching;Dull; Shooting  Vital Signs  Patient Currently in Pain: Yes    Vision/Hearing  Vision  Vision: Within Functional Limits  Hearing  Hearing: Within functional limits    Orientation  Orientation  Overall Orientation Status: Within Normal Limits    Social/Functional History  Social/Functional History  Type of Home: Facility ([address]; first floor, one-level condo style)  Home Layout: One level  Home Access: Level entry  Bathroom Shower/Tub: Tub/Shower unit  Bathroom Toilet: Standard  Bathroom Equipment: Grab bars in shower  Bathroom Accessibility: Accessible  Home Equipment: Rolling walker  ADL Assistance: Needs assistance  Homemaking Assistance: Needs assistance  Ambulation Assistance: Needs assistance  Transfer Assistance: Needs assistance  Active : No  Occupation: Retired    Objective  Observation/Palpation  Posture: Fair  Palpation: Tenderness along entire apsect of cervical spine  Observation: Noted large incision from upper cervical to upper thoracic spinous processes; residual dower hump noted;   Body Mechanics: Patient ambulates with rollator and presents with slowed gait speed, decreased step length, and no cervical collar donned  Edema: --  Scar: Noted along cervical/upper throacic spinous processes    PROM RLE (degrees)  RLE PROM: WNL  AROM RLE (degrees)  RLE AROM: WNL  RLE General AROM: *Patient limited bending/lifitng/twisting  PROM LLE (degrees)  LLE PROM: WNL  AROM LLE (degrees)  LLE AROM : WNL  LLE General AROM: *Patient limited bending/lifitng/twisting  Spine  Cervical: Did not assess PROM/AROM due to limitations/precautions from physician  Lumbar: Did not assess PROM/AROM due to limitations/precautions from physician  Joint Mobility  Spine: Did not assess due to precautions and tenderness    Strength RLE  R Hip Flexion: 4-/5  R Hip Extension: 4/5  R Hip ABduction: 4/5  R Hip Internal Rotation: 4+/5  R Hip External Rotation: 4+/5  R Knee Flexion: 4+/5  R Knee Extension: 4+/5  R Ankle Dorsiflexion: 4/5  R Ankle Plantar flexion: 5/5  Strength LLE  L Hip Flexion: 4-/5  L Hip Extension: 4-/5  L Hip ABduction: 4-/5  L Hip Internal Rotation: 4/5  L Hip External Rotation: 4/5  L Knee Flexion: 4/5  L Knee Extension: 4/5  L Ankle Dorsiflexion: 4/5  L Ankle Plantar Flexion: 4/5     Additional Measures  Flexibility: Unable to assess hip flexors/hamstrings due to limitations in bending/twisting  Other: 5x STS: 21 seconds with x2 UE assist; 6MWT: 952 ft in 6 mins and 45 seconds    Assessment   Conditions Requiring Skilled Therapeutic Intervention  Body structures, Functions, Activity limitations: Decreased functional mobility ; Decreased ADL status; Decreased ROM; Decreased strength; Increased pain  Pt is 73year old female who arrives to therapy following a revision C3-T1 posterior cervical fusion. PMH: CVA (L sided weakness), kidney failure, L4-S1 lumbar decompression and fusion, and previous C3-C6 posterior decompression and fusion in 2015. Manjeet Fung Pt now has difficulties completing walking without AD, rising from a chair, completing chores/tasks around her home before getting fatigued. Pt demo deficits this date that include restricted cervical AROM/PROM, decreased BLE strength in all directions, increased fall risk, reduced tolerance to standing exercises, and decreased endurance. Pt will benefit with PT services with gait/endurance training, BLE strengthening, AROM/PROM of cervical spine once clearance from physician, core stability, and balance to return to PLOF. Patient able to complete 6 MWT today with 952 ft ambulated with rollator. Pt prior to surgery patient had excruciating pain in her neck/shoulder (L) and difficulties completing tasks at home. Patient received education on their current pathology and how their condition effects them with their functional activities. Also, patient received a requirement to undergo 3 months of physical therapy prior to her kidney transplant; since we did not receive the referral from this doctor, asked patient to reach out to her transplant doctor to provide need for 3 months of therapy for insurance coverage.    Treatment Diagnosis: Decreased BLE strength (L>R) and walking endurance  Prognosis: Good  Decision Making: Medium Complexity  History: CVA (L sided residual weakness), kidney failure (will be receiving a transplant)  Barriers to Learning: None  REQUIRES PT FOLLOW UP: Yes  Activity Tolerance  Activity Tolerance: Patient Tolerated treatment well;Patient limited by pain; Patient limited by fatigue      Plan   Plan  Times per week: 2x  Plan weeks: 6 weeks  Specific instructions for Next Treatment: Review HEP and progress BLE strengthening (patient can tolerate supine and SL), hold on AROM/PROM of cervical spine until full clearance from doctor  Current Treatment Recommendations: Strengthening, Neuromuscular Re-education, Home Exercise Program, ROM, Manual Therapy - Soft Tissue Mobilization, Endurance Training, Pain Management, Gait Training    Goals  Short term goals  Time Frame for Short term goals: 6 weeks  Short term goal 1: Pt demo I w/ HEP and pain management  Short term goal 2: Pt demo Oswestry <25/50 to improve tolerance to ADL's  Short term goal 3: Pt demo >1,000 ft in 6 minutes with rollator around therapy gym and no rest breaks  Short term goal 4: Pt demo ability to ambulate >150 ft with SPC or least restricted AD and no LOB noted  Short term goal 5: Pt demo 5x STS test <20 seconds to improve BLE strengthening  Patient Goals   Patient goals : Decrease tightness/discomfort in cervical spine; improve strength and endurance    Annalee Benson, PT, DPT, CSCS

## 2021-09-01 NOTE — FLOWSHEET NOTE
Outpatient Physical Therapy  [facility]           [x] Phone: [phone]   Fax: [phone]  [facility]           [] Phone: [phone]   Fax: [phone]        Physical Therapy Daily Treatment Note  Date:  2021    Patient Name:  Obinna Foley    :  1950  MRN: [id number]  Restrictions/Precautions:  no bending, twisting or lifting >8 lbs  Diagnosis:   Diagnosis: cervical spine fusion; 2021 for revision C3-T1 posterior spinal fusion (previous L4-S1 decompression and fusion 10/17/16 and C3-6 posterior decompression and fusion 11/2/15)  Date of Injury/Surgery: see above  Treatment Diagnosis: Treatment Diagnosis: Decreased BLE strength (L>R) and walking endurance    Insurance/Certification information: PT Insurance Information: Danyel Parnell-requested approval for visits   Referring Physician:  Referring Practitioner: SHIRA Hansen  Next Doctor Visit:  --  Plan of care signed (Y/N):  N, sent 21  Outcome Measure:   Visit# / total visits:     Pain level: 2-3/10, mostly tightness/stiffness in the neck   Goals:     Patient goals : Decrease tightness/discomfort in cervical spine; improve strength and endurance  Short term goals  Time Frame for Short term goals: 6 weeks  Short term goal 1: Pt demo I w/ HEP and pain management  Short term goal 2: Pt demo Oswestry <25/50 to improve tolerance to ADL's  Short term goal 3: Pt demo >1,000 ft in 6 minutes with rollator around therapy gym and no rest breaks  Short term goal 4: Pt demo ability to ambulate >150 ft with SPC or least restricted AD and no LOB noted  Short term goal 5: Pt demo 5x STS test <20 seconds to improve BLE strengthening     Summary of Evaluation:  Pt is 73year old female who arrives to therapy following a revision C3-T1 posterior cervical fusion. PMH: CVA (L sided weakness), kidney failure, L4-S1 lumbar decompression and fusion, and previous C3-C6 posterior decompression and fusion in . [place] Petit  Pt now has difficulties completing walking without AD, rising from a chair, completing chores/tasks around her home before getting fatigued. Pt demo deficits this date that include restricted cervical AROM/PROM, decreased BLE strength in all directions, increased fall risk, reduced tolerance to standing exercises, and decreased endurance. Pt will benefit with PT services with gait/endurance training, BLE strengthening, AROM/PROM of cervical spine once clearance from physician, core stability, and balance to return to PLOF. Patient able to complete 6 MWT today with 952 ft ambulated with rollator. Pt prior to surgery patient had excruciating pain in her neck/shoulder (L) and difficulties completing tasks at home. Patient received education on their current pathology and how their condition effects them with their functional activities. Also, patient received a requirement to undergo 3 months of physical therapy prior to her kidney transplant; since we did not receive the referral from this doctor, asked patient to reach out to her transplant doctor to provide need for 3 months of therapy for insurance coverage. Subjective:  See katina         Any changes in Ambulatory Summary Sheet?   None        Objective:  See katina BRAUNID screening questions were asked and patient attested that there had been no contact or symptoms  Patient can tolerate supine and SL      Exercises: (No more than 4 columns)   Exercise/Equipment 9/1/21 #1 Date Date           WARM UP      Nu Step               TABLE      *LAQ      *SLR      *Adductor Squeeze      *Supine Clamshells       Seated Hamstring Curl                                 STANDING      Marches      Heel Raises      Lateral Stepping in // bars      Fwd Step up                             PROPRIOCEPTION                                    MODALITIES                      Other Therapeutic Activities/Education:  Patient received education on their current pathology and how their condition effects them with their functional activities. Patient understood discussion and questions were answered. Patient understands their activity limitations and understands rational for treatment progression. Home Exercise Program:  HO issued, reviewed and discussed with patient. Pt agreed to comply. Manual Treatments:  --      Modalities:  --      Communication with other providers:  katina sent 9/1/21      Assessment:  (Response towards treatment session) (Pain Rating)  Pt is 73year old female who arrives to therapy following a revision C3-T1 posterior cervical fusion. PMH: CVA (L sided weakness), kidney failure, L4-S1 lumbar decompression and fusion, and previous C3-C6 posterior decompression and fusion in 2015. Blair Langfordgo Pt now has difficulties completing walking without AD, rising from a chair, completing chores/tasks around her home before getting fatigued. Pt demo deficits this date that include restricted cervical AROM/PROM, decreased BLE strength in all directions, increased fall risk, reduced tolerance to standing exercises, and decreased endurance. Pt will benefit with PT services with gait/endurance training, BLE strengthening, AROM/PROM of cervical spine once clearance from physician, core stability, and balance to return to PLOF. Patient able to complete 6 MWT today with 952 ft ambulated with rollator. Pt prior to surgery patient had excruciating pain in her neck/shoulder (L) and difficulties completing tasks at home. Patient received education on their current pathology and how their condition effects them with their functional activities. Also, patient received a requirement to undergo 3 months of physical therapy prior to her kidney transplant; since we did not receive the referral from this doctor, asked patient to reach out to her transplant doctor to provide need for 3 months of therapy for insurance coverage.           Plan for Next Session:  Review HEP; progress BLE strengthening as tolerated      Time In / Time Out:  5536-0483         If [facility] Please Indicate Time In/Out/Total Time  CPT Code Time in Time out Total Time                                                            Total for session             Timed Code/Total Treatment Minutes:  10'/45'    (1) PT katina   (1) TE 10'      Next Progress Note due:  10th visit    Plan of Care Interventions:  [x] Therapeutic Exercise  [x] Modalities:  [x] Therapeutic Activity     [] Ultrasound  [] Estim  [] Gait Training      [] Cervical Traction [] Lumbar Traction  [x] Neuromuscular Re-education    [] Cold/hotpack [] Iontophoresis   [x] Instruction in HEP      [] Vasopneumatic   [] Dry Needling    [x] Manual Therapy               [] Aquatic Therapy              Electronically signed by:  Clair Acosta, PT, DPT, CSCS 9/1/2021, 8:02 AM

## 2021-09-02 ENCOUNTER — HOSPITAL ENCOUNTER (OUTPATIENT)
Age: 71
Setting detail: SPECIMEN
Discharge: HOME | End: 2021-09-02
Payer: COMMERCIAL

## 2021-09-02 LAB
ALBUMIN SERPL-MCNC: 4.5 GM/DL (ref 3.4–5)
ALP BLD-CCNC: 199 IU/L (ref 40–128)
ALT SERPL-CCNC: 17 U/L (ref 10–40)
ANION GAP SERPL CALCULATED.3IONS-SCNC: 16 MMOL/L (ref 4–16)
AST SERPL-CCNC: 21 IU/L (ref 15–37)
BILIRUB SERPL-MCNC: 0.2 MG/DL (ref 0–1)
BUN BLDV-MCNC: 63 MG/DL (ref 6–23)
CALCIUM SERPL-MCNC: 8.2 MG/DL (ref 8.3–10.6)
CHLORIDE BLD-SCNC: 100 MMOL/L (ref 99–110)
CHOLESTEROL: 223 MG/DL
CO2: 26 MMOL/L (ref 21–32)
CREAT SERPL-MCNC: 6.3 MG/DL (ref 0.6–1.1)
ESTIMATED AVERAGE GLUCOSE: 111 MG/DL
GFR AFRICAN AMERICAN: 8 ML/MIN/1.73M2
GFR NON-AFRICAN AMERICAN: 7 ML/MIN/1.73M2
GLUCOSE BLD-MCNC: 103 MG/DL (ref 70–99)
HBA1C MFR BLD: 5.5 % (ref 4.2–6.3)
HDLC SERPL-MCNC: 49 MG/DL
LDL CHOLESTEROL DIRECT: 132 MG/DL
POTASSIUM SERPL-SCNC: 4.2 MMOL/L (ref 3.5–5.1)
SODIUM BLD-SCNC: 142 MMOL/L (ref 135–145)
T4 FREE: 0.86 NG/DL (ref 0.9–1.8)
TOTAL PROTEIN: 6.5 GM/DL (ref 6.4–8.2)
TRIGL SERPL-MCNC: 154 MG/DL
TSH HIGH SENSITIVITY: 2.02 UIU/ML (ref 0.27–4.2)

## 2021-09-02 PROCEDURE — 80053 COMPREHEN METABOLIC PANEL: CPT

## 2021-09-02 PROCEDURE — 80061 LIPID PANEL: CPT

## 2021-09-02 PROCEDURE — 84439 ASSAY OF FREE THYROXINE: CPT

## 2021-09-02 PROCEDURE — 83036 HEMOGLOBIN GLYCOSYLATED A1C: CPT

## 2021-09-02 PROCEDURE — 83721 ASSAY OF BLOOD LIPOPROTEIN: CPT

## 2021-09-02 PROCEDURE — 84443 ASSAY THYROID STIM HORMONE: CPT

## 2021-09-02 NOTE — FLOWSHEET NOTE
Patients Plan of Care was received and signed. Signed POC was scanned and placed in the patients chart.     Griselda Bravo

## 2021-09-23 PROBLEM — S46.112A STRAIN OF MUSCLE, FASCIA AND TENDON OF LONG HEAD OF BICEPS, LEFT ARM, INITIAL ENCOUNTER: Status: ACTIVE | Noted: 2021-09-23

## 2021-10-06 ENCOUNTER — HOSPITAL ENCOUNTER (OUTPATIENT)
Dept: PHYSICAL THERAPY | Age: 71
Discharge: HOME | End: 2021-10-06

## 2021-10-06 NOTE — FLOWSHEET NOTE
Cardiology Office Visit      Chief complaint  Chief Complaint   Patient presents with   • Office Visit   • Follow-up     2 month follow up, patient complains of anxiety and depression symptoms.        HPI:   The patient is 66 year old female presenting for follow-up appointment.     Patient is doing well today.  We reviewed the results of her lipid panel in detail today.     She was previously on atorvastatin which was stopped.  She states that Dr. Delgado took her off atorvastatin because she experienced redness and pain in her leg. Patient notes that after last visit, she started to exercise for about a week and a half but then started experiencing generalized pain, so stopped. However she reports that she will start the sit and be fit exercises. Patient has been watching her diet since her last visit here-as lost weight since last office visit. Sleep study came back inconclusive; she states that she sleeps on a lift recliner, so the nurse told her that it would be impossible for her to complete sleep study at the lab.     The patient denies complaints of chest pain, shortness of breath, orthopnea or PND, palpitations, worsening lower extremity edema, TIA or strokelike symptoms, claudication, presyncope or syncope.  She has chronic lymphedema.    Review of Systems  General: No fever or chills, no loss of appetite.    No cough or hemoptysis  No GI or  disturbances  No skin disorders or blood dyscrasias.  Remainder of systems reviewed and negative.        Past Medical History:   Diagnosis Date   • AMANDA positive    • Back pain    • Change in bowel movement    • Depression with anxiety    • Essential (primary) hypertension    • Gastroesophageal reflux disease    • Heartburn    • High cholesterol    • Hoarseness    • Knee dislocation     L knee   • Lymphedema    • Neck pain    • OA (osteoarthritis)    • Paroxysmal atrial fibrillation with rapid ventricular response (CMS/HCC)    •  Physical Therapy  Cancellation/No-show Note  Patient Name:  Jayashree Rey  :  1950   Date:  10/6/2021  Cancelled visits to date: 1  No-shows to date: 0    For today's appointment patient:  [x]  Cancelled  []  Rescheduled appointment  []  No-show     Reason given by patient:  []  Patient ill  []  Conflicting appointment  [x]  No transportation    []  Conflict with work  []  No reason given  []  Other:     Comments:      Electronically signed by:  Zeynep Buenrostro 10/6/2021, 1:00 PM Pulmonary embolism (CMS/HCC) 2014   • Thyroid condition    • Ulcers of both lower legs (CMS/[facility])    • Varicose veins of leg with pain, bilateral      Past Surgical History:   Procedure Laterality Date   • Carpal tunnel release     • Dilation and curettage  2009   • Ivc filter placement     • Knee dislocation surgery Left    • Umbilical hernia repair       Family History   Problem Relation Age of Onset   • Diabetes Other    • Hypertension Other    • Heart disease Other    • Osteoporosis Other    • Hypertension Sister    • Hypertension Brother    • Hypertension Mother    • Sepsis Father      Social History     Socioeconomic History   • Marital status: /Civil Union     Spouse name: Not on file   • Number of children: Not on file   • Years of education: Not on file   • Highest education level: Not on file   Occupational History   • Not on file   Tobacco Use   • Smoking status: Former Smoker     Packs/day: 0.00     Types: Cigarettes     Start date: 1974     Quit date: 2006     Years since quitting: 15.7   • Smokeless tobacco: Never Used   • Tobacco comment: occasional   Vaping Use   • Vaping Use: never used   Substance and Sexual Activity   • Alcohol use: Not Currently     Comment: rare   • Drug use: Never   • Sexual activity: Not on file   Other Topics Concern   • Not on file   Social History Narrative   • Not on file     Social Determinants of Health     Financial Resource Strain:    • Social Determinants: Financial Resource Strain: Not on file   Food Insecurity:    • Social Determinants: Food Insecurity: Not on file   Transportation Needs:    • Lack of Transportation (Medical): Not on file   • Lack of Transportation (Non-Medical): Not on file   Physical Activity: Inactive   • Days of Exercise per Week: 0 days   • Minutes of Exercise per Session: 0 min   Stress:    • Social Determinants: Stress: Not on file   Social Connections:    • Social Determinants: Social Connections: Not on file   Intimate Partner  Violence: Not At Risk   • Social Determinants: Intimate Partner Violence Past Fear: No   • Social Determinants: Intimate Partner Violence Current Fear: No     Current Medications    ALENDRONATE (FOSAMAX) 70 MG TABLET    TAKE 1 TABLET BY MOUTH ONCE WEEKLY    DILTIAZEM (CARDIZEM CD) 180 MG 24 HR CAPSULE    TAKE 1 CAPSULE BY MOUTH DAILY FOR HIGH BLOOD PRESSURE    FAMOTIDINE (PEPCID) 40 MG TABLET    Take 1 tablet by mouth 2 times daily.    FLUTICASONE (FLONASE) 50 MCG/ACT NASAL SPRAY    Spray 1 spray in each nostril 2 times daily as needed (nasal congestion).    LEVOCETIRIZINE (XYZAL) 5 MG TABLET    Take 1 tablet by mouth every evening.    METOPROLOL SUCCINATE (TOPROL-XL) 25 MG 24 HR TABLET    Take 1 tablet by mouth daily. Indications: High Blood Pressure Disorder    OMEGA-3 FATTY ACIDS (OMEGA-3 FISH OIL) 1000 MG CAPSULE    Take 1 capsule by mouth 2 times daily.    VALSARTAN (DIOVAN) 80 MG TABLET    TAKE 1 TABLET BY MOUTH DAILY    VITAMIN D, ERGOCALCIFEROL, 1.25 MG (50,000 UNITS) CAPSULE    Take 1 capsule by mouth every 30 days. T     ALLERGIES:   Allergen Reactions   • Clindamycin HIVES   • Shellfish Allergy   (Food Or Med) Other (See Comments)     Unknown           PHYSICAL EXAM   Visit Vitals  /78 (BP Location: RUE - Right upper extremity, Patient Position: Sitting, Cuff Size: Large Adult)   Pulse 62 Comment: apical, regular   Ht 5' 8\" (1.727 m)   Wt (!) 156 kg (343 lb 14.7 oz)   BMI 52.29 kg/m²     Physical Exam:    HEENT: PERRL, EOMI. Normocephalic.  Neck:  There is no jugular venous distention.  No carotid bruits were noted.  Supple neck.  Short, thick neck  Oral mucosa : Pink and moist.    Endocrine: There is no goiter.  CVS: Nonpalpable PMI.  Regular rate and rhythm.  Normal first and second heart tones.  No murmurs, rubs or gallops.  Lung fields: Clear to auscultation bilaterally.  Abdomen: Soft. Nontender, nondistended.  Lower extremity: No cyanosis, clubbing.  Lymphedema/stasis changes  Peripheral  vascular: Both lower extremities are warm and well perfused.     Venous system: Calves are benign.  Homans sign is negative.  Neuro: Awake and alert.  Nonfocal examination.  Psych: Appropriate mood and affect  Integumentary: Warm and Dry      Procedures:  No results found for this or any previous visit.     Dobutamine stress test from 5/3/2021   STUDY CONCLUSIONS  SUMMARY:     1. Stress echo: Left ventricular ejection fraction was normal at rest and     with stress. There is no evidence for stress-induced ischemia.  2. Procedure narrative: Dobutamine stress test. Stress testing was     performed per protocol, with Dobutamine infusion from 5 to 20     mcg/kg/min by 5 mcg/kg/min increments. Heart rate response was     augmented by the addition of hand  and atropine. The infusion was terminated due to target heart rate achievement.  3. Left ventricle: The ejection fraction was measured by biplane method of disks. The ejection fraction is 72%.    ECG from 4/21/2021  Sinus bradycardia with marked sinus arrhythmia   Borderline ECG         Labs:  Sodium (mmol/L)   Date Value   07/23/2021 140   05/21/2021 142     Potassium (mmol/L)   Date Value   07/23/2021 5.0   05/21/2021 4.4     Chloride (mmol/L)   Date Value   07/23/2021 107   05/21/2021 110 (H)     Carbon Dioxide (mmol/L)   Date Value   07/23/2021 26   05/21/2021 25     BUN (mg/dL)   Date Value   07/23/2021 27 (H)   05/21/2021 21 (H)     Creatinine (mg/dL)   Date Value   07/23/2021 0.91   05/21/2021 0.81     Glucose (mg/dL)   Date Value   07/23/2021 88   05/21/2021 84       Hemoglobin A1C (%)   Date Value   11/01/2019 5.1   06/13/2017 5.3       CHOLESTEROL (mg/dL)   Date Value   09/29/2020 206 (H)     Cholesterol (mg/dL)   Date Value   09/23/2021 210 (H)     HDL (mg/dL)   Date Value   09/23/2021 43 (L)   09/29/2020 44 (L)     TRIGLYCERIDE (mg/dL)   Date Value   09/29/2020 195 (H)     Triglycerides (mg/dL)   Date Value   09/23/2021 193 (H)     CALCULATED LDL  (mg/dL)   Date Value   09/29/2020 123     LDL (mg/dL)   Date Value   09/23/2021 128       TSH (mcUnits/mL)   Date Value   09/12/2019 3.289       No results found for: INR    WBC (K/mcL)   Date Value   05/06/2021 6.8     RBC (mil/mcL)   Date Value   05/06/2021 3.55 (L)     HCT (%)   Date Value   05/06/2021 33.0 (L)     HGB (g/dL)   Date Value   05/06/2021 10.9 (L)     PLT (K/mcL)   Date Value   05/06/2021 190         IMPRESSION/PLAN:  1. Hyperlipidemia, unspecified hyperlipidemia type        Orders Placed This Encounter   • Lipid Panel With Reflex     Order Specific Question:   Should this test be performed fasting or random?     Answer:   Fasting   • Hepatic Function Panel   • rosuvastatin (CRESTOR) 5 MG tablet     Sig: Take 1 tablet by mouth daily.     Dispense:  90 tablet     Refill:  1     Hyperlipidemia  -Lipid panel from 9/23/2021: Cholesterol 210, , HDL 44, and     -Not on statin. ASCVD risk score is 7.5%   -Atorvastatin was apparently stopped because of side effects-?  Muscle cramps  -Due to elevated ASCVD risk score, will start patient on crestor 5mg and recheck lipid panel in 6 weeks. Discontinued fish oil today. Discussed with patient about taking COQ10 if she develops any muscle aches.   -Advised patient to try to do the sit and be fit exercises     History of transient atrial fibrillation in the setting of C. difficile colitis  -Has not had any recurrence of atrial fibrillation since then  -On diltiazem and metoprolol succinate.   -Was on anticoagulation for a short period of time and due to reversible cause, it was determined that she could stop anticoagulation as per notes from previous cardiology team.  -At home sleep study came back inconclusive. Advised her to follow-up with sleep physician.    -Monitor pulse rates daily at home   -discussed weight management    History of pulmonary embolus  -?Provoked in the setting of prolonged hospitalization  -Previous cardiology service notes were  reviewed.  They state that she completed her course of anticoagulation  -At that time, appears to have had an IVC filter placed-around 2014 (unclear whether it is a retrievable filter)   -? Placed in the setting of a GI bleed.    -She states that her previous pulmonologist had determined that it should be left in place.  -We will defer to pulmonology service on this.    Hypertension   -BP in office today 126/78 mmHg   -On Valsartan, metoprolol succinate, diltiazem       Possible JOSETTE   -At home sleep study came back inconclusive. Advised her to follow-up with sleep physician.      Elevated BMI  -Spoke to patient about the importance of eating a heart healthy diet and maintaining good physical activity.   -Weight management discussed.  She has been losing weight- down by 18 lbs since last visit.   -Goal to maintain low Na+, low-fat intake    Negative dobutamine echo 5/2021  -Clinically asymptomatic.    Personally reviewed lab results and discussed with patient and  in detail and all questions answered in layman's terms to the patient's understanding.       Karen Castro MD, FACC    Scribe Attestation: Entered by Chris Lima, acting as scribe for Dr. Karen Castro MD, FACC.     Provider Attestation: The documentation recorded by the scribe accurately reflects the service I personally performed and the decisions made by me, Karen Castro MD, FACC.

## 2021-10-08 ENCOUNTER — HOSPITAL ENCOUNTER (OUTPATIENT)
Dept: PHYSICAL THERAPY | Age: 71
Setting detail: THERAPIES SERIES
Discharge: HOME | End: 2021-10-08
Payer: COMMERCIAL

## 2021-10-08 PROCEDURE — 97110 THERAPEUTIC EXERCISES: CPT

## 2021-10-08 PROCEDURE — 97112 NEUROMUSCULAR REEDUCATION: CPT

## 2021-10-08 NOTE — FLOWSHEET NOTE
Outpatient Physical Therapy  [facility]           [x] Phone: [phone]   Fax: [phone]  [facility]           [] Phone: [phone]   Fax: [phone]        Physical Therapy Daily Treatment Note  Date:  10/8/2021    Patient Name:  Hema Weaver    :  1950  MRN: [id number]  Restrictions/Precautions:  no bending, twisting or lifting >8 lbs  Diagnosis:   Diagnosis: cervical spine fusion; 2021 for revision C3-T1 posterior spinal fusion (previous L4-S1 decompression and fusion 10/17/16 and C3-6 posterior decompression and fusion 11/2/15)  Date of Injury/Surgery: see above  Treatment Diagnosis: Treatment Diagnosis: Decreased BLE strength (L>R) and walking endurance    Insurance/Certification information: PT Insurance Information: Pura Umanzor for 12 -10/27 Auth# [id number]  Referring Physician:  Referring Practitioner: SHIRA Mayers  Next Doctor Visit:  --  Plan of care signed (Y/N):  YES, sent 21  Outcome Measure:   Visit# / total visits:     Pain level: 5/10, mostly tightness/stiffness in the neck   Goals:     Patient goals : Decrease tightness/discomfort in cervical spine; improve strength and endurance  Short term goals  Time Frame for Short term goals: 6 weeks  Short term goal 1: Pt demo I w/ HEP and pain management  Short term goal 2: Pt demo Oswestry <25/50 to improve tolerance to ADL's  Short term goal 3: Pt demo >1,000 ft in 6 minutes with rollator around therapy gym and no rest breaks  Short term goal 4: Pt demo ability to ambulate >150 ft with SPC or least restricted AD and no LOB noted  Short term goal 5: Pt demo 5x STS test <20 seconds to improve BLE strengthening       Summary of Evaluation:  Pt is 73year old female who arrives to therapy following a revision C3-T1 posterior cervical fusion. PMH: CVA (L sided weakness), kidney failure, L4-S1 lumbar decompression and fusion, and previous C3-C6 posterior decompression and fusion in . Suresh Cartwright  Pt now has difficulties completing walking without AD, rising from a chair, completing chores/tasks around her home before getting fatigued. Pt demo deficits this date that include restricted cervical AROM/PROM, decreased BLE strength in all directions, increased fall risk, reduced tolerance to standing exercises, and decreased endurance. Pt will benefit with PT services with gait/endurance training, BLE strengthening, AROM/PROM of cervical spine once clearance from physician, core stability, and balance to return to PLOF. Patient able to complete 6 MWT today with 952 ft ambulated with rollator. Pt prior to surgery patient had excruciating pain in her neck/shoulder (L) and difficulties completing tasks at home. Patient received education on their current pathology and how their condition effects them with their functional activities. Also, patient received a requirement to undergo 3 months of physical therapy prior to her kidney transplant; since we did not receive the referral from this doctor, asked patient to reach out to her transplant doctor to provide need for 3 months of therapy for insurance coverage. Subjective:  Patient reports no updates from the transplant doctor. Reports the stiffness in her neck has worsened and will get headaches. Has not been sleeping in bed since laying down bothers her. She reports having to complete her exercises in sitting. Reports only being able to do her HEP twice a week and did not do it while on vacation. Any changes in Ambulatory Summary Sheet?   None        Objective:  See eval   COVID screening questions were asked and patient attested that there had been no contact or symptoms    Patient prefers seated/standing; but can tolerate supine for a short period of time    Exercises: (No more than 4 columns)   Exercise/Equipment 9/1/21 #1 10/8/21 #2 Date           WARM UP      Nu Step               TABLE      *LAQ  3# 2x10 ea side    *SLR  x10 ea side    *Adductor Squeeze *Supine Clamshells   Seated 2x10 RTB    Seated Hamstring Curl     2x10     Cervical AROM  Flexion 20\" x2    Rotation w/ overpressure using hand 20\" x2    Cervical Isometrics  next                STANDING      Marches  x20    Heel Raises  x10    Lateral Stepping in // bars      Fwd Step up                             PROPRIOCEPTION                                    MODALITIES                      Other Therapeutic Activities/Education:  Patient received education on their current pathology and how their condition effects them with their functional activities. Patient understood discussion and questions were answered. Patient understands their activity limitations and understands rational for treatment progression. Home Exercise Program:  HO issued, reviewed and discussed with patient. Pt agreed to comply. Manual Treatments:  --      Modalities:  --      Communication with other providers:  eval sent 9/1/21      Assessment:  Patient demonstrates fair tolerance to today's session and no adverse effects were noted throughout. Patient noted ongoing stiffness and pain in her cervical spine, introduced gentle cervical AROM flexion/rotation with some overpressure as tolerated. Educated patient on prognosis related to surgical procedure and timeline following surgery. She inquires about using TENS and massage therapy during treatment; discussed purpose and short term benefits related to e-stim for pain management and importance of continuing other exercises in combination. Will try STM/TPR to cervical/thoracic region as tolerated in the following session. End of session: 5/10       Pt is 73year old female who arrives to therapy following a revision C3-T1 posterior cervical fusion. PMH: CVA (L sided weakness), kidney failure, L4-S1 lumbar decompression and fusion, and previous C3-C6 posterior decompression and fusion in 2015. Pinky Angles  Pt now has difficulties completing walking without AD, rising from a chair, completing chores/tasks around her home before getting fatigued. Pt demo deficits this date that include restricted cervical AROM/PROM, decreased BLE strength in all directions, increased fall risk, reduced tolerance to standing exercises, and decreased endurance. Pt will benefit with PT services with gait/endurance training, BLE strengthening, AROM/PROM of cervical spine once clearance from physician, core stability, and balance to return to PLOF. Patient able to complete 6 MWT today with 952 ft ambulated with rollator. Pt prior to surgery patient had excruciating pain in her neck/shoulder (L) and difficulties completing tasks at home. Patient received education on their current pathology and how their condition effects them with their functional activities. Also, patient received a requirement to undergo 3 months of physical therapy prior to her kidney transplant; since we did not receive the referral from this doctor, asked patient to reach out to her transplant doctor to provide need for 3 months of therapy for insurance coverage.           Plan for Next Session:         Time In / Time Out:  4420-0419         If [facility] Please Indicate Time In/Out/Total Time  CPT Code Time in Time out Total Time                                                            Total for session             Timed Code/Total Treatment Minutes:  45'/45'    (1) Neuro  (2) TE 30'      Next Progress Note due:  10th visit    Plan of Care Interventions:  [x] Therapeutic Exercise  [x] Modalities:  [x] Therapeutic Activity     [] Ultrasound  [] Estim  [] Gait Training      [] Cervical Traction [] Lumbar Traction  [x] Neuromuscular Re-education    [] Cold/hotpack [] Iontophoresis   [x] Instruction in HEP      [] Vasopneumatic   [] Dry Needling    [x] Manual Therapy               [] Aquatic Therapy              Electronically signed by:  Servando Spear, PT, DPT, CSCS 9/1/2021, 8:02 AM

## 2021-10-15 ENCOUNTER — HOSPITAL ENCOUNTER (OUTPATIENT)
Dept: PHYSICAL THERAPY | Age: 71
Discharge: HOME | End: 2021-10-15

## 2021-10-15 NOTE — FLOWSHEET NOTE
Physical Therapy  Cancellation/No-show Note  Patient Name:  Kane Matson  :  1950   Date:  10/15/2021  Cancelled visits to date: 2  No-shows to date: 0    For today's appointment patient:  [x]  Cancelled  []  Rescheduled appointment  []  No-show     Reason given by patient:  [x]  Patient ill  []  Conflicting appointment  []  No transportation    []  Conflict with work  []  No reason given  [x]  Other:     Comments:  Had flu shot yesterday and not feeling well today.     Electronically signed by:  Delvin Conway       10/15/2021, 8:35 AM

## 2021-10-26 ENCOUNTER — HOSPITAL ENCOUNTER (OUTPATIENT)
Dept: PHYSICAL THERAPY | Age: 71
Setting detail: THERAPIES SERIES
Discharge: HOME | End: 2021-10-26
Payer: COMMERCIAL

## 2021-10-26 PROCEDURE — 97110 THERAPEUTIC EXERCISES: CPT

## 2021-10-26 PROCEDURE — 97140 MANUAL THERAPY 1/> REGIONS: CPT

## 2021-10-26 NOTE — PROGRESS NOTES
Outpatient Physical Therapy           [place]           [] Phone: [phone]   Fax: [phone]  [facility]           [] Phone: [phone]   Fax: [phone]      To: Olga Galeazzi PA                                       From: Pieter Saleem PT     Patient: Ev Preston                  : 1950  Diagnosis:  cervical spine fusion; 2021 for revision C3-T1 posterior spinal fusion (previous L4-S1 decompression and fusion 10/17/16 and C3-6 posterior decompression and fusion 11/2/15)       Date: 10/26/2021  Treatment Diagnosis: Decreased BLE strength (L>R) and walking endurance         [x]  Progress Note                []  Discharge Note    Evaluation Date:   21  Total Visits to date:  3  Cancels/No-shows to date:  2    Subjective:   Natacha Milner reports 3/10 mostly stiffness upon arrival. She reports compliance with her HEP with the new additions from last session and has been getting massages from her son most nights. States she was supposed to see the doctor in early October, but had to reschedule. Her dialysis has not been making her feel very good and is often nauseous.      Plan of Care/Treatment to date:  [x] Therapeutic Exercise    [x] Modalities:  [x] Therapeutic Activity     [] Ultrasound  [x] Electrical Stimulation  [x] Gait Training      [] Cervical Traction   [] Lumbar Traction  [x] Neuromuscular Re-education  [x] Cold/hotpack [] Iontophoresis  [x] Instruction in HEP      Other:  [x] Manual Therapy       []  Vasopneumatic  [] Aquatic Therapy       []   Dry Needle Therapy                      Objective/Significant Findings At Last Visit/Comments:    Cervical AROM:  Flexion: 22 deg  Extension: 30 deg  R SB: 22 deg  L SB: 23 deg  R Rot: 40 deg  L rot: 45 deg    Strength RLE  R Hip Flexion: 4/5  R Hip Extension: 4/5  R Hip ABduction: 4/5  R Hip Internal Rotation: 4+/5  R Hip External Rotation: 4+/5  R Knee Flexion: 4+/5  R Knee Extension: 4+/5  R Ankle Dorsiflexion: 4+/5  R Ankle Plantar flexion: 5/5  Strength LLE  L Hip Flexion: 4-/5  L Hip Extension: 4-/5  L Hip ABduction: 4/5  L Hip Internal Rotation: 4+/5  L Hip External Rotation: 4/5  L Knee Flexion: 4/5  L Knee Extension: 4+/5  L Ankle Dorsiflexion: 4+/5  L Ankle Plantar Flexion: 4/5    5x STS: 19.5 seconds with x2 UE assist    Assessment:   Jadon Blancas has completed 3 visits since the start of therapy on 9/1/21. She has had to reschedule a few sessions of therapy due to being out of town for a few weeks and having symptoms from dialysis. She continues to report stiffness in her neck is pretty constant and will get a little relief with heat and her TENS unit. Due to the nature of her procedure and time following the procedure her cervical motion is still quite limited and spent some explaining the prognosis of her presentation. Since we have not completed many visits, patient has only made some small changes, but reported good relief of stiffness following STM/TPR during today's session. Patient will continue to benefit from therapy to address remaining limitations with stiffness and decreased strength in neck and scapular region.     Goal Status:  [] Achieved [x] Partially Achieved  [x] Not Achieved     Changes to goals:    Patient goals : Decrease tightness/discomfort in cervical spine; improve strength and endurance  Short term goals  Time Frame for Short term goals: 6 weeks  Short term goal 1: Pt demo I w/ HEP and pain management Reports compliance  Short term goal 2: Pt demo Oswestry <25/50 to improve tolerance to ADL's Not assessed  Short term goal 3: Pt demo >1,000 ft in 6 minutes with rollator around therapy gym and no rest breaks Not re-assessed  Short term goal 4: Pt demo ability to ambulate >150 ft with SPC or least restricted AD and no LOB noted Pt is still using rollator at this point in time  Short term goal 5: Pt demo 5x STS test <20 seconds to improve BLE strengthening MET    Frequency/Duration:  # Days per week: [] 1 day # Weeks: [] 1 week [] 4 weeks [] 8 weeks     [x] 2 days   [] 2 weeks [] 5 weeks [] 10 weeks     [] 3 days   [] 3 weeks [x] 6 weeks [] 12 weeks       Rehab Potential: [] Excellent [] Good [x] Fair  [] Poor     Patient Status: [x] Continue per initial plan of Care     [] Patient now discharged     [] Additional visits requested, Please re-certify for additional visits: If we are requesting more visits, we fully anticipate the patient's condition is expected to improve within the treatment timeframe we are requesting. Electronically signed by:  Abdelrahman Mcgregor PT, FRANKLYN CLAIRE 10/26/2021, 6:35 AM    If you have any questions or concerns, please don't hesitate to call.   Thank you for your referral.    Physician Signature:______________________ Date:______ Time: ________  By signing above, therapists plan is approved by physician

## 2021-10-26 NOTE — FLOWSHEET NOTE
Outpatient Physical Therapy  [place]           [x] Phone: [phone]   Fax: [phone]  [facility]           [] Phone: [phone]   Fax: [phone]        Physical Therapy Daily Treatment Note  Date:  10/26/2021    Patient Name:  Jose Mosley    :  1950  MRN: [id number]  Restrictions/Precautions:  no bending, twisting or lifting >8 lbs  Diagnosis:   Diagnosis: cervical spine fusion; 2021 for revision C3-T1 posterior spinal fusion (previous L4-S1 decompression and fusion 10/17/16 and C3-6 posterior decompression and fusion 11/2/15)  Date of Injury/Surgery: see above  Treatment Diagnosis: Treatment Diagnosis: Decreased BLE strength (L>R) and walking endurance    Insurance/Certification information: PT Insurance Information: Agata Began for -10/27 Auth# [id number] *REQUESTED DATE EXTENSION  Referring Physician:  Referring Practitioner: SHIRA Cain  Next Doctor Visit:  --  Plan of care signed (Y/N):  YES, sent 21  Outcome Measure:   Visit# / total visits:   3/12  Pain level: 5/10, mostly tightness/stiffness in the neck   Goals:     Patient goals : Decrease tightness/discomfort in cervical spine; improve strength and endurance  Short term goals  Time Frame for Short term goals: 6 weeks  Short term goal 1: Pt demo I w/ HEP and pain management Reports compliance  Short term goal 2: Pt demo Oswestry <25/50 to improve tolerance to ADL's Not assessed  Short term goal 3: Pt demo >1,000 ft in 6 minutes with rollator around therapy gym and no rest breaks Not re-assessed  Short term goal 4: Pt demo ability to ambulate >150 ft with SPC or least restricted AD and no LOB noted Pt is still using rollator at this point in time  Short term goal 5: Pt demo 5x STS test <20 seconds to improve BLE strengthening MET       Summary of Evaluation:  Pt is 73year old female who arrives to therapy following a revision C3-T1 posterior cervical fusion.  PMH: CVA (L sided weakness), kidney failure, L4-S1 lumbar decompression and fusion, and previous C3-C6 posterior decompression and fusion in 2015. Danilo Hooks Pt now has difficulties completing walking without AD, rising from a chair, completing chores/tasks around her home before getting fatigued. Pt demo deficits this date that include restricted cervical AROM/PROM, decreased BLE strength in all directions, increased fall risk, reduced tolerance to standing exercises, and decreased endurance. Pt will benefit with PT services with gait/endurance training, BLE strengthening, AROM/PROM of cervical spine once clearance from physician, core stability, and balance to return to PLOF. Patient able to complete 6 MWT today with 952 ft ambulated with rollator. Pt prior to surgery patient had excruciating pain in her neck/shoulder (L) and difficulties completing tasks at home. Patient received education on their current pathology and how their condition effects them with their functional activities. Also, patient received a requirement to undergo 3 months of physical therapy prior to her kidney transplant; since we did not receive the referral from this doctor, asked patient to reach out to her transplant doctor to provide need for 3 months of therapy for insurance coverage. Subjective:  Arlen Elizondo reports 3/10 mostly stiffness upon arrival. She reports compliance with her HEP with the new additions from last session and has been getting massages from her son most nights. States she was supposed to see the doctor in early October, but had to reschedule. Her dialysis has not been making her feel very good and is often nauseous. Any changes in Ambulatory Summary Sheet?   None        Objective:  See eval   COVID screening questions were asked and patient attested that there had been no contact or symptoms    Patient prefers seated/standing; but can tolerate supine for a short period of time    Cervical AROM:  Flexion: 22 deg  Extension: 30 deg  R SB: 22 deg  L SB: 23 deg  R Rot: 40 deg  L rot: 45 deg    Exercises: (No more than 4 columns)   Exercise/Equipment 9/1/21 #1 10/8/21 #2 10/26/21 #3           WARM UP      Nu Step               TABLE      *LAQ  3# 2x10 ea side    *SLR  x10 ea side    *Adductor Squeeze      *Supine Clamshells   Seated 2x10 RTB    Seated Hamstring Curl     2x10     Cervical AROM  Flexion 20\" x2    Rotation w/ overpressure using hand 20\" x2 Flexion w/ OP 20\" x3     Rotation w/ OP 20\" x3    SB w/ OP 20\" x2 ea side   Cervical Isometrics  next    Seated Chin Tucks   x10 3\"         STANDING      Marches  x20    Heel Raises  x10    Lateral Stepping in // bars      Fwd Step up                             PROPRIOCEPTION                                    MODALITIES                      Other Therapeutic Activities/Education:  Patient received education on their current pathology and how their condition effects them with their functional activities. Patient understood discussion and questions were answered. Patient understands their activity limitations and understands rational for treatment progression. Home Exercise Program:  HO issued, reviewed and discussed with patient. Pt agreed to comply. Manual Treatments:  STM/TPR with free motion to mid trap/upper trap/suboccipitals/scalenes/levator; scar massage on incision total 25'      Modalities:  --      Communication with other providers:  katina sent 9/1/21      Assessment:  Chava has completed 3 visits since the start of therapy on 9/1/21. She has had to reschedule a few sessions of therapy due to being out of town for a few weeks and having symptoms from dialysis. She continues to report stiffness in her neck is pretty constant and will get a little relief with heat and her TENS unit. Due to the nature of her procedure and time following the procedure her cervical motion is still quite limited and spent some explaining the prognosis of her presentation.  Since we have not completed many visits, patient has only made some small changes, but reported good relief of stiffness following STM/TPR during today's session. Patient will continue to benefit from therapy to address remaining limitations with stiffness and decreased strength in neck and scapular region. End of session: 1/10; pt reports good improvements in stiffness and \"feeling looser\"      Pt is 73year old female who arrives to therapy following a revision C3-T1 posterior cervical fusion. PMH: CVA (L sided weakness), kidney failure, L4-S1 lumbar decompression and fusion, and previous C3-C6 posterior decompression and fusion in 2015. Belkys Sanchez Pt now has difficulties completing walking without AD, rising from a chair, completing chores/tasks around her home before getting fatigued. Pt demo deficits this date that include restricted cervical AROM/PROM, decreased BLE strength in all directions, increased fall risk, reduced tolerance to standing exercises, and decreased endurance. Pt will benefit with PT services with gait/endurance training, BLE strengthening, AROM/PROM of cervical spine once clearance from physician, core stability, and balance to return to PLOF. Patient able to complete 6 MWT today with 952 ft ambulated with rollator. Pt prior to surgery patient had excruciating pain in her neck/shoulder (L) and difficulties completing tasks at home. Patient received education on their current pathology and how their condition effects them with their functional activities. Also, patient received a requirement to undergo 3 months of physical therapy prior to her kidney transplant; since we did not receive the referral from this doctor, asked patient to reach out to her transplant doctor to provide need for 3 months of therapy for insurance coverage.           Plan for Next Session:         Time In / Time Out:  4615-3964         If [facility] Please Indicate Time In/Out/Total Time  CPT Code Time in Time out Total Time Total for session             Timed Code/Total Treatment Minutes:  45'/45'    (2) MAN 25'   (1) TE 20      Next Progress Note due:  10th visit    Plan of Care Interventions:  [x] Therapeutic Exercise  [x] Modalities:  [x] Therapeutic Activity     [] Ultrasound  [] Estim  [] Gait Training      [] Cervical Traction [] Lumbar Traction  [x] Neuromuscular Re-education    [] Cold/hotpack [] Iontophoresis   [x] Instruction in HEP      [] Vasopneumatic   [] Dry Needling    [x] Manual Therapy               [] Aquatic Therapy              Electronically signed by:  Marce Ndiaye, PT, DPT, CSCS 9/1/2021, 8:02 AM

## 2021-12-03 ENCOUNTER — HOSPITAL ENCOUNTER (OUTPATIENT)
Dept: WOMENS IMAGING | Age: 71
Discharge: HOME | End: 2021-12-03
Payer: COMMERCIAL

## 2021-12-03 DIAGNOSIS — Z12.31 ENCOUNTER FOR SCREENING MAMMOGRAM FOR MALIGNANT NEOPLASM OF BREAST: ICD-10-CM

## 2021-12-03 PROCEDURE — 77067 SCR MAMMO BI INCL CAD: CPT

## 2021-12-22 ENCOUNTER — HOSPITAL ENCOUNTER (OUTPATIENT)
Dept: INTERVENTIONAL RADIOLOGY/VASCULAR | Age: 71
Discharge: HOME | End: 2021-12-22
Payer: COMMERCIAL

## 2021-12-22 VITALS
RESPIRATION RATE: 16 BRPM | DIASTOLIC BLOOD PRESSURE: 71 MMHG | OXYGEN SATURATION: 98 % | HEART RATE: 87 BPM | SYSTOLIC BLOOD PRESSURE: 145 MMHG | TEMPERATURE: 96.3 F

## 2021-12-22 DIAGNOSIS — N18.6 ESRD (END STAGE RENAL DISEASE) (HCC): ICD-10-CM

## 2021-12-22 LAB
APTT: 42.5 SECONDS (ref 25.1–37.1)
HCT VFR BLD CALC: 30 % (ref 37–47)
HEMOGLOBIN: 9.7 GM/DL (ref 12.5–16)
INR BLD: 0.85 INDEX
MCH RBC QN AUTO: 31.7 PG (ref 27–31)
MCHC RBC AUTO-ENTMCNC: 32.3 % (ref 32–36)
MCV RBC AUTO: 98 FL (ref 78–100)
PDW BLD-RTO: 13.4 % (ref 11.7–14.9)
PLATELET # BLD: 246 K/CU MM (ref 140–440)
PMV BLD AUTO: 10.1 FL (ref 7.5–11.1)
PROTHROMBIN TIME: 10.9 SECONDS (ref 11.7–14.5)
RBC # BLD: 3.06 M/CU MM (ref 4.2–5.4)
SARS-COV-2, NAAT: NOT DETECTED
SOURCE: NORMAL
WBC # BLD: 5.9 K/CU MM (ref 4–10.5)

## 2021-12-22 PROCEDURE — 36589 REMOVAL TUNNELED CV CATH: CPT

## 2021-12-22 PROCEDURE — 85730 THROMBOPLASTIN TIME PARTIAL: CPT

## 2021-12-22 PROCEDURE — 85610 PROTHROMBIN TIME: CPT

## 2021-12-22 PROCEDURE — 2709999900 HC NON-CHARGEABLE SUPPLY

## 2021-12-22 PROCEDURE — 87635 SARS-COV-2 COVID-19 AMP PRB: CPT

## 2021-12-22 PROCEDURE — 7100000011 HC PHASE II RECOVERY - ADDTL 15 MIN

## 2021-12-22 PROCEDURE — 7100000010 HC PHASE II RECOVERY - FIRST 15 MIN

## 2021-12-22 PROCEDURE — 85027 COMPLETE CBC AUTOMATED: CPT

## 2021-12-22 ASSESSMENT — PAIN SCALES - GENERAL: PAINLEVEL_OUTOF10: 0

## 2021-12-22 NOTE — PROGRESS NOTES
PROCEDURE PERFORMED: Tunneled catheter removal    PRIMARY INDICATION FOR PROCEDURE:  Therapy completed    PT TRANSPORTED FROM:    \[facility]\""             TO THE IR ROOM:    Small    PT IN THE ROOM AT WHAT TIME: 3267                          INFORMED CONSENT:  Patient signed consent. Consent placed in chart. PEDRO SCORE PRE PROCEDURE:  10    NURSING ASSESSMENT:   Alert and oriented. TIME OUT COMPLETE:   0941    BARRIER PRECAUTIONS & STERILE TECHNIQUE  Patient placed on a cardiac monitor. Pt. placed n the supine position. Site prepped with chlorhexadine and draped in a sterile fashion. PAIN/LOCAL ANESTHESIA/SEDATION MANAGEMENT:  Lidocaine 1% without Epinephrine    INTRAOPERATIVE:   Tunneled hemodialysis catheter removal. Tip intact. Pressure held to site for 5  minutes post procedure. Patient tolerated the procedure well. Patient transported back to \[facility]\"" to be discharged.     STERILE DRESSINGS:   Gauze, and Tegaderm     EBL:  Less then 1 ml    COMPLICATIONS:  None    STAFF PRESENT DURING PROCEDURE:  Fred Prado RN    PEDRO SCORE POST PROCEDURE:  10    REPORT CALLED TO: Madhuri Dave RN    PT LEFT ROOM AT WHAT TIME: 4690

## 2021-12-22 NOTE — PROGRESS NOTES
1005 pt recd from IR to [facility] room 14. Pt denies pain or nausea. Dressing left chest clean dry intact  1014 son at bedside assisting patient to dress.   1021 discharge instructions reviewed with family per Shameka Cevallos, verbalized understanding  [phone] discharged to car via wheelchair

## 2021-12-27 NOTE — DISCHARGE SUMMARY
Outpatient Physical Therapy           [place]           [] Phone: [phone]   Fax: [phone]  [facility]           [] Phone: [phone]   Fax: [phone]     To: Shahriar Jaime    From: Dale Zamora PT, PT     Patient: Adina Witt       : 1950  Diagnosis: cervical spine fusion   Treatment Diagnosis: decreased BLE strength (L>R) and walking endurance  Date: 2021  Physical Therapy Discharge Form  Dear Shahriar Jaime PA,   The following patient has no returned to therapy for >30 days and will be formally discharged at this time. Electronically signed by:  Dale Zamora PT, DPT, CSCS 2021, 9:50 AM        If you have any questions or concerns, please don't hesitate to call.   Thank you for your referral.      Physician Signature:________________________________Date:_________ TIME: _____  By signing above, therapists plan is approved by physician

## 2022-01-01 ENCOUNTER — APPOINTMENT (OUTPATIENT)
Dept: ULTRASOUND IMAGING | Age: 72
DRG: 056 | End: 2022-01-01

## 2022-01-01 ENCOUNTER — APPOINTMENT (OUTPATIENT)
Dept: GENERAL RADIOLOGY | Age: 72
DRG: 056 | End: 2022-01-01

## 2022-01-01 ENCOUNTER — APPOINTMENT (OUTPATIENT)
Dept: INTERVENTIONAL RADIOLOGY/VASCULAR | Age: 72
DRG: 056 | End: 2022-01-01

## 2022-01-01 ENCOUNTER — APPOINTMENT (OUTPATIENT)
Dept: CT IMAGING | Age: 72
DRG: 056 | End: 2022-01-01

## 2022-01-01 ENCOUNTER — APPOINTMENT (OUTPATIENT)
Dept: MRI IMAGING | Age: 72
DRG: 056 | End: 2022-01-01

## 2022-01-01 ENCOUNTER — HOSPITAL ENCOUNTER (INPATIENT)
Age: 72
LOS: 8 days | Discharge: HOSPICE | DRG: 056 | End: 2023-01-04
Attending: EMERGENCY MEDICINE | Admitting: STUDENT IN AN ORGANIZED HEALTH CARE EDUCATION/TRAINING PROGRAM
Payer: MEDICARE

## 2022-01-01 DIAGNOSIS — J18.9 HCAP (HEALTHCARE-ASSOCIATED PNEUMONIA): ICD-10-CM

## 2022-01-01 DIAGNOSIS — R53.1 GENERALIZED WEAKNESS: ICD-10-CM

## 2022-01-01 DIAGNOSIS — N18.6 ESRD (END STAGE RENAL DISEASE) (HCC): ICD-10-CM

## 2022-01-01 DIAGNOSIS — N30.01 ACUTE CYSTITIS WITH HEMATURIA: Primary | ICD-10-CM

## 2022-01-01 LAB
ADENOVIRUS DETECTION BY PCR: NOT DETECTED
ALBUMIN SERPL-MCNC: 4.2 GM/DL (ref 3.4–5)
ALP BLD-CCNC: 102 IU/L (ref 40–129)
ALT SERPL-CCNC: 14 U/L (ref 10–40)
ANION GAP SERPL CALCULATED.3IONS-SCNC: 11 MMOL/L (ref 4–16)
ANION GAP SERPL CALCULATED.3IONS-SCNC: 11 MMOL/L (ref 4–16)
ANION GAP SERPL CALCULATED.3IONS-SCNC: 13 MMOL/L (ref 4–16)
ANION GAP SERPL CALCULATED.3IONS-SCNC: 15 MMOL/L (ref 4–16)
ANION GAP SERPL CALCULATED.3IONS-SCNC: 17 MMOL/L (ref 4–16)
APTT: 20.3 SECONDS (ref 25.1–37.1)
APTT: 33 SECONDS (ref 25.1–37.1)
APTT: 35.5 SECONDS (ref 25.1–37.1)
APTT: 35.7 SECONDS (ref 25.1–37.1)
APTT: 36 SECONDS (ref 25.1–37.1)
AST SERPL-CCNC: 21 IU/L (ref 15–37)
BACTERIA: ABNORMAL /HPF
BASOPHILS ABSOLUTE: 0 K/CU MM
BASOPHILS RELATIVE PERCENT: 0.7 % (ref 0–1)
BILIRUB SERPL-MCNC: 0.2 MG/DL (ref 0–1)
BILIRUBIN URINE: NEGATIVE MG/DL
BLOOD, URINE: ABNORMAL
BORDETELLA PARAPERTUSSIS BY PCR: NOT DETECTED
BORDETELLA PERTUSSIS PCR: NOT DETECTED
BUN BLDV-MCNC: 62 MG/DL (ref 6–23)
BUN BLDV-MCNC: 67 MG/DL (ref 6–23)
BUN BLDV-MCNC: 68 MG/DL (ref 6–23)
BUN BLDV-MCNC: 73 MG/DL (ref 6–23)
BUN BLDV-MCNC: 76 MG/DL (ref 6–23)
CALCIUM SERPL-MCNC: 9.1 MG/DL (ref 8.3–10.6)
CALCIUM SERPL-MCNC: 9.1 MG/DL (ref 8.3–10.6)
CALCIUM SERPL-MCNC: 9.3 MG/DL (ref 8.3–10.6)
CALCIUM SERPL-MCNC: 9.4 MG/DL (ref 8.3–10.6)
CALCIUM SERPL-MCNC: 9.5 MG/DL (ref 8.3–10.6)
CHLAMYDOPHILA PNEUMONIA PCR: NOT DETECTED
CHLORIDE BLD-SCNC: 100 MMOL/L (ref 99–110)
CHLORIDE BLD-SCNC: 100 MMOL/L (ref 99–110)
CHLORIDE BLD-SCNC: 98 MMOL/L (ref 99–110)
CLARITY: ABNORMAL
CO2: 20 MMOL/L (ref 21–32)
CO2: 23 MMOL/L (ref 21–32)
CO2: 24 MMOL/L (ref 21–32)
CO2: 26 MMOL/L (ref 21–32)
CO2: 27 MMOL/L (ref 21–32)
COLOR: YELLOW
CORONAVIRUS 229E PCR: NOT DETECTED
CORONAVIRUS HKU1 PCR: NOT DETECTED
CORONAVIRUS NL63 PCR: NOT DETECTED
CORONAVIRUS OC43 PCR: NOT DETECTED
CREAT SERPL-MCNC: 3.7 MG/DL (ref 0.6–1.1)
CREAT SERPL-MCNC: 3.9 MG/DL (ref 0.6–1.1)
CREAT SERPL-MCNC: 4 MG/DL (ref 0.6–1.1)
CREAT SERPL-MCNC: 4.1 MG/DL (ref 0.6–1.1)
CREAT SERPL-MCNC: 4.5 MG/DL (ref 0.6–1.1)
CULTURE: ABNORMAL
CULTURE: NORMAL
DIFFERENTIAL TYPE: ABNORMAL
EKG ATRIAL RATE: 97 BPM
EKG DIAGNOSIS: NORMAL
EKG P AXIS: 15 DEGREES
EKG P-R INTERVAL: 188 MS
EKG Q-T INTERVAL: 388 MS
EKG QRS DURATION: 100 MS
EKG QTC CALCULATION (BAZETT): 492 MS
EKG R AXIS: -55 DEGREES
EKG T AXIS: 45 DEGREES
EKG VENTRICULAR RATE: 97 BPM
EOSINOPHILS ABSOLUTE: 0.1 K/CU MM
EOSINOPHILS RELATIVE PERCENT: 1.3 % (ref 0–3)
GFR SERPL CREATININE-BSD FRML MDRD: 10 ML/MIN/1.73M2
GFR SERPL CREATININE-BSD FRML MDRD: 11 ML/MIN/1.73M2
GFR SERPL CREATININE-BSD FRML MDRD: 11 ML/MIN/1.73M2
GFR SERPL CREATININE-BSD FRML MDRD: 12 ML/MIN/1.73M2
GFR SERPL CREATININE-BSD FRML MDRD: 13 ML/MIN/1.73M2
GLUCOSE BLD-MCNC: 120 MG/DL (ref 70–99)
GLUCOSE BLD-MCNC: 79 MG/DL (ref 70–99)
GLUCOSE BLD-MCNC: 83 MG/DL (ref 70–99)
GLUCOSE BLD-MCNC: 88 MG/DL (ref 70–99)
GLUCOSE BLD-MCNC: 96 MG/DL (ref 70–99)
GLUCOSE, URINE: NEGATIVE MG/DL
HBV SURFACE AB TITR SER: 68.84 {TITER}
HCT VFR BLD CALC: 36.6 % (ref 37–47)
HCT VFR BLD CALC: 37 % (ref 37–47)
HCT VFR BLD CALC: 39.1 % (ref 37–47)
HCT VFR BLD CALC: 40.3 % (ref 37–47)
HEMOGLOBIN: 11.7 GM/DL (ref 12.5–16)
HEMOGLOBIN: 12.2 GM/DL (ref 12.5–16)
HEMOGLOBIN: 12.8 GM/DL (ref 12.5–16)
HEMOGLOBIN: 12.9 GM/DL (ref 12.5–16)
HEPATITIS B SURFACE ANTIGEN: NON REACTIVE
HUMAN METAPNEUMOVIRUS PCR: NOT DETECTED
HYALINE CASTS: 5 /LPF
IMMATURE NEUTROPHIL %: 0.2 % (ref 0–0.43)
INFLUENZA A BY PCR: NOT DETECTED
INFLUENZA A H1 (2009) PCR: NOT DETECTED
INFLUENZA A H1 PANDEMIC PCR: NOT DETECTED
INFLUENZA A H3 PCR: NOT DETECTED
INFLUENZA B BY PCR: NOT DETECTED
KETONES, URINE: NEGATIVE MG/DL
LACTIC ACID, SEPSIS: 0.9 MMOL/L (ref 0.5–1.9)
LEUKOCYTE ESTERASE, URINE: ABNORMAL
LIPASE: 116 IU/L (ref 13–60)
LV EF: 60 %
LVEF MODALITY: NORMAL
LYMPHOCYTES ABSOLUTE: 0.7 K/CU MM
LYMPHOCYTES RELATIVE PERCENT: 13.5 % (ref 24–44)
Lab: ABNORMAL
Lab: NORMAL
MCH RBC QN AUTO: 28.5 PG (ref 27–31)
MCH RBC QN AUTO: 28.6 PG (ref 27–31)
MCH RBC QN AUTO: 28.8 PG (ref 27–31)
MCH RBC QN AUTO: 29 PG (ref 27–31)
MCHC RBC AUTO-ENTMCNC: 32 % (ref 32–36)
MCHC RBC AUTO-ENTMCNC: 32 % (ref 32–36)
MCHC RBC AUTO-ENTMCNC: 32.7 % (ref 32–36)
MCHC RBC AUTO-ENTMCNC: 33 % (ref 32–36)
MCV RBC AUTO: 87.5 FL (ref 78–100)
MCV RBC AUTO: 88.7 FL (ref 78–100)
MCV RBC AUTO: 89.2 FL (ref 78–100)
MCV RBC AUTO: 89.5 FL (ref 78–100)
MONOCYTES ABSOLUTE: 0.3 K/CU MM
MONOCYTES RELATIVE PERCENT: 5.2 % (ref 0–4)
MUCUS: ABNORMAL HPF
MYCOPLASMA PNEUMONIAE PCR: NOT DETECTED
NITRITE URINE, QUANTITATIVE: NEGATIVE
NUCLEATED RBC %: 0 %
PARAINFLUENZA 1 PCR: NOT DETECTED
PARAINFLUENZA 2 PCR: NOT DETECTED
PARAINFLUENZA 3 PCR: NOT DETECTED
PARAINFLUENZA 4 PCR: NOT DETECTED
PDW BLD-RTO: 15.6 % (ref 11.7–14.9)
PDW BLD-RTO: 15.9 % (ref 11.7–14.9)
PDW BLD-RTO: 16 % (ref 11.7–14.9)
PDW BLD-RTO: 16.1 % (ref 11.7–14.9)
PH, URINE: 5.5 (ref 5–8)
PLATELET # BLD: 203 K/CU MM (ref 140–440)
PLATELET # BLD: 204 K/CU MM (ref 140–440)
PLATELET # BLD: 226 K/CU MM (ref 140–440)
PLATELET # BLD: 233 K/CU MM (ref 140–440)
PMV BLD AUTO: 12.4 FL (ref 7.5–11.1)
PMV BLD AUTO: 12.8 FL (ref 7.5–11.1)
PMV BLD AUTO: 13.2 FL (ref 7.5–11.1)
PMV BLD AUTO: 13.7 FL (ref 7.5–11.1)
POTASSIUM SERPL-SCNC: 4.5 MMOL/L (ref 3.5–5.1)
POTASSIUM SERPL-SCNC: 4.8 MMOL/L (ref 3.5–5.1)
POTASSIUM SERPL-SCNC: 4.9 MMOL/L (ref 3.5–5.1)
POTASSIUM SERPL-SCNC: 5 MMOL/L (ref 3.5–5.1)
POTASSIUM SERPL-SCNC: 5.3 MMOL/L (ref 3.5–5.1)
PRO-BNP: 168.8 PG/ML
PROCALCITONIN: 3.86
PROTEIN UA: ABNORMAL MG/DL
RAPID INFLUENZA  B AGN: NEGATIVE
RAPID INFLUENZA A AGN: NEGATIVE
RBC # BLD: 4.09 M/CU MM (ref 4.2–5.4)
RBC # BLD: 4.23 M/CU MM (ref 4.2–5.4)
RBC # BLD: 4.41 M/CU MM (ref 4.2–5.4)
RBC # BLD: 4.52 M/CU MM (ref 4.2–5.4)
RBC URINE: 48 /HPF (ref 0–6)
REASON FOR REJECTION: NORMAL
REASON FOR REJECTION: NORMAL
REJECTED TEST: NORMAL
REJECTED TEST: NORMAL
RHINOVIRUS ENTEROVIRUS PCR: NOT DETECTED
RSV PCR: NOT DETECTED
SARS-COV-2, NAAT: NOT DETECTED
SARS-COV-2: NOT DETECTED
SEGMENTED NEUTROPHILS ABSOLUTE COUNT: 4.2 K/CU MM
SEGMENTED NEUTROPHILS RELATIVE PERCENT: 79.1 % (ref 36–66)
SODIUM BLD-SCNC: 135 MMOL/L (ref 135–145)
SODIUM BLD-SCNC: 136 MMOL/L (ref 135–145)
SODIUM BLD-SCNC: 136 MMOL/L (ref 135–145)
SODIUM BLD-SCNC: 137 MMOL/L (ref 135–145)
SODIUM BLD-SCNC: 137 MMOL/L (ref 135–145)
SOURCE: NORMAL
SPECIFIC GRAVITY UA: 1.02 (ref 1–1.03)
SPECIMEN: ABNORMAL
SPECIMEN: NORMAL
SQUAMOUS EPITHELIAL: 11 /HPF
TOTAL CK: 67 IU/L (ref 26–140)
TOTAL IMMATURE NEUTOROPHIL: 0.01 K/CU MM
TOTAL NUCLEATED RBC: 0 K/CU MM
TOTAL PROTEIN: 6.6 GM/DL (ref 6.4–8.2)
TRICHOMONAS: ABNORMAL /HPF
TROPONIN T: 0.09 NG/ML
TROPONIN T: 0.1 NG/ML
TSH HIGH SENSITIVITY: 2.7 UIU/ML (ref 0.27–4.2)
UROBILINOGEN, URINE: 0.2 MG/DL (ref 0.2–1)
WBC # BLD: 5.3 K/CU MM (ref 4–10.5)
WBC # BLD: 7.3 K/CU MM (ref 4–10.5)
WBC # BLD: 7.9 K/CU MM (ref 4–10.5)
WBC # BLD: 8.6 K/CU MM (ref 4–10.5)
WBC CLUMP: ABNORMAL /HPF
WBC UA: 265 /HPF (ref 0–5)

## 2022-01-01 PROCEDURE — 36556 INSERT NON-TUNNEL CV CATH: CPT

## 2022-01-01 PROCEDURE — 2580000003 HC RX 258: Performed by: NURSE PRACTITIONER

## 2022-01-01 PROCEDURE — 2580000003 HC RX 258: Performed by: PHYSICIAN ASSISTANT

## 2022-01-01 PROCEDURE — 70450 CT HEAD/BRAIN W/O DYE: CPT

## 2022-01-01 PROCEDURE — 76882 US LMTD JT/FCL EVL NVASC XTR: CPT

## 2022-01-01 PROCEDURE — 2700000000 HC OXYGEN THERAPY PER DAY

## 2022-01-01 PROCEDURE — 94761 N-INVAS EAR/PLS OXIMETRY MLT: CPT

## 2022-01-01 PROCEDURE — 6360000002 HC RX W HCPCS: Performed by: PHYSICIAN ASSISTANT

## 2022-01-01 PROCEDURE — 84443 ASSAY THYROID STIM HORMONE: CPT

## 2022-01-01 PROCEDURE — 83519 RIA NONANTIBODY: CPT

## 2022-01-01 PROCEDURE — 6370000000 HC RX 637 (ALT 250 FOR IP): Performed by: NURSE PRACTITIONER

## 2022-01-01 PROCEDURE — 85027 COMPLETE CBC AUTOMATED: CPT

## 2022-01-01 PROCEDURE — 94640 AIRWAY INHALATION TREATMENT: CPT

## 2022-01-01 PROCEDURE — 6360000004 HC RX CONTRAST MEDICATION: Performed by: INTERNAL MEDICINE

## 2022-01-01 PROCEDURE — 2580000003 HC RX 258: Performed by: STUDENT IN AN ORGANIZED HEALTH CARE EDUCATION/TRAINING PROGRAM

## 2022-01-01 PROCEDURE — 77001 FLUOROGUIDE FOR VEIN DEVICE: CPT

## 2022-01-01 PROCEDURE — 6360000004 HC RX CONTRAST MEDICATION

## 2022-01-01 PROCEDURE — 87186 SC STD MICRODIL/AGAR DIL: CPT

## 2022-01-01 PROCEDURE — 74230 X-RAY XM SWLNG FUNCJ C+: CPT

## 2022-01-01 PROCEDURE — 6370000000 HC RX 637 (ALT 250 FOR IP): Performed by: STUDENT IN AN ORGANIZED HEALTH CARE EDUCATION/TRAINING PROGRAM

## 2022-01-01 PROCEDURE — 1200000000 HC SEMI PRIVATE

## 2022-01-01 PROCEDURE — 6360000002 HC RX W HCPCS

## 2022-01-01 PROCEDURE — 99285 EMERGENCY DEPT VISIT HI MDM: CPT

## 2022-01-01 PROCEDURE — 85730 THROMBOPLASTIN TIME PARTIAL: CPT

## 2022-01-01 PROCEDURE — 94150 VITAL CAPACITY TEST: CPT

## 2022-01-01 PROCEDURE — 36415 COLL VENOUS BLD VENIPUNCTURE: CPT

## 2022-01-01 PROCEDURE — 74176 CT ABD & PELVIS W/O CONTRAST: CPT

## 2022-01-01 PROCEDURE — 72125 CT NECK SPINE W/O DYE: CPT

## 2022-01-01 PROCEDURE — 90935 HEMODIALYSIS ONE EVALUATION: CPT

## 2022-01-01 PROCEDURE — 5A1D70Z PERFORMANCE OF URINARY FILTRATION, INTERMITTENT, LESS THAN 6 HOURS PER DAY: ICD-10-PCS | Performed by: STUDENT IN AN ORGANIZED HEALTH CARE EDUCATION/TRAINING PROGRAM

## 2022-01-01 PROCEDURE — 92610 EVALUATE SWALLOWING FUNCTION: CPT

## 2022-01-01 PROCEDURE — 86706 HEP B SURFACE ANTIBODY: CPT

## 2022-01-01 PROCEDURE — 96365 THER/PROPH/DIAG IV INF INIT: CPT

## 2022-01-01 PROCEDURE — 86255 FLUORESCENT ANTIBODY SCREEN: CPT

## 2022-01-01 PROCEDURE — 99223 1ST HOSP IP/OBS HIGH 75: CPT | Performed by: PSYCHIATRY & NEUROLOGY

## 2022-01-01 PROCEDURE — 6370000000 HC RX 637 (ALT 250 FOR IP): Performed by: INTERNAL MEDICINE

## 2022-01-01 PROCEDURE — 6360000002 HC RX W HCPCS: Performed by: STUDENT IN AN ORGANIZED HEALTH CARE EDUCATION/TRAINING PROGRAM

## 2022-01-01 PROCEDURE — 36410 VNPNXR 3YR/> PHY/QHP DX/THER: CPT

## 2022-01-01 PROCEDURE — 6360000002 HC RX W HCPCS: Performed by: INTERNAL MEDICINE

## 2022-01-01 PROCEDURE — 84484 ASSAY OF TROPONIN QUANT: CPT

## 2022-01-01 PROCEDURE — 87040 BLOOD CULTURE FOR BACTERIA: CPT

## 2022-01-01 PROCEDURE — 96374 THER/PROPH/DIAG INJ IV PUSH: CPT

## 2022-01-01 PROCEDURE — 85025 COMPLETE CBC W/AUTO DIFF WBC: CPT

## 2022-01-01 PROCEDURE — 6370000000 HC RX 637 (ALT 250 FOR IP): Performed by: PHYSICIAN ASSISTANT

## 2022-01-01 PROCEDURE — 02HV33Z INSERTION OF INFUSION DEVICE INTO SUPERIOR VENA CAVA, PERCUTANEOUS APPROACH: ICD-10-PCS | Performed by: STUDENT IN AN ORGANIZED HEALTH CARE EDUCATION/TRAINING PROGRAM

## 2022-01-01 PROCEDURE — 80048 BASIC METABOLIC PNL TOTAL CA: CPT

## 2022-01-01 PROCEDURE — 83880 ASSAY OF NATRIURETIC PEPTIDE: CPT

## 2022-01-01 PROCEDURE — 92611 MOTION FLUOROSCOPY/SWALLOW: CPT

## 2022-01-01 PROCEDURE — 80053 COMPREHEN METABOLIC PANEL: CPT

## 2022-01-01 PROCEDURE — 87635 SARS-COV-2 COVID-19 AMP PRB: CPT

## 2022-01-01 PROCEDURE — P9047 ALBUMIN (HUMAN), 25%, 50ML: HCPCS | Performed by: INTERNAL MEDICINE

## 2022-01-01 PROCEDURE — 93306 TTE W/DOPPLER COMPLETE: CPT

## 2022-01-01 PROCEDURE — 82550 ASSAY OF CK (CPK): CPT

## 2022-01-01 PROCEDURE — 87081 CULTURE SCREEN ONLY: CPT

## 2022-01-01 PROCEDURE — 2500000003 HC RX 250 WO HCPCS: Performed by: NURSE PRACTITIONER

## 2022-01-01 PROCEDURE — 2500000003 HC RX 250 WO HCPCS: Performed by: PHYSICIAN ASSISTANT

## 2022-01-01 PROCEDURE — 6360000002 HC RX W HCPCS: Performed by: NURSE PRACTITIONER

## 2022-01-01 PROCEDURE — 99232 SBSQ HOSP IP/OBS MODERATE 35: CPT | Performed by: PSYCHIATRY & NEUROLOGY

## 2022-01-01 PROCEDURE — 93005 ELECTROCARDIOGRAM TRACING: CPT | Performed by: NURSE PRACTITIONER

## 2022-01-01 PROCEDURE — 94664 DEMO&/EVAL PT USE INHALER: CPT

## 2022-01-01 PROCEDURE — 84145 PROCALCITONIN (PCT): CPT

## 2022-01-01 PROCEDURE — 87077 CULTURE AEROBIC IDENTIFY: CPT

## 2022-01-01 PROCEDURE — 86704 HEP B CORE ANTIBODY TOTAL: CPT

## 2022-01-01 PROCEDURE — C1894 INTRO/SHEATH, NON-LASER: HCPCS

## 2022-01-01 PROCEDURE — 93971 EXTREMITY STUDY: CPT

## 2022-01-01 PROCEDURE — 76937 US GUIDE VASCULAR ACCESS: CPT

## 2022-01-01 PROCEDURE — 83605 ASSAY OF LACTIC ACID: CPT

## 2022-01-01 PROCEDURE — 72141 MRI NECK SPINE W/O DYE: CPT

## 2022-01-01 PROCEDURE — 81001 URINALYSIS AUTO W/SCOPE: CPT

## 2022-01-01 PROCEDURE — 96375 TX/PRO/DX INJ NEW DRUG ADDON: CPT

## 2022-01-01 PROCEDURE — 70460 CT HEAD/BRAIN W/DYE: CPT

## 2022-01-01 PROCEDURE — 93010 ELECTROCARDIOGRAM REPORT: CPT | Performed by: INTERNAL MEDICINE

## 2022-01-01 PROCEDURE — 96366 THER/PROPH/DIAG IV INF ADDON: CPT

## 2022-01-01 PROCEDURE — 87086 URINE CULTURE/COLONY COUNT: CPT

## 2022-01-01 PROCEDURE — 2709999900 HC NON-CHARGEABLE SUPPLY

## 2022-01-01 PROCEDURE — 92526 ORAL FUNCTION THERAPY: CPT

## 2022-01-01 PROCEDURE — 71045 X-RAY EXAM CHEST 1 VIEW: CPT

## 2022-01-01 PROCEDURE — 87340 HEPATITIS B SURFACE AG IA: CPT

## 2022-01-01 PROCEDURE — 83690 ASSAY OF LIPASE: CPT

## 2022-01-01 PROCEDURE — 87804 INFLUENZA ASSAY W/OPTIC: CPT

## 2022-01-01 PROCEDURE — 0202U NFCT DS 22 TRGT SARS-COV-2: CPT

## 2022-01-01 RX ORDER — SODIUM CHLORIDE 9 MG/ML
INJECTION, SOLUTION INTRAVENOUS PRN
Status: DISCONTINUED | OUTPATIENT
Start: 2022-01-01 | End: 2023-01-01 | Stop reason: HOSPADM

## 2022-01-01 RX ORDER — PANTOPRAZOLE SODIUM 40 MG/1
40 TABLET, DELAYED RELEASE ORAL
Status: DISCONTINUED | OUTPATIENT
Start: 2022-01-01 | End: 2023-01-01 | Stop reason: HOSPADM

## 2022-01-01 RX ORDER — ONDANSETRON 2 MG/ML
4 INJECTION INTRAMUSCULAR; INTRAVENOUS EVERY 6 HOURS PRN
Status: DISCONTINUED | OUTPATIENT
Start: 2022-01-01 | End: 2023-01-01 | Stop reason: HOSPADM

## 2022-01-01 RX ORDER — HEPARIN SODIUM 1000 [USP'U]/ML
80 INJECTION, SOLUTION INTRAVENOUS; SUBCUTANEOUS ONCE
Status: DISCONTINUED | OUTPATIENT
Start: 2022-01-01 | End: 2022-01-01

## 2022-01-01 RX ORDER — RANOLAZINE 500 MG/1
500 TABLET, EXTENDED RELEASE ORAL 2 TIMES DAILY
Status: DISCONTINUED | OUTPATIENT
Start: 2022-01-01 | End: 2023-01-01 | Stop reason: HOSPADM

## 2022-01-01 RX ORDER — SUCRALFATE 1 G/1
1 TABLET ORAL EVERY 12 HOURS SCHEDULED
Status: DISCONTINUED | OUTPATIENT
Start: 2022-01-01 | End: 2023-01-01 | Stop reason: HOSPADM

## 2022-01-01 RX ORDER — POLYETHYLENE GLYCOL 3350 17 G/17G
17 POWDER, FOR SOLUTION ORAL DAILY PRN
Status: DISCONTINUED | OUTPATIENT
Start: 2022-01-01 | End: 2023-01-01 | Stop reason: HOSPADM

## 2022-01-01 RX ORDER — AMLODIPINE BESYLATE 5 MG/1
5 TABLET ORAL DAILY
Status: DISCONTINUED | OUTPATIENT
Start: 2022-01-01 | End: 2023-01-01 | Stop reason: HOSPADM

## 2022-01-01 RX ORDER — IPRATROPIUM BROMIDE AND ALBUTEROL SULFATE 2.5; .5 MG/3ML; MG/3ML
1 SOLUTION RESPIRATORY (INHALATION) EVERY 6 HOURS PRN
Status: DISCONTINUED | OUTPATIENT
Start: 2022-01-01 | End: 2023-01-01 | Stop reason: HOSPADM

## 2022-01-01 RX ORDER — BETAMETHASONE DIPROPIONATE 0.5 MG/G
CREAM TOPICAL DAILY
Status: DISCONTINUED | OUTPATIENT
Start: 2022-01-01 | End: 2023-01-01 | Stop reason: HOSPADM

## 2022-01-01 RX ORDER — ATORVASTATIN CALCIUM 10 MG/1
20 TABLET, FILM COATED ORAL NIGHTLY
Status: DISCONTINUED | OUTPATIENT
Start: 2022-01-01 | End: 2023-01-01 | Stop reason: HOSPADM

## 2022-01-01 RX ORDER — SODIUM CHLORIDE 0.9 % (FLUSH) 0.9 %
5-40 SYRINGE (ML) INJECTION PRN
Status: DISCONTINUED | OUTPATIENT
Start: 2022-01-01 | End: 2023-01-01 | Stop reason: HOSPADM

## 2022-01-01 RX ORDER — PYRIDOSTIGMINE BROMIDE 60 MG/1
30 TABLET ORAL EVERY 8 HOURS SCHEDULED
Status: DISCONTINUED | OUTPATIENT
Start: 2022-01-01 | End: 2023-01-01

## 2022-01-01 RX ORDER — FENTANYL CITRATE 50 UG/ML
25 INJECTION, SOLUTION INTRAMUSCULAR; INTRAVENOUS ONCE
Status: COMPLETED | OUTPATIENT
Start: 2022-01-01 | End: 2022-01-01

## 2022-01-01 RX ORDER — PREDNISONE 20 MG/1
20 TABLET ORAL DAILY
Status: DISCONTINUED | OUTPATIENT
Start: 2022-01-01 | End: 2023-01-01 | Stop reason: HOSPADM

## 2022-01-01 RX ORDER — ASPIRIN 81 MG/1
81 TABLET, CHEWABLE ORAL DAILY
Status: DISCONTINUED | OUTPATIENT
Start: 2022-01-01 | End: 2023-01-01 | Stop reason: HOSPADM

## 2022-01-01 RX ORDER — MIDODRINE HYDROCHLORIDE 5 MG/1
5 TABLET ORAL
Status: DISCONTINUED | OUTPATIENT
Start: 2022-01-01 | End: 2023-01-01

## 2022-01-01 RX ORDER — ONDANSETRON 2 MG/ML
4 INJECTION INTRAMUSCULAR; INTRAVENOUS ONCE
Status: COMPLETED | OUTPATIENT
Start: 2022-01-01 | End: 2022-01-01

## 2022-01-01 RX ORDER — ATORVASTATIN CALCIUM 40 MG/1
80 TABLET, FILM COATED ORAL NIGHTLY
Status: DISCONTINUED | OUTPATIENT
Start: 2022-01-01 | End: 2022-01-01

## 2022-01-01 RX ORDER — BETAMETHASONE DIPROPIONATE 0.5 MG/G
OINTMENT TOPICAL DAILY
Status: DISCONTINUED | OUTPATIENT
Start: 2022-01-01 | End: 2022-01-01

## 2022-01-01 RX ORDER — SEVELAMER CARBONATE 800 MG/1
800 TABLET, FILM COATED ORAL
Status: DISCONTINUED | OUTPATIENT
Start: 2022-01-01 | End: 2023-01-01 | Stop reason: HOSPADM

## 2022-01-01 RX ORDER — HEPARIN SODIUM 5000 [USP'U]/ML
5000 INJECTION, SOLUTION INTRAVENOUS; SUBCUTANEOUS EVERY 8 HOURS SCHEDULED
Status: DISCONTINUED | OUTPATIENT
Start: 2022-01-01 | End: 2022-01-01

## 2022-01-01 RX ORDER — MIRTAZAPINE 15 MG/1
15 TABLET, ORALLY DISINTEGRATING ORAL NIGHTLY
Status: DISCONTINUED | OUTPATIENT
Start: 2022-01-01 | End: 2023-01-01 | Stop reason: HOSPADM

## 2022-01-01 RX ORDER — ALLOPURINOL 100 MG/1
200 TABLET ORAL DAILY
Status: DISCONTINUED | OUTPATIENT
Start: 2022-01-01 | End: 2023-01-01 | Stop reason: HOSPADM

## 2022-01-01 RX ORDER — CLONIDINE HYDROCHLORIDE 0.1 MG/1
0.1 TABLET ORAL 2 TIMES DAILY
Status: DISCONTINUED | OUTPATIENT
Start: 2022-01-01 | End: 2023-01-01 | Stop reason: HOSPADM

## 2022-01-01 RX ORDER — SODIUM CHLORIDE 0.9 % (FLUSH) 0.9 %
5-40 SYRINGE (ML) INJECTION EVERY 12 HOURS SCHEDULED
Status: DISCONTINUED | OUTPATIENT
Start: 2022-01-01 | End: 2023-01-01 | Stop reason: HOSPADM

## 2022-01-01 RX ORDER — LANOLIN ALCOHOL/MO/W.PET/CERES
3 CREAM (GRAM) TOPICAL NIGHTLY PRN
Status: DISCONTINUED | OUTPATIENT
Start: 2022-01-01 | End: 2023-01-01 | Stop reason: HOSPADM

## 2022-01-01 RX ORDER — LORAZEPAM 0.5 MG/1
0.5 TABLET ORAL 2 TIMES DAILY
Status: DISCONTINUED | OUTPATIENT
Start: 2022-01-01 | End: 2023-01-01 | Stop reason: HOSPADM

## 2022-01-01 RX ORDER — LEVOTHYROXINE SODIUM 0.1 MG/1
100 TABLET ORAL DAILY
Status: DISCONTINUED | OUTPATIENT
Start: 2022-01-01 | End: 2023-01-01 | Stop reason: HOSPADM

## 2022-01-01 RX ORDER — TRIAMCINOLONE ACETONIDE 5 MG/G
CREAM TOPICAL DAILY
Status: DISCONTINUED | OUTPATIENT
Start: 2022-01-01 | End: 2022-01-01

## 2022-01-01 RX ORDER — QUETIAPINE FUMARATE 50 MG/1
50 TABLET, EXTENDED RELEASE ORAL NIGHTLY
Status: DISCONTINUED | OUTPATIENT
Start: 2022-01-01 | End: 2023-01-01 | Stop reason: HOSPADM

## 2022-01-01 RX ORDER — HEPARIN SODIUM 10000 [USP'U]/100ML
5-30 INJECTION, SOLUTION INTRAVENOUS CONTINUOUS
Status: DISCONTINUED | OUTPATIENT
Start: 2022-01-01 | End: 2022-01-01

## 2022-01-01 RX ORDER — HEPARIN SODIUM 1000 [USP'U]/ML
80 INJECTION, SOLUTION INTRAVENOUS; SUBCUTANEOUS PRN
Status: DISCONTINUED | OUTPATIENT
Start: 2022-01-01 | End: 2022-01-01 | Stop reason: ALTCHOICE

## 2022-01-01 RX ORDER — ALBUMIN (HUMAN) 12.5 G/50ML
25 SOLUTION INTRAVENOUS
Status: COMPLETED | OUTPATIENT
Start: 2022-01-01 | End: 2022-01-01

## 2022-01-01 RX ORDER — HEPARIN SODIUM 1000 [USP'U]/ML
40 INJECTION, SOLUTION INTRAVENOUS; SUBCUTANEOUS PRN
Status: DISCONTINUED | OUTPATIENT
Start: 2022-01-01 | End: 2022-01-01 | Stop reason: ALTCHOICE

## 2022-01-01 RX ORDER — MONTELUKAST SODIUM 10 MG/1
10 TABLET ORAL NIGHTLY
Status: DISCONTINUED | OUTPATIENT
Start: 2022-01-01 | End: 2023-01-01 | Stop reason: HOSPADM

## 2022-01-01 RX ORDER — ONDANSETRON 4 MG/1
4 TABLET, ORALLY DISINTEGRATING ORAL EVERY 8 HOURS PRN
Status: DISCONTINUED | OUTPATIENT
Start: 2022-01-01 | End: 2023-01-01 | Stop reason: HOSPADM

## 2022-01-01 RX ORDER — TRAMADOL HYDROCHLORIDE 50 MG/1
50 TABLET ORAL EVERY 6 HOURS PRN
Status: COMPLETED | OUTPATIENT
Start: 2022-01-01 | End: 2023-01-01

## 2022-01-01 RX ADMIN — TIOTROPIUM BROMIDE AND OLODATEROL 2 PUFF: 3.124; 2.736 SPRAY, METERED RESPIRATORY (INHALATION) at 07:54

## 2022-01-01 RX ADMIN — SODIUM CHLORIDE, PRESERVATIVE FREE 10 ML: 5 INJECTION INTRAVENOUS at 11:42

## 2022-01-01 RX ADMIN — SODIUM CHLORIDE, PRESERVATIVE FREE 10 ML: 5 INJECTION INTRAVENOUS at 20:55

## 2022-01-01 RX ADMIN — LORAZEPAM 0.5 MG: 0.5 TABLET ORAL at 21:40

## 2022-01-01 RX ADMIN — RANOLAZINE 500 MG: 500 TABLET, FILM COATED, EXTENDED RELEASE ORAL at 20:51

## 2022-01-01 RX ADMIN — ALBUMIN (HUMAN) 25 G: 0.25 INJECTION, SOLUTION INTRAVENOUS at 09:24

## 2022-01-01 RX ADMIN — CEFTRIAXONE SODIUM 1000 MG: 1 INJECTION, POWDER, FOR SOLUTION INTRAMUSCULAR; INTRAVENOUS at 22:08

## 2022-01-01 RX ADMIN — SUCRALFATE 1 G: 1 TABLET ORAL at 10:27

## 2022-01-01 RX ADMIN — AZITHROMYCIN MONOHYDRATE 500 MG: 500 INJECTION, POWDER, LYOPHILIZED, FOR SOLUTION INTRAVENOUS at 12:22

## 2022-01-01 RX ADMIN — RANOLAZINE 500 MG: 500 TABLET, FILM COATED, EXTENDED RELEASE ORAL at 01:19

## 2022-01-01 RX ADMIN — SEVELAMER CARBONATE 800 MG: 800 TABLET, FILM COATED ORAL at 11:39

## 2022-01-01 RX ADMIN — VALPROATE SODIUM 500 MG: 100 INJECTION, SOLUTION INTRAVENOUS at 04:52

## 2022-01-01 RX ADMIN — SEVELAMER CARBONATE 800 MG: 800 TABLET, FILM COATED ORAL at 08:03

## 2022-01-01 RX ADMIN — ATORVASTATIN CALCIUM 20 MG: 10 TABLET, FILM COATED ORAL at 20:54

## 2022-01-01 RX ADMIN — METOPROLOL TARTRATE 25 MG: 25 TABLET, FILM COATED ORAL at 08:03

## 2022-01-01 RX ADMIN — MIRTAZAPINE 15 MG: 15 TABLET, ORALLY DISINTEGRATING ORAL at 21:40

## 2022-01-01 RX ADMIN — SODIUM CHLORIDE, PRESERVATIVE FREE 10 ML: 5 INJECTION INTRAVENOUS at 11:23

## 2022-01-01 RX ADMIN — FENTANYL CITRATE 25 MCG: 50 INJECTION, SOLUTION INTRAMUSCULAR; INTRAVENOUS at 17:22

## 2022-01-01 RX ADMIN — PYRIDOSTIGMINE BROMIDE 30 MG: 60 TABLET ORAL at 20:51

## 2022-01-01 RX ADMIN — SUCRALFATE 1 G: 1 TABLET ORAL at 20:55

## 2022-01-01 RX ADMIN — QUETIAPINE 50 MG: 50 TABLET, EXTENDED RELEASE ORAL at 21:40

## 2022-01-01 RX ADMIN — AMLODIPINE BESYLATE 5 MG: 5 TABLET ORAL at 10:28

## 2022-01-01 RX ADMIN — PYRIDOSTIGMINE BROMIDE 30 MG: 60 TABLET ORAL at 13:23

## 2022-01-01 RX ADMIN — CLONIDINE HYDROCHLORIDE 0.1 MG: 0.1 TABLET ORAL at 21:03

## 2022-01-01 RX ADMIN — PANTOPRAZOLE SODIUM 40 MG: 40 TABLET, DELAYED RELEASE ORAL at 16:28

## 2022-01-01 RX ADMIN — PANTOPRAZOLE SODIUM 40 MG: 40 TABLET, DELAYED RELEASE ORAL at 06:06

## 2022-01-01 RX ADMIN — METOPROLOL TARTRATE 25 MG: 25 TABLET, FILM COATED ORAL at 10:27

## 2022-01-01 RX ADMIN — MONTELUKAST 10 MG: 10 TABLET, FILM COATED ORAL at 01:19

## 2022-01-01 RX ADMIN — APIXABAN 5 MG: 5 TABLET, FILM COATED ORAL at 08:03

## 2022-01-01 RX ADMIN — HEPARIN SODIUM 5000 UNITS: 5000 INJECTION INTRAVENOUS; SUBCUTANEOUS at 22:00

## 2022-01-01 RX ADMIN — PYRIDOSTIGMINE BROMIDE 30 MG: 60 TABLET ORAL at 18:41

## 2022-01-01 RX ADMIN — PYRIDOSTIGMINE BROMIDE 30 MG: 60 TABLET ORAL at 05:49

## 2022-01-01 RX ADMIN — VANCOMYCIN HYDROCHLORIDE 1250 MG: 1.25 INJECTION, POWDER, LYOPHILIZED, FOR SOLUTION INTRAVENOUS at 18:27

## 2022-01-01 RX ADMIN — METOPROLOL TARTRATE 25 MG: 25 TABLET, FILM COATED ORAL at 20:55

## 2022-01-01 RX ADMIN — RANOLAZINE 500 MG: 500 TABLET, FILM COATED, EXTENDED RELEASE ORAL at 20:54

## 2022-01-01 RX ADMIN — CEFTRIAXONE SODIUM 1000 MG: 1 INJECTION, POWDER, FOR SOLUTION INTRAMUSCULAR; INTRAVENOUS at 17:56

## 2022-01-01 RX ADMIN — SEVELAMER CARBONATE 800 MG: 800 TABLET, FILM COATED ORAL at 12:27

## 2022-01-01 RX ADMIN — ATORVASTATIN CALCIUM 20 MG: 10 TABLET, FILM COATED ORAL at 21:40

## 2022-01-01 RX ADMIN — TIOTROPIUM BROMIDE AND OLODATEROL 2 PUFF: 3.124; 2.736 SPRAY, METERED RESPIRATORY (INHALATION) at 12:13

## 2022-01-01 RX ADMIN — LORAZEPAM 0.5 MG: 0.5 TABLET ORAL at 08:35

## 2022-01-01 RX ADMIN — SUCRALFATE 1 G: 1 TABLET ORAL at 20:51

## 2022-01-01 RX ADMIN — QUETIAPINE 50 MG: 50 TABLET, EXTENDED RELEASE ORAL at 20:50

## 2022-01-01 RX ADMIN — LORAZEPAM 0.5 MG: 0.5 TABLET ORAL at 11:39

## 2022-01-01 RX ADMIN — METOPROLOL TARTRATE 25 MG: 25 TABLET, FILM COATED ORAL at 21:41

## 2022-01-01 RX ADMIN — ATORVASTATIN CALCIUM 20 MG: 10 TABLET, FILM COATED ORAL at 20:51

## 2022-01-01 RX ADMIN — SEVELAMER CARBONATE 800 MG: 800 TABLET, FILM COATED ORAL at 16:28

## 2022-01-01 RX ADMIN — APIXABAN 5 MG: 5 TABLET, FILM COATED ORAL at 20:51

## 2022-01-01 RX ADMIN — SEVELAMER CARBONATE 800 MG: 800 TABLET, FILM COATED ORAL at 16:31

## 2022-01-01 RX ADMIN — QUETIAPINE 50 MG: 50 TABLET, EXTENDED RELEASE ORAL at 01:19

## 2022-01-01 RX ADMIN — RANOLAZINE 500 MG: 500 TABLET, FILM COATED, EXTENDED RELEASE ORAL at 08:03

## 2022-01-01 RX ADMIN — SUCRALFATE 1 G: 1 TABLET ORAL at 08:03

## 2022-01-01 RX ADMIN — RANOLAZINE 500 MG: 500 TABLET, FILM COATED, EXTENDED RELEASE ORAL at 11:39

## 2022-01-01 RX ADMIN — MONTELUKAST 10 MG: 10 TABLET, FILM COATED ORAL at 20:54

## 2022-01-01 RX ADMIN — VALPROATE SODIUM 500 MG: 100 INJECTION, SOLUTION INTRAVENOUS at 18:48

## 2022-01-01 RX ADMIN — PANTOPRAZOLE SODIUM 40 MG: 40 TABLET, DELAYED RELEASE ORAL at 18:11

## 2022-01-01 RX ADMIN — HEPARIN SODIUM 18 UNITS/KG/HR: 10000 INJECTION, SOLUTION INTRAVENOUS at 11:35

## 2022-01-01 RX ADMIN — TRAMADOL HYDROCHLORIDE 50 MG: 50 TABLET, COATED ORAL at 16:30

## 2022-01-01 RX ADMIN — SODIUM CHLORIDE, PRESERVATIVE FREE 10 ML: 5 INJECTION INTRAVENOUS at 08:38

## 2022-01-01 RX ADMIN — TIOTROPIUM BROMIDE AND OLODATEROL 2 PUFF: 3.124; 2.736 SPRAY, METERED RESPIRATORY (INHALATION) at 08:00

## 2022-01-01 RX ADMIN — ALLOPURINOL 200 MG: 100 TABLET ORAL at 08:04

## 2022-01-01 RX ADMIN — CEFTRIAXONE SODIUM 1000 MG: 1 INJECTION, POWDER, FOR SOLUTION INTRAMUSCULAR; INTRAVENOUS at 21:19

## 2022-01-01 RX ADMIN — LORAZEPAM 0.5 MG: 0.5 TABLET ORAL at 08:03

## 2022-01-01 RX ADMIN — METOPROLOL TARTRATE 25 MG: 25 TABLET, FILM COATED ORAL at 16:28

## 2022-01-01 RX ADMIN — SUCRALFATE 1 G: 1 TABLET ORAL at 11:38

## 2022-01-01 RX ADMIN — CEFEPIME HYDROCHLORIDE 2000 MG: 2 INJECTION, POWDER, FOR SOLUTION INTRAVENOUS at 17:47

## 2022-01-01 RX ADMIN — ASPIRIN 81 MG CHEWABLE TABLET 81 MG: 81 TABLET CHEWABLE at 08:03

## 2022-01-01 RX ADMIN — HEPARIN SODIUM 5000 UNITS: 5000 INJECTION INTRAVENOUS; SUBCUTANEOUS at 06:02

## 2022-01-01 RX ADMIN — AMLODIPINE BESYLATE 5 MG: 5 TABLET ORAL at 08:04

## 2022-01-01 RX ADMIN — ATORVASTATIN CALCIUM 80 MG: 40 TABLET, FILM COATED ORAL at 01:19

## 2022-01-01 RX ADMIN — LEVOTHYROXINE SODIUM 100 MCG: 0.1 TABLET ORAL at 05:49

## 2022-01-01 RX ADMIN — EPOETIN ALFA-EPBX 8000 UNITS: 4000 INJECTION, SOLUTION INTRAVENOUS; SUBCUTANEOUS at 09:21

## 2022-01-01 RX ADMIN — MIRTAZAPINE 15 MG: 15 TABLET, ORALLY DISINTEGRATING ORAL at 21:03

## 2022-01-01 RX ADMIN — CLONIDINE HYDROCHLORIDE 0.1 MG: 0.1 TABLET ORAL at 21:40

## 2022-01-01 RX ADMIN — METOPROLOL TARTRATE 25 MG: 25 TABLET, FILM COATED ORAL at 20:51

## 2022-01-01 RX ADMIN — PREDNISONE 20 MG: 20 TABLET ORAL at 11:39

## 2022-01-01 RX ADMIN — AMLODIPINE BESYLATE 5 MG: 5 TABLET ORAL at 11:39

## 2022-01-01 RX ADMIN — AZITHROMYCIN MONOHYDRATE 500 MG: 500 INJECTION, POWDER, LYOPHILIZED, FOR SOLUTION INTRAVENOUS at 12:25

## 2022-01-01 RX ADMIN — PREDNISONE 20 MG: 20 TABLET ORAL at 08:04

## 2022-01-01 RX ADMIN — AZITHROMYCIN MONOHYDRATE 500 MG: 500 INJECTION, POWDER, LYOPHILIZED, FOR SOLUTION INTRAVENOUS at 11:41

## 2022-01-01 RX ADMIN — LORAZEPAM 0.5 MG: 0.5 TABLET ORAL at 20:51

## 2022-01-01 RX ADMIN — SEVELAMER CARBONATE 800 MG: 800 TABLET, FILM COATED ORAL at 10:27

## 2022-01-01 RX ADMIN — APIXABAN 5 MG: 5 TABLET, FILM COATED ORAL at 10:28

## 2022-01-01 RX ADMIN — LEVOTHYROXINE SODIUM 100 MCG: 0.1 TABLET ORAL at 06:06

## 2022-01-01 RX ADMIN — RANOLAZINE 500 MG: 500 TABLET, FILM COATED, EXTENDED RELEASE ORAL at 21:40

## 2022-01-01 RX ADMIN — CLONIDINE HYDROCHLORIDE 0.1 MG: 0.1 TABLET ORAL at 10:13

## 2022-01-01 RX ADMIN — MIRTAZAPINE 15 MG: 15 TABLET, ORALLY DISINTEGRATING ORAL at 20:51

## 2022-01-01 RX ADMIN — APIXABAN 5 MG: 5 TABLET, FILM COATED ORAL at 16:29

## 2022-01-01 RX ADMIN — ONDANSETRON 4 MG: 2 INJECTION INTRAMUSCULAR; INTRAVENOUS at 17:23

## 2022-01-01 RX ADMIN — CLONIDINE HYDROCHLORIDE 0.1 MG: 0.1 TABLET ORAL at 16:31

## 2022-01-01 RX ADMIN — AZITHROMYCIN MONOHYDRATE 500 MG: 500 INJECTION, POWDER, LYOPHILIZED, FOR SOLUTION INTRAVENOUS at 16:42

## 2022-01-01 RX ADMIN — MONTELUKAST 10 MG: 10 TABLET, FILM COATED ORAL at 21:41

## 2022-01-01 RX ADMIN — PANTOPRAZOLE SODIUM 40 MG: 40 TABLET, DELAYED RELEASE ORAL at 05:11

## 2022-01-01 RX ADMIN — TRAMADOL HYDROCHLORIDE 50 MG: 50 TABLET, COATED ORAL at 13:23

## 2022-01-01 RX ADMIN — SUCRALFATE 1 G: 1 TABLET ORAL at 21:41

## 2022-01-01 RX ADMIN — SEVELAMER CARBONATE 800 MG: 800 TABLET, FILM COATED ORAL at 18:11

## 2022-01-01 RX ADMIN — APIXABAN 5 MG: 5 TABLET, FILM COATED ORAL at 20:55

## 2022-01-01 RX ADMIN — LORAZEPAM 0.5 MG: 0.5 TABLET ORAL at 20:54

## 2022-01-01 RX ADMIN — MONTELUKAST 10 MG: 10 TABLET, FILM COATED ORAL at 20:51

## 2022-01-01 RX ADMIN — ALLOPURINOL 200 MG: 100 TABLET ORAL at 11:39

## 2022-01-01 RX ADMIN — RANOLAZINE 500 MG: 500 TABLET, FILM COATED, EXTENDED RELEASE ORAL at 10:27

## 2022-01-01 RX ADMIN — TIOTROPIUM BROMIDE AND OLODATEROL 2 PUFF: 3.124; 2.736 SPRAY, METERED RESPIRATORY (INHALATION) at 10:22

## 2022-01-01 RX ADMIN — BETAMETHASONE DIPROPIONATE: 0.5 CREAM TOPICAL at 10:03

## 2022-01-01 RX ADMIN — IOPAMIDOL 80 ML: 755 INJECTION, SOLUTION INTRAVENOUS at 08:38

## 2022-01-01 RX ADMIN — LEVOTHYROXINE SODIUM 100 MCG: 0.1 TABLET ORAL at 05:11

## 2022-01-01 RX ADMIN — ONDANSETRON 4 MG: 2 INJECTION INTRAMUSCULAR; INTRAVENOUS at 17:22

## 2022-01-01 RX ADMIN — PREDNISONE 20 MG: 20 TABLET ORAL at 10:28

## 2022-01-01 RX ADMIN — PANTOPRAZOLE SODIUM 40 MG: 40 TABLET, DELAYED RELEASE ORAL at 05:49

## 2022-01-01 RX ADMIN — HEPARIN SODIUM 5000 UNITS: 5000 INJECTION INTRAVENOUS; SUBCUTANEOUS at 05:11

## 2022-01-01 RX ADMIN — ASPIRIN 81 MG CHEWABLE TABLET 81 MG: 81 TABLET CHEWABLE at 11:39

## 2022-01-01 RX ADMIN — ASPIRIN 81 MG CHEWABLE TABLET 81 MG: 81 TABLET CHEWABLE at 10:28

## 2022-01-01 RX ADMIN — PANTOPRAZOLE SODIUM 40 MG: 40 TABLET, DELAYED RELEASE ORAL at 16:30

## 2022-01-01 RX ADMIN — SODIUM CHLORIDE, PRESERVATIVE FREE 10 ML: 5 INJECTION INTRAVENOUS at 20:52

## 2022-01-01 RX ADMIN — QUETIAPINE 50 MG: 50 TABLET, EXTENDED RELEASE ORAL at 21:03

## 2022-01-01 RX ADMIN — ALLOPURINOL 200 MG: 100 TABLET ORAL at 10:28

## 2022-01-01 RX ADMIN — APIXABAN 5 MG: 5 TABLET, FILM COATED ORAL at 21:41

## 2022-01-01 ASSESSMENT — PAIN DESCRIPTION - DESCRIPTORS
DESCRIPTORS: ACHING
DESCRIPTORS: ACHING

## 2022-01-01 ASSESSMENT — PAIN DESCRIPTION - LOCATION
LOCATION: NECK
LOCATION: HEAD
LOCATION: NECK

## 2022-01-01 ASSESSMENT — PAIN SCALES - GENERAL
PAINLEVEL_OUTOF10: 2
PAINLEVEL_OUTOF10: 5
PAINLEVEL_OUTOF10: 8
PAINLEVEL_OUTOF10: 8
PAINLEVEL_OUTOF10: 2
PAINLEVEL_OUTOF10: 5
PAINLEVEL_OUTOF10: 0
PAINLEVEL_OUTOF10: 0
PAINLEVEL_OUTOF10: 5

## 2022-01-01 ASSESSMENT — PAIN - FUNCTIONAL ASSESSMENT: PAIN_FUNCTIONAL_ASSESSMENT: 0-10

## 2022-01-01 ASSESSMENT — PAIN DESCRIPTION - PAIN TYPE
TYPE: CHRONIC PAIN
TYPE: CHRONIC PAIN

## 2022-01-01 ASSESSMENT — PAIN DESCRIPTION - ORIENTATION
ORIENTATION: POSTERIOR
ORIENTATION: POSTERIOR

## 2022-01-04 NOTE — FLOWSHEET NOTE
Patients Plan of Care was received and signed. Signed POC was scanned and placed in the patients chart.     Beny Duarte

## 2022-01-12 ENCOUNTER — APPOINTMENT (OUTPATIENT)
Dept: GENERAL RADIOLOGY | Age: 72
End: 2022-01-12
Payer: COMMERCIAL

## 2022-01-12 ENCOUNTER — APPOINTMENT (OUTPATIENT)
Dept: CT IMAGING | Age: 72
End: 2022-01-12
Payer: COMMERCIAL

## 2022-01-12 ENCOUNTER — HOSPITAL ENCOUNTER (EMERGENCY)
Age: 72
Discharge: LEFT BEFORE DISCHARGE | End: 2022-01-12
Attending: EMERGENCY MEDICINE
Payer: COMMERCIAL

## 2022-01-12 VITALS
HEART RATE: 101 BPM | TEMPERATURE: 99.3 F | DIASTOLIC BLOOD PRESSURE: 56 MMHG | OXYGEN SATURATION: 97 % | SYSTOLIC BLOOD PRESSURE: 135 MMHG | RESPIRATION RATE: 15 BRPM

## 2022-01-12 DIAGNOSIS — G45.9 TIA (TRANSIENT ISCHEMIC ATTACK): Primary | ICD-10-CM

## 2022-01-12 LAB
ALBUMIN SERPL-MCNC: 3.9 GM/DL (ref 3.4–5)
ALP BLD-CCNC: 172 IU/L (ref 40–129)
ALT SERPL-CCNC: 14 U/L (ref 10–40)
ANION GAP SERPL CALCULATED.3IONS-SCNC: 12 MMOL/L (ref 4–16)
AST SERPL-CCNC: 25 IU/L (ref 15–37)
BASOPHILS ABSOLUTE: 0.1 K/CU MM
BASOPHILS RELATIVE PERCENT: 1 % (ref 0–1)
BILIRUB SERPL-MCNC: 0.2 MG/DL (ref 0–1)
BUN BLDV-MCNC: 48 MG/DL (ref 6–23)
CALCIUM SERPL-MCNC: 8.9 MG/DL (ref 8.3–10.6)
CHLORIDE BLD-SCNC: 98 MMOL/L (ref 99–110)
CO2: 24 MMOL/L (ref 21–32)
CREAT SERPL-MCNC: 6.2 MG/DL (ref 0.6–1.1)
DIFFERENTIAL TYPE: ABNORMAL
EOSINOPHILS ABSOLUTE: 0.1 K/CU MM
EOSINOPHILS RELATIVE PERCENT: 2 % (ref 0–3)
GFR AFRICAN AMERICAN: 8 ML/MIN/1.73M2
GFR NON-AFRICAN AMERICAN: 7 ML/MIN/1.73M2
GLUCOSE BLD-MCNC: 107 MG/DL (ref 70–99)
HCT VFR BLD CALC: 37.8 % (ref 37–47)
HEMOGLOBIN: 11.9 GM/DL (ref 12.5–16)
IMMATURE NEUTROPHIL %: 0.3 % (ref 0–0.43)
LYMPHOCYTES ABSOLUTE: 1.4 K/CU MM
LYMPHOCYTES RELATIVE PERCENT: 19.9 % (ref 24–44)
MCH RBC QN AUTO: 31.2 PG (ref 27–31)
MCHC RBC AUTO-ENTMCNC: 31.5 % (ref 32–36)
MCV RBC AUTO: 99 FL (ref 78–100)
MONOCYTES ABSOLUTE: 0.5 K/CU MM
MONOCYTES RELATIVE PERCENT: 7.4 % (ref 0–4)
NUCLEATED RBC %: 0 %
PDW BLD-RTO: 13.1 % (ref 11.7–14.9)
PLATELET # BLD: 361 K/CU MM (ref 140–440)
PMV BLD AUTO: 10.4 FL (ref 7.5–11.1)
POTASSIUM SERPL-SCNC: 4.4 MMOL/L (ref 3.5–5.1)
RBC # BLD: 3.82 M/CU MM (ref 4.2–5.4)
SEGMENTED NEUTROPHILS ABSOLUTE COUNT: 4.8 K/CU MM
SEGMENTED NEUTROPHILS RELATIVE PERCENT: 69.4 % (ref 36–66)
SODIUM BLD-SCNC: 134 MMOL/L (ref 135–145)
TOTAL IMMATURE NEUTOROPHIL: 0.02 K/CU MM
TOTAL NUCLEATED RBC: 0 K/CU MM
TOTAL PROTEIN: 7 GM/DL (ref 6.4–8.2)
TROPONIN T: 0.05 NG/ML
WBC # BLD: 6.9 K/CU MM (ref 4–10.5)

## 2022-01-12 PROCEDURE — 93005 ELECTROCARDIOGRAM TRACING: CPT | Performed by: PHYSICIAN ASSISTANT

## 2022-01-12 PROCEDURE — 85025 COMPLETE CBC W/AUTO DIFF WBC: CPT

## 2022-01-12 PROCEDURE — 80053 COMPREHEN METABOLIC PANEL: CPT

## 2022-01-12 PROCEDURE — 70450 CT HEAD/BRAIN W/O DYE: CPT

## 2022-01-12 PROCEDURE — 99282 EMERGENCY DEPT VISIT SF MDM: CPT

## 2022-01-12 PROCEDURE — 71045 X-RAY EXAM CHEST 1 VIEW: CPT

## 2022-01-12 PROCEDURE — 84484 ASSAY OF TROPONIN QUANT: CPT

## 2022-01-12 NOTE — ED PROVIDER NOTES
As provider-in-triage, I performed a medical screening history and physical exam on this patient. HISTORY OF PRESENT ILLNESS  Jessa Hendricks is 73 y.o. female insulin-dependent diabetic with history of previous CVA presents with what she describes as speech issues that she 1st noticed 2 nights ago. In discussion, she states that she had trouble finding her words and felt that she was also slurring her speech. She mentions they had resolved on their own. Symptoms had returned this morning and were worse so she called her insurance company and doctor who advised her to come to emergency department. In discussion with her, she states that she does not have the symptoms at this time. She denies any injury or trauma, chest pain, shortness of breath, numbness tingling weakness     PHYSICAL EXAM  BP (!) 135/56   Pulse 101   Temp 99.3 °F (37.4 °C)   Resp 15   SpO2 97%     On exam, the patient appears well-hydrated, well-nourished, and in no acute distress. Mucous membranes are moist. Speech is clear. No noted extremity weakness, or facial asymmetry. Breathing is unlabored. Skin is dry. Mental status is normal. Moves all extremities   Comment: Please note this report has been produced using speech recognition software and may contain errors related to that system including errors in grammar, punctuation, and spelling, as well as words and phrases that may be inappropriate. If there are any questions or concerns please feel free to contact the dictating provider for clarification.         Daniel Call PA-C  01/12/22 7420

## 2022-01-12 NOTE — ED NOTES
Pt would like to leave AMA at this time. Pt states that staff is being rude and have attitudes towards her. Pt agreeing to sign AMA paperwork for this nurse. Pt changed into regular clothes. Pt ambulated to waiting room to wait on ride.       Jacqui Mojica RN  01/12/22 8783

## 2022-01-12 NOTE — ED PROVIDER NOTES
Emergency Department Encounter    Patient: May Ivory  MRN: [id number]  : 1950  Date of Evaluation: 2022  ED Provider:  Ezequiel Swanson MD    Triage Chief Complaint:   Aphasia (off and on since Monday 1/10/22; states that she just doesn't feel well)    [place]:  May Ivory is a 73 y.o. female that presents with complaint of difficulty with speech, off and on since Monday. Felt confused today and couldn't remember her phone number. No nausea or vomiting or diarrhea. No CP or SOB. No fevers. No cough. Has h/o stroke and TIA. Has seen Dr. Bala Pablo in the past.  States it has completely resolved again. She called a nursing line today and 2 triage nurses both told her to come in and so she came in to be evaluated. ROS - see HPI, below listed is current ROS at time of my eval:  10 systems reviewed and negative except as above.      Past Medical History:   Diagnosis Date    Acid reflux     Anemia     Arthritis     \"Shoulders, Hips And Knees\"    CAD (coronary artery disease)     Sees Dr. Matt Gabriel Cerebral artery occlusion with cerebral infarction ([address]     CHF (congestive heart failure) (HCC)     Chronic back pain     Chronic constipation     Chronic kidney disease     Sees Dr. Diana Morales COPD (chronic obstructive pulmonary disease) ([address]     Sees Dr. Raulito Maloney Depression     Diabetes mellitus [facility]) Dx     Sees Dr. Deep Snyder    Emphysema lung [facility])     Glaucoma     \"Both Eyes\"    Gout     Hemodialysis patient ([address]     Hiatal hernia     History of blood transfusion     No Reaction To Blood Transfusion Received    [place] (hard of hearing)     Bilateral Ears    Hyperlipidemia     Hypertension     Dr. Celestino Stephenson    Itching     \"Whole Body Itches The [de-identified] Of The Time\"    Morbid obesity ([address]     Rheumatoid arthritis ([address]     Shortness of breath on exertion     Sleep apnea     Uses CPAP    Teeth missing     Upper And Lower    Thyroid disease Thyroidectomy In     Urinary incontinence     WD-Chronic buttock pain 2018    Wears glasses      Past Surgical History:   Procedure Laterality Date    APPENDECTOMY  1968    AV FISTULA CREATION Left     BACK SURGERY  2017    Lower Back With Hardware    BACK SURGERY  2016    Cervical Back With Hardware    CARPAL TUNNEL RELEASE Right 1993     SECTION  3-30-68 And 10-17-69    COLONOSCOPY  2017    Polyps Removed    CORONARY ANGIOPLASTY  2016    Heart Stent D Circ    DENTAL SURGERY      Teeth Extracted In Past    DILATION AND CURETTAGE OF UTERUS  Last Done In [address] [place] [address] 281    \"Numerous\"    ENDOSCOPY, COLON, DIAGNOSTIC  2017    HYSTERECTOMY      Partial Abdominal Hysterectomy    IR TUNNELED CATHETER PLACEMENT GREATER THAN 5 YEARS  2019    IR TUNNELED CATHETER PLACEMENT GREATER THAN 5 YEARS 2019 [facility]    IR TUNNELED CATHETER PLACEMENT GREATER THAN 5 YEARS  3/3/2021    IR TUNNELED CATHETER PLACEMENT GREATER THAN 5 YEARS [facility]    ROTATOR CUFF REPAIR Left 2010    SLEEVE GASTRECTOMY  2018    robotic assisted gastric sleeve, hiatal hernia repair    THYROIDECTOMY       Family History   Problem Relation Age of Onset    Kidney Disease Mother         \"[facility]"    Heart Disease Mother         [facility] Arthritis Mother     Diabetes Mother     Depression Mother     High Blood Pressure Mother     Obesity Mother     Vision Loss Mother     Heart Attack Father     Heart Disease Father         Heart Attack    Diabetes Father     High Blood Pressure Father     High Blood Pressure Daughter      Social History     Socioeconomic History    Marital status: Single     Spouse name: Not on file    Number of children: 2    Years of education: Not on file    Highest education level: Not on file   Occupational History    Not on file   Tobacco Use    Smoking status: Never Smoker    Smokeless tobacco: Never Used   Vaping Use    Vaping Use: Never used   Substance and Sexual Activity    Alcohol use: No    Drug use: No    Sexual activity: Never   Other Topics Concern    Not on file   Social History Narrative    Not on file     Social Determinants of Health     Financial Resource Strain:     Difficulty of Paying Living Expenses: Not on file   Food Insecurity:     Worried About Running Out of Food in the Last Year: Not on file    Dank of Food in the Last Year: Not on file   Transportation Needs:     Lack of Transportation (Medical): Not on file    Lack of Transportation (Non-Medical): Not on file   Physical Activity:     Days of Exercise per Week: Not on file    Minutes of Exercise per Session: Not on file   Stress:     Feeling of Stress : Not on file   Social Connections:     Frequency of Communication with Friends and Family: Not on file    Frequency of Social Gatherings with Friends and Family: Not on file    Attends Church Services: Not on file    Active Member of [address] [place] [address] or Organizations: Not on file    Attends Club or Organization Meetings: Not on file    Marital Status: Not on file   Intimate Partner Violence:     Fear of Current or Ex-Partner: Not on file    Emotionally Abused: Not on file    Physically Abused: Not on file    Sexually Abused: Not on file   Housing Stability:     Unable to Pay for Housing in the Last Year: Not on file    Number of [place] in the Last Year: Not on file    Unstable Housing in the Last Year: Not on file     No current facility-administered medications for this encounter.      Current Outpatient Medications   Medication Sig Dispense Refill    guaiFENesin (MUCUS RELIEF) 600 MG extended release tablet Take 1 tablet by mouth 2 times daily 60 tablet 3    Adalimumab (HUMIRA PEN) 40 MG/0.4ML PNKT Humira(CF) Pen 40 mg/0.4 mL subcutaneous kit      benzonatate (TESSALON) 100 MG capsule Take 100 mg by mouth 3 times daily as needed for Cough      acetaminophen (TYLENOL) 500 MG tablet Take 500 mg by mouth every 6 hours as needed for Pain      fluocinolone (DERMA-SMOOTHE) 0.01 % external oil Apply topically 3 times daily Apply topically.  hydrOXYzine (ATARAX) 25 MG tablet Take 25 mg by mouth 3 times daily as needed for Itching      lidocaine viscous hcl (XYLOCAINE) 2 % SOLN solution Take 15 mLs by mouth as needed for Irritation      mupirocin (BACTROBAN) 2 % ointment Apply topically 3 times daily Apply topically 3 times daily.  predniSONE (DELTASONE) 20 MG tablet Take 20 mg by mouth daily      sulfacetamide (BLEPH-10) 10 % ophthalmic ointment every 6 hours Every 6 hours.  midodrine (PROAMATINE) 10 MG tablet Take 1 tablet by mouth daily Take one to two tablet three times a week prior to each dialysis treatment 36 tablet 3    sevelamer (RENVELA) 800 MG tablet Take 1 tablet by mouth 4 times daily 120 tablet 5    aspirin (ASPIRIN CHILDRENS) 81 MG chewable tablet Take 1 tablet by mouth daily 60 tablet 0    montelukast (SINGULAIR) 10 MG tablet Take 1 tablet by mouth nightly. 30 tablet 1    Tiotropium Bromide-Olodaterol (STIOLTO RESPIMAT) 2.5-2.5 MCG/ACT AERS Inhale 2 puffs into the lungs daily 1 Inhaler 3    ipratropium-albuterol (DUONEB) 0.5-2.5 (3) MG/3ML SOLN nebulizer solution Inhale 3 mLs into the lungs every 4 hours 360 mL 2    tiotropium (SPIRIVA RESPIMAT) 2.5 MCG/ACT AERS inhaler Inhale 2 puffs into the lungs      Multiple Vitamin (DAILY-NICOLE) TABS TAKE ONE TABLET BY MOUTH DAILY 30 tablet 0    ketoconazole (NIZORAL) 2 % shampoo Apply topically daily as needed.  1 Bottle 0    allopurinol (ZYLOPRIM) 100 MG tablet Take 2 tablets by mouth daily 30 tablet 3    promethazine (PHENERGAN) 25 MG tablet Take 25 mg by mouth every 6 hours as needed for Nausea      REFRESH OPTIVE 0.5-0.9 % SOLN Place 2 drops into both eyes 2 times daily      levothyroxine (SYNTHROID) 100 MCG tablet Take 100 mcg by mouth daily      ketoconazole (NIZORAL) 2 % cream Apply topically daily Apply topically daily.  triamcinolone (ARISTOCORT) 0.5 % cream Apply topically 3 times daily Apply topically 3 times daily.  pantoprazole (PROTONIX) 40 MG tablet Take 1 tablet by mouth 2 times daily 60 tablet 3    LORazepam (ATIVAN) 0.5 MG tablet Take 0.5 mg by mouth 2 times daily.  atorvastatin (LIPITOR) 80 MG tablet Take 80 mg by mouth nightly      ranolazine (RANEXA) 500 MG extended release tablet Take 500 mg by mouth 2 times daily      Nebulizer MISC Inhale into the lungs as needed      QUEtiapine (SEROQUEL XR) 300 MG extended release tablet Take 50 mg by mouth nightly       levomilnacipran (FETZIMA) 40 MG CP24 extended release capsule Take 1 capsule by mouth daily (Patient taking differently: Take 40 mg by mouth nightly ) 30 capsule 0    nitroGLYCERIN (NITROSTAT) 0.4 MG SL tablet Place 0.4 mg under the tongue every 5 minutes as needed for Chest pain      fluticasone (FLONASE) 50 MCG/ACT nasal spray 2 sprays by Nasal route every morning        Allergies   Allergen Reactions    Percocet [Oxycodone-Acetaminophen]      hallucinations    Tramadol Itching    Vicodin [Hydrocodone-Acetaminophen] Itching    Narcan [Naloxone Hcl] Anxiety     Feels like out of body, things are speeding       Nursing Notes Reviewed    Physical Exam:  Triage VS:    ED Triage Vitals   Enc Vitals Group      BP 01/12/22 1245 (!) 135/56      Pulse 01/12/22 1245 101      Resp 01/12/22 1247 15      Temp 01/12/22 1245 99.3 °F (37.4 °C)      Temp src --       SpO2 01/12/22 1245 97 %      Weight --       Height --       Head Circumference --       Peak Flow --       Pain Score --       Pain Loc --       Pain Edu? --       Excl. in [address]? --         My pulse ox interpretation is - normal    General appearance:  No acute distress. Skin:  Warm. Dry. No rash. Eye:  Extraocular movements intact. Ears, nose, mouth and throat:  Oral mucosa moist   Neck:  Trachea midline. Extremity:  No swelling.   Normal ROM     Heart: Regular rate and rhythm, normal S1 & S2, no extra heart sounds. Perfusion:  intact   Respiratory:  Lungs clear to auscultation bilaterally. Respirations nonlabored. Abdominal:  Normal bowel sounds. Soft. Nontender. Non distended. Neurological:      Mental Status Exam:   Alert and oriented times three, follows commands, speech and language intact    Cranial [id number] Intact.     Cranial nerve II  Visual acuity: normal  Cranial nerve III  Pupils: equal, round, reactive to light  Cranial nerves III, IV, VI  Extraocular Movements: intact  Cranial nerve V  Facial sensation: intact  Cranial nerve VII  Facial strength: intact  Cranial nerve VIII  Hearing: intact  Cranial nerve IX  Palate: intact  Cranial nerve XI  Shoulder shrug: intact  Cranial nerve XII  Tongue movement: normal    Motor:   Drift: absent  Motor exam is symmetrical 5 out of 5 all extremities bilaterally  Tone: normal  Abnormal Movements: Absent    DTRs- intact bilaterally and symmetrical.  Toes-downgoing bilaterally  Sensory:  Light Touch  Right Upper Extremity: normal  Left Upper Extremity: normal  Right Lower Extremity: normal  Left Lower Extremity: normal      I have reviewed and interpreted all of the currently available lab results from this visit (if applicable):  Results for orders placed or performed during the hospital encounter of 01/12/22   CBC auto diff   Result Value Ref Range    WBC 6.9 4.0 - 10.5 K/CU MM    RBC 3.82 (L) 4.2 - 5.4 M/CU MM    Hemoglobin 11.9 (L) 12.5 - 16.0 GM/DL    Hematocrit 37.8 37 - 47 %    MCV 99.0 78 - 100 FL    MCH 31.2 (H) 27 - 31 PG    MCHC 31.5 (L) 32.0 - 36.0 %    RDW 13.1 11.7 - 14.9 %    Platelets 764 408 - 287 K/CU MM    MPV 10.4 7.5 - 11.1 FL    Differential Type AUTOMATED DIFFERENTIAL     Segs Relative 69.4 (H) 36 - 66 %    Lymphocytes % 19.9 (L) 24 - 44 %    Monocytes % 7.4 (H) 0 - 4 %    Eosinophils % 2.0 0 - 3 %    Basophils % 1.0 0 - 1 %    Segs Absolute 4.8 K/CU MM    Lymphocytes Absolute 1.4 K/CU MM    Monocytes Absolute 0.5 K/CU MM    Eosinophils Absolute 0.1 K/CU MM    Basophils Absolute 0.1 K/CU MM    Nucleated RBC % 0.0 %    Total Nucleated RBC 0.0 K/CU MM    Total Immature Neutrophil 0.02 K/CU MM    Immature Neutrophil % 0.3 0 - 0.43 %   Comprehensive Metabolic Panel w/ Reflex to MG   Result Value Ref Range    Sodium 134 (L) 135 - 145 MMOL/L    Potassium 4.4 3.5 - 5.1 MMOL/L    Chloride 98 (L) 99 - 110 mMol/L    CO2 24 21 - 32 MMOL/L    BUN 48 (H) 6 - 23 MG/DL    CREATININE 6.2 (H) 0.6 - 1.1 MG/DL    Glucose 107 (H) 70 - 99 MG/DL    Calcium 8.9 8.3 - 10.6 MG/DL    Albumin 3.9 3.4 - 5.0 GM/DL    Total Protein 7.0 6.4 - 8.2 GM/DL    Total Bilirubin 0.2 0.0 - 1.0 MG/DL    ALT 14 10 - 40 U/L    AST 25 15 - 37 IU/L    Alkaline Phosphatase 172 (H) 40 - 129 IU/L    GFR Non- 7 (L) >60 mL/min/1.73m2    GFR  8 (L) >60 mL/min/1.73m2    Anion Gap 12 4 - 16   Troponin   Result Value Ref Range    Troponin T 0.046 (H) <0.01 NG/ML   EKG 12 Lead   Result Value Ref Range    Ventricular Rate 95 BPM    Atrial Rate 95 BPM    P-R Interval 198 ms    QRS Duration 98 ms    Q-T Interval 402 ms    QTc Calculation (Bazett) 505 ms    P Axis 48 degrees    R Axis 12 degrees    T Axis 56 degrees    Diagnosis       Normal sinus rhythm  Low voltage QRS  Septal infarct (cited on or before 21-OCT-2018)  Abnormal ECG  When compared with ECG of 15-FEB-2021 19:27,  No significant change was found        Radiographs (if obtained):  Radiologist's Report Reviewed:  CT HEAD WO CONTRAST    Result Date: 1/12/2022  EXAMINATION: CT OF THE HEAD WITHOUT CONTRAST  1/12/2022 1:18 pm TECHNIQUE: CT of the head was performed without the administration of intravenous contrast. Dose modulation, iterative reconstruction, and/or weight based adjustment of the mA/kV was utilized to reduce the radiation dose to as low as reasonably achievable. COMPARISON: 02/15/2021.  HISTORY: ORDERING SYSTEM PROVIDED HISTORY: resolved dysarthia 3 days ago TECHNOLOGIST PROVIDED HISTORY: Reason for exam:->resolved dysarthia 3 days ago Has a \"code stroke\" or \"stroke alert\" been called? ->No Decision Support Exception - unselect if not a suspected or confirmed emergency medical condition->Emergency Medical Condition (MA) Reason for Exam: resolved dysarthia 3 days ago Additional signs and symptoms: HX OF 2 STROKES FINDINGS: BRAIN/VENTRICLES: There is no acute intracranial hemorrhage, mass effect or midline shift. No abnormal extra-axial fluid collection. The gray-white differentiation is maintained without evidence of an acute infarct. There is no evidence of hydrocephalus. The cerebral sulci and ventricles are enlarged compatible with diffuse cerebral atrophy. There is low-attenuation in the periventricular white matter and deep white matter of the brain representing changes of chronic small vessel ischemic disease. ORBITS: The visualized portion of the orbits demonstrate no acute abnormality. SINUSES: The visualized paranasal sinuses and mastoid air cells demonstrate no acute abnormality. SOFT TISSUES/SKULL:  No acute abnormality of the visualized skull or soft tissues. 1.No acute intracranial abnormality. XR CHEST PORTABLE    Result Date: 1/12/2022  EXAMINATION: ONE XRAY VIEW OF THE CHEST 1/12/2022 1:21 pm COMPARISON: 02/15/2021. HISTORY: ORDERING SYSTEM PROVIDED HISTORY: tachy TECHNOLOGIST PROVIDED HISTORY: Reason for exam:->tachy Reason for Exam: tachy FINDINGS: The heart size is within normal limits. The pulmonary vasculature is also within normal limits. No acute infiltrates are seen. The costophrenic angles are sharp bilaterally. No pneumothoraces are noted. 1. No active pulmonary disease. EKG (if obtained): (All EKG's are interpreted by myself in the absence of a cardiologist)  Normal sinus rhythm 395 beats minute, normal intervals. No ST elevation.   No previous compare    MDM:  61-year-old female with history as above presents with complaint of difficulty with speech as well as some confusion, off and on over the last 3 days. She is in no distress with a normal stroke scale, and NIH of 0. No current symptoms. She was mildly tachycardic on arrival, labs, chest x-ray and CT head were ordered. So far labs and imaging are unremarkable. We are awaiting urinalysis. I discussed with her plan for admission given concern for possible TIA or small stroke and she was agreeable. I was told by nursing that patient wanted to leave, at 1515, I went back and spoke with her, she states that she is not upset with me, but that one of the nurses \"gave me the third-degree\", states that they got upset when she asked for a blanket and she does not want to stay here. States she was worried about staying in the hospital anyway. I spoke with her about wanting her to stay to make sure she did not have a stroke, given her history. We discussed that it is possible that she has had a small stroke or could have a larger 1 if she were to leave and she understood this but still does not wish to stay. She does have a neurologist, Dr. Tom Jaquez. She is comfortable following up with him, I discussed with her and she states she will come back if her symptoms recur at all. We did discuss that I cannot completely rule out a stroke at this time, but she does not wish to stay in the hospital.  She is discharged at her request    Clinical Impression:  1. TIA (transient ischemic attack)      Disposition referral (if applicable): Eduardo Ford  [address]  [id number]  [address] [place] [address] [phone]  [phone]    Schedule an appointment as soon as possible for a visit       Garrett Garay MD  [address] [place] [address] [phone]          Disposition medications (if applicable):  New Prescriptions    No medications on file     ED Provider Disposition Time  DISPOSITION Decision To Discharge 01/12/2022 03:24:32 PM      Comment: Please note this report has been produced using speech recognition software and may contain errors related to that system including errors in grammar, punctuation, and spelling, as well as words and phrases that may be inappropriate. Efforts were made to edit the dictations.        Armin Garcia MD  01/12/22 3041

## 2022-01-14 LAB
EKG ATRIAL RATE: 95 BPM
EKG DIAGNOSIS: NORMAL
EKG P AXIS: 48 DEGREES
EKG P-R INTERVAL: 198 MS
EKG Q-T INTERVAL: 402 MS
EKG QRS DURATION: 98 MS
EKG QTC CALCULATION (BAZETT): 505 MS
EKG R AXIS: 12 DEGREES
EKG T AXIS: 56 DEGREES
EKG VENTRICULAR RATE: 95 BPM

## 2022-01-14 PROCEDURE — 93010 ELECTROCARDIOGRAM REPORT: CPT | Performed by: INTERNAL MEDICINE

## 2022-04-25 ENCOUNTER — HOSPITAL ENCOUNTER (OUTPATIENT)
Age: 72
Setting detail: SPECIMEN
Discharge: HOME | End: 2022-04-25

## 2022-04-25 LAB — POTASSIUM SERPL-SCNC: 3.8 MMOL/L (ref 3.5–5.1)

## 2022-04-25 PROCEDURE — 84132 ASSAY OF SERUM POTASSIUM: CPT

## 2022-05-10 ENCOUNTER — HOSPITAL ENCOUNTER (OUTPATIENT)
Dept: PHYSICAL THERAPY | Age: 72
Setting detail: THERAPIES SERIES
Discharge: HOME | End: 2022-05-10
Payer: COMMERCIAL

## 2022-05-10 VITALS — DIASTOLIC BLOOD PRESSURE: 75 MMHG | SYSTOLIC BLOOD PRESSURE: 125 MMHG | HEART RATE: 86 BPM

## 2022-05-10 PROCEDURE — 97161 PT EVAL LOW COMPLEX 20 MIN: CPT

## 2022-05-10 PROCEDURE — 97110 THERAPEUTIC EXERCISES: CPT

## 2022-05-10 ASSESSMENT — PAIN DESCRIPTION - LOCATION: LOCATION: HEAD

## 2022-05-10 ASSESSMENT — PAIN SCALES - GENERAL: PAINLEVEL_OUTOF10: 3

## 2022-05-10 ASSESSMENT — PAIN DESCRIPTION - PAIN TYPE: TYPE: CHRONIC PAIN

## 2022-05-10 ASSESSMENT — PAIN DESCRIPTION - FREQUENCY: FREQUENCY: INTERMITTENT

## 2022-05-10 ASSESSMENT — PAIN - FUNCTIONAL ASSESSMENT: PAIN_FUNCTIONAL_ASSESSMENT: PREVENTS OR INTERFERES SOME ACTIVE ACTIVITIES AND ADLS

## 2022-05-10 NOTE — PROGRESS NOTES
Physical Therapy: Initial Evaluation    Patient: Bebo Almonte (60 y.o. female)   Examination Date:   Plan of Care Certification [id number]: to        :  1950 ;    Confirmed: Yes MRN: [id number]  CSN: [id number]   Insurance: Payor: Becky Escalante / Plan: Lenor Sean / Product Type: *No Product type* /   Insurance ID: [id number] - (Medicare Managed) Secondary Insurance (if applicable):    Referring Physician: Mary Bustamante Dr.   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 Bryan Rhodes    Cerebral artery occlusion with [facility]     CHF (congestive heart failure) ([facility]     Chronic back pain     Chronic constipation     Chronic kidney disease     Sees Dr. Bia Pozo COPD (chronic obstructive pulmonary disease) ([facility]     Sees Dr. Zeus Duval Depression     Diabetes mellitus [facility]) Dx     Sees Dr. Santamaria Both    Emphysema lung [facility])     Glaucoma     \"Both Eyes\"    Gout     Hemodialysis patient ([facility]     Hiatal hernia     History of blood transfusion     No Reaction To Blood Transfusion Received    [place] (hard of hearing)     Bilateral Ears    Hyperlipidemia     Hypertension     Dr. Martínez Zimmerman    Itching     \"Whole Body Itches The Majority Of The Time\"    Morbid obesity ([address]     Rheumatoid arthritis (HCC)     Shortness of breath on exertion     Sleep apnea     Uses CPAP    Teeth missing     Upper And Lower    Thyroid disease     Thyroidectomy In     Urinary incontinence     WD-Chronic buttock pain 2018    Wears glasses      Surgical History:   Past Surgical History:   Procedure Laterality Date    APPENDECTOMY  1968    AV FISTULA CREATION Left     BACK SURGERY  2017    Lower Back With Hardware    BACK SURGERY  2016    Cervical Back With Hardware    CARPAL TUNNEL RELEASE Right 1993     SECTION  3-30-68 And 10-17-69    COLONOSCOPY  2017    Polyps Removed    CORONARY ANGIOPLASTY  2016    Heart Stent D Circ    DENTAL SURGERY      Teeth Extracted In Past    DILATION AND CURETTAGE OF UTERUS  Last Done In [address] [place] [address]    \"Numerous\"    ENDOSCOPY, COLON, DIAGNOSTIC  2017    HYSTERECTOMY      Partial Abdominal Hysterectomy    IR TUNNELED CATHETER PLACEMENT GREATER THAN 5 YEARS  2019    IR TUNNELED CATHETER PLACEMENT GREATER THAN 5 YEARS [facility]    IR TUNNELED CATHETER PLACEMENT GREATER THAN 5 YEARS  3/3/2021    IR TUNNELED CATHETER PLACEMENT GREATER THAN 5 YEARS [facility]ED[facility]    ROTATOR CUFF REPAIR Left 2010    SLEEVE GASTRECTOMY  2018    robotic assisted gastric sleeve, hiatal hernia repair    THYROIDECTOMY         Medications:   Current Outpatient Medications:     guaiFENesin (MUCUS RELIEF) 600 MG extended release tablet, Take 1 tablet by mouth 2 times daily, Disp: 60 tablet, Rfl: 0    amLODIPine (NORVASC) 5 MG tablet, Take 1 tablet by mouth daily, Disp: 90 tablet, Rfl: 3    promethazine (PHENERGAN) 25 MG tablet, Take 1 tablet by mouth daily for 10 days. , Disp: 10 tablet, Rfl: 0    diclofenac sodium (VOLTAREN) 1 % GEL, Apply topically 2 times daily Apply to low back and flank area twice a day, Disp: 50 g, Rfl: 3    midodrine (PROAMATINE) 10 MG tablet, TAKE 1 TABLET BY MOUTH DAILY TAKE ONE TO TWO TABLET THREE TIMES A WEEK PRIOR TO EACH DIALYSIS TREATMENT, Disp: 36 tablet, Rfl: 3    Adalimumab (HUMIRA PEN) 40 MG/0.4ML PNKT, Humira(CF) Pen 40 mg/0.4 mL subcutaneous kit, Disp: , Rfl:     benzonatate (TESSALON) 100 MG capsule, Take 100 mg by mouth 3 times daily as needed for Cough, Disp: , Rfl:     acetaminophen (TYLENOL) 500 MG tablet, Take 500 mg by mouth every 6 hours as needed for Pain, Disp: , Rfl:     fluocinolone (DERMA-SMOOTHE) 0.01 % external oil, Apply topically 3 times daily Apply topically. , Disp: , Rfl:     hydrOXYzine (ATARAX) 25 MG tablet, Take 25 mg by mouth 3 times daily as needed for Itching, Disp: , Rfl:     lidocaine viscous hcl (XYLOCAINE) 2 % SOLN solution, Take 15 mLs by mouth as needed for Irritation, Disp: , Rfl:     mupirocin (BACTROBAN) 2 % ointment, Apply topically 3 times daily Apply topically 3 times daily. , Disp: , Rfl:     predniSONE (DELTASONE) 20 MG tablet, Take 20 mg by mouth daily, Disp: , Rfl:     sulfacetamide (BLEPH-10) 10 % ophthalmic ointment, every 6 hours Every 6 hours. , Disp: , Rfl:     sevelamer (RENVELA) 800 MG tablet, Take 1 tablet by mouth 4 times daily, Disp: 120 tablet, Rfl: 5    aspirin (ASPIRIN CHILDRENS) 81 MG chewable tablet, Take 1 tablet by mouth daily, Disp: 60 tablet, Rfl: 0    montelukast (SINGULAIR) 10 MG tablet, Take 1 tablet by mouth nightly., Disp: 30 tablet, Rfl: 1    Tiotropium Bromide-Olodaterol (STIOLTO RESPIMAT) 2.5-2.5 MCG/ACT AERS, Inhale 2 puffs into the lungs daily, Disp: 1 Inhaler, Rfl: 3    ipratropium-albuterol (DUONEB) 0.5-2.5 (3) MG/3ML SOLN nebulizer solution, Inhale 3 mLs into the lungs every 4 hours, Disp: 360 mL, Rfl: 2    tiotropium (SPIRIVA RESPIMAT) 2.5 MCG/ACT AERS inhaler, Inhale 2 puffs into the lungs, Disp: , Rfl:     Multiple Vitamin (DAILY-NICOLE) TABS, TAKE ONE TABLET BY MOUTH DAILY, Disp: 30 tablet, Rfl: 0    ketoconazole (NIZORAL) 2 % shampoo, Apply topically daily as needed. , Disp: 1 Bottle, Rfl: 0    allopurinol (ZYLOPRIM) 100 MG tablet, Take 2 tablets by mouth daily, Disp: 30 tablet, Rfl: 3    promethazine (PHENERGAN) 25 MG tablet, Take 25 mg by mouth every 6 hours as needed for Nausea, Disp: , Rfl:     REFRESH OPTIVE 0.5-0.9 % SOLN, Place 2 drops into both eyes 2 times daily, Disp: , Rfl:     levothyroxine (SYNTHROID) 100 MCG tablet, Take 100 mcg by mouth daily, Disp: , Rfl:     ketoconazole (NIZORAL) 2 % cream, Apply topically daily Apply topically daily. , Disp: , Rfl:     triamcinolone (ARISTOCORT) 0.5 % cream, Apply topically 3 times daily Apply topically 3 times daily. , Disp: , Rfl:     pantoprazole (PROTONIX) 40 MG tablet, Take 1 tablet by mouth 2 times daily, Disp: 60 tablet, Rfl: 3    LORazepam (ATIVAN) 0.5 MG tablet, Take 0.5 mg by mouth 2 times daily. , Disp: , Rfl:     atorvastatin (LIPITOR) 80 MG tablet, Take 80 mg by mouth nightly, Disp: , Rfl:     ranolazine (RANEXA) 500 MG extended release tablet, Take 500 mg by mouth 2 times daily, Disp: , Rfl:     Nebulizer MISC, Inhale into the lungs as needed, Disp: , Rfl:     QUEtiapine (SEROQUEL XR) 300 MG extended release tablet, Take 50 mg by mouth nightly , Disp: , Rfl:     levomilnacipran (FETZIMA) 40 MG CP24 extended release capsule, Take 1 capsule by mouth daily (Patient taking differently: Take 40 mg by mouth nightly ), Disp: 30 capsule, Rfl: 0    nitroGLYCERIN (NITROSTAT) 0.4 MG SL tablet, Place 0.4 mg under the tongue every 5 minutes as needed for Chest pain, Disp: , Rfl:     fluticasone (FLONASE) 50 MCG/ACT nasal spray, 2 sprays by Nasal route every morning , Disp: , Rfl:   Allergies: Percocet [oxycodone-acetaminophen], Tramadol, Vicodin [hydrocodone-acetaminophen], and Narcan [naloxone hcl]       SUBJECTIVE EXAMINATION     History obtained from[de-identified] Chart Review,Patient,      Family/Caregiver Present: No    Subjective History:   Patient reports she had a 3rd TIA in december, she was not admitted. She has an appointment with her neurologist in July, has not seen one since her accident. She has been seeing Dr. Susanna Arauz.  PMH: bilat rtc tears, carpal tunnel in bilat hands, previous difficulties with gripping; difficulties getting out of chair/bed, worsens balance    Additional Pertinent Hx (if applicable): 3 TIA's     Prior diagnostic testing[de-identified] CT Scan,MRI,X-ray  Previous treatments prior to current episode?: Outpatient PT  Any changes to allergies, medications, or have you had any medical procedures/ER visits since your last visit?: No       Learning/Language: Learning  Does the patient/guardian have any barriers to learning?: No barriers  Will there be a co-learner?: No  What is the preferred language of the patient/guardian?: English  Is an  required?: No  How does the patient/guardian prefer to learn new concepts?: Listening,Reading,Demonstration     Pain Screening    Pain Screening  Patient Currently in Pain: Yes  Pain Assessment: 0-10  Pain Level: 3  Best Pain Level: 0  Worst Pain Level: 3  Pain Type: Chronic pain  Pain Location: Head  Pain Frequency: Intermittent  Functional Pain Assessment: Prevents or interferes some active activities and ADLs    Functional Status    Dominant Hand: : Right    Social History:    Social History  Lives With: Alone  Type of Home: Assisted living  Home Layout: One level  Home Access: Level entry  Bathroom Shower/Tub: Walk-in shower  Bathroom Toilet: Standard  Bathroom Equipment: Grab bars in shower  Bathroom Accessibility: Accessible    Occupation/Interests:  Occupation: Retired    Prior Level of Function:    mod I     Current Level of Function:  mod I      ADL Assistance: Independent  Homemaking Assistance: Independent  Ambulation Assistance: Independent  Transfer Assistance: Independent  Active : No  Patient's  Info: gets rides    OBJECTIVE EXAMINATION   Restrictions:  NO BP on LUE due to dialysis port     Review of Systems:  Vision: Within Functional Limits  Hearing: Within functional limits  Follows Commands: Within Functional Limits  Vital Signs  Pulse: 86  Heart Rate Source: Monitor  BP: 125/75  BP Location: Right upper arm (dialysis port on L arm)  MAP (Calculated): 91.67  Patient Position: Sitting    Observations:  General Observations  Description: Patient ambualtes into the clinic without AD, she reports she uses a rollator at home, but her driving service will not assist her with getting it into the car and therefore she cannot bring it    Mobility:   Transfers  Sit to Stand: Modified independent  Stand to sit: Modified independent  Bed to Chair: Modified independent    Balance Screen:  Safety Awareness  Safety awareness: Good  Romberg/Narrow Base of Support  Sway: Minimal  Sway: Minimal  Sharpened Romberg/Tandem  Sway: Minimal  Sway: Moderate  Single Leg Stance  Right Leg Eyes Open: 5 seconds  Left Leg Eyes Open: 5 seconds    ROM:Cervical: limited due to recent cervical fusion last year  Left AROM  Right AROM         AROM LUE (degrees)  LUE AROM : WNL  AROM LLE (degrees)  LLE AROM : WNL    AROM RUE (degrees)  RUE AROM : WNL  AROM RLE (degrees)  RLE AROM: WNL     Left Strength  Right Strength         Strength LUE  Strength LUE: WFL  Comment: except 4/5 shoulder flexion, 4-/5 shoulder abduction, 3-/5 ER, 4/5 IR, 4/5 biceps/triceps  Strength LLE  Strength LLE: WFL  Comment: except 4/5 hip flexion, 4+/5 knee ext/flex    Strength RUE  Strength RUE: WFL  Comment: except 4/5 shoulder flexion, 4-/5 shoulder abduction, 3-/5 ER, 4/5 IR, 4/5 biceps/triceps  Strength RLE  Strength RLE: WFL  Comment: except 4/5 hip flexion, 4+/5 knee ext/flex     Additional Finding(s) (if applicable):  Additional Measures  Special Tests: 5x STS: 24 seconds with UE assist; TU seocnds without AD  Other: R  Strength: 30.6;  L  Strength: 33.5     ASSESSMENT     Impression:Assessment: Pt is 73year old female with new TIA in 2021 and onset of worsening weakness and balance deficits following; 2 previous episodes. She currently has dialysis on Monday and Thursday. Pt now has difficulties walking without balance difficulties, endurance, and strength. Pt demo deficits this date that include reduced BUE/BLE strength, difficulty with tandem/SLS, gait deviations without use of AD, difficulty with functional transfers, impaired sensation, and increased fatigue. Pt will benefit with PT services with BUE/BLE strengthening,  strength, balance/proprioception, gait/stair training, sit to stand training, fall/safety education, endurance to return to [facility]. Pt prior to onset of current condition had been using a rollator and completing all cooking and cleaning at her assisted living. Submitted for pre-cert and will schedule following. Body Structures, Functions, Activity Limitations Requiring Skilled Therapeutic Intervention: Decreased functional mobility ,Decreased ADL status,Decreased endurance,Increased pain,Decreased ROM,Decreased balance,Decreased posture,Decreased strength    Statement of Medical Necessity: Physical Therapy is both indicated and medically necessary as outlined in the POC to increase the likelihood of meeting the functionally related goals stated below.      Patient's Activity Tolerance: Patient tolerated evaluation without incident        Patient's rehabilitation potential/prognosis is considered to be: Good    Factors which may impact rehabilitation potential include: None  Measures taken to address barrier(s): N/A     GOALS     Patient Goal(s): improve strength  Short Term Goals Completed by 5 weeks Goal Status   Pt demo I w/ HEP and symptom management New   Pt demo ABC score >35% to improve confidence with standing activity New   Pt demo 5/5 BLE/BUE strength in all directions with pain reported <1/10 New   Pt demo ability to complete 5 sit to stands <20 seconds with x1 UE assist New   Pt demo ability to ambulate >300 ft in 6 mintues with least restrictive AD New          TREATMENT PLAN       Requires PT Follow-Up: Yes    Pt. actively involved in establishing Plan of Care and Goals: Yes  Patient/ Caregiver education and instruction:Goals,PT Role,Plan of Care,Evaluative findings,Pressure Relief,Insurance,Home Exercise Program,Fall prevention strategies,Gait Training,Disease Specific Education         Treatment may include any combination of the following: Strengthening,ROM,Balance training,Gait training,Pain management,Stair training,Neuromuscular re-education,Home exercise program,Transfer training,Safety education & training,Therapeutic activities,Endurance training     Frequency / Duration:  Patient to be seen 2x for 5 weeks weeks       Eval Complexity:    Decision Making: Low Complexity  History: cervical fusion, kidney disease, carpal tunnel, rotator cuff tears (surgery on R side)     Therapist Signature: Eliseo Francisco, PT    Date: 1/26/5791     I certify that the above Therapy Services are being furnished while the patient is under my care. I agree with the treatment plan and certify that this therapy is necessary. Physician's Signature:  ___________________________   Date:_______                                                                   Beola Batters*        Physician Comments: _______________________________________________    Please sign and return to [id number]  [facility]. Please fax to the location listed below.  [facility] for this referral!    [address] [facility] [address], [address] [address]  Dept: [phone]  Loc: [phone]

## 2022-05-10 NOTE — PLAN OF CARE
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 Mamie Benitez,   The following patient has been evaluated for physical therapy services and for therapy to continue, insurance requires physician review of the treatment plan initially and every 90 days. Please review the attached evaluation and/or summary of the patient's plan of care, and verify that you agree therapy should continue by signing the attached document and sending it back to our office. Assessment:    Assessment: Pt is 73year old female with new TIA in 2021 and onset of worsening weakness and balance deficits following; 2 previous episodes. She currently has dialysis on Monday and Thursday. Pt now has difficulties walking without balance difficulties, endurance, and strength. Pt demo deficits this date that include reduced BUE/BLE strength, difficulty with tandem/SLS, gait deviations without use of AD, difficulty with functional transfers, impaired sensation, and increased fatigue. Pt will benefit with PT services with BUE/BLE strengthening,  strength, balance/proprioception, gait/stair training, sit to stand training, fall/safety education, endurance to return to [facility]. Pt prior to onset of current condition had been using a rollator and completing all cooking and cleaning at her assisted living. Submitted for pre-cert and will schedule following. Plan of Care/Treatment to date:  [x] Therapeutic Exercise  [] Modalities:  [x] Therapeutic Activity     [] Ultrasound  [] Electrical Stimulation  [x] Gait Training      [] Cervical Traction [] Lumbar Traction  [x] Neuromuscular Re-education    [] Cold/hotpack [] Iontophoresis   [x] Instruction in HEP      [] Vasopneumatic    [] Dry Needling  [] Manual Therapy               [] Aquatic Therapy       Other:          Frequency/Duration:  # Days per week: [] 1 day # Weeks: [] 1 week [x] 5 weeks     [x] 2 days   [] 2 weeks [] 6 weeks     [] 3 days   [] 3 weeks [] 7 weeks     [] 4 days   [] 4 weeks [] 8 weeks         [] 9 weeks [] 10 weeks         [] 11 weeks [] 12 weeks    Rehab Potential/Progress: [] Excellent [x] Good [] Fair  [] Poor     Goals:    Patient goals: imrpove strength  Short term goals  Time Frame for Short term goals: 5 weeks  Pt demo I w/ HEP and symptom management  Pt demo ABC score >35% to improve confidence with standing activity  Pt demo 5/5 BLE/BUE strength in all directions with pain reported <1/10  Pt demo ability to complete 5 sit to stands <20 seconds with x1 UE assist  Pt demo ability to ambulate >300 ft in 6 mintues with least restrictive AD      Electronically signed by:  Torres Ricks, PT, DPT, CSCS 5/10/2022, 3:59 PM        If you have any questions or concerns, please don't hesitate to call.   Thank you for your referral.      Physician Signature:________________________________Date:_________ TIME: _____  By signing above, therapists plan is approved by physician

## 2022-06-03 ENCOUNTER — HOSPITAL ENCOUNTER (OUTPATIENT)
Dept: PHYSICAL THERAPY | Age: 72
Setting detail: THERAPIES SERIES
Discharge: HOME | End: 2022-06-03
Payer: COMMERCIAL

## 2022-06-03 PROCEDURE — 97110 THERAPEUTIC EXERCISES: CPT

## 2022-06-03 PROCEDURE — 97140 MANUAL THERAPY 1/> REGIONS: CPT

## 2022-06-03 NOTE — FLOWSHEET NOTE
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]  Pain in left elbow [M25.522]  Other symptoms and signs involving the musculoskeletal system [R29.898]  Carpal tunnel syndrome, bilateral upper limbs [G56.03] Diagnosis: CVA  Date of Injury/Surgery: --  Treatment Diagnosis:  Decreased strength and balance deficit  Insurance/Certification information: Katie after 95TB visit  Referring Physician:  Cullen Cabrera   PCP: Linda Hennessy  Next Doctor Visit:  --  Plan of care signed (Y/N):  N, sent 5/10/22  Outcome Measure: --  Visit# / total visits:   2/10  Pain level: 810  R shoulder  Goals:      Patient goals: imrpove strength  Short term goals  Time Frame for Short term goals: 5 weeks  Pt demo I w/ HEP and symptom management  Pt demo ABC score >35% to improve confidence with standing activity  Pt demo 5/5 BLE/BUE strength in all directions with pain reported <1/10  Pt demo ability to complete 5 sit to stands <20 seconds with x1 UE assist  Pt demo ability to ambulate >300 ft in 6 mintues with least restrictive AD    Summary of Evaluation:   Pt is 73year old female with new TIA in 2021 and onset of worsening weakness and balance deficits following; 2 previous episodes. She currently has dialysis on Monday and Thursday. Pt now has difficulties walking without balance difficulties, endurance, and strength.  Pt demo deficits this date that include reduced BUE/BLE strength, difficulty with tandem/SLS, gait deviations without use of AD, difficulty with functional transfers, impaired sensation, and increased fatigue. Pt will benefit with PT services with BUE/BLE strengthening,  strength, balance/proprioception, gait/stair training, sit to stand training, fall/safety education, endurance to return to PLOF. Pt prior to onset of current condition had been using a rollator and completing all cooking and cleaning at her assisted living. Submitted for pre-cert and will schedule following. Subjective: Pt stated that most of her pain is in her R shoulder today. Pt rated it at 8/10 today. Pt stated that her oxygen levels dropped during dialysis yesterday and that she had to have  O2 during dialysis. Any changes in Ambulatory Summary Sheet? None        Objective:    COVID screening questions were asked and patient attested that there had been no contact or symptoms    Pt was unable to lift R UE at start of treatment. Pt was able to get more PROM and some AAROM after treatment. Exercises: (No more than 4 columns)   Exercise/Equipment 5/10/22 #1 6/3/2022 #2 Date           WARM UP      *Nu Step     L-3 x 8'           TABLE      Cane press up  10x2    Cane flexion   MA 10x2    RS   90° 20\" x2    Seated scap squeezes   10x2 5\"           Isometrics 5 way  10x 5\" ea dir    *SLR  10x2 ea LE    *Seated Scaption Raise      *Seated March      Adductor Squeeze      Seated Clamshell         STANDING      Heel Raise      Mini squat      STS      Bicep Curl                             PROPRIOCEPTION                                    MODALITIES                      Other Therapeutic Activities/Education:  Patient received education on their current pathology and how their condition effects them with their functional activities. Patient understood discussion and questions were answered. Patient understands their activity limitations and understands rational for treatment progression. Home Exercise Program:  HO issued, reviewed and discussed with patient. Pt agreed to comply.         Manual Treatments: PROM to R shoulder in all directions and isometrics against PTA x 20'       Modalities:  --      Communication with other providers:  katina sent 5/10/22      Assessment:  (Response towards treatment session) (Pain Rating)Pt tolerated treatment fair today. Pt was able to increase activity in the gym today. Pt responded well to manual stretching and isometrics for shoulder. Pt rated pain at 3/10 after treatment. PT will continue to benefit from more strengthening. Pt is 73year old female with new TIA in December 2021 and onset of worsening weakness and balance deficits following; 2 previous episodes. She currently has dialysis on Monday and Thursday. Pt now has difficulties walking without balance difficulties, endurance, and strength. Pt demo deficits this date that include reduced BUE/BLE strength, difficulty with tandem/SLS, gait deviations without use of AD, difficulty with functional transfers, impaired sensation, and increased fatigue. Pt will benefit with PT services with BUE/BLE strengthening,  strength, balance/proprioception, gait/stair training, sit to stand training, fall/safety education, endurance to return to [facility]. Pt prior to onset of current condition had been using a rollator and completing all cooking and cleaning at her assisted living. Submitted for pre-cert and will schedule following.        Plan for Next Session: --       Time In / Time Out: 1345/1430        Timed Code/Total Treatment Minutes:  39' /45' 1 man (20') 2 TE (25')       Next Progress Note due:  10th visit      Plan of Care Interventions:  [x] Therapeutic Exercise  [] Modalities:  [x] Therapeutic Activity     [] Ultrasound  [] Estim  [x] Gait Training      [] Cervical Traction [] Lumbar Traction  [x] Neuromuscular Re-education    [] Cold/hotpack [] Iontophoresis   [x] Instruction in HEP      [] Vasopneumatic   [] Dry Needling    [x] Manual Therapy               [] Aquatic Therapy              Electronically signed by:Yamile Anselmo Benites, PTA  12:23 PM, 06/03/22      6/3/2022,4:13 PM

## 2022-06-10 ENCOUNTER — HOSPITAL ENCOUNTER (OUTPATIENT)
Dept: PHYSICAL THERAPY | Age: 72
Discharge: HOME | End: 2022-06-10

## 2022-06-10 NOTE — FLOWSHEET NOTE
Physical Therapy  Cancellation/No-show Note  Patient Name:  Kiana Mcgrath  :  1950   Date:  6/10/2022  Cancelled visits to date: 1  No-shows to date: 0    For today's appointment patient:  [x]  Cancelled  []  Rescheduled appointment  []  No-show     Reason given by patient:  []  Patient ill  []  Conflicting appointment  []  No transportation    []  Conflict with work  [x]  No reason given  []  Other:     Comments:      Electronically signed by:  Soraya Khan PTA,

## 2022-06-14 ENCOUNTER — HOSPITAL ENCOUNTER (OUTPATIENT)
Dept: PHYSICAL THERAPY | Age: 72
Setting detail: THERAPIES SERIES
Discharge: HOME | End: 2022-06-14
Payer: COMMERCIAL

## 2022-06-14 PROCEDURE — 97110 THERAPEUTIC EXERCISES: CPT

## 2022-06-14 PROCEDURE — 97140 MANUAL THERAPY 1/> REGIONS: CPT

## 2022-06-14 NOTE — FLOWSHEET NOTE
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 [I63.81]  Pain in left elbow [M25.522]  Other symptoms and signs involving the musculoskeletal system [R29.898]  Carpal tunnel syndrome, bilateral upper limbs [G56.03] Diagnosis: CVA  Date of Injury/Surgery: --  Treatment Diagnosis:  Decreased strength and balance deficit  Insurance/Certification information: Katie after 83PH visit  Referring Physician:  Charlene Jorgensen   PCP: Sj Vazquez  Next Doctor Visit:  --  Plan of care signed (Y/N):  N, sent 5/10/22  Outcome Measure: --  Visit# / total visits:   3/10  Pain level: 6/10  R shoulder  Goals:      Patient goals: imrpove strength  Short term goals  Time Frame for Short term goals: 5 weeks  Pt demo I w/ HEP and symptom management  Pt demo ABC score >35% to improve confidence with standing activity  Pt demo 5/5 BLE/BUE strength in all directions with pain reported <1/10  Pt demo ability to complete 5 sit to stands <20 seconds with x1 UE assist  Pt demo ability to ambulate >300 ft in 6 mintues with least restrictive AD    Summary of Evaluation:   Pt is 73year old female with new TIA in 2021 and onset of worsening weakness and balance deficits following; 2 previous episodes. She currently has dialysis on Monday and Thursday. Pt now has difficulties walking without balance difficulties, endurance, and strength.  Pt demo deficits this date that include reduced BUE/BLE strength, difficulty with tandem/SLS, gait deviations without use of AD, difficulty with functional transfers, impaired sensation, and increased fatigue. Pt will benefit with PT services with BUE/BLE strengthening,  strength, balance/proprioception, gait/stair training, sit to stand training, fall/safety education, endurance to return to PLOF. Pt prior to onset of current condition had been using a rollator and completing all cooking and cleaning at her assisted living. Submitted for pre-cert and will schedule following. Subjective:  Jerrell Schneider states that the pain is a 6/10. She states that she fell on Sunday but that hasn't affected her shoulder. Any changes in Ambulatory Summary Sheet? None        Objective:   COVID screening questions were asked and patient attested that there had been no contact or symptoms    Jerrell Schneider had increased pain during her exercises. Exercises: (No more than 4 columns)   Exercise/Equipment 5/10/22 #1 6/3/2022 #2 6/14/22 #3           WARM UP      *Nu Step     L-3 x 8'  L-3 x 8'         TABLE      Cane press up  10x2 10x2   Cane flexion   MA 10x2 10x2   RS   90° 20\" x2    Seated scap squeezes   10x2 5\"  10x2 5\"          Isometrics 5 way  10x 5\" ea dir    *SLR  10x2 ea LE 10x1 ea LE   *Seated Scaption Raise   2x10   *Seated March   1x10   Adductor Squeeze   1x10   Seated Clamshell      2x10   STANDING      Heel Raise   2x10   Mini squat      STS      Bicep Curl   2x10 each arm                          PROPRIOCEPTION                                    MODALITIES                      Other Therapeutic Activities/Education:  Patient received education on their current pathology and how their condition effects them with their functional activities. Patient understood discussion and questions were answered. Patient understands their activity limitations and understands rational for treatment progression. Home Exercise Program:  HO issued, reviewed and discussed with patient. Pt agreed to comply.         Manual Treatments: PROM to R shoulder in all directions       Modalities:  --      Communication with other providers:  katina sent 5/10/22      Assessment:  Linda Mir states that pain is now an 8/10 at end of session. She has decreased tolerance for exercises and needed increased rest breaks. Could not tolerate isometrics due to increased pain today, so held exercise. Patient has self limiting tendencies. Pt will continue to benefit from PT services with BUE/BLE strengthening,  strength, balance/proprioception, gait/stair training, sit to stand training, fall/safety education, endurance to return to PLOF. Pt is 73year old female with new TIA in December 2021 and onset of worsening weakness and balance deficits following; 2 previous episodes. She currently has dialysis on Monday and Thursday. Pt now has difficulties walking without balance difficulties, endurance, and strength. Pt demo deficits this date that include reduced BUE/BLE strength, difficulty with tandem/SLS, gait deviations without use of AD, difficulty with functional transfers, impaired sensation, and increased fatigue. Pt will benefit with PT services with BUE/BLE strengthening,  strength, balance/proprioception, gait/stair training, sit to stand training, fall/safety education, endurance to return to PLOF. Pt prior to onset of current condition had been using a rollator and completing all cooking and cleaning at her assisted living. Submitted for pre-cert and will schedule following.        Plan for Next Session: --       Time In / Time Out: 1345/1425      Timed Code/Total Treatment Minutes:  36'    2 TE    1 MT      Next Progress Note due:  10th visit      Plan of Care Interventions:  [x] Therapeutic Exercise  [] Modalities:  [x] Therapeutic Activity     [] Ultrasound  [] Estim  [x] Gait Training      [] Cervical Traction [] Lumbar Traction  [x] Neuromuscular Re-education    [] Cold/hotpack [] Iontophoresis   [x] Instruction in HEP      [] Vasopneumatic   [] Dry Needling    [x] Manual Therapy               [] Aquatic Therapy Electronically signed by:Clair Gomez PTA,   1:39 PM, 06/14/22

## 2022-06-17 ENCOUNTER — HOSPITAL ENCOUNTER (OUTPATIENT)
Dept: PHYSICAL THERAPY | Age: 72
Setting detail: THERAPIES SERIES
Discharge: HOME | End: 2022-06-17
Payer: COMMERCIAL

## 2022-06-17 PROCEDURE — 97110 THERAPEUTIC EXERCISES: CPT

## 2022-06-17 PROCEDURE — 97140 MANUAL THERAPY 1/> REGIONS: CPT

## 2022-06-17 NOTE — FLOWSHEET NOTE
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 [I63.81]  Pain in left elbow [M25.522]  Other symptoms and signs involving the musculoskeletal system [R29.898]  Carpal tunnel syndrome, bilateral upper limbs [G56.03] Diagnosis: CVA  Date of Injury/Surgery: --  Treatment Diagnosis:  Decreased strength and balance deficit  Insurance/Certification information: Katie after 97JI visit  Referring Physician:  Phoenix Pearl   PCP: Chandler Ross  Next Doctor Visit:  --  Plan of care signed (Y/N):  N, sent 5/10/22  Outcome Measure: --  Visit# / total visits:   410  Pain level: 0/10   Goals:      Patient goals: imrpove strength  Short term goals  Time Frame for Short term goals: 5 weeks  Pt demo I w/ HEP and symptom management  Pt demo ABC score >35% to improve confidence with standing activity  Pt demo 5/5 BLE/BUE strength in all directions with pain reported <1/10  Pt demo ability to complete 5 sit to stands <20 seconds with x1 UE assist  Pt demo ability to ambulate >300 ft in 6 mintues with least restrictive AD    Summary of Evaluation:   Pt is 73year old female with new TIA in 2021 and onset of worsening weakness and balance deficits following; 2 previous episodes. She currently has dialysis on Monday and Thursday. Pt now has difficulties walking without balance difficulties, endurance, and strength. Pt demo deficits this date that include reduced BUE/BLE strength, difficulty with tandem/SLS, gait deviations without use of AD, difficulty with functional transfers, impaired sensation, and increased fatigue. Pt will benefit with PT services with BUE/BLE strengthening,  strength, balance/proprioception, gait/stair training, sit to stand training, fall/safety education, endurance to return to PLOF. Pt prior to onset of current condition had been using a rollator and completing all cooking and cleaning at her assisted living. Submitted for pre-cert and will schedule following. Subjective:  Marine Fleming states that the pain is a 6/10 pain \"all over\"; relates pressure in neck today. Any changes in Ambulatory Summary Sheet? None        Objective:   COVID screening questions were asked and patient attested that there had been no contact or symptoms    Tolerates exercises much better today    Exercises: (No more than 4 columns)   Exercise/Equipment 5/10/22 #1 6/3/2022 #2 6/14/22 #3 6/17/22 #4            WARM UP       *Nu Step     L-3 x 8'  L-3 x 8' L-3 x 8'          TABLE       Cane press up  10x2 10x2 10x2   Cane flexion   MA 10x2 10x2 10x2   RS   90° 20\" x2     Seated scap squeezes   10x2 5\"  10x2 5\"  10x2 5\"          Isometrics 5 way  10x 5\" ea dir     *SLR  10x2 ea LE 10x1 ea LE 10x2 ea LE   *Seated Scaption Raise   2x10 2x10   *Seated March   1x10 2x10   Adductor Squeeze   1x10 2x10   Seated Clamshell      2x10 2x10   STANDING       Heel Raise   2x10 2x10   Mini squat    2x10   STS       Bicep Curl   2x10 each arm 2x10 #1 each arm   Marching     2x10                      PROPRIOCEPTION                                          MODALITIES                         Other Therapeutic Activities/Education:  Patient received education on their current pathology and how their condition effects them with their functional activities. Patient understood discussion and questions were answered. Patient understands their activity limitations and understands rational for treatment progression. Home Exercise Program:  HO issued, reviewed and discussed with patient. Pt agreed to comply.         Manual Treatments: PROM to R shoulder in all directions       Modalities:  --      Communication with other providers:  neryal sent 5/10/22      Assessment:   Pt tolerated tx session well without adverse reactions or complications. Able to increase sets of exercises this date without increased pain. States that she feels better than when she came in. Pt will continue to benefit from PT services with BUE/BLE strengthening,  strength, balance/proprioception, gait/stair training, sit to stand training, fall/safety education, endurance to return to PLOF. End pain: 0/10       Pt is 73year old female with new TIA in December 2021 and onset of worsening weakness and balance deficits following; 2 previous episodes. She currently has dialysis on Monday and Thursday. Pt now has difficulties walking without balance difficulties, endurance, and strength. Pt demo deficits this date that include reduced BUE/BLE strength, difficulty with tandem/SLS, gait deviations without use of AD, difficulty with functional transfers, impaired sensation, and increased fatigue. Pt will benefit with PT services with BUE/BLE strengthening,  strength, balance/proprioception, gait/stair training, sit to stand training, fall/safety education, endurance to return to PLOF. Pt prior to onset of current condition had been using a rollator and completing all cooking and cleaning at her assisted living. Submitted for pre-cert and will schedule following.        Plan for Next Session: --       Time In / Time Out: 9594 / 8362      Timed Code/Total Treatment Minutes:  45' / 38'    2 TE    1 MT      Next Progress Note due:  10th visit      Plan of Care Interventions:  [x] Therapeutic Exercise  [] Modalities:  [x] Therapeutic Activity     [] Ultrasound  [] Estim  [x] Gait Training      [] Cervical Traction [] Lumbar Traction  [x] Neuromuscular Re-education    [] Cold/hotpack [] Iontophoresis   [x] Instruction in HEP      [] Vasopneumatic   [] Dry Needling    [x] Manual Therapy [] Aquatic Therapy              Electronically signed by:Clari Gomez PTA,   1:43 PM, 06/17/22

## 2022-06-21 ENCOUNTER — HOSPITAL ENCOUNTER (OUTPATIENT)
Dept: PHYSICAL THERAPY | Age: 72
Setting detail: THERAPIES SERIES
Discharge: HOME | End: 2022-06-21
Payer: COMMERCIAL

## 2022-06-21 PROCEDURE — 97110 THERAPEUTIC EXERCISES: CPT

## 2022-06-21 NOTE — FLOWSHEET NOTE
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 [I63.81]  Pain in left elbow [M25.522]  Other symptoms and signs involving the musculoskeletal system [R29.898]  Carpal tunnel syndrome, bilateral upper limbs [G56.03] Diagnosis: CVA  Date of Injury/Surgery: --  Treatment Diagnosis:  Decreased strength and balance deficit  Insurance/Certification information: Katie after 72NC visit  Referring Physician:  Ravi Cardenas   PCP: Preeti Hebert Doctor Visit:  --  Plan of care signed (Y/N):  Y, sent 5/10/22  Outcome Measure: --  Visit# / total visits:   5/10  Pain level: 0/10   Goals:      Patient goals: imrpove strength  Short term goals  Time Frame for Short term goals: 5 weeks  Pt demo I w/ HEP and symptom management  Pt demo ABC score >35% to improve confidence with standing activity  Pt demo 5/5 BLE/BUE strength in all directions with pain reported <1/10  Pt demo ability to complete 5 sit to stands <20 seconds with x1 UE assist  Pt demo ability to ambulate >300 ft in 6 mintues with least restrictive AD    Summary of Evaluation:   Pt is 73year old female with new TIA in 2021 and onset of worsening weakness and balance deficits following; 2 previous episodes. She currently has dialysis on Monday and Thursday. Pt now has difficulties walking without balance difficulties, endurance, and strength. Pt demo deficits this date that include reduced BUE/BLE strength, difficulty with tandem/SLS, gait deviations without use of AD, difficulty with functional transfers, impaired sensation, and increased fatigue. Pt will benefit with PT services with BUE/BLE strengthening,  strength, balance/proprioception, gait/stair training, sit to stand training, fall/safety education, endurance to return to [facility]. Pt prior to onset of current condition had been using a rollator and completing all cooking and cleaning at her assisted living. Submitted for pre-cert and will schedule following. Subjective:  Robbie Martin reports no pain today, but usually pressure sensation in her head and neck. Reports she had a fall in her apartment when trying to answer the phone real quick       Any changes in Ambulatory Summary Sheet? None      Objective:   COVID screening questions were asked and patient attested that there had been no contact or symptoms    Difficulty keeping elbow straight during scaption raise  Discussed proper breathing during activity     Exercises: (No more than 4 columns)   Exercise/Equipment 6/14/22 #3 6/17/22 #4 6/21/22 #5           WARM UP      *Nu Step    L-3 x 8' L-3 x 8' Lv5 8'         TABLE      Cane press up 10x2 10x2    Cane flexion  10x2 10x2    RS       Seated scap squeezes  10x2 5\"  10x2 5\"          Isometrics 5 way      *SLR 10x1 ea LE 10x2 ea LE 2x10 ea side   *Seated Scaption Raise 2x10 2x10 2x10 1#   *Seated March 1x10 2x10 --   Adductor Squeeze 1x10 2x10 2x10 5\"   Seated Clamshell    2x10 2x10 --   LAQ   2x10 2#   SL Hip ABD   x10 ea side, tactile cues from therapuist         STANDING      Heel Raise 2x10 2x10 x20   Mini squat  2x10 --   STS   2x10 w/o assist   Bicep Curl 2x10 each arm 2x10 #1 each arm 2x10 1# ea side   Marching   2x10 x20   TB Row   2x10 RTB seated              PROPRIOCEPTION                                    MODALITIES                      Other Therapeutic Activities/Education:  Patient received education on their current pathology and how their condition effects them with their functional activities. Patient understood discussion and questions were answered.  Patient understands their activity limitations and understands rational for treatment progression. Home Exercise Program:  HO issued, reviewed and discussed with patient. Pt agreed to comply. Manual Treatments:       Modalities:  --      Communication with other providers:  neryal sent 5/10/22      Assessment:   Jesse Banks tolerated today's session well and no increased pain reported. She continues to have ongoing difficulties with endurance and fatigue levels, but alternated between UE and LE activity with improved response. End pain: 0/10       Pt is 73year old female with new TIA in December 2021 and onset of worsening weakness and balance deficits following; 2 previous episodes. She currently has dialysis on Monday and Thursday. Pt now has difficulties walking without balance difficulties, endurance, and strength. Pt demo deficits this date that include reduced BUE/BLE strength, difficulty with tandem/SLS, gait deviations without use of AD, difficulty with functional transfers, impaired sensation, and increased fatigue. Pt will benefit with PT services with BUE/BLE strengthening,  strength, balance/proprioception, gait/stair training, sit to stand training, fall/safety education, endurance to return to [facility]. Pt prior to onset of current condition had been using a rollator and completing all cooking and cleaning at her assisted living. Submitted for pre-cert and will schedule following.        Plan for Next Session: --       Time In / Time Out: 7897-5607      Timed Code/Total Treatment Minutes:  39'   (3) TE      Next Progress Note due:  10th visit      Plan of Care Interventions:  [x] Therapeutic Exercise  [] Modalities:  [x] Therapeutic Activity     [] Ultrasound  [] Estim  [x] Gait Training      [] Cervical Traction [] Lumbar Traction  [x] Neuromuscular Re-education    [] Cold/hotpack [] Iontophoresis   [x] Instruction in HEP      [] Vasopneumatic   [] Dry Needling    [x] Manual Therapy               [] Aquatic Therapy Electronically signed by:Luz Davison DPT, [facility]  6:40 AM, 06/21/22

## 2022-06-24 ENCOUNTER — HOSPITAL ENCOUNTER (OUTPATIENT)
Dept: PHYSICAL THERAPY | Age: 72
Setting detail: THERAPIES SERIES
Discharge: HOME | End: 2022-06-24
Payer: COMMERCIAL

## 2022-06-24 PROCEDURE — 97110 THERAPEUTIC EXERCISES: CPT

## 2022-06-24 PROCEDURE — 97112 NEUROMUSCULAR REEDUCATION: CPT

## 2022-06-24 NOTE — FLOWSHEET NOTE
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 [I63.81]  Pain in left elbow [M25.522]  Other symptoms and signs involving the musculoskeletal system [R29.898]  Carpal tunnel syndrome, bilateral upper limbs [G56.03] Diagnosis: CVA  Date of Injury/Surgery: --  Treatment Diagnosis:  Decreased strength and balance deficit  Insurance/Certification information: Katie after 15PP visit  Referring Physician:  Candie Bolanos   PCP: Jose Alberto Hebert Doctor Visit:  --  Plan of care signed (Y/N):  Y, sent 5/10/22  Outcome Measure: --  Visit# / total visits:   6/10  Pain level: 10 Stiff    Goals:      Patient goals: imrpove strength  Short term goals  Time Frame for Short term goals: 5 weeks  Pt demo I w/ HEP and symptom management  Pt demo ABC score >35% to improve confidence with standing activity  Pt demo 5/5 BLE/BUE strength in all directions with pain reported <1/10  Pt demo ability to complete 5 sit to stands <20 seconds with x1 UE assist  Pt demo ability to ambulate >300 ft in 6 mintues with least restrictive AD    Summary of Evaluation:   Pt is 73year old female with new TIA in 2021 and onset of worsening weakness and balance deficits following; 2 previous episodes. She currently has dialysis on Monday and Thursday. Pt now has difficulties walking without balance difficulties, endurance, and strength.  Pt demo deficits this date that include reduced BUE/BLE strength, difficulty with tandem/SLS, gait deviations without use of AD, difficulty with functional transfers, impaired sensation, and increased fatigue. Pt will benefit with PT services with BUE/BLE strengthening,  strength, balance/proprioception, gait/stair training, sit to stand training, fall/safety education, endurance to return to [facility]. Pt prior to onset of current condition had been using a rollator and completing all cooking and cleaning at her assisted living. Submitted for pre-cert and will schedule following. Subjective: Pt rated pain at 4/10 today. Pt stated that she was having more pressure  sensation up back of head today. Any changes in Ambulatory Summary Sheet? None      Objective:   COVID screening questions were asked and patient attested that there had been no contact or symptoms    Needed to decrease some wt today due to decreased ability to perform the exercises.     Exercises: (No more than 4 columns)   Exercise/Equipment 6/14/22 #3 6/17/22 #4 6/21/22 #5 6/24/2022 #6            WARM UP       *Nu Step    L-3 x 8' L-3 x 8' Lv5 8' L-4 x 6'          TABLE       Cane press up 10x2 10x2     Cane flexion  10x2 10x2     RS        Seated scap squeezes  10x2 5\"  10x2 5\"            Isometrics 5 way       *SLR 10x1 ea LE 10x2 ea LE 2x10 ea side 1# R 10x2   *Seated Scaption Raise 2x10 2x10 2x10 1# 1# 10x2   *Seated March 1x10 2x10 --    Adductor Squeeze 1x10 2x10 2x10 5\" 10x2 5\"    Seated Clamshell    2x10 2x10 --    LAQ   2x10 2# 1# 10x2   SL Hip ABD   x10 ea side, tactile cues from therapuist x10 ea side, tactile cues from therapuist          STANDING       Heel Raise 2x10 2x10 x20 20x   Mini squat  2x10 --    STS   2x10 w/o assist 10x2 w/o UE assist   Bicep Curl 2x10 each arm 2x10 #1 each arm 2x10 1# ea side 1# 10x2 ea    Marching   2x10 x20 20x at TM   TB Row   2x10 RTB seated RTB 10x2               PROPRIOCEPTION                                          MODALITIES                         Other Therapeutic Activities/Education:  Patient received education on their current pathology and how their condition effects them with their functional activities. Patient understood discussion and questions were answered. Patient understands their activity limitations and understands rational for treatment progression. Home Exercise Program:  HO issued, reviewed and discussed with patient. Pt agreed to comply. Manual Treatments:       Modalities:  --      Communication with other providers:  neryal sent 5/10/22      Assessment:   Pt tolerated treatment fair today. Pt was able to perform exercises with less weight and rest breaks today. Pt rated pain at 2/10 after treatment. Pt will continue to benefit from more strengthening. Pt is 73year old female with new TIA in December 2021 and onset of worsening weakness and balance deficits following; 2 previous episodes. She currently has dialysis on Monday and Thursday. Pt now has difficulties walking without balance difficulties, endurance, and strength. Pt demo deficits this date that include reduced BUE/BLE strength, difficulty with tandem/SLS, gait deviations without use of AD, difficulty with functional transfers, impaired sensation, and increased fatigue. Pt will benefit with PT services with BUE/BLE strengthening,  strength, balance/proprioception, gait/stair training, sit to stand training, fall/safety education, endurance to return to [facility]. Pt prior to onset of current condition had been using a rollator and completing all cooking and cleaning at her assisted living. Submitted for pre-cert and will schedule following.        Plan for Next Session: --       Time In / Time Out: 1300/1345      Timed Code/Total Treatment Minutes:  45'/45' 2TE ( 35') 1 neuro (10')       Next Progress Note due:  10th visit      Plan of Care Interventions:  [x] Therapeutic Exercise  [] Modalities:  [x] Therapeutic Activity     [] Ultrasound  [] Estim  [x] Gait Training      [] Cervical Traction [] Lumbar Traction  [x] Neuromuscular Re-education    [] Cold/hotpack [] Iontophoresis   [x] Instruction in HEP [] Vasopneumatic   [] Dry Needling    [x] Manual Therapy               [] Aquatic Therapy              Electronically signed by:Yamile Smith, PTA  12:58 PM, 06/24/22 6/24/2022,3:49 PM

## 2022-06-27 ENCOUNTER — HOSPITAL ENCOUNTER (OUTPATIENT)
Age: 72
Setting detail: SPECIMEN
Discharge: HOME | End: 2022-06-27
Payer: COMMERCIAL

## 2022-06-27 PROCEDURE — 86038 ANTINUCLEAR ANTIBODIES: CPT

## 2022-06-27 PROCEDURE — 86021 WBC ANTIBODY IDENTIFICATION: CPT

## 2022-06-27 PROCEDURE — 86430 RHEUMATOID FACTOR TEST QUAL: CPT

## 2022-06-27 PROCEDURE — 83516 IMMUNOASSAY NONANTIBODY: CPT

## 2022-06-27 PROCEDURE — 86235 NUCLEAR ANTIGEN ANTIBODY: CPT

## 2022-06-28 LAB — RHEUMATOID FACTOR: 295 IU/ML (ref 0–14)

## 2022-06-29 ENCOUNTER — HOSPITAL ENCOUNTER (OUTPATIENT)
Dept: PHYSICAL THERAPY | Age: 72
Setting detail: THERAPIES SERIES
Discharge: HOME | End: 2022-06-29
Payer: COMMERCIAL

## 2022-06-29 LAB — ANTI-NUCLEAR ANTIBODY (ANA): NORMAL

## 2022-06-29 PROCEDURE — 97110 THERAPEUTIC EXERCISES: CPT

## 2022-06-29 NOTE — FLOWSHEET NOTE
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 [I63.81]  Pain in left elbow [M25.522]  Other symptoms and signs involving the musculoskeletal system [R29.898]  Carpal tunnel syndrome, bilateral upper limbs [G56.03] Diagnosis: CVA  Date of Injury/Surgery: --  Treatment Diagnosis:  Decreased strength and balance deficit  Insurance/Certification information: Katie after 12JF visit  Referring Physician:  Phoenix Pearl   PCP: Chandler Ross  Next Doctor Visit:  --  Plan of care signed (Y/N):  Y, sent 5/10/22  Outcome Measure: --  Visit# / total visits:   7/10  Pain level: 4/10  Goals:      Patient goals: imrpove strength  Short term goals  Time Frame for Short term goals: 5 weeks  Pt demo I w/ HEP and symptom management  Pt demo ABC score >35% to improve confidence with standing activity  Pt demo 5/5 BLE/BUE strength in all directions with pain reported <1/10  Pt demo ability to complete 5 sit to stands <20 seconds with x1 UE assist  Pt demo ability to ambulate >300 ft in 6 mintues with least restrictive AD    Summary of Evaluation:   Pt is 73year old female with new TIA in 2021 and onset of worsening weakness and balance deficits following; 2 previous episodes. She currently has dialysis on Monday and Thursday. Pt now has difficulties walking without balance difficulties, endurance, and strength. Pt demo deficits this date that include reduced BUE/BLE strength, difficulty with tandem/SLS, gait deviations without use of AD, difficulty with functional transfers, impaired sensation, and increased fatigue. Pt will benefit with PT services with BUE/BLE strengthening,  strength, balance/proprioception, gait/stair training, sit to stand training, fall/safety education, endurance to return to [facility]. Pt prior to onset of current condition had been using a rollator and completing all cooking and cleaning at her assisted living. Submitted for pre-cert and will schedule following. Subjective: Pt rated pain in her arm from getting her COVID booster on Monday. Has a lot of pressure today in her neck. Any changes in Ambulatory Summary Sheet? None      Objective:   COVID screening questions were asked and patient attested that there had been no contact or symptoms    Needed to decrease some wt today due to decreased ability to perform the exercises. NO quad lag noted with SLR on L side     Exercises: (No more than 4 columns)   Exercise/Equipment 6/21/22 #5 6/24/2022 #6 6/29/22 #7           WARM UP      *Nu Step    Lv5 8' L-4 x 6' Lv4 6'         TABLE      Cane press up      [employer] flexion       RS       Seated scap squeezes             Isometrics 5 way      *SLR 2x10 ea side 1# R 10x2 2x10 1# R, no weight L side   *Seated Scaption Raise 2x10 1# 1# 10x2 1# 2x10   *Seated March --     Adductor Squeeze 2x10 5\" 10x2 5\"  x20 5\"   Seated Clamshell    --     LAQ 2x10 2# 1# 10x2 2x10 1#   SL Hip ABD x10 ea side, tactile cues from therapuist x10 ea side, tactile cues from therapuist x10 ea side, tactile cues needed         STANDING      Heel Raise x20 20x x20   Mini squat --     STS 2x10 w/o assist 10x2 w/o UE assist 2x10 w/o UE assist   Bicep Curl 2x10 1# ea side 1# 10x2 ea  2x10 2#   Marching  x20 20x at TM x20 at counter   TB Row 2x10 RTB seated RTB 10x2 RTB 2x10              PROPRIOCEPTION                                    MODALITIES                      Other Therapeutic Activities/Education:  Patient received education on their current pathology and how their condition effects them with their functional activities. Patient understood discussion and questions were answered. Patient understands their activity limitations and understands rational for treatment progression. Home Exercise Program:  HO issued, reviewed and discussed with patient. Pt agreed to comply. Manual Treatments:       Modalities:  --      Communication with other providers:  katina sent 5/10/22      Assessment:   Symone Currie demonstrates good tolerance to today's session. She is able to progress with resistance for some exercises. She is making slow, but steady improvements with her strength. Able to do more around her house with increased time. End of session: \"pressure\" in neck      Pt is 73year old female with new TIA in December 2021 and onset of worsening weakness and balance deficits following; 2 previous episodes. She currently has dialysis on Monday and Thursday. Pt now has difficulties walking without balance difficulties, endurance, and strength. Pt demo deficits this date that include reduced BUE/BLE strength, difficulty with tandem/SLS, gait deviations without use of AD, difficulty with functional transfers, impaired sensation, and increased fatigue. Pt will benefit with PT services with BUE/BLE strengthening,  strength, balance/proprioception, gait/stair training, sit to stand training, fall/safety education, endurance to return to [facility]. Pt prior to onset of current condition had been using a rollator and completing all cooking and cleaning at her assisted living. Submitted for pre-cert and will schedule following.        Plan for Next Session: --       Time In / Time Out: 2460-3467      Timed Code/Total Treatment Minutes:  36'    (3) TE      Next Progress Note due:  10th visit      Plan of Care Interventions:  [x] Therapeutic Exercise  [] Modalities:  [x] Therapeutic Activity     [] Ultrasound  [] Estim  [x] Gait Training      [] Cervical Traction [] Lumbar Traction  [x] Neuromuscular Re-education    [] Cold/hotpack [] Iontophoresis   [x] Instruction in HEP      [] Vasopneumatic   [] Dry Needling    [x] Manual Therapy               [] Aquatic Therapy              Electronically signed by:Luz Angelo, PT, DPT, CSCS  6/29/2022,8:39 AM

## 2022-06-30 LAB
ENA TO SSA (RO) ANTIBODY: 1 AU/ML (ref 0–40)
ENA TO SSB (LA) ANTIBODY: 0 AU/ML (ref 0–40)
MYELOPEROXIDASE AB: 0 AU/ML (ref 0–19)
SERINE PR3 (ANCA): 0 AU/ML (ref 0–19)
SSA 60 RO IGG ANTIBODY: 0 AU/ML (ref 0–40)

## 2022-07-08 ENCOUNTER — HOSPITAL ENCOUNTER (OUTPATIENT)
Dept: GENERAL RADIOLOGY | Age: 72
Discharge: HOME | End: 2022-07-08
Payer: COMMERCIAL

## 2022-07-08 ENCOUNTER — HOSPITAL ENCOUNTER (OUTPATIENT)
Age: 72
Discharge: HOME | End: 2022-07-08
Payer: COMMERCIAL

## 2022-07-08 DIAGNOSIS — M25.562 BILATERAL CHRONIC KNEE PAIN: ICD-10-CM

## 2022-07-08 DIAGNOSIS — G89.29 BILATERAL CHRONIC KNEE PAIN: ICD-10-CM

## 2022-07-08 DIAGNOSIS — J43.2 CENTRILOBULAR EMPHYSEMA (HCC): ICD-10-CM

## 2022-07-08 DIAGNOSIS — M25.561 BILATERAL CHRONIC KNEE PAIN: ICD-10-CM

## 2022-07-08 PROCEDURE — 73565 X-RAY EXAM OF KNEES: CPT

## 2022-07-08 PROCEDURE — 73521 X-RAY EXAM HIPS BI 2 VIEWS: CPT

## 2022-07-08 PROCEDURE — 71046 X-RAY EXAM CHEST 2 VIEWS: CPT

## 2022-07-08 PROCEDURE — 73560 X-RAY EXAM OF KNEE 1 OR 2: CPT

## 2022-07-11 ENCOUNTER — HOSPITAL ENCOUNTER (OUTPATIENT)
Age: 72
Setting detail: SPECIMEN
Discharge: HOME | End: 2022-07-11
Payer: COMMERCIAL

## 2022-07-11 LAB — ERYTHROCYTE SEDIMENTATION RATE: 15 MM/HR (ref 0–30)

## 2022-07-11 PROCEDURE — 85652 RBC SED RATE AUTOMATED: CPT

## 2022-07-11 PROCEDURE — 86480 TB TEST CELL IMMUN MEASURE: CPT

## 2022-07-12 ENCOUNTER — HOSPITAL ENCOUNTER (OUTPATIENT)
Dept: PHYSICAL THERAPY | Age: 72
Discharge: HOME | End: 2022-07-12

## 2022-07-12 NOTE — FLOWSHEET NOTE
Physical Therapy  Cancellation/No-show Note  Patient Name:  Ludmila Francis  :  1950   Date:  2022  Cancelled visits to date: 2  No-shows to date: 0    For today's appointment patient:  [x]  Cancelled  []  Rescheduled appointment  []  No-show     Reason given by patient:  []  Patient ill  []  Conflicting appointment  []  No transportation    []  Conflict with work  []  No reason given  [x]  Other:     Comments:  Leticia Arango twice and her tail bone is hurting    Electronically signed by:  Ashwini Key PT,

## 2022-07-14 ENCOUNTER — HOSPITAL ENCOUNTER (OUTPATIENT)
Age: 72
Setting detail: SPECIMEN
Discharge: HOME | End: 2022-07-14

## 2022-07-14 LAB
POTASSIUM SERPL-SCNC: 3.4 MMOL/L (ref 3.5–5.1)
QUANTI TB1 MINUS NIL: 0 IU/ML (ref 0–0.34)
QUANTI TB2 MINUS NIL: 0 IU/ML (ref 0–0.34)
QUANTIFERON (R) TB GOLD (INCUBATED): NEGATIVE IU/ML
QUANTIFERON MITOGEN MINUS NIL: >10 IU/ML
QUANTIFERON NIL: 0.02 IU/ML

## 2022-07-14 PROCEDURE — 84132 ASSAY OF SERUM POTASSIUM: CPT

## 2022-08-31 NOTE — DISCHARGE SUMMARY
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 Hearn Led,  The following patient has not returned to formal physical therapy in > 30 days and will be discharged at this time from the service. If you have any questions or concerns, please feel free to reach out to our office. Electronically signed by:  Mack Vaca PT, DPT, [facility] 2022, 9:23 AM        If you have any questions or concerns, please don't hesitate to call.   Thank you for your referral.      Physician Signature:________________________________Date:_________ TIME: _____  By signing above, therapists plan is approved by physician

## 2022-09-02 NOTE — FLOWSHEET NOTE
Patients Plan of Care was received and signed. Signed POC was scanned and placed in the patients chart.     Catrachita Young

## 2022-10-25 ENCOUNTER — HOSPITAL ENCOUNTER (OUTPATIENT)
Age: 72
Setting detail: SPECIMEN
Discharge: HOME | End: 2022-10-25
Payer: MEDICARE

## 2022-10-25 LAB — ERYTHROCYTE SEDIMENTATION RATE: 19 MM/HR (ref 0–30)

## 2022-10-25 PROCEDURE — 85652 RBC SED RATE AUTOMATED: CPT

## 2022-12-01 NOTE — PROGRESS NOTES
IR Procedure at [facility]:  Left message for patient to arrive at 1130 at [facility] on 12/6/2022 for her procedure at 1330, also went over below instructions. ADDENDUM: Left second message for patient with above information. NPO at [address]      2. Follow your directions as prescribed by the doctor for your procedure and medications. 3.   Consult your provider as to when to stop blood thinner  4. Do not take any pain medication within 6 hours of your procedure  5. Do not drink any alcoholic beverages or use any street drugs 24 hours before procedure. 6.   Please wear simple, loose fitting clothing to the hospital.  Do not bring valuables (money,             credit cards, checkbooks, etc.)     7. If you  have a Living Will and Durable Power of  for Healthcare, please bring in a copy. 8.   Please bring picture ID,  insurance card, paperwork from the doctors office            (H & P, Consent,  & card for implantable devices). 9.   Report to the information desk on the ground floor. 10. Take a shower the night before or morning of your procedure, do not apply any lotion, oil or powder. 11. If you are going to be sedated for the procedure, you will need a responsible adult to drive you home.

## 2022-12-06 ENCOUNTER — HOSPITAL ENCOUNTER (OUTPATIENT)
Dept: INTERVENTIONAL RADIOLOGY/VASCULAR | Age: 72
Discharge: HOME | End: 2022-12-06
Payer: MEDICARE

## 2022-12-06 VITALS
TEMPERATURE: 97.7 F | HEART RATE: 97 BPM | SYSTOLIC BLOOD PRESSURE: 137 MMHG | RESPIRATION RATE: 19 BRPM | WEIGHT: 180 LBS | DIASTOLIC BLOOD PRESSURE: 84 MMHG | OXYGEN SATURATION: 95 % | BODY MASS INDEX: 30.73 KG/M2 | HEIGHT: 64 IN

## 2022-12-06 DIAGNOSIS — N18.6 ESRD (END STAGE RENAL DISEASE) (HCC): ICD-10-CM

## 2022-12-06 LAB
APTT: 27.4 SECONDS (ref 25.1–37.1)
HCT VFR BLD CALC: 42.2 % (ref 37–47)
HEMOGLOBIN: 13.6 GM/DL (ref 12.5–16)
INR BLD: 0.83 INDEX
MCH RBC QN AUTO: 29.8 PG (ref 27–31)
MCHC RBC AUTO-ENTMCNC: 32.2 % (ref 32–36)
MCV RBC AUTO: 92.3 FL (ref 78–100)
PDW BLD-RTO: 16.8 % (ref 11.7–14.9)
PLATELET # BLD: 282 K/CU MM (ref 140–440)
PMV BLD AUTO: 12 FL (ref 7.5–11.1)
PROTHROMBIN TIME: 10.7 SECONDS (ref 11.7–14.5)
RBC # BLD: 4.57 M/CU MM (ref 4.2–5.4)
WBC # BLD: 6 K/CU MM (ref 4–10.5)

## 2022-12-06 PROCEDURE — 85027 COMPLETE CBC AUTOMATED: CPT

## 2022-12-06 PROCEDURE — 6370000000 HC RX 637 (ALT 250 FOR IP)

## 2022-12-06 PROCEDURE — 7100000010 HC PHASE II RECOVERY - FIRST 15 MIN

## 2022-12-06 PROCEDURE — 7100000011 HC PHASE II RECOVERY - ADDTL 15 MIN

## 2022-12-06 PROCEDURE — C1725 CATH, TRANSLUMIN NON-LASER: HCPCS

## 2022-12-06 PROCEDURE — 85610 PROTHROMBIN TIME: CPT

## 2022-12-06 PROCEDURE — 6360000002 HC RX W HCPCS

## 2022-12-06 PROCEDURE — 85730 THROMBOPLASTIN TIME PARTIAL: CPT

## 2022-12-06 PROCEDURE — 36902 INTRO CATH DIALYSIS CIRCUIT: CPT

## 2022-12-06 PROCEDURE — 6360000004 HC RX CONTRAST MEDICATION

## 2022-12-06 RX ORDER — SODIUM CHLORIDE 0.9 % (FLUSH) 0.9 %
10 SYRINGE (ML) INJECTION PRN
Status: DISCONTINUED | OUTPATIENT
Start: 2022-12-06 | End: 2022-12-07 | Stop reason: HOSPADM

## 2022-12-06 ASSESSMENT — PAIN - FUNCTIONAL ASSESSMENT
PAIN_FUNCTIONAL_ASSESSMENT: PREVENTS OR INTERFERES WITH MANY ACTIVE NOT PASSIVE ACTIVITIES
PAIN_FUNCTIONAL_ASSESSMENT: 0-10

## 2022-12-06 ASSESSMENT — PAIN DESCRIPTION - DESCRIPTORS: DESCRIPTORS: ACHING

## 2022-12-06 NOTE — OR NURSING
PROCEDURE PERFORMED:    Fistulagram                                           INFORMED CONSENT:  Obtained prior to procedure. Consent placed in chart. STAFF IN THE ROOM AT WHAT TIME:  [address] [facility] & STERILE TECHNIQUE:               Pt transferred to table and placed on monitor. Site prepped and draped in a sterile fashion with chlorhexadine.     PAIN/LOCAL ANESTHESIA/SEDATION MANAGEMENT:           Local: Lidocaine 1% given by     INTRAOPERATIVE:           ACCESS TIME: 1311           US/FLUORO:  FLUORO           CONTRAST/CC: ISOVUE [id number]     STERILE DRESSINGS: Guaze and tegaderm     REPORT GIVEN TO: Lucero PRINCE SDS

## 2022-12-06 NOTE — PROGRESS NOTES
1233 meds reviewed with nurse Maria Valente RN from [facility] via phone [phone] verified with nick that ok with no iv access

## 2022-12-06 NOTE — PROGRESS NOTES
1356  Patient arrived back to [facility]. Report given to this nurse from [facility]. Patient A&O. Dressing clean/dry/intake. Beverage of choice offered to patient. Call light in reach and bed in lowest position. 1416 IV removed. 1420 Discharge instructions given to Dragan. [place] used to place patient in wheel chair by this nurse and Agnieszka Head RN. Patient dressed assisted by This nurse and Dragan. 1430 Patient escorted to [facility] via wheelchair transported home by [facility]. 1431 report called to NIKI Rankin @[facility].

## 2022-12-27 PROBLEM — Z87.898 HISTORY OF PROGRESSIVE WEAKNESS: Status: ACTIVE | Noted: 2022-01-01

## 2022-12-27 NOTE — ED NOTES
Attempted line and labs multiple times and Pauly Huber had multiple attempts with no success, Dr Vania Araiza notified     Dulce Keller, RN  12/27/22 8834

## 2022-12-27 NOTE — CONSULTS
Arrow Endurance Ext Dwel RAPHAEL Brachial Vessel x2 attemps with ultrasound guidance. Labs drawn/sent.

## 2022-12-27 NOTE — ED PROVIDER NOTES
I independently examined and evaluated Delilah Gracia. I personally saw the patient and performed a substantive portion of the visit including all aspects of the medical decision making. In brief their history revealed patient is here with acute on chronic weakness to bilateral legs and arms as well as difficulty swallowing and drinking. Patient has been having problems with weakness for over a year and family has been trying to get patient into see a neurologist which has not happened yet. Family and patient report that over the last week or 2 her weakness has been progressive. Family reports that patient had previously been able to stand is no longer able to do this. Additionally patient is choking when she is trying to drink and is having worsening ability to speak. Patient has been at assisted living and since she has been there her symptoms have worsened. Patient has been complaining of some abdominal pain and some pain with urinating as well for the last few days. No vomiting or diarrhea. No fevers or chills. Patient also has fallen frequently with 11 reported falls. Patient does report some neck pain which is chronic and has been present since prior neck surgery. No new neck or back pains. Their focused exam revealed the patient is afebrile, slightly tachycardic but otherwise hemodynamically stable on room air. The patient appears age appropriate, appears well-hydrated, well-nourished. Appears very deconditioned. Mucous membranes are moist. Speech is clear. Breathing is unlabored. Speaking clearly in full sentences. Respiratory stress. Skin is dry. Mental status is normal. The patient moves all extremities and is without facial droop.     Results for orders placed or performed during the hospital encounter of 12/27/22   COVID-19, Rapid    Specimen: Nasopharyngeal   Result Value Ref Range    Source UNKNOWN     SARS-CoV-2, NAAT NOT DETECTED NOT DETECTED   Rapid Flu Swab    Specimen: Nasopharyngeal   Result Value Ref Range    Rapid Influenza A Ag NEGATIVE NEGATIVE    Rapid Influenza B Ag NEGATIVE NEGATIVE   CMP   Result Value Ref Range    Sodium 135 135 - 145 MMOL/L    Potassium 4.9 3.5 - 5.1 MMOL/L    Chloride 98 (L) 99 - 110 mMol/L    CO2 26 21 - 32 MMOL/L    BUN 73 (H) 6 - 23 MG/DL    Creatinine 4.1 (H) 0.6 - 1.1 MG/DL    Est, Glom Filt Rate 11 (L) >60 mL/min/1.73m2    Glucose 96 70 - 99 MG/DL    Calcium 9.3 8.3 - 10.6 MG/DL    Albumin 4.2 3.4 - 5.0 GM/DL    Total Protein 6.6 6.4 - 8.2 GM/DL    Total Bilirubin 0.2 0.0 - 1.0 MG/DL    ALT 14 10 - 40 U/L    AST 21 15 - 37 IU/L    Alkaline Phosphatase 102 40 - 129 IU/L    Anion Gap 11 4 - 16   Lipase   Result Value Ref Range    Lipase 116 (H) 13 - 60 IU/L   Troponin   Result Value Ref Range    Troponin T 0.097 (H) <0.01 NG/ML   Urinalysis   Result Value Ref Range    Color, UA YELLOW YELLOW    Clarity, UA CLOUDY (A) CLEAR    Glucose, Urine NEGATIVE NEGATIVE MG/DL    Bilirubin Urine NEGATIVE NEGATIVE MG/DL    Ketones, Urine NEGATIVE NEGATIVE MG/DL    Specific Gravity, UA 1.020 1.001 - 1.035    Blood, Urine SMALL NUMBER OR AMOUNT OBSERVED (A) NEGATIVE    pH, Urine 5.5 5.0 - 8.0    Protein, UA TRACE (A) NEGATIVE MG/DL    Urobilinogen, Urine 0.2 0.2 - 1.0 MG/DL    Nitrite Urine, Quantitative NEGATIVE NEGATIVE    Leukocyte Esterase, Urine MODERATE NUMBER OR AMOUNT OBSERVED (A) NEGATIVE   Lactate, Sepsis   Result Value Ref Range    Lactic Acid, Sepsis 0.9 0.5 - 1.9 mMOL/L   Brain Natriuretic Peptide   Result Value Ref Range    Pro-.8 <300 PG/ML   TSH   Result Value Ref Range    TSH, High Sensitivity 2.700 0.270 - 4.20 uIu/ml   CBC with Auto Differential   Result Value Ref Range    WBC 5.3 4.0 - 10.5 K/CU MM    RBC 4.52 4.2 - 5.4 M/CU MM    Hemoglobin 12.9 12.5 - 16.0 GM/DL    Hematocrit 40.3 37 - 47 %    MCV 89.2 78 - 100 FL    MCH 28.5 27 - 31 PG    MCHC 32.0 32.0 - 36.0 %    RDW 16.1 (H) 11.7 - 14.9 %    Platelets 620 214 - 994 K/CU MM    MPV 12.8 (H) 7.5 - 11.1 FL    Differential Type AUTOMATED DIFFERENTIAL     Segs Relative 79.1 (H) 36 - 66 %    Lymphocytes % 13.5 (L) 24 - 44 %    Monocytes % 5.2 (H) 0 - 4 %    Eosinophils % 1.3 0 - 3 %    Basophils % 0.7 0 - 1 %    Segs Absolute 4.2 K/CU MM    Lymphocytes Absolute 0.7 K/CU MM    Monocytes Absolute 0.3 K/CU MM    Eosinophils Absolute 0.1 K/CU MM    Basophils Absolute 0.0 K/CU MM    Nucleated RBC % 0.0 %    Total Nucleated RBC 0.0 K/CU MM    Total Immature Neutrophil 0.01 K/CU MM    Immature Neutrophil % 0.2 0 - 0.43 %   SPECIMEN REJECTION   Result Value Ref Range    Rejected Test CD     Reason for Rejection UNABLE TO PERFORM TESTING:    Microscopic Urinalysis   Result Value Ref Range    RBC, UA 48 (H) 0 - 6 /HPF    WBC,  (H) 0 - 5 /HPF    Bacteria, UA FEW (A) NEGATIVE /HPF    WBC Clumps, UA FEW /HPF    Squam Epithel, UA 11 /HPF    Mucus, UA RARE (A) NEGATIVE HPF    Trichomonas, UA NONE SEEN NONE SEEN /HPF    Hyaline Casts, UA 5 /LPF   EKG 12 Lead   Result Value Ref Range    Ventricular Rate 97 BPM    Atrial Rate 97 BPM    P-R Interval 188 ms    QRS Duration 100 ms    Q-T Interval 388 ms    QTc Calculation (Bazett) 492 ms    P Axis 15 degrees    R Axis -55 degrees    T Axis 45 degrees    Diagnosis       Normal sinus rhythm  Left axis deviation  Anterior infarct (cited on or before 21-OCT-2018)  Abnormal ECG  When compared with ECG of 12-JAN-2022 13:27,  QRS axis shifted left  Questionable change in initial forces of Septal leads  ST elevation now present in Inferior leads             12 lead EKG per my interpretation:  Normal Sinus Rhythm at 97  [place] is   Left axis deviation  QTc is  within an acceptable range  There is no specific T wave changes appreciated. There is no specific ST wave changes appreciated. No STEMI    Prior EKG to compare with was available and no clinically significant change in her morphology compared to prior from 1/12/22.     Starr Owens MD    ED course: Pt presents as above.  Emergent conditions considered. Presentation prompted initial labs and imaging. EKG is also obtained with normal sinus rhythm as above. Work-up does demonstrate a likely multifocal pneumonia as well as urinary tract infection. Patient is hemodynamically stable on room air and I have low suspicion for a severe sepsis or septic shock as lactic acid is normal and blood pressure is normal.  Patient is receiving IV broad-spectrum antibiotics. Patient is with underlying neurologic process and infection might be acutely worsening her weakness. Patient's nephrology team is consulted and will arrange for dialysis while in the hospital.  They are also recommending neurology evaluation given all the above which is very reasonable especially as patient been having trouble getting into see neurology. Case discussed with hospitalist team and patient admitted to medicine. Questions sought and answered with the patient. They voice understanding and agree with plan. Is this patient to be included in the SEP-1 Core Measure due to severe sepsis or septic shock? No   Exclusion criteria - the patient is NOT to be included for SEP-1 Core Measure due to:  2+ SIRS criteria are not met            All decisions regarding differential diagnosis, lab/radiology/EKG interpretation, risk of significant illness, specific reasons for performing tests, management/treatment, response to management/treatment, disposition, reasons for consults, results of consults, etc. were made by myself in conjunction with the Advanced Practice Provider. For all further details of the patient's emergency department visit, please see the Advanced Practice Provider's documentation.                   Chen Tom MD  12/27/22 5082       Chen Tom MD  12/27/22 2599

## 2022-12-28 NOTE — CARE COORDINATION
Reviewed chart- pt was admitted to [facility] from [address]. TC to [facility], confirmed pt is a long term resident and Medicaid bed hold and can return whenever medically stable.

## 2022-12-28 NOTE — ED NOTES
Pts son Hu Duke would like called when pt gets room [phone]     Dulce Keller, RN  12/27/22 1581 Karma Villeda, NIKI  12/27/22 3029

## 2022-12-28 NOTE — ED NOTES
ED TO INPATIENT SBAR HANDOFF    Patient Name: Suzanne Germain   :  1950  81 y.o. MRN:  [id number]  Preferred Name    ED Room #:  CG41/BD-97  Family/Caregiver Present no   Restraints no   Sitter no   Sepsis Risk Score Sepsis Risk Score: 2.29    Situation  Code Status: Prior No additional code details. Allergies: Percocet [oxycodone-acetaminophen], Tramadol, Vicodin [hydrocodone-acetaminophen], and Narcan [naloxone hcl]  Weight: Patient Vitals for the past 96 hrs (Last 3 readings):   Weight   22 1330 162 lb (73.5 kg)     Arrived from: nursing home  Chief Complaint:   Chief Complaint   Patient presents with    Altered Mental Status     Sent from [address] [place] [address] for altered mental status. Other     Patient says had dialysis Saturday [facility] claims has missed last two treatments. Hospital Problem/Diagnosis:  Active Problems:    * No active hospital problems. *  Resolved Problems:    * No resolved hospital problems. *    Imaging:   CT CERVICAL SPINE WO CONTRAST   Final Result   No acute fracture or traumatic malalignment of the cervical spine      Posterior fusion extends from C3-T2 with laminectomies at C4, C5, C6 and C7. The left pedicle at T2 extends into the left transverse process but does not   extend into the vertebral body. Tip of the left T2 pedicle screw is directly   adjacent to the posterior aortic arch. CT ABDOMEN PELVIS WO CONTRAST Additional Contrast? None   Final Result   1. Bilateral lower lobe consolidations concerning for multifocal pneumonia. Radiographic follow-up recommended to document resolution following treatment. 2.  Probable constipation. Colonic diverticulosis without diverticulitis. 3.  Mild nonspecific edema within the perirectal fat without appreciable wall   thickening. Correlation with digital rectal exam may be helpful.       4.  Nonspecific edema/skin thickening within the left lateral breast and   chest wall which may be iatrogenic or related to recent trauma. Follow-up   mammography may be useful as clinically warranted. 5.  Additional nonemergent findings, as above. CT HEAD WO CONTRAST   Final Result   No acute intracranial abnormality. XR CHEST PORTABLE   Final Result   Left basilar parenchymal opacity may represent atelectasis versus developing   infiltrate. Radiographic follow-up recommended to document resolution   following treatment, including PA/lateral views.            Abnormal labs:   Abnormal Labs Reviewed   COMPREHENSIVE METABOLIC PANEL - Abnormal; Notable for the following components:       Result Value    Chloride 98 (*)     BUN 73 (*)     Creatinine 4.1 (*)     Est, Glom Filt Rate 11 (*)     All other components within normal limits   LIPASE - Abnormal; Notable for the following components:    Lipase 116 (*)     All other components within normal limits   TROPONIN - Abnormal; Notable for the following components:    Troponin T 0.097 (*)     All other components within normal limits   URINALYSIS - Abnormal; Notable for the following components:    Clarity, UA CLOUDY (*)     Blood, Urine SMALL NUMBER OR AMOUNT OBSERVED (*)     Protein, UA TRACE (*)     Leukocyte Esterase, Urine MODERATE NUMBER OR AMOUNT OBSERVED (*)     All other components within normal limits   CBC WITH AUTO DIFFERENTIAL - Abnormal; Notable for the following components:    RDW 16.1 (*)     MPV 12.8 (*)     Segs Relative 79.1 (*)     Lymphocytes % 13.5 (*)     Monocytes % 5.2 (*)     All other components within normal limits   MICROSCOPIC URINALYSIS - Abnormal; Notable for the following components:    RBC, UA 48 (*)     WBC,  (*)     Bacteria, UA FEW (*)     Mucus, UA RARE (*)     All other components within normal limits     Critical values: yes     Abnormal Assessment Findings weakness, unable to walk or feed self    Background  History:   Past Medical History:   Diagnosis Date    Acid reflux     Anemia     Arthritis     \"Shoulders, Hips And Knees\"    CAD (coronary artery disease)     Sees Dr. Jeanmarie Lima    Cerebral artery occlusion with cerebral infarction [facility])     CHF (congestive heart failure) (HCC)     Chronic back pain     Chronic constipation     Chronic kidney disease     Sees Dr. Raisa Ray    COPD (chronic obstructive pulmonary disease) ([facility]     Sees Dr. Devyn Samuel    Depression     Diabetes mellitus [facility]) Dx 2012    Sees Dr. Dhillon Favor    Emphysema lung [facility])     Glaucoma     \"Both Eyes\"    Gout     Hemodialysis patient ([facility]     Hiatal hernia     History of blood transfusion 1960's    No Reaction To Blood Transfusion Received    [place] (hard of hearing)     Bilateral Ears    Hyperlipidemia     Hypertension     Dr. Merry Seymour    Itching     \"Whole Body Itches The [de-identified] Of The Time\"    Morbid obesity ([facility]     Rheumatoid arthritis ([facility]     Shortness of breath on exertion     Sleep apnea     Uses CPAP    Teeth missing     Upper And Lower    Thyroid disease     Thyroidectomy In 1993    Urinary incontinence     WD-Chronic buttock pain 5/9/2018    Wears glasses        Assessment    Vitals/MEWS: MEWS Score: 1  Level of Consciousness: Alert (0)   Vitals:    12/27/22 1902 12/27/22 2001 12/27/22 2222 12/27/22 2227   BP: 136/82 (!) 141/76 (!) 141/81 (!) 141/81   Pulse: 96 93 88 88   Resp: 21 20 20 20   Temp:   98.1 °F (36.7 °C) 98.1 °F (36.7 °C)   TempSrc:    Oral   SpO2: 100% 100% 100%    Weight:       Height:         FiO2 (%):   O2 Flow Rate: O2 Device: Nasal cannula O2 Flow Rate (L/min): 2 L/min  Cardiac Rhythm:    Pain Assessment: neck chronic [x] Verbal [] [place] Scale  Pain Scale: Pain Assessment  Pain Assessment: 0-10  Pain Level: 2  Pain Location: Neck  Pain Orientation: Posterior  Pain Descriptors: Aching  Pain Type: Chronic pain  Last documented pain score (0-10 scale) Pain Level: 2  Last documented pain medication administered: 1722 Fentanyl  Mental Status: oriented  NIH Score:    C-SSRS:    Bedside swallow:    [place] Coma Scale (GCS): [place] Coma Scale  Eye Opening: Spontaneous  Best Verbal Response: Oriented  Best Motor Response: Obeys commands  James Coma Scale Score: 15  Active LDA's:    PO Status: Regular  Pertinent or High Risk Medications/Drips: no   If Yes, please provide details:   Pending Blood Product Administration: no     You may also review the ED PT Care Timeline found under the Summary Nursing Index tab. Recommendation    Pending orders   Plan for Discharge (if known):    Additional Comments:    If any further questions, please call Sending RN at [phone]    Electronically signed by: Electronically signed by Abhijeet Shanks RN on 12/27/2022 at 10:27 PM       Larry Pizano RN  12/27/22 1269

## 2022-12-28 NOTE — PLAN OF CARE
Problem: Discharge Planning  Goal: Discharge to home or other facility with appropriate resources  Outcome: Progressing  Flowsheets (Taken 12/28/2022 0800)  Discharge to home or other facility with appropriate resources: Identify barriers to discharge with patient and caregiver     Problem: Pain  Goal: Verbalizes/displays adequate comfort level or baseline comfort level  Outcome: Progressing  Flowsheets (Taken 12/28/2022 1030)  Verbalizes/displays adequate comfort level or baseline comfort level: Encourage patient to monitor pain and request assistance     Problem: Skin/Tissue Integrity  Goal: Absence of new skin breakdown  Description: 1. Monitor for areas of redness and/or skin breakdown  2. Assess vascular access sites hourly  3. Every 4-6 hours minimum:  Change oxygen saturation probe site  4. Every 4-6 hours:  If on nasal continuous positive airway pressure, respiratory therapy assess nares and determine need for appliance change or resting period.   Outcome: Progressing     Problem: Safety - Adult  Goal: Free from fall injury  Outcome: Progressing

## 2022-12-28 NOTE — ED PROVIDER NOTES
[address] ENCOUNTER        Pt Name: Suzanne Germain  MRN: [id number]  [place] 1950  Date of evaluation: 12/27/2022  Provider: FIDEL Mendez - MENG  PCP: Jameson Pineda MD     I have seen and evaluated this patient with my supervising physician Dinora Virgen MD.      Triage CHIEF COMPLAINT       Chief Complaint   Patient presents with    Altered Mental Status     Sent from  [facility] for altered mental status. Other     Patient says had dialysis Saturday [facility] claims has missed last two treatments. HISTORY OF PRESENT ILLNESS      Chief Complaint: Delirium, increasing weakness, dysuria    Suzanne Germain is a 81 y.o. female who presents from  [facility] for evaluation of delirium intermittently over the last couple of days as well as increasing generalized weakness and dysuria. Patient has a history of end-stage renal disease on dialysis and reports she missed dialysis yesterday. She reports that she has had increasing weakness over the last 2 weeks with dysuria over the last few days. She denies any URI symptoms but was newly placed on oxygen 1 week ago because of shortness of breath. She is accompanied by her son who reports that approximately 6 months ago the patient started developing progressive weakness over her body that started with her limbs and increased falling and is beginning to affect her swallowing and has affected her voice. She has an appointment with neurology for evaluation in 1 week which she has been waiting for for some time and is unsure what is causing the symptoms. They noticed a significant progression over the last 2 weeks to the point that she is not able to hold her cell phone or feed herself any longer. Patient is continent but wears a depends because she is not able to get up to go to the bathroom.   She denies any known fevers or chills, diarrhea, abdominal pain, chest pain. Nursing Notes were all reviewed and agreed with or any disagreements were addressed in the HPI.     REVIEW OF SYSTEMS     Constitutional:   Denies fever, chills  HENT:   Denies sore throat or ear pain   Cardiovascular:   Denies chest pain, palpitations   Respiratory:  Denies cough, + shortness of breath    GI:   Denies abdominal pain, nausea, vomiting, or diarrhea  : See HPI  Musculoskeletal:   Denies neck, back, or extremity pain  Skin:   Denies rash  Neurologic:   Denies headache, focal weakness or sensory changes   PAST MEDICAL HISTORY     Past Medical History:   Diagnosis Date    Acid reflux     Anemia     Arthritis     \"Shoulders, Hips And Knees\"    CAD (coronary artery disease)     Sees Dr. Vladimir Keyes    Cerebral artery occlusion with cerebral infarction ([facility]     CHF (congestive heart failure) (HCC)     Chronic back pain     Chronic constipation     Chronic kidney disease     Sees Dr. Kamari Tamayo    COPD (chronic obstructive pulmonary disease) [facility]     Sees Dr. Jason Calderon    Depression     Diabetes mellitus [facility]) Dx 2012    Sees Dr. Annie Soliz    Emphysema lung [facility])     Glaucoma     \"Both Eyes\"    Gout     Hemodialysis patient ([facility]     Hiatal hernia     History of blood transfusion 1960's    No Reaction To Blood Transfusion Received    [place] (hard of hearing)     Bilateral Ears    Hyperlipidemia     Hypertension     Dr. Gurpreet Paul    Itching     \"Whole Body Itches The Majority Of The Time\"    Morbid obesity ([facility]     Rheumatoid arthritis ([facility]     Shortness of breath on exertion     Sleep apnea     Uses CPAP    Teeth missing     Upper And Lower    Thyroid disease     Thyroidectomy In 1993    Urinary incontinence     WD-Chronic buttock pain 5/9/2018    Wears glasses        SURGICAL HISTORY     Past Surgical History:   Procedure Laterality Date    APPENDECTOMY  1968    AV FISTULA CREATION Left 2021    BACK SURGERY  2017    Lower Back With Hardware    BACK SURGERY 2016    Cervical Back With Hardware    CARPAL TUNNEL RELEASE Right 1993     SECTION  3-30-68 And 10-17-69    COLONOSCOPY  2017    Polyps Removed    CORONARY ANGIOPLASTY  2016    Heart Stent D Circ    DENTAL SURGERY      Teeth Extracted In Past    DILATION AND CURETTAGE OF UTERUS  Last Done In [id number] [place] [id number] 281    \"Numerous\"    ENDOSCOPY, COLON, DIAGNOSTIC  2017    HYSTERECTOMY (CERVIX STATUS UNKNOWN)      Partial Abdominal Hysterectomy    IR TUNNELED CATHETER PLACEMENT GREATER THAN 5 YEARS  2019    IR TUNNELED CATHETER PLACEMENT GREATER THAN 5 YEARS 2019 [facility]    IR TUNNELED CATHETER PLACEMENT GREATER THAN 5 YEARS  3/3/2021    IR TUNNELED CATHETER PLACEMENT GREATER THAN 5 YEARS [facility]    ROTATOR CUFF REPAIR Left 2010    SLEEVE GASTRECTOMY  2018    robotic assisted gastric sleeve, hiatal hernia repair    THYROIDECTOMY         CURRENTMEDICATIONS       Previous Medications    ACETAMINOPHEN (TYLENOL) 500 MG TABLET    Take 1 tablet by mouth 3 times daily as needed for Pain    ADALIMUMAB (HUMIRA PEN) 40 MG/0.4ML PNKT    Humira(CF) Pen 40 mg/0.4 mL subcutaneous kit    ALLOPURINOL (ZYLOPRIM) 100 MG TABLET    Take 2 tablets by mouth daily    AMLODIPINE (NORVASC) 5 MG TABLET    Take 1 tablet by mouth daily    ASPIRIN (ASPIRIN CHILDRENS) 81 MG CHEWABLE TABLET    Take 1 tablet by mouth daily    ATORVASTATIN (LIPITOR) 80 MG TABLET    Take 80 mg by mouth nightly    BENZONATATE (TESSALON) 100 MG CAPSULE    Take 100 mg by mouth 3 times daily as needed for Cough    DICLOFENAC SODIUM (VOLTAREN) 1 % GEL    APPLY TOPICALLY 2 TIMES DAILY APPLY TO LOW BACK AND FLANK AREA TWICE A DAY    FLUOCINOLONE (DERMA-SMOOTHE) 0.01 % EXTERNAL OIL    Apply topically 3 times daily Apply topically.     FLUTICASONE (FLONASE) 50 MCG/ACT NASAL SPRAY    2 sprays by Nasal route every morning     HYDROXYZINE (ATARAX) 25 MG TABLET    Take 25 mg by mouth 3 times daily as needed for Itching IPRATROPIUM-ALBUTEROL (DUONEB) 0.5-2.5 (3) MG/3ML SOLN NEBULIZER SOLUTION    Inhale 3 mLs into the lungs every 4 hours    KETOCONAZOLE (NIZORAL) 2 % CREAM    Apply topically daily Apply topically daily. KETOCONAZOLE (NIZORAL) 2 % SHAMPOO    Apply topically daily as needed. LEVOMILNACIPRAN (FETZIMA) 40 MG CP24 EXTENDED RELEASE CAPSULE    Take 1 capsule by mouth daily    LEVOTHYROXINE (SYNTHROID) 100 MCG TABLET    Take 100 mcg by mouth daily    LIDOCAINE VISCOUS HCL (XYLOCAINE) 2 % SOLN SOLUTION    Take 15 mLs by mouth as needed for Irritation    LORAZEPAM (ATIVAN) 0.5 MG TABLET    Take 0.5 mg by mouth 2 times daily. MIDODRINE (PROAMATINE) 10 MG TABLET    TAKE 1 TABLET BY MOUTH DAILY TAKE ONE TO TWO TABLET THREE TIMES A WEEK PRIOR TO EACH DIALYSIS TREATMENT    MONTELUKAST (SINGULAIR) 10 MG TABLET    Take 1 tablet by mouth nightly    MUCUS RELIEF 600 MG EXTENDED RELEASE TABLET    TAKE 1 TABLET BY MOUTH TWICE A DAY    MULTIPLE VITAMIN (DAILY-NICOLE) TABS    TAKE ONE TABLET BY MOUTH DAILY    MUPIROCIN (BACTROBAN) 2 % OINTMENT    Apply topically 3 times daily Apply topically 3 times daily. NEBULIZER MISC    Inhale into the lungs as needed    NITROGLYCERIN (NITROSTAT) 0.4 MG SL TABLET    Place 0.4 mg under the tongue every 5 minutes as needed for Chest pain    ONDANSETRON (ZOFRAN) 4 MG TABLET    Take 1 tablet by mouth every 8 hours as needed for Nausea or Vomiting    PANTOPRAZOLE (PROTONIX) 40 MG TABLET    Take 1 tablet by mouth 2 times daily    PREDNISONE (DELTASONE) 20 MG TABLET    Take 20 mg by mouth daily    PROMETHAZINE (PHENERGAN) 25 MG TABLET    Take 25 mg by mouth every 6 hours as needed for Nausea    PROMETHAZINE (PHENERGAN) 25 MG TABLET    Take 1 tablet by mouth daily for 10 days.     QUETIAPINE (SEROQUEL XR) 300 MG EXTENDED RELEASE TABLET    Take 50 mg by mouth nightly     RANOLAZINE (RANEXA) 500 MG EXTENDED RELEASE TABLET    Take 500 mg by mouth 2 times daily    REFRESH OPTIVE 0.5-0.9 % SOLN    Place 2 drops into both eyes 2 times daily    SEVELAMER (RENVELA) 800 MG TABLET    Take one tablet by mouth three times a day and 1 tablet by mouth with snack    SULFACETAMIDE (BLEPH-10) 10 % OPHTHALMIC OINTMENT    every 6 hours Every 6 hours. TIOTROPIUM (SPIRIVA RESPIMAT) 2.5 MCG/ACT AERS INHALER    Inhale 2 puffs into the lungs daily    TIOTROPIUM BROMIDE-OLODATEROL (STIOLTO RESPIMAT) 2.5-2.5 MCG/ACT AERS    Inhale 2 puffs into the lungs daily    TRIAMCINOLONE (ARISTOCORT) 0.5 % CREAM    Apply topically 3 times daily Apply topically 3 times daily. ALLERGIES     Percocet [oxycodone-acetaminophen], Tramadol, Vicodin [hydrocodone-acetaminophen], and Narcan [naloxone hcl]    FAMILYHISTORY       Family History   Problem Relation Age of Onset    Kidney Disease Mother         \"Kidney Failure\"    Heart Disease Mother         CHF    Arthritis Mother     Diabetes Mother     Depression Mother     High Blood Pressure Mother     Obesity Mother     Vision Loss Mother     Heart Attack Father     Heart Disease Father         Heart Attack    Diabetes Father     High Blood Pressure Father     High Blood Pressure Daughter         SOCIAL HISTORY       Social History     Socioeconomic History    Marital status: Single     Spouse name: None    Number of children: 2    Years of education: None    Highest education level: None   Tobacco Use    Smoking status: Never    Smokeless tobacco: Never   Vaping Use    Vaping Use: Never used   Substance and Sexual Activity    Alcohol use: No    Drug use: No    Sexual activity: Never       SCREENINGS    [place] Coma Scale  Eye Opening: Spontaneous  Best Verbal Response: Oriented  Best Motor Response: Obeys commands  James Coma Scale Score: 15      PHYSICAL EXAM       ED Triage Vitals [12/27/22 1330]   BP Temp Temp Source Heart Rate Resp SpO2 Height Weight   136/80 98 °F (36.7 °C) Oral 97 16 99 % 5' 4\" (1.626 m) 162 lb (73.5 kg)      Constitutional:  Well developed, Well nourished.   No distress  HENT:  Normocephalic, Atraumatic, PERRL. EOMI. Sclera clear. Conjunctiva normal, No discharge. Neck/Lymphatics: supple, no swollen nodes  Cardiovascular:   RRR,  no murmurs/rubs/gallops. Distal cap refill and pulses intact bilateral upper and lower extremities. Mild peripheral edema distal to mid shin  Respiratory:   Nonlabored breathing. Normal breath sounds, No wheezing  Abdomen: Bowel sounds normal, Soft, Mild lower abdominal tenderness with R/R/G, no masses. Musculoskeletal:  There is no edema, asymmetry, or calf / thigh tenderness bilaterally. Reduced strength 3/5 lower extremities and 4/5 bilateral upper extremities. Unable to hold arms and legs in air to assess drift. Integument:   Warm, Dry, No rashes. Neurologic:  Alert & oriented x4 , No focal deficits noted. Cranial nerves II through XII grossly intact. Sensation intact. Speech is fluid and articulate but voice is hoarse.   Psychiatric:  Affect normal, Mood normal.     DIAGNOSTIC RESULTS   LABS:    Labs Reviewed   COMPREHENSIVE METABOLIC PANEL - Abnormal; Notable for the following components:       Result Value    Chloride 98 (*)     BUN 73 (*)     Creatinine 4.1 (*)     Est, Glom Filt Rate 11 (*)     All other components within normal limits   LIPASE - Abnormal; Notable for the following components:    Lipase 116 (*)     All other components within normal limits   TROPONIN - Abnormal; Notable for the following components:    Troponin T 0.097 (*)     All other components within normal limits   URINALYSIS - Abnormal; Notable for the following components:    Clarity, UA CLOUDY (*)     Blood, Urine SMALL NUMBER OR AMOUNT OBSERVED (*)     Protein, UA TRACE (*)     Leukocyte Esterase, Urine MODERATE NUMBER OR AMOUNT OBSERVED (*)     All other components within normal limits   CBC WITH AUTO DIFFERENTIAL - Abnormal; Notable for the following components:    RDW 16.1 (*)     MPV 12.8 (*)     Segs Relative 79.1 (*)     Lymphocytes % 13.5 (*)     Monocytes % 5.2 (*)     All other components within normal limits   MICROSCOPIC URINALYSIS - Abnormal; Notable for the following components:    RBC, UA 48 (*)     WBC,  (*)     Bacteria, UA FEW (*)     Mucus, UA RARE (*)     All other components within normal limits   COVID-19, RAPID   RAPID INFLUENZA A/B ANTIGENS   CULTURE, BLOOD 1   CULTURE, BLOOD 1   CULTURE, MRSA, SCREENING   CULTURE, URINE   LACTATE, SEPSIS   BRAIN NATRIURETIC PEPTIDE   TSH   SPECIMEN REJECTION   LACTATE, SEPSIS   TROPONIN       When ordered, only abnormal lab results are displayed. All other labs were within normal range or not returned as of this dictation. EKG: When ordered, EKG's are interpreted by the Emergency Department Physician in the absence of a cardiologist.  Please see their note for interpretation of EKG. RADIOLOGY:   Non-plain film images such as CT, Ultrasound and MRI are read by the radiologist. Plain radiographic images are visualized and preliminarily interpreted by the  ED Provider with the below findings:    Interpretation perthe Radiologist below, if available at the time of this note:    CT CERVICAL SPINE WO CONTRAST   Final Result   No acute fracture or traumatic malalignment of the cervical spine      Posterior fusion extends from C3-T2 with laminectomies at C4, C5, C6 and C7. The left pedicle at T2 extends into the left transverse process but does not   extend into the vertebral body. Tip of the left T2 pedicle screw is directly   adjacent to the posterior aortic arch. CT ABDOMEN PELVIS WO CONTRAST Additional Contrast? None   Final Result   1. Bilateral lower lobe consolidations concerning for multifocal pneumonia. Radiographic follow-up recommended to document resolution following treatment. 2.  Probable constipation. Colonic diverticulosis without diverticulitis. 3.  Mild nonspecific edema within the perirectal fat without appreciable wall   thickening.   Correlation with digital rectal exam may be helpful. 4.  Nonspecific edema/skin thickening within the left lateral breast and   chest wall which may be iatrogenic or related to recent trauma. Follow-up   mammography may be useful as clinically warranted. 5.  Additional nonemergent findings, as above. CT HEAD WO CONTRAST   Final Result   No acute intracranial abnormality. XR CHEST PORTABLE   Final Result   Left basilar parenchymal opacity may represent atelectasis versus developing   infiltrate. Radiographic follow-up recommended to document resolution   following treatment, including PA/lateral views. CT ABDOMEN PELVIS WO CONTRAST Additional Contrast? None    Result Date: 12/27/2022  EXAMINATION: CT OF THE ABDOMEN AND PELVIS WITHOUT CONTRAST 12/27/2022 3:42 pm TECHNIQUE: CT of the abdomen and pelvis was performed without the administration of intravenous contrast. Multiplanar reformatted images are provided for review. Automated exposure control, iterative reconstruction, and/or weight based adjustment of the mA/kV was utilized to reduce the radiation dose to as low as reasonably achievable. COMPARISON: 04/30/2017 and 12/27/2022 HISTORY: ORDERING SYSTEM PROVIDED HISTORY: abd pain, generalized weakness TECHNOLOGIST PROVIDED HISTORY: Reason for exam:->abd pain, generalized weakness Additional Contrast?->None Decision Support Exception - unselect if not a suspected or confirmed emergency medical condition->Emergency Medical Condition (MA) Reason for Exam: abd pain, generalized weakness FINDINGS: Evaluation of the solid and hollow abdominal viscera is limited without intravenous contrast administration. Study limited by motion and beam hardening artifacts. Lower Chest:   Trace left pleural fluid. Lower lobe parenchymal consolidations bilaterally, left greater than right. Organs: Punctate nonobstructing superior right renal calculus. Otherwise unremarkable.  GI/Bowel:   Colonic diverticulosis without diverticulitis. Moderate to large amount of retained colonic stool suggesting constipation. Mild nonspecific edema within the perirectal fat. Status post bariatric surgery. Pelvis: Hysterectomy. Peritoneum/Retroperitoneum:   Mild-to-moderate atherosclerotic calcification of the abdominal aorta and major branch vessels. Bones/Soft Tissues:   Nonspecific edema/skin thickening within the lateral left breast/chest wall. Posterior ronald/screw fixation of the L4-S1 levels bilaterally. Multilevel disc disease. Interval development of a lytic focus within the superior L2 vertebral body measuring 1.6 x 0.8 cm, likely an intravertebral disc herniation. 1.  Bilateral lower lobe consolidations concerning for multifocal pneumonia. Radiographic follow-up recommended to document resolution following treatment. 2.  Probable constipation. Colonic diverticulosis without diverticulitis. 3.  Mild nonspecific edema within the perirectal fat without appreciable wall thickening. Correlation with digital rectal exam may be helpful. 4.  Nonspecific edema/skin thickening within the left lateral breast and chest wall which may be iatrogenic or related to recent trauma. Follow-up mammography may be useful as clinically warranted. 5.  Additional nonemergent findings, as above. CT HEAD WO CONTRAST    Result Date: 12/27/2022  EXAMINATION: CT OF THE HEAD WITHOUT CONTRAST  12/27/2022 3:41 pm TECHNIQUE: CT of the head was performed without the administration of intravenous contrast. Automated exposure control, iterative reconstruction, and/or weight based adjustment of the mA/kV was utilized to reduce the radiation dose to as low as reasonably achievable.  COMPARISON: 01/12/2022 HISTORY: ORDERING SYSTEM PROVIDED HISTORY: frequent falls, generalized weakness, difficulty swallowing TECHNOLOGIST PROVIDED HISTORY: Reason for exam:->frequent falls, generalized weakness, difficulty swallowing Has a \"code stroke\" or \"stroke alert\" been called? ->No Decision Support Exception - unselect if not a suspected or confirmed emergency medical condition->Emergency Medical Condition (MA) Reason for Exam: frequent falls, generalized weakness, difficulty swallowing FINDINGS: BRAIN/VENTRICLES: There is mild generalized atrophy and there is mild periventricular and subcortical white matter low attenuation that is nonspecific but most consistent with chronic small vessel ischemia. There is a small remote right basal ganglia lacunar infarct. There are few bilateral basal ganglia calcifications. There is no acute intracranial hemorrhage, mass effect or midline shift. No abnormal extra-axial fluid collection. The gray-white differentiation is maintained without evidence of an acute infarct. ORBITS: The visualized portion of the orbits demonstrate no acute abnormality. SINUSES: The visualized paranasal sinuses and mastoid air cells demonstrate no acute abnormality. Mild mucosal thickening in the right sphenoid locule. SOFT TISSUES/SKULL:  No acute abnormality of the visualized skull or soft tissues. No acute intracranial abnormality. CT CERVICAL SPINE WO CONTRAST    Result Date: 12/27/2022  EXAMINATION: CT OF THE CERVICAL SPINE WITHOUT CONTRAST 12/27/2022 3:41 pm TECHNIQUE: CT of the cervical spine was performed without the administration of intravenous contrast. Multiplanar reformatted images are provided for review. Automated exposure control, iterative reconstruction, and/or weight based adjustment of the mA/kV was utilized to reduce the radiation dose to as low as reasonably achievable.  COMPARISON: 12/29/2020 HISTORY: ORDERING SYSTEM PROVIDED HISTORY: frequent falls, chronic falls, generalized weakness, difficulty swallowing TECHNOLOGIST PROVIDED HISTORY: Reason for exam:->frequent falls, chronic falls, generalized weakness, difficulty swallowing Decision Support Exception - unselect if not a suspected or confirmed emergency medical condition->Emergency Medical Condition (MA) Reason for Exam: frequent falls, chronic falls, generalized weakness, difficulty swallowing FINDINGS: BONES/ALIGNMENT: The previously noted cervical fusion from C3-C6 has been extended inferiorly to T2 with posterior fixation rods and pedicle screws at T1 and T2. The left pedicle screw at T2 extends into the left transverse process but does not extend into the vertebral body. The tip of the left T2 pedicle screw is directly adjacent to the posterior aortic arch on the last image obtained (axial image 74). Pedicle screws at T1 are in satisfactory position. Remote C4, C5, C6 and C7 laminectomies. There is no acute fracture or traumatic malalignment of the cervical spine. DEGENERATIVE CHANGES: There are multilevel degenerative changes in the cervical spine. There is new bone fusion of the disc space at C7-T1. SOFT TISSUES: There is no prevertebral soft tissue swelling. No acute fracture or traumatic malalignment of the cervical spine Posterior fusion extends from C3-T2 with laminectomies at C4, C5, C6 and C7. The left pedicle at T2 extends into the left transverse process but does not extend into the vertebral body. Tip of the left T2 pedicle screw is directly adjacent to the posterior aortic arch. XR CHEST PORTABLE    Result Date: 12/27/2022  EXAMINATION: ONE XRAY VIEW OF THE CHEST 12/27/2022 3:34 pm COMPARISON: 07/08/2022 HISTORY: ORDERING SYSTEM PROVIDED HISTORY: weakness TECHNOLOGIST PROVIDED HISTORY: Reason for exam:->weakness Reason for Exam: weakness FINDINGS: Fusion hardware again noted at the cervicothoracic junction. Cardiomediastinal silhouette appears unremarkable. Low lung volumes accentuate the central bronchovascular markings bilaterally. Small left basilar parenchymal opacity may represent atelectasis or developing infiltrate. Minimal right basilar atelectasis. No effusion or pneumothorax. No aggressive osseous lesion.      Left basilar parenchymal opacity may represent atelectasis versus developing infiltrate. Radiographic follow-up recommended to document resolution following treatment, including PA/lateral views. PROCEDURES   Unless otherwise noted below, none         CRITICAL CARE   CRITICAL CARE NOTE:   N/A    CONSULTS:  PHARMACY TO DOSE VANCOMYCIN  IP CONSULT TO NEPHROLOGY  IP CONSULT TO CARDIOLOGY      EMERGENCY DEPARTMENT COURSE and MDM:   Vitals:    Vitals:    12/27/22 1756 12/27/22 1802 12/27/22 1902 12/27/22 2001   BP: (!) 142/81 137/74 136/82 (!) 141/76   Pulse: 98 95 96 93   Resp: 16 19 21 20   Temp:       TempSrc:       SpO2: 100% 100% 100% 100%   Weight:       Height:           Patient was given thefollowing medications:  Medications   ondansetron (ZOFRAN) injection 4 mg (4 mg IntraVENous Given 12/27/22 1723)   fentaNYL (SUBLIMAZE) injection 25 mcg (25 mcg IntraVENous Given 12/27/22 1722)   cefepime (MAXIPIME) 2000 mg IVPB minibag (0 mg IntraVENous Stopped 12/27/22 1817)   vancomycin (VANCOCIN) 1,250 mg in dextrose 5 % 250 mL IVPB ([id number]) (0 mg IntraVENous Stopped 12/27/22 2118)           Sepsis Consideration    Exclusion criteria - the patient is NOT to be included for SEP-1 Core Measure due to:  2+ SIRS criteria are not met      MDM:  Patient presents as above. Emergent etiologies considered. Patient seen and examined. Work-up initiated secondary to presentation, physical exam findings, vital signs and medical chart review. Marilee Morgan CNP, am the primary clinician of record. In brief, patient presented for evaluation of delirium for the last couple of days night as well as increasing generalized weakness and dysuria. On exam, the patient has weakness of all extremities. Unable to assess drift due to reduced strength and inability to hold limbs. Patient reports this has been progressive over the last 6 months after having a stroke but has increased significantly over the last 2 weeks.   There are no focal deficits. She reports progressive muscular weakness with progressive reduction in her voice strength and swallowing ability. She has not been evaluated by speech therapy or had a swallow evaluation in the facility. She was scheduled to be evaluated by neurology as an outpatient in 1 to 2 weeks. She is ESRD on hemodialysis and missed dialysis yesterday. Laboratory studies including a CMP and CBC were significant for BUN 73/creatinine 4.1 which are stable. Initial troponin was 0.097. ECG showed a normal sinus rhythm without any signs concerning for ACS. Please see attending note for complete interpretation. Repeat troponin is pending. She is not having any chest pain and has no prior history of cardiac disease. Will consult cardiology and trend troponins. This may be secondary to her ESRD. TSH was normal.  BNP was normal.  Initial lactate was 0.9. Blood cultures were ordered and are pending. Rapid COVID and flu were both negative. Urinalysis is concerning for UTI with 265 WBCs. Urine culture pending. CT of the head was negative. C-spine CT showed multilevel chronic degenerative disease without any acute changes. CT of the abdomen and pelvis demonstrated bilateral lower lobe consolidations concerning for multifocal pneumonia, constipation with nonspecific edema in the perirectal fat without appreciable wall thickening and nonspecific skin thickening/edema in the left lateral breast.  Chest x-ray also indicated a left basilar opacity that could represent atelectasis versus infiltrate. Patient was given dose of Rocephin as well as IV vancomycin to cover for HCAP and UTI. She will be admitted for further evaluation and care of her UTI, bibasilar pneumonia, generalized weakness, and delirium. 2146:  Discussed with Dr. Samir Barraza, cardiology, and updated on ED presentation, history, lab, ECG results. Will follow patient. No new recs. 2150:  Discussed case with Dr. Amanda Senior.   Agrees with admission. Will plan for HD tomorrow. Recommends neurology consultation to evaluate for progressive weakness. 2214: Discussed case with Dr. Sia Sanford, hospitalist and updated on ED presentation, imaging, lab results. Will accept the patient. CLINICAL IMPRESSION      1. Acute cystitis with hematuria    2. HCAP (healthcare-associated pneumonia)    3. ESRD (end stage renal disease) ([facility] [address]    4. Generalized weakness          DISPOSITION/PLAN   DISPOSITION Decision To Admit 12/27/2022 10:00:34 PM      PATIENT REFERREDTO:  No follow-up provider specified.     DISCHARGE MEDICATIONS:  New Prescriptions    No medications on file       DISCONTINUED MEDICATIONS:  Discontinued Medications    No medications on file              (Please note that portions ofthis note were completed with a voice recognition program.  Efforts were made to edit the dictations but occasionally words are mis-transcribed.)    FIDEL Dunne CNP (electronically signed)             Janalyn Hodgkin, APRN - CNP  12/27/22 2212

## 2022-12-28 NOTE — H&P
History and Physical      Name:  Gillian Long /Age/Sex: 1950  (81 y.o. female)   MRN & CSN:  [id number] & [id number] Encounter Date/Time: 2022 10:57 PM EST   Location:  ED30/ED-30 PCP: Kiran Ochoa MD       Hospital Day: 1    Assessment and Plan:     Patient is a 65-year-old female who presented with chronic progressive weakness at [facility]. Hx is very limited as patient unable to provide significant amount of details. # Chronic progressive weakness  - Having progressive weakness in all 4 extremities with difficulty swallowing worsened over past 2 weeks. Requiring assistance with ADLs.   - Exam non-focal, no ocular involvement. Labs unremarkable. CTH non-acute. On chronic Prednisone for RA. Hx of posterior C3-T2 posterior fusion. Hx of CVA in .  - Neurology consulted, follow-up.   - PT/OT/SLP consulted. # ESRD on HD MF  - Last HD .  - Nephrology following, plan for HD on . # NSTEMI, type II  - Denied any CP.  - Initial Tn mildly elevated, likely in setting of ESRD. ECG without acute ischemic changes. - Follow-up repeat Tn. # Community-acquired pneumonia  - No leukocytosis. On RA. CT showing multifocal pneumonia.  - Started on Vanc/Cefepime in the ED, continue for now. - Follow-up cultures. # Asymptomatic bacteruria    - Denied any urinary symptoms.   - Treatment not indicated. Checklist:  Advanced directive: full  Antibiotics: as above  Catheter: none  DVT ppx: Heparin  PT/OT:  ordered    Disposition: patient requires continued admission due to chronic progressive weakness. MDM: moderate. History of Present Illness:     Chief Complaint: weakness    Patient is a 65-year-old female with a PMHx of CAD, CVA in , ESRD on HD MF, HTN, HLD, gout, RA and hypothyroidism who presented to the ED from [facility] due to progressive weakness in all 4 extremities with difficulty swallowing worsened over past 2 weeks. Requiring assistance with ADLs.  Hx is otherwise very limited as patient unable to provide significant amount of details. No family at bedside. ROS:     Ten point ROS reviewed negative, unless as noted above. Objective:     No intake or output data in the 24 hours ending 12/27/22 2257     Vitals:   Vitals:    12/27/22 1902 12/27/22 2001 12/27/22 2222 12/27/22 2227   BP: 136/82 (!) 141/76 (!) 141/81 (!) 141/81   Pulse: 96 93 88 88   Resp: 21 20 20 20   Temp:   98.1 °F (36.7 °C) 98.1 °F (36.7 °C)   TempSrc:    Oral   SpO2: 100% 100% 100%    Weight:       Height:         BMI: Body mass index is 27.81 kg/m². General: Awake. WDWN. AAOx2, inattentive. HEENT: PERRLA. Neck: Supple. No JVD. CV: RRR. NL S1/S2. No peripheral edema. Pulm: NL effort on 2L NC.   GI: +BS x4.  : No CVA or suprapubic tenderness. Skin: Intact. Normal coloration, warm, dry. MSK: No gross joint deformities. Full ROM. Muscle strength is 3/5. Neuro: CNs grossly intact. Slow speech. Past History:      PMHx:   Past Medical History:   Diagnosis Date    Acid reflux     Anemia     Arthritis     \"Shoulders, Hips And Knees\"    CAD (coronary artery disease)     Sees Dr. Adelita Parry    Cerebral artery occlusion with cerebral infarction ([facility] [address]     CHF (congestive heart failure) (HCC)     Chronic back pain     Chronic constipation     Chronic kidney disease     Sees Dr. Kelle Pereyra    COPD (chronic obstructive pulmonary disease) ([facility] [address]     Sees Dr. Daniella Park    Depression     Diabetes mellitus [facility]) Dx 2012    Sees Dr. Kartik Sun    Emphysema lung [facility])     Glaucoma     \"Both Eyes\"    Gout     Hemodialysis patient ([facility] [address]     Hiatal hernia     History of blood transfusion 1960's    No Reaction To Blood Transfusion Received    [place] (hard of hearing)     Bilateral Ears    Hyperlipidemia     Hypertension     Dr. Mendez Favor    Itching     \"Whole Body Itches The [de-identified] Of The Time\"    Morbid obesity ([facility] [address]     Rheumatoid arthritis ([facility] [address]     Shortness of breath on exertion     Sleep apnea Uses CPAP    Teeth missing     Upper And Lower    Thyroid disease     Thyroidectomy In     Urinary incontinence     WD-Chronic buttock pain 2018    Wears glasses        PSHx:   Past Surgical History:   Procedure Laterality Date    APPENDECTOMY  1968    AV FISTULA CREATION Left     BACK SURGERY  2017    Lower Back With Hardware    BACK SURGERY  2016    Cervical Back With Hardware    CARPAL TUNNEL RELEASE Right 1993     SECTION  3-30-68 And 10-17-69    COLONOSCOPY  2017    Polyps Removed    CORONARY ANGIOPLASTY  2016    Heart Stent D Circ    DENTAL SURGERY      Teeth Extracted In Past    DILATION AND CURETTAGE OF UTERUS  Last Done In [address] [place] [address] 281    \"Numerous\"    ENDOSCOPY, COLON, DIAGNOSTIC  2017    HYSTERECTOMY (CERVIX STATUS UNKNOWN)      Partial Abdominal Hysterectomy    IR TUNNELED CATHETER PLACEMENT GREATER THAN 5 YEARS  2019    IR TUNNELED CATHETER PLACEMENT GREATER THAN 5 YEARS 2019 [facility]    IR TUNNELED CATHETER PLACEMENT GREATER THAN 5 YEARS  3/3/2021    IR TUNNELED CATHETER PLACEMENT GREATER THAN 5 YEARS [facility] SPECIAL PROCEDURES    ROTATOR CUFF REPAIR Left 2010    SLEEVE GASTRECTOMY  2018    robotic assisted gastric sleeve, hiatal hernia repair    THYROIDECTOMY         Allergies: Allergies   Allergen Reactions    Percocet [Oxycodone-Acetaminophen]      hallucinations    Tramadol Itching    Vicodin [Hydrocodone-Acetaminophen] Itching    Narcan [Naloxone Hcl] Anxiety     Feels like out of body, things are speeding       FHx: family history includes Arthritis in her mother; Depression in her mother; Diabetes in her father and mother; Heart Attack in her father; Heart Disease in her father and mother; High Blood Pressure in her daughter, father, and mother; Kidney Disease in her mother; Obesity in her mother; Vision Loss in her mother.     SHx:   Social History     Socioeconomic History    Marital status: Single     Spouse name: None Number of children: 2    Years of education: None    Highest education level: None   Tobacco Use    Smoking status: Never    Smokeless tobacco: Never   Vaping Use    Vaping Use: Never used   Substance and Sexual Activity    Alcohol use: No    Drug use: No    Sexual activity: Never       Medications Prior to Admission     Prior to Admission medications    Medication Sig Start Date End Date Taking? Authorizing Provider   MUCUS RELIEF 600 MG extended release tablet TAKE 1 TABLET BY MOUTH TWICE A DAY 11/29/22   Jai Mcdaniel MD   diclofenac sodium (VOLTAREN) 1 % GEL APPLY TOPICALLY 2 TIMES DAILY APPLY TO LOW BACK AND FLANK AREA TWICE A DAY 11/28/22   Ailin Sy MD   sevelamer (RENVELA) 800 MG tablet Take one tablet by mouth three times a day and 1 tablet by mouth with snack 10/5/22   Lauren Rodriguez MD   tiotropium (SPIRIVA RESPIMAT) 2.5 MCG/ACT AERS inhaler Inhale 2 puffs into the lungs daily 8/24/22   Shea Ward MD   montelukast (SINGULAIR) 10 MG tablet Take 1 tablet by mouth nightly 8/5/22   Shea Ward MD   ondansetron (ZOFRAN) 4 MG tablet Take 1 tablet by mouth every 8 hours as needed for Nausea or Vomiting 7/5/22   Lauren Rodriguez MD   acetaminophen (TYLENOL) 500 MG tablet Take 1 tablet by mouth 3 times daily as needed for Pain 6/15/22   Ailin Sy MD   ipratropium-albuterol (DUONEB) 0.5-2.5 (3) MG/3ML SOLN nebulizer solution Inhale 3 mLs into the lungs every 4 hours 5/23/22   Shea Ward MD   amLODIPine (NORVASC) 5 MG tablet Take 1 tablet by mouth daily 4/7/22   Lauren Rodriguez MD   promethazine (PHENERGAN) 25 MG tablet Take 1 tablet by mouth daily for 10 days.  4/7/22   Alberto Wallace MD   midodrine (PROAMATINE) 10 MG tablet TAKE 1 TABLET BY MOUTH DAILY TAKE ONE TO TWO TABLET THREE TIMES A WEEK PRIOR TO EACH DIALYSIS TREATMENT 1/18/22   Lauren Rodriguez MD   Adalimumab (HUMIRA PEN) 40 MG/0.4ML PNKT Humira(CF) Pen 40 mg/0.4 mL subcutaneous kit    Historical Provider, MD   benzonatate (TESSALON) 100 MG capsule Take 100 mg by mouth 3 times daily as needed for Cough    Historical Provider, MD   fluocinolone (DERMA-SMOOTHE) 0.01 % external oil Apply topically 3 times daily Apply topically. Historical Provider, MD   hydrOXYzine (ATARAX) 25 MG tablet Take 25 mg by mouth 3 times daily as needed for Itching    Historical Provider, MD   lidocaine viscous hcl (XYLOCAINE) 2 % SOLN solution Take 15 mLs by mouth as needed for Irritation    Historical Provider, MD   mupirocin (BACTROBAN) 2 % ointment Apply topically 3 times daily Apply topically 3 times daily. Historical Provider, MD   predniSONE (DELTASONE) 20 MG tablet Take 20 mg by mouth daily    Historical Provider, MD   sulfacetamide (BLEPH-10) 10 % ophthalmic ointment every 6 hours Every 6 hours. Historical Provider, MD   aspirin (ASPIRIN CHILDRENS) 81 MG chewable tablet Take 1 tablet by mouth daily 2/17/21   Mere Arias MD   Tiotropium Bromide-Olodaterol (STIOLTO RESPIMAT) 2.5-2.5 MCG/ACT AERS Inhale 2 puffs into the lungs daily 10/27/20   Tammi Cunningham MD   Multiple Vitamin (DAILY-NICOLE) TABS TAKE ONE TABLET BY MOUTH DAILY 1/28/20   Joselyn Kumar MD   ketoconazole (NIZORAL) 2 % shampoo Apply topically daily as needed. 7/22/19   Yasmin High MD   allopurinol (ZYLOPRIM) 100 MG tablet Take 2 tablets by mouth daily 7/23/19   Yasmin High MD   promethazine (PHENERGAN) 25 MG tablet Take 25 mg by mouth every 6 hours as needed for Nausea    Historical Provider, MD   REFRESH OPTIVE 0.5-0.9 % SOLN Place 2 drops into both eyes 2 times daily 6/17/19   Historical Provider, MD   levothyroxine (SYNTHROID) 100 MCG tablet Take 100 mcg by mouth daily 7/13/19   Historical Provider, MD   ketoconazole (NIZORAL) 2 % cream Apply topically daily Apply topically daily. Historical Provider, MD   triamcinolone (ARISTOCORT) 0.5 % cream Apply topically 3 times daily Apply topically 3 times daily. Historical Provider, MD   pantoprazole (PROTONIX) 40 MG tablet Take 1 tablet by mouth 2 times daily 4/20/18   FIDEL Bautista - CNP   LORazepam (ATIVAN) 0.5 MG tablet Take 0.5 mg by mouth 2 times daily.     Historical Provider, MD   atorvastatin (LIPITOR) 80 MG tablet Take 80 mg by mouth nightly    Historical Provider, MD   ranolazine (RANEXA) 500 MG extended release tablet Take 500 mg by mouth 2 times daily    Historical Provider, MD   Nebulizer MISC Inhale into the lungs as needed    Historical Provider, MD   QUEtiapine (SEROQUEL XR) 300 MG extended release tablet Take 50 mg by mouth nightly     Historical Provider, MD   levomilnacipran (FETZIMA) 40 MG CP24 extended release capsule Take 1 capsule by mouth daily  Patient taking differently: Take 40 mg by mouth nightly 10/15/17   Colin Foy MD   nitroGLYCERIN (NITROSTAT) 0.4 MG SL tablet Place 0.4 mg under the tongue every 5 minutes as needed for Chest pain    Historical Provider, MD   fluticasone (FLONASE) 50 MCG/ACT nasal spray 2 sprays by Nasal route every morning     Historical Provider, MD       Data:     CBC:   Recent Labs     12/27/22  1835   WBC 5.3   HGB 12.9      MCV 89.2   RDW 16.1*   LYMPHOPCT 13.5*   MONOPCT 5.2*   BASOPCT 0.7   MONOSABS 0.3   LYMPHSABS 0.7   EOSABS 0.1   BASOSABS 0.0     CMP:    Recent Labs     12/27/22  1700      K 4.9   CL 98*   CO2 26   BUN 73*   CREATININE 4.1*   GLUCOSE 96   LABALBU 4.2   CALCIUM 9.3   BILITOT 0.2   ALKPHOS 102   AST 21   ALT 14     Lipids:   Lab Results   Component Value Date/Time    CHOL 223 09/02/2021 10:30 AM    CHOL 140 09/12/2017 09:45 AM    HDL 49 09/02/2021 10:30 AM    TRIG 154 09/02/2021 10:30 AM     Hemoglobin A1C:   Lab Results   Component Value Date/Time    LABA1C 5.5 09/02/2021 10:30 AM     TSH:   Lab Results   Component Value Date/Time    TSH 2.19 10/06/2017 12:15 PM     Troponin:   Lab Results   Component Value Date/Time    TROPONINT 0.097 12/27/2022 05:00 PM    TROPONINT 0.046 01/12/2022 01:39 PM    TROPONINT 0.038 02/16/2021 04:36 PM     BNP:   Recent Labs     12/27/22  1700   PROBNP 168.8     Lactic Acid: No results for input(s): LACTA in the last 72 hours. UA:  Lab Results   Component Value Date/Time    NITRU NEGATIVE 12/27/2022 05:45 PM    NITRU Negative 07/10/2019 11:09 AM    COLORU YELLOW 12/27/2022 05:45 PM    PHUR 6.0 07/10/2019 11:09 AM    WBCUA 265 12/27/2022 05:45 PM    RBCUA 48 12/27/2022 05:45 PM    MUCUS RARE 12/27/2022 05:45 PM    TRICHOMONAS NONE SEEN 12/27/2022 05:45 PM    BACTERIA FEW 12/27/2022 05:45 PM    CLARITYU CLOUDY 12/27/2022 05:45 PM    SPECGRAV 1.020 12/27/2022 05:45 PM    LEUKOCYTESUR MODERATE NUMBER OR AMOUNT OBSERVED 12/27/2022 05:45 PM    UROBILINOGEN 0.2 12/27/2022 05:45 PM    BILIRUBINUR NEGATIVE 12/27/2022 05:45 PM    BLOODU SMALL NUMBER OR AMOUNT OBSERVED 12/27/2022 05:45 PM    GLUCOSEU Negative 07/10/2019 11:09 AM    KETUA NEGATIVE 12/27/2022 05:45 PM     Urine Cultures: No results found for: LABURIN  Blood Cultures: No results found for: BC  No results found for: BLOODCULT2  Organism:   Lab Results   Component Value Date/Time    [facility] 02/03/2017 12:15 PM       Radiology results:  CT CERVICAL SPINE WO CONTRAST   Final Result   No acute fracture or traumatic malalignment of the cervical spine      Posterior fusion extends from C3-T2 with laminectomies at C4, C5, C6 and C7. The left pedicle at T2 extends into the left transverse process but does not   extend into the vertebral body. Tip of the left T2 pedicle screw is directly   adjacent to the posterior aortic arch. CT ABDOMEN PELVIS WO CONTRAST Additional Contrast? None   Final Result   1. Bilateral lower lobe consolidations concerning for multifocal pneumonia. Radiographic follow-up recommended to document resolution following treatment. 2.  Probable constipation. Colonic diverticulosis without diverticulitis.       3.  Mild nonspecific edema within the perirectal fat without appreciable wall   thickening. Correlation with digital rectal exam may be helpful. 4.  Nonspecific edema/skin thickening within the left lateral breast and   chest wall which may be iatrogenic or related to recent trauma. Follow-up   mammography may be useful as clinically warranted. 5.  Additional nonemergent findings, as above. CT HEAD WO CONTRAST   Final Result   No acute intracranial abnormality. XR CHEST PORTABLE   Final Result   Left basilar parenchymal opacity may represent atelectasis versus developing   infiltrate. Radiographic follow-up recommended to document resolution   following treatment, including PA/lateral views.              Medications:     Medications:        Infusions:       PRN Meds:       Idalmis Bryant MD  12/27/22 10:57 PM

## 2022-12-28 NOTE — PROGRESS NOTES
[id number] [place] OF SPEECH/LANGUAGE PATHOLOGY    Lucero Couch  12/28/2022  [id number]    Attempted to see Lucero Couch for dysphagia evaluation. Pt off the floor at CT. Will reattempt as schedule allows. 8:32 AM    Attempted to see pt for dysphagia evaluation approximately 11:30. Pt alert and seated upright in bed. She refused all offered PO trials despite education, multiple presentations of various consistencies/flavors. Will reattempt as able.     Yamile Herrera MA CCC-SLP  12/28/2022

## 2022-12-28 NOTE — PROCEDURES
PATIENT COMPLETED ONLY 1.5 HOURS OF A 3 HOUR HD TREATMENT. PT CONTINUED TO CRY OUT SHE WANTED TO ENDED TREATMENT. DR Zoya Osman WAS NOTIFIED AND TREATMENT WAS TERMINATED. LEFT AVF WAS USED FOR TREATMENT, CANNULATED WITH NO DIFFICULTIES. PATIENT WAS HYPOTENSIVE AND ALBUMIN WAS GIVEN. POST TREATMENT NEEDLES WERE REMOVED, PRESSURES HELD FOR 10 MINUTES AND SITES DRESSED WITH GAUZE. RETACRIT WAS ALSO GIVEN BY NURSE AS ORDERED. TREATMENT COMPLETED BY:  [address] [place] [address]    Patient Name: Chuck Redmond  Patient : 1950  MRN: [id number]     Acct: [de-identified]  Date of Admission: 2022  Room/Bed: 4112/4112-A  Code Status:  Full Code  Allergies:    Allergies   Allergen Reactions    Percocet [Oxycodone-Acetaminophen]      hallucinations    Tramadol Itching    Vicodin [Hydrocodone-Acetaminophen] Itching    Narcan [Naloxone Hcl] Anxiety     Feels like out of body, things are speeding     Diagnosis:    Patient Active Problem List   Diagnosis    CAD (coronary artery disease)    Nausea & vomiting    Hypothyroidism, adult    Acute on chronic renal failure (HCC)    Hypertensive kidney disease with CKD stage III (HCC)    Type 2 diabetes mellitus without complication (HCC)    Hypercalcemia    Chronic venous hypertension (idiopathic) with inflammation of bilateral lower extremity    Fluid overload    Acute on chronic diastolic heart failure ([address]    Morbid obesity with BMI of 45.0-49.9, adult ([address]    Diabetes 1.5, managed as type 2 (HCC)    Hyperglycemia    Chronic fatigue    Solitary pulmonary nodule    Acute hypercapnic respiratory failure (HCC)    Acute metabolic encephalopathy    Chronic diastolic heart failure (HCC)    Acute renal failure (ARF) (HCC)    Acute encephalopathy    YRIS (obstructive sleep apnea)    Hiatal hernia    Status post laparoscopic sleeve gastrectomy    Status post bariatric surgery    WD-Chronic buttock pain    Chronic kidney disease, stage IV (severe) ([address]    Essential hypertension    ESRD (end stage renal disease) ([facility]    ESRD needing dialysis ([facility]    Type 2 diabetes mellitus with hyperglycemia, with long-term current use of insulin (HCC)    Class 3 severe obesity due to excess calories with body mass index (BMI) of 40.0 to 44.9 in adult [facility])    Fistula    Cerebrovascular accident (CVA) due to occlusion of precerebral artery (HCC)    Aphagia    Lymphedema of left upper extremity    Strain of muscle, fascia and tendon of long head of biceps, left arm, initial encounter    History of progressive weakness         Treatment:  Hemodilaysis 2:1  Priority: Routine  Location: Acute Room    Diabetic: Yes  NPO: No  Isolation Precautions: Dialysis     Consent for Treatment Verified: Yes  Blood Consent Verified: Not Applicable     Safety Verified: Identify (I), Consent (C), Equipment (E), HepB Status (B), Orders Complete (O), Access Verified (A), and Timeliness (T)  Time out performed prior to access at 0845 hours. Report Received from Primary RN at 0740 hours.   Primary RN (First Initial, Last Name, Title): Matthew Costello RN  Incapacitated Nurse Education Completed: Yes     HBsAg ONLY:  Date Drawn: December 12, 2022       Results: Negative  HBsAb:  Date Drawn:  December 28, 2022       Results: Unknown    Order  Dialysis Bath  K+ (Potassium): 3  Ca+ (Calcium): 2.5  Na+ (Sodium): 138  HCO3 (Bicarb): 35  Bicarbonate Concentrate Lot No.: [id number]  Acid Concentrate Lot No.: [id number]     Na+ Modeling: Not Applicable  Dialyzer: [id number]  Dialysate Temperature (C):  35  Blood Flow Rate (BFR):  300   Dialysate Flow Rate (DFR):   600        Access to be Utilized   Access: AVF  Location: Upper Extremity  Side: Left   Needle gauge:  15  + Bruit/Thrill: Yes    Site Assessment:  Signs and Symptoms of Infection/Inflammation: None  If yes: Not Applicable  Site Prep: Medical Aseptic Technique  Gauze Dressing?: Yes  Non Dialysis Use?: No  Comment:    10 SECOND ACCESS CHECK COMPLETED, BRUIT AND THRILL PRESENT, NO S/S OF REDNESS OR SWELLING NOTED. CANNULATED X2 WITH NO PROBLEMS. Flows: Good, Patent  If access problem, who was notified:     Pre and Post-Assessment  Patient Vitals for the past 8 hrs:   Level of Consciousness O2 Device Skin Condition/Temp Edema   12/28/22 0742 -- Nasal cannula -- --   12/28/22 0800 0 -- Dry;Flaky; Warm None   12/28/22 1030 0 Nasal cannula -- --     Labs  Recent Labs     12/27/22  1835   WBC 5.3   HGB 12.9   HCT 40.3                                                                     Recent Labs     12/27/22  1700 12/28/22  0456    137   K 4.9 4.8   CL 98* 100   CO2 26 20*   BUN 73* 76*   CREATININE 4.1* 3.9*   GLUCOSE 96 83     IV Drips and Rate/Dose   sodium chloride        Safety - Before each treatment:   Dialysis Machine No.: [id number]   Machine Number: [id number]  Dialyzer Lot No.: [id number]  RO Machine Log Sheet Completed: Yes  Machine Alarm Self Test: Completed; Passed (12/28/22 0845)     Air Foam Detector: Tested, Proper Function  Extracorporeal Circuit Tested for Integrity: Yes  Machine Conductivity: 14.0  Manual Conductivity: 14     Bicarbonate Concentrate Lot No.: [id number]  Acid Concentrate Lot No.: [id number]  Manual Ph: 7.4  Bleach Test (Neg): Yes  Bath Temperature: 95 °F (35 °C)  Tubing Lot#: [id number]  Conductivity Meter Serial #: [id number]  All Connections Secure?: Yes  Venous Parameters Set?: Yes  Arterial Parameters Set?: Yes  Saline Line Double Clamped?: Yes  Air Foam Detector Engaged?: Yes  Machine Functioning Alarm Free?  Yes  Prime Given: 200ml    Chlorine Testing - Before each treatment and every 4 hours:   Treatment  Treatment Number: 1  Time On: 8406  Time Off: 1005  Treatment Goal: 3 HOUR, 500ML  Weight: 180 lb 3.2 oz (81.7 kg) (12/28/22 1005)  1st check: less than 0.1 ppm at: 0650 hours  2nd check: less than 0.1 ppm at: NOT APPLICABLE  3rd check: Not Applicable  (if greater than 0.1 ppm, then check every 30 minutes from secondary)    Access Flows and Pressures  Patient Vitals for the past 8 hrs:   Blood Flow Rate (ml/min) Ultrafiltration Rate (ml/hr) Arterial Pressure (mmHg) Venous Pressure (mmHg) TMP DFR Comments Access Visible   12/28/22 0849 300 ml/min 170 ml/hr -140 mmHg 150 50 600 TX STARTED, PRIME GIVEN, LINES SECURE AND CALL LIGHT WITHIN REACH Yes   12/28/22 0900 300 ml/min 170 ml/hr -210 mmHg 160 60 600 DR DEWEY AT BEDSIDE Yes   12/28/22 0915 300 ml/min 170 ml/hr -180 mmHg 130 60 600 ALBUMIN STARTED Yes   12/28/22 0945 300 ml/min 0 ml/hr -200 mmHg 120 60 600 BP IMPROVING, CONT.  TO MONITOR Yes   12/28/22 1000 300 ml/min 0 ml/hr -110 mmHg 150 60 600 PT CONTINUING TO SHOUT SHE WANTS TO STOP TREATMENT, DR DAVIDSON NOTIFIED AND GAVE OKAY TO TERMINATE TREATMENT Yes   12/28/22 1005 200 ml/min 0 ml/hr -10 mmHg 100 60 600 TX ENDED, RINSEBACK GIVE N Yes     Vital Signs  Patient Vitals for the past 8 hrs:   BP Temp Pulse Resp SpO2 Weight Weight Method Percent Weight Change   12/28/22 0742 113/64 97.4 °F (36.3 °C) 97 18 100 % -- -- --   12/28/22 0836 113/64 -- -- -- -- -- -- --   12/28/22 0845 (!) 85/50 97.6 °F (36.4 °C) 90 22 92 % 178 lb 6.4 oz (80.9 kg) Bed scale 2   12/28/22 0849 98/62 97.6 °F (36.4 °C) 90 20 93 % 178 lb 6.4 oz (80.9 kg) Bed scale 2   12/28/22 0900 93/63 -- 89 21 92 % -- -- --   12/28/22 0915 (!) 74/52 -- 89 23 97 % -- -- --   12/28/22 0945 (!) 96/54 -- 89 21 99 % -- -- --   12/28/22 1000 88/63 -- 91 21 99 % -- -- --   12/28/22 1005 (!) 93/57 97 °F (36.1 °C) 92 20 99 % 180 lb 3.2 oz (81.7 kg) Bed scale 1.01   12/28/22 1030 127/65 97.4 °F (36.3 °C) 93 18 97 % -- -- --     Post-Dialysis    Post-Treatment Procedures: Blood returned, Access bleeding time < 10 minutes  Machine Disinfection Process: Exterior Machine Disinfection  Rinseback Volume (ml): 300 ml  Blood Volume Processed (Liters): 20.4 l/min  Dialyzer Clearance: Lightly streaked  Duration of Treatment (minutes): 90 minutes     Hemodialysis Intake (ml): 500 ml  Hemodialysis Output (ml): 98 ml     Tolerated Treatment: Poor  Patient Response to Treatment: HYPOTENSIVE, SHOUTING TO TERMINATE T  Physician Notified: Yes       Provider Notification        Handoff complete and report given to Primary RN at 1026 hours.   Primary RN (First Initial, Last Name, Title):  JASON GONG RN     Education  Person Educated: Patient   Knowledge Base: Substantial  Barriers to Learning?: None  Preferred method of Learning: Oral  Topic(s): Access Care and Procedural   Teaching Tools: Explanation   Response to Education: Verbalized Understanding     Electronically signed by Seth Rivera on 12/28/2022 at 11:03 AM

## 2022-12-28 NOTE — CONSULTS
Consult completed for PIV access. EDC was placed on 12/27 in RUE brachial vein, which infiltrated this afternoon. X 1 attempt for PIV in lower right forearm without success. RUE is edematous extending distal to previous EDC insertion site. Under ultrasound examination, the brachial vein is dilated with hyperechogenic density and noncompressible. LUE has AV fistula. [address] notified of above findings. Doppler US ordered. Instructed to reach out to Dr. Ashok Contreras re: PIV placement possibly in LFA. 1714 - PC to Dr. Ashok Contreras. No PIV in LFA. Verbal order for IR consult in AM for central access. 4976 - Secure message sent to [facility] for POC. Orders to wait on attempting PIV access again in RFA until doppler US reveals if patient does/does not have DVT. If no clot, attempt PIV access as patient is currently without IV access. Primary RN Atul Granados updated. Please consult IV/PICC team for questions, concerns, or patient's needs change.

## 2022-12-28 NOTE — PROGRESS NOTES
Pt seen and examined known to me from [facility] with hd mwf   Missed hd Monday prior cva and concern worse effect.  Make npo checvk swallow, fu neuro  Repeat head ct with contrast concern aspiration on abx will call and dw son  Full note to follow  Hd today

## 2022-12-28 NOTE — PROGRESS NOTES
RENAL DOSE ADJUSTMENT MADE PER P/T PROTOCOL    PREVIOUS ORDER:  Cefepime 1 g q8h    Estimated Creatinine Clearance: 13 mL/min (A) (based on SCr of 4.1 mg/dL (H)).   Recent Labs     12/27/22  1700 12/27/22  1835   BUN 73*  --    CREATININE 4.1*  --    PLT  --  233     NEW RENALLY ADJUSTED ORDER:  Cefepime 1 g q24h    Precilla Copper, CAMACHO LIBORIO [facility]  12/28/2022 12:32 AM

## 2022-12-28 NOTE — CONSULTS
Nephrology Service Consultation    Patient:  Mallie Nyhan  MRN: [id number]  Consulting physician:  Kacey Gimenez MD  Reason for Consult:  end-stage renal disease on dialysis    History Obtained From:  patient, electronic medical record  PCP: Ty Montoya MD    HISTORY OF PRESENT ILLNESS:   The patient is a 81 y.o. female who presents with  weakness fatigue increased progressive generalized weakness despite being at the nursing home. She refused to go to dialysis on Monday and now presented to the hospital yesterday feeling increased generalized weakness with poor oral intake. I did discuss with patient son on the telephone. Patient had a decline over the Christmas break when they took her home depressed the patient and she is wanting to give up and quit but the son convinced her to give her more time to recover. Today when seen patient she seems very hard to arouse or awaken her swallow. Concern with CT scan possible aspiration pneumonia. Patient has history of coronary disease prior CVA end-stage renal disease on dialysis Monday Friday hypertension hyperlipidemia gout arthritis had recently prior CVA was getting better at the skilled nursing he left there at return and has been overall declining in the last 4 to 6 weeks. Head CT with IV contrast today shows no acute pathology patient became more arousable after dialysis today then refused swallow eval but was able to swallow medicines with applesauce. I discussed with son the possible need of a feeding tube also she declines and her overall ability to eat. She did have dialysis this morning but unfortunately stopped after an hour and a half if she refused any further treatment. We will need to have a better discussion with patient and family about overall goals of care and plan.   And admitted for work-up and therapy in the setting of worsening neurological status    Past Medical History:        Diagnosis Date    Acid reflux     Anemia Arthritis     \"Shoulders, Hips And Knees\"    CAD (coronary artery disease)     Sees Dr. Blanca La    Cerebral artery occlusion with cerebral infarction ([facility]     CHF (congestive heart failure) ([facility]     Chronic back pain     Chronic constipation     Chronic kidney disease     Sees Dr. Parth Somers    COPD (chronic obstructive pulmonary disease) ([facility]     Sees Dr. Mary Porras    Depression     Diabetes mellitus [facility]) Dx 2012    Sees Dr. Elodia Mercado    Emphysema lung [facility])     Glaucoma     \"Both Eyes\"    Gout     Hemodialysis patient ([facility]     Hiatal hernia     History of blood transfusion 's    No Reaction To Blood Transfusion Received    [place] (hard of hearing)     Bilateral Ears    Hyperlipidemia     Hypertension     Dr. Kari Suarez    Itching     \"Whole Body Itches The [de-identified] Of The Time\"    Morbid obesity ([facility]     Rheumatoid arthritis ([facility]     Shortness of breath on exertion     Sleep apnea     Uses CPAP    Teeth missing     Upper And Lower    Thyroid disease     Thyroidectomy In     Urinary incontinence     WD-Chronic buttock pain 2018    Wears glasses        Past Surgical History:        Procedure Laterality Date    APPENDECTOMY  1968    AV FISTULA CREATION Left     BACK SURGERY  2017    Lower Back With Hardware    BACK SURGERY  2016    Cervical Back With Hardware    CARPAL TUNNEL RELEASE Right      SECTION  3-30-68 And 10-17-69    COLONOSCOPY  2017    Polyps Removed    CORONARY ANGIOPLASTY  2016    Heart Stent D Circ    DENTAL SURGERY      Teeth Extracted In Past    DILATION AND CURETTAGE OF UTERUS  Last Done In [address] [place] [address] 281    \"Numerous\"    ENDOSCOPY, COLON, DIAGNOSTIC  2017    HYSTERECTOMY (CERVIX STATUS UNKNOWN)      Partial Abdominal Hysterectomy    IR TUNNELED CATHETER PLACEMENT GREATER THAN 5 YEARS  2019    IR TUNNELED CATHETER PLACEMENT GREATER THAN 5 YEARS [facility]    IR TUNNELED CATHETER PLACEMENT GREATER THAN 5 YEARS  3/3/2021    IR TUNNELED CATHETER PLACEMENT GREATER THAN 5 YEARS [facility] SPECIAL PROCEDURES    ROTATOR CUFF REPAIR Left 06/2010    SLEEVE GASTRECTOMY  04/16/2018    robotic assisted gastric sleeve, hiatal hernia repair    THYROIDECTOMY  1993       Medications:   Scheduled Meds:   amLODIPine  5 mg Oral Daily    aspirin  81 mg Oral Daily    atorvastatin  80 mg Oral Nightly    levothyroxine  100 mcg Oral Daily    montelukast  10 mg Oral Nightly    pantoprazole  40 mg Oral BID AC    predniSONE  20 mg Oral Daily    QUEtiapine  50 mg Oral Nightly    ranolazine  500 mg Oral BID    sevelamer  800 mg Oral TID WC    tiotropium-olodaterol  2 puff Inhalation Daily    sodium chloride flush  5-40 mL IntraVENous 2 times per day    heparin (porcine)  5,000 Units SubCUTAneous 3 times per day    cefTRIAXone (ROCEPHIN) IV  1,000 mg IntraVENous Q24H    azithromycin  500 mg IntraVENous Q24H    [START ON 12/29/2022] allopurinol  200 mg Oral Daily    levomilnacipran  40 mg Oral Nightly    [START ON 12/29/2022] LORazepam  0.5 mg Oral BID     Continuous Infusions:   sodium chloride       PRN Meds:.ipratropium-albuterol, sodium chloride flush, sodium chloride, ondansetron **OR** ondansetron, polyethylene glycol, traMADol    Allergies:  Percocet [oxycodone-acetaminophen], Tramadol, Vicodin [hydrocodone-acetaminophen], and Narcan [naloxone hcl]    Social History:   TOBACCO:   reports that she has never smoked. She has never used smokeless tobacco.  ETOH:   reports no history of alcohol use.   OCCUPATION:      Family History:       Problem Relation Age of Onset    Kidney Disease Mother         \"Kidney Failure\"    Heart Disease Mother         CHF    Arthritis Mother     Diabetes Mother     Depression Mother     High Blood Pressure Mother     Obesity Mother     Vision Loss Mother     Heart Attack Father     Heart Disease Father         Heart Attack    Diabetes Father     High Blood Pressure Father     High Blood Pressure Daughter        REVIEW OF SYSTEMS:  Negative except for  weak tired decreased overall mobility. Physical Exam:    I/O: No intake/output data recorded. Vitals: BP (!) 111/47   Pulse 95   Temp 97.3 °F (36.3 °C) (Oral)   Resp 18   Ht 5' 4\" (1.626 m)   Wt 180 lb 3.2 oz (81.7 kg)   SpO2 100%   BMI 30.93 kg/m²   General appearance: awake weak poor speech swallow cough  HEENT: Head: Normal, normocephalic, atraumatic. Neck: supple, symmetrical, trachea midline  Lungs: diminished breath sounds bilaterally  Heart: S1, S2 normal  Abdomen: abnormal findings:  soft NT  Extremities: edema trace decreased range of motion  Neurologic: Mental status: alertness: alert      CBC:   Recent Labs     12/27/22  1835   WBC 5.3   HGB 12.9        BMP:    Recent Labs     12/27/22  1700 12/28/22  0456    137   K 4.9 4.8   CL 98* 100   CO2 26 20*   BUN 73* 76*   CREATININE 4.1* 3.9*   GLUCOSE 96 83     Hepatic:   Recent Labs     12/27/22  1700   AST 21   ALT 14   BILITOT 0.2   ALKPHOS 102     Troponin: No results for input(s): TROPONINI in the last 72 hours. BNP: No results for input(s): BNP in the last 72 hours. Lipids: No results for input(s): CHOL, HDL in the last 72 hours. Invalid input(s): LDLCALCU  ABGs:   Lab Results   Component Value Date/Time    PO2ART 58 09/21/2017 12:00 AM    FXJ3WEV 44.0 09/21/2017 12:00 AM     INR: No results for input(s): INR in the last 72 hours.   Renal Labs  Albumin:    Lab Results   Component Value Date/Time    LABALBU 4.2 12/27/2022 05:00 PM     Calcium:    Lab Results   Component Value Date/Time    CALCIUM 9.1 12/28/2022 04:56 AM     Phosphorus:    Lab Results   Component Value Date/Time    PHOS 7.6 02/17/2021 06:11 AM     U/A:    Lab Results   Component Value Date/Time    NITRU NEGATIVE 12/27/2022 05:45 PM    NITRU Negative 07/10/2019 11:09 AM    COLORU YELLOW 12/27/2022 05:45 PM    PHUR 6.0 07/10/2019 11:09 AM    WBCUA 265 12/27/2022 05:45 PM    RBCUA 48 12/27/2022 05:45 PM    MUCUS RARE 12/27/2022 05:45 PM TRICHOMONAS NONE SEEN 12/27/2022 05:45 PM    BACTERIA FEW 12/27/2022 05:45 PM    CLARITYU CLOUDY 12/27/2022 05:45 PM    SPECGRAV 1.020 12/27/2022 05:45 PM    UROBILINOGEN 0.2 12/27/2022 05:45 PM    BILIRUBINUR NEGATIVE 12/27/2022 05:45 PM    BLOODU SMALL NUMBER OR AMOUNT OBSERVED 12/27/2022 05:45 PM    GLUCOSEU Negative 07/10/2019 11:09 AM    KETUA NEGATIVE 12/27/2022 05:45 PM     ABG:    Lab Results   Component Value Date/Time    TYW5FLQ 44.0 09/21/2017 12:00 AM    PO2ART 58 09/21/2017 12:00 AM    TJT0FSF 28.5 09/21/2017 12:00 AM     HgBA1c:    Lab Results   Component Value Date/Time    LABA1C 5.5 09/02/2021 10:30 AM     Microalbumen/Creatinine ratio:  No components found for: RUCREAT  TSH:    Lab Results   Component Value Date/Time    TSH 2.19 10/06/2017 12:15 PM     IRON:    Lab Results   Component Value Date/Time    IRON 53 12/01/2017 09:15 AM     Iron Saturation:  No components found for: PERCENTFE  TIBC:    Lab Results   Component Value Date/Time    TIBC 367 12/01/2017 09:15 AM     FERRITIN:    Lab Results   Component Value Date/Time    FERRITIN 17 12/01/2017 09:15 AM     RPR:  No results found for: RPR  MADDY:    Lab Results   Component Value Date/Time    MADDY None Detected 06/27/2022 11:00 AM     24 Hour Urine for Creatinine Clearance:  No components found for: CREAT4, UHRS10, UTV10  -----------------------------------------------------------------      Assessment and Recommendations     Patient Active Problem List   Diagnosis Code    CAD (coronary artery disease) I25.10    Nausea & vomiting R11.2    Hypothyroidism, adult E03.9    Acute on chronic renal failure (HCC) N17.9, N18.9    Hypertensive kidney disease with CKD stage III (HCC) I12.9, N18.30    Type 2 diabetes mellitus without complication (HCC) C56.5    Hypercalcemia E83.52    Chronic venous hypertension (idiopathic) with inflammation of bilateral lower extremity I87.323    Fluid overload E87.70    Acute on chronic diastolic heart failure (HCC) I50.33 Morbid obesity with BMI of 45.0-49.9, adult ([facility] E66.01, Z68.42    Diabetes 1.5, managed as type 2 ([facility] E13.9    Hyperglycemia R73.9    Chronic fatigue R53.82    Solitary pulmonary nodule R91.1    Acute hypercapnic respiratory failure ([facility]) T41.41    Acute metabolic encephalopathy J79.62    Chronic diastolic heart failure ([facility]) I50.32    Acute renal failure (ARF) ([facility]) N17.9    Acute encephalopathy G93.40    YRIS (obstructive sleep apnea) G47.33    Hiatal hernia K44.9    Status post laparoscopic sleeve gastrectomy Z98.84    Status post bariatric surgery Z98.84    WD-Chronic buttock pain M79.18, G89.29    Chronic kidney disease, stage IV (severe) ([facility]) N18.4    Essential hypertension I10    ESRD (end stage renal disease) ([facility] N18.6    ESRD needing dialysis ([facility] N18.6, Z99.2    Type 2 diabetes mellitus with hyperglycemia, with long-term current use of insulin ([facility]) E11.65, Z79.4    Class 3 severe obesity due to excess calories with body mass index (BMI) of 40.0 to 44.9 in adult ([facility] E66.01, Z68.41    Fistula L98.8    Cerebrovascular accident (CVA) due to occlusion of precerebral artery ([facility] I63.20    Aphagia R13.0    Lymphedema of left upper extremity I89.0    Strain of muscle, fascia and tendon of long head of biceps, left arm, initial encounter H89.451A    History of progressive weakness Z87.898      impression plan   #1 end-stage renal disease on dialysis Monday Friday   #2 history of CVA with overall decline neurologically   #3 high blood pressure   #4?   Aspiration pneumonia/UTI       Plan   #1 patient missed dialysis Monday doing today and resume back on regular dialysis Friday   #2 patient was in the hospital September in [place] similar findings had a UTI at that time possible concern of polypharmacy as well CT scan of the head with IV contrast shows no acute pathology will defer to neurology for options and care to help with neuro status   #3 blood pressure variable high and low we will be careful for hypotension    #4 if able recommend doing a swallow evaluation maintain antibiotics follow-up cultures and discussed with son that if possibly failed swallow eval may need a feeding tube as well   for now son and patient was still wants to provide full care and treatment plans although patient did stop dialysis earlier today  Electronically signed by Harleen Serna MD on 12/28/2022 at 3:50 PM

## 2022-12-28 NOTE — PROGRESS NOTES
Physical Therapy    Physical Therapy Treatment Note  Name: Avi Villarreal MRN: [id number] :   1950   Date:  2022   Admission Date: 2022 Room:  [address] [place] [address]-A     PT/OT attempted this AM for initial eval. Pt if KE for imaging. Per RN pt is to receive dialysis KE following imaging. RN requests PT/OT HOLD for today as pt has also demonstrated decline in mentation. Will re-attempt tomorrow as appropriate.         Electronically signed by:    Celio Martinez, PT  2022, 9:19 AM

## 2022-12-28 NOTE — PROGRESS NOTES
Hospitalist Progress Note      Name:  Walter Vasquez /Age/Sex: 1950  (81 y.o. female)   MRN & CSN:  [id number] & [id number] Admission Date/Time: 2022  1:16 PM   [facility]  [facility] PCP: Kimberly Schultz, [address] Day: 2                                               Attending Physician Dr. Chema Rashid and Plan:   Walter Vasquez is a 81 y.o.  female  who presents with History of progressive weakness    # Chronic progressive weakness  - Having progressive weakness in all 4 extremities with difficulty swallowing worsened over past 2 weeks. Requiring assistance with ADLs. Exam non-focal, no ocular involvement. Labs unremarkable. On chronic Prednisone for RA. Hx of posterior C3-T2 posterior fusion. Hx of CVA in .  -Initial CT head was negative, repeat head CT negative  - Neurology consulted, appreciate recommendations  - PT/OT/SLP consults pending  -Holding ativan, resume in AM      # ESRD on HD MF  - Last HD .  - Nephrology following, patient received 1.5 hours of HD today, albumin was given for hypotension, and then the patient asked to stop the session, Dr. Caesar Mathias aware     # NSTEMI, type II, in the setting of CAD and ESRD   - Denied any CP.  - Initial Tn mildly elevated ECG without acute ischemic changes. - Follow-up repeat Tn.  -Continue ASA, statin, ranexa     # Acute hypoxic respiratory failure 2/2 pneumonia, possibly aspiration  - No leukocytosis. On RA. CT showing multifocal pneumonia. Respiratory disease panel negative, COVID and influenza negative  - Started on Vanc/Cefepime in the ED, switched to rocephin and azithromycin as patient is not showing signs of SIRS/sepsis  -Keep NPO, SLP to evaluate  - Follow-up blood cultures. # Asymptomatic bacteruria    - Denied any urinary symptoms.   -Urine culture pending  - Treatment not indicated.      # Gout  -Continue allopurinol    #RA  -Continue home chronic prednisone    #COPD, not in exacerbation    #DM II  -Not on home antiglycemics, last A1C 5.5    #CHF  -Echo EF 50-55%    #Hypothyroidism  -Continue synthroid    Diet ADULT DIET; Dysphagia - Minced and Moist; Moderately Thick (Honey); 1800 ml   DVT Prophylaxis [] Lovenox, [x]  Heparin, [] SCDs, [] Ambulation   GI Prophylaxis [] PPI,  [] H2 Blocker,  [] Carafate,  [] Diet/Tube Feeds   Code Status Full Code   Disposition Patient requires continued admission due to neuro eval     -Patient assessment and plan discussed with supervising physician-  Overnight events:     Jeanmarie Verde is a 81 y.o.  female, who presented with Altered Mental Status (Sent from [address] [place] [address] for altered mental status. ) and Other (Patient says had dialysis Saturday [facility] claims has missed last two treatments. )    Patient was seen and evaluated at bedside by myself. No acute overnight events. Patient reports no complaints    Objective: Intake/Output Summary (Last 24 hours) at 12/28/2022 1243  Last data filed at 12/28/2022 1005  Gross per 24 hour   Intake 500 ml   Output 98 ml   Net 402 ml      Vitals:   Vitals:    12/28/22 1000 12/28/22 1005 12/28/22 1030 12/28/22 1216   BP: 88/63 (!) 93/57 127/65    Pulse: 91 92 93    Resp: 21 20 18    Temp:  97 °F (36.1 °C) 97.4 °F (36.3 °C)    TempSrc:   Oral    SpO2: 99% 99% 97% 94%   Weight:  180 lb 3.2 oz (81.7 kg)     Height:         Physical Exam: 12/28/22     General: NAD, obese  Eyes: EOMI  ENT: neck supple  Cardiovascular: Regular rate  Respiratory: Clear to auscultation  Gastrointestinal: Soft, non tender  Genitourinary: no suprapubic tenderness  Musculoskeletal: No edema  Skin: warm, dry  Neuro: Alert. Psych: Mood appropriate.      Medications:   Medications:    amLODIPine  5 mg Oral Daily    aspirin  81 mg Oral Daily    atorvastatin  80 mg Oral Nightly    levothyroxine  100 mcg Oral Daily    montelukast  10 mg Oral Nightly    pantoprazole  40 mg Oral BID AC    predniSONE  20 mg Oral Daily    QUEtiapine  50 mg Oral Nightly    ranolazine  500 mg Oral BID    sevelamer  800 mg Oral TID     tiotropium-olodaterol  2 puff Inhalation Daily    sodium chloride flush  5-40 mL IntraVENous 2 times per day    heparin (porcine)  5,000 Units SubCUTAneous 3 times per day    cefTRIAXone (ROCEPHIN) IV  1,000 mg IntraVENous Q24H    azithromycin  500 mg IntraVENous Q24H      Infusions:    sodium chloride       PRN Meds: ipratropium-albuterol, 1 vial, Q6H PRN  sodium chloride flush, 5-40 mL, PRN  sodium chloride, , PRN  ondansetron, 4 mg, Q8H PRN   Or  ondansetron, 4 mg, Q6H PRN  polyethylene glycol, 17 g, Daily PRN        LABS  CBC   Recent Labs     12/27/22  1835   WBC 5.3   HGB 12.9   HCT 40.3         RENAL  Recent Labs     12/27/22  1700 12/28/22  0456    137   K 4.9 4.8   CL 98* 100   CO2 26 20*   BUN 73* 76*   CREATININE 4.1* 3.9*     LFT'S  Recent Labs     12/27/22  1700   AST 21   ALT 14   BILITOT 0.2   ALKPHOS 102     COAG  No results for input(s): INR in the last 72 hours. CARDIAC ENZYMES  No results for input(s): CKTOTAL, CKMB, CKMBINDEX, TROPONINI in the last 72 hours. Radiology this visit:  Reviewed. CT ABDOMEN PELVIS WO CONTRAST Additional Contrast? None    Result Date: 12/27/2022  EXAMINATION: CT OF THE ABDOMEN AND PELVIS WITHOUT CONTRAST 12/27/2022 3:42 pm TECHNIQUE: CT of the abdomen and pelvis was performed without the administration of intravenous contrast. Multiplanar reformatted images are provided for review. Automated exposure control, iterative reconstruction, and/or weight based adjustment of the mA/kV was utilized to reduce the radiation dose to as low as reasonably achievable.  COMPARISON: 04/30/2017 and 12/27/2022 HISTORY: ORDERING SYSTEM PROVIDED HISTORY: abd pain, generalized weakness TECHNOLOGIST PROVIDED HISTORY: Reason for exam:->abd pain, generalized weakness Additional Contrast?->None Decision Support Exception - unselect if not a suspected or confirmed emergency medical condition->Emergency Medical Condition (MA) Reason for Exam: abd pain, generalized weakness FINDINGS: Evaluation of the solid and hollow abdominal viscera is limited without intravenous contrast administration. Study limited by motion and beam hardening artifacts. Lower Chest:   Trace left pleural fluid. Lower lobe parenchymal consolidations bilaterally, left greater than right. Organs: Punctate nonobstructing superior right renal calculus. Otherwise unremarkable. GI/Bowel:   Colonic diverticulosis without diverticulitis. Moderate to large amount of retained colonic stool suggesting constipation. Mild nonspecific edema within the perirectal fat. Status post bariatric surgery. Pelvis: Hysterectomy. Peritoneum/Retroperitoneum:   Mild-to-moderate atherosclerotic calcification of the abdominal aorta and major branch vessels. Bones/Soft Tissues:   Nonspecific edema/skin thickening within the lateral left breast/chest wall. Posterior ronald/screw fixation of the L4-S1 levels bilaterally. Multilevel disc disease. Interval development of a lytic focus within the superior L2 vertebral body measuring 1.6 x 0.8 cm, likely an intravertebral disc herniation. 1.  Bilateral lower lobe consolidations concerning for multifocal pneumonia. Radiographic follow-up recommended to document resolution following treatment. 2.  Probable constipation. Colonic diverticulosis without diverticulitis. 3.  Mild nonspecific edema within the perirectal fat without appreciable wall thickening. Correlation with digital rectal exam may be helpful. 4.  Nonspecific edema/skin thickening within the left lateral breast and chest wall which may be iatrogenic or related to recent trauma. Follow-up mammography may be useful as clinically warranted. 5.  Additional nonemergent findings, as above.      CT HEAD WO CONTRAST    Result Date: 12/27/2022  EXAMINATION: CT OF THE HEAD WITHOUT CONTRAST  12/27/2022 3:41 pm TECHNIQUE: CT of the head was performed without the administration of intravenous contrast. Automated exposure control, iterative reconstruction, and/or weight based adjustment of the mA/kV was utilized to reduce the radiation dose to as low as reasonably achievable. COMPARISON: 01/12/2022 HISTORY: ORDERING SYSTEM PROVIDED HISTORY: frequent falls, generalized weakness, difficulty swallowing TECHNOLOGIST PROVIDED HISTORY: Reason for exam:->frequent falls, generalized weakness, difficulty swallowing Has a \"code stroke\" or \"stroke alert\" been called? ->No Decision Support Exception - unselect if not a suspected or confirmed emergency medical condition->Emergency Medical Condition (MA) Reason for Exam: frequent falls, generalized weakness, difficulty swallowing FINDINGS: BRAIN/VENTRICLES: There is mild generalized atrophy and there is mild periventricular and subcortical white matter low attenuation that is nonspecific but most consistent with chronic small vessel ischemia. There is a small remote right basal ganglia lacunar infarct. There are few bilateral basal ganglia calcifications. There is no acute intracranial hemorrhage, mass effect or midline shift. No abnormal extra-axial fluid collection. The gray-white differentiation is maintained without evidence of an acute infarct. ORBITS: The visualized portion of the orbits demonstrate no acute abnormality. SINUSES: The visualized paranasal sinuses and mastoid air cells demonstrate no acute abnormality. Mild mucosal thickening in the right sphenoid locule. SOFT TISSUES/SKULL:  No acute abnormality of the visualized skull or soft tissues. No acute intracranial abnormality. CT HEAD W CONTRAST    Result Date: 12/28/2022  EXAMINATION: CT OF THE HEAD WITH CONTRAST  12/28/2022 8:18 am TECHNIQUE: CT of the head/brain was performed with the administration of intravenous contrast. Multiplanar reformatted images are provided for review.  Automated exposure control, iterative reconstruction, and/or weight based adjustment of the mA/kV was utilized to reduce the radiation dose to as low as reasonably achievable. COMPARISON: 12/27/2022 HISTORY: ORDERING SYSTEM PROVIDED HISTORY: worse neuro symptoms and prior cva and doing dialysis today TECHNOLOGIST PROVIDED HISTORY: Reason for exam:->worse neuro symptoms and prior cva and doing dialysis today Reason for Exam: worse neuro symptoms and prior cva and doing dialysis today FINDINGS: BRAIN/VENTRICLES: There is no acute infarct or acute intracranial hemorrhage present. There is no mass effect or midline shift present. There is no ventriculomegaly or abnormal extra-axial fluid collection present. There is mild periventricular hypoattenuation. ORBITS: Limited evaluation of the orbits is unremarkable. SINUSES: The paranasal sinuses and mastoid air cells are clear. SOFT TISSUES/SKULL:  No lytic or blastic osseous lesions are identified. Stable appearance of the brain without acute intracranial process identified. CT CERVICAL SPINE WO CONTRAST    Result Date: 12/27/2022  EXAMINATION: CT OF THE CERVICAL SPINE WITHOUT CONTRAST 12/27/2022 3:41 pm TECHNIQUE: CT of the cervical spine was performed without the administration of intravenous contrast. Multiplanar reformatted images are provided for review. Automated exposure control, iterative reconstruction, and/or weight based adjustment of the mA/kV was utilized to reduce the radiation dose to as low as reasonably achievable.  COMPARISON: 12/29/2020 HISTORY: ORDERING SYSTEM PROVIDED HISTORY: frequent falls, chronic falls, generalized weakness, difficulty swallowing TECHNOLOGIST PROVIDED HISTORY: Reason for exam:->frequent falls, chronic falls, generalized weakness, difficulty swallowing Decision Support Exception - unselect if not a suspected or confirmed emergency medical condition->Emergency Medical Condition (MA) Reason for Exam: frequent falls, chronic falls, generalized weakness, difficulty swallowing FINDINGS: BONES/ALIGNMENT: The previously noted cervical fusion from C3-C6 has been extended inferiorly to T2 with posterior fixation rods and pedicle screws at T1 and T2. The left pedicle screw at T2 extends into the left transverse process but does not extend into the vertebral body. The tip of the left T2 pedicle screw is directly adjacent to the posterior aortic arch on the last image obtained (axial image 74). Pedicle screws at T1 are in satisfactory position. Remote C4, C5, C6 and C7 laminectomies. There is no acute fracture or traumatic malalignment of the cervical spine. DEGENERATIVE CHANGES: There are multilevel degenerative changes in the cervical spine. There is new bone fusion of the disc space at C7-T1. SOFT TISSUES: There is no prevertebral soft tissue swelling. No acute fracture or traumatic malalignment of the cervical spine Posterior fusion extends from C3-T2 with laminectomies at C4, C5, C6 and C7. The left pedicle at T2 extends into the left transverse process but does not extend into the vertebral body. Tip of the left T2 pedicle screw is directly adjacent to the posterior aortic arch. XR CHEST PORTABLE    Result Date: 12/27/2022  EXAMINATION: ONE XRAY VIEW OF THE CHEST 12/27/2022 3:34 pm COMPARISON: 07/08/2022 HISTORY: ORDERING SYSTEM PROVIDED HISTORY: weakness TECHNOLOGIST PROVIDED HISTORY: Reason for exam:->weakness Reason for Exam: weakness FINDINGS: Fusion hardware again noted at the cervicothoracic junction. Cardiomediastinal silhouette appears unremarkable. Low lung volumes accentuate the central bronchovascular markings bilaterally. Small left basilar parenchymal opacity may represent atelectasis or developing infiltrate. Minimal right basilar atelectasis. No effusion or pneumothorax. No aggressive osseous lesion. Left basilar parenchymal opacity may represent atelectasis versus developing infiltrate.   Radiographic follow-up recommended to document resolution following treatment, including PA/lateral views. IR FISTULAGRAM    Result Date: 12/6/2022  PROCEDURE: IR FISTULAGRAM 12/6/2022 HISTORY: ORDERING SYSTEM PROVIDED HISTORY: ESRD (end stage renal disease) ([facility] [address] TECHNIQUE: Maximum sterile barrier technique including hand hygiene, skin prep and sterile ultrasound technique utilized for procedure. Following informed consent, pause a confirm/time-out patient is placed in supine position on fluoroscopic table with left upper extremity in the abducted and externally rotated position. Skin was prepped and draped in sterile fashion from the left axillary through mid forearm regions. 5 cc 1% lidocaine without epinephrine for local anesthesia. Percutaneous antegrade access of left upper arm dialysis access graft near arterial anastomosis was achieved. Guidewires advanced over which 4 [place] introducer was placed and graftogram was performed. 4 [place] introducer was exchanged for a 6 [place] Kelly sheath. Balloon angioplasty of the graft-venous anastomosis to maximal diameter of 6 mm. Balloon catheter deflated and removed. Post angioplasty shuntogram. Access sheath removed. Direct pressure applied the puncture site until hemostasis achieved. Dressing applied. Patient tolerated procedure well. CONTRAST: 20 cc Isovue 370 SEDATION: None FLUOROSCOPY DOSE AND TYPE OR TIME AND EXPOSURES: 10 minutes fluoroscopy time Air kerma: 120 minutes DESCRIPTION OF PROCEDURE: Informed consent was obtained after a detailed explanation of the procedure including risks, benefits, and alternatives. Universal protocol was observed. Sterile gowns, masks, hats and gloves utilized for maximal sterile barrier. As above in technique section FINDINGS: Focal high-grade stenosis noted at graft-venous anastomosis. Intraprocedural images demonstrate balloon angioplasty to maximal diameter of 6 mm. Postprocedure shuntogram demonstrate marked improvement in vessel diameter at graft-venous anastomosis.   No complication suggested. Moderate-high-grade stenosis also seen at the graft-arterial anastomosis however, patient states clinical symptoms of dialysis access steal with episodes of numbness, pain and cold sensation of the left hand. No intervention the arterial anastomosis was performed. Correlation with graft function suggested. Intended primary outflow vein is uncertain but roughly corresponds to position of basilic vein. Primary outflow provided via collateral opacification of well-developed brachial vein. Left subclavian, brachiocephalic vein and superior vena cava are widely patent. High-grade/Flow limiting stenosis graft-venous anastomosis angioplasty to maximal diameter of 6 mm (maximal patient tolerance) with marked improvement in luminal diameter at the angioplasty site. Intended primary outflow vein is occluded however, well-developed collateral opacification of well-developed brachial vein noted suggested chronic process. Correlation with graft function suggested. Hemodynamically significant appearing stenosis graft-arterial anastomosis. Patient relates symptoms of graft steal.  Intervention at arterial anastomosis deferred pending evaluation of graft function.        Sherryle Comfort, PA-C  Hospitalist  12/28/2022, 12:43 PM

## 2022-12-28 NOTE — PROGRESS NOTES
4 Eyes Skin Assessment     NAME:  Alina Dobbins  YOB: 1950  MEDICAL RECORD NUMBER:  [id number]    The patient is being assess for  Admission    I agree that 2 RN's have performed a thorough Head to Toe Skin Assessment on the patient. ALL assessment sites listed below have been assessed. Areas assessed by both nurses:    Head, Face, Ears, Shoulders, Back, Chest, Arms, Elbows, Hands, Sacrum. Buttock, Coccyx, Ischium, and Legs. Feet and Heels        Does the Patient have a Wound?  No noted wound(s)  Healed scab/abrasion to cocyx, Left lower leg, mid abd       Manuel Prevention initiated:  Yes   Wound Care Orders initiated:  No    Pressure Injury (Stage 3,4, Unstageable, DTI, NWPT, and Complex wounds) if present place consult order under [de-identified] No    New and Established Ostomies if present place consult order under : No      Nurse 1 eSignature: Electronically signed by Naomie Chong RN on 12/28/22 at 12:27 AM EST    **SHARE this note so that the co-signing nurse is able to place an eSignature**    Nurse 2 eSignature: Electronically signed by Dave Sanders LPN on 69/13/48 at 1:19 AM EST

## 2022-12-28 NOTE — PROGRESS NOTES
patient seen and examined on dialysis tolerating at this time. Did get a phone call patient 1 to stop after 1 and half hours. Did have a CAT scan done with contrast this morning and okay to stop after 1-1/2 hours. And CT scan was negative for any acute pathology await neurology evaluation as well and swallow evaluation full note to still follow-up.

## 2022-12-28 NOTE — DISCHARGE INSTR - COC
Continuity of Care Form    Patient Name: Creston Mohs   :  1950  MRN:  [id number]    Admit date:  2022  Discharge date:  ***    Code Status Order: Full Code   Advance Directives:     Admitting Physician:  Tobin Lema MD  PCP: Ruthie Ruiz MD    Discharging Nurse: [facility] Unit/Room#: 9766/6495-Z  Discharging Unit Phone Number: ***    Emergency Contact:   Extended Emergency Contact Information  Primary Emergency Contact: Lio Willingham  Address: [address] [place] [address]           Kirsty Patel [address] [place] [address] Phone: [phone]  Mobile Phone: [phone]  Relation: Child    Past Surgical History:  Past Surgical History:   Procedure Laterality Date    APPENDECTOMY      AV FISTULA CREATION Left     BACK SURGERY  2017    Lower Back With Hardware    BACK SURGERY  2016    Cervical Back With Hardware    CARPAL TUNNEL RELEASE Right      SECTION  3-30-68 And 10-17-69    COLONOSCOPY  2017    Polyps Removed    CORONARY ANGIOPLASTY  2016    Heart Stent D Circ    DENTAL SURGERY      Teeth Extracted In Past    DILATION AND CURETTAGE OF UTERUS  Last Done In [address] [place] [address]    \"Numerous\"    ENDOSCOPY, COLON, DIAGNOSTIC  2017    HYSTERECTOMY (CERVIX STATUS UNKNOWN)      Partial Abdominal Hysterectomy    IR TUNNELED CATHETER PLACEMENT GREATER THAN 5 YEARS  2019    IR TUNNELED CATHETER PLACEMENT GREATER THAN 5 YEARS 2019 [facility] PROC[facility]    IR TUNNELED CATHETER PLACEMENT GREATER THAN 5 YEARS  3/3/2021    IR TUNNELED CATHETER PLACEMENT GREATER THAN 5 YEARS [facility] PROCEDURES    ROTATOR CUFF REPAIR Left 2010    SLEEVE GASTRECTOMY  2018    robotic assisted gastric sleeve, hiatal hernia repair    THYROIDECTOMY         Immunization History:   Immunization History   Administered Date(s) Administered    COVID-19, PFIZER Bivalent BOOSTER, DO NOT Dilute, (age 12y+), IM, 30 mcg/0.3 mL 11/10/2022    COVID-19, PFIZER GRAY top, DO NOT Dilute, (age 15 y+), IM, 30 mcg/0.3 mL 06/27/2022    COVID-19, PFIZER PURPLE top, DILUTE for use, (age 15 y+), 30mcg/0.3mL 01/21/2021, 02/11/2021, 11/16/2021    Influenza Vaccine, unspecified formulation 08/30/2017, 08/18/2018    Influenza Virus Vaccine 08/10/2015    Influenza Whole 08/31/2016    Influenza, High Dose (Fluzone 65 yrs and older) 10/01/2020    Pneumococcal Conjugate 13-valent (Fkualiz52) 07/19/2019    Pneumococcal Polysaccharide (Qmtkxctys96) 02/18/2015       Active Problems:  Patient Active Problem List   Diagnosis Code    CAD (coronary artery disease) I25.10    Nausea & vomiting R11.2    Hypothyroidism, adult E03.9    Acute on chronic renal failure (HCC) N17.9, N18.9    Hypertensive kidney disease with CKD stage III ([facility]) I12.9, N18.30    Type 2 diabetes mellitus without complication ([facility]) R97.9    Hypercalcemia E83.52    Chronic venous hypertension (idiopathic) with inflammation of bilateral lower extremity I87.323    Fluid overload E87.70    Acute on chronic diastolic heart failure (HCC) I50.33    Morbid obesity with BMI of 45.0-49.9, adult ([facility] E66.01, Z68.42    Diabetes 1.5, 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    Hyperglycemia R73.9    Chronic fatigue R53.82    Solitary pulmonary nodule R91.1    Acute hypercapnic respiratory failure (HCC) D83.90    Acute metabolic encephalopathy C09.65    Chronic diastolic heart failure (HCC) I50.32    Acute renal failure (ARF) ([facility]) N17.9    Acute encephalopathy G93.40    YRIS (obstructive sleep apnea) G47.33    Hiatal hernia K44.9    Status post laparoscopic sleeve gastrectomy Z98.84    Status post bariatric surgery Z98.84    WD-Chronic buttock pain M79.18, G89.29    Chronic kidney disease, stage IV (severe) ([facility]) N18.4    Essential hypertension I10    ESRD (end stage renal disease) ([facility] N18.6    ESRD needing dialysis ([facility] N18.6, Z99.2    Type 2 diabetes mellitus with hyperglycemia, with long-term current use of insulin (HCC) E11.65, Z79.4    Class 3 severe obesity due to excess calories with body mass index (BMI) of 40.0 to 44.9 in adult ([facility]) E66.01, Z68.41    Fistula L98.8    Cerebrovascular accident (CVA) due to occlusion of precerebral artery ([facility]) I63.20    Aphagia R13.0    Lymphedema of left upper extremity I89.0    Strain of muscle, fascia and tendon of long head of biceps, left arm, initial encounter Z16.278H    History of progressive weakness Z87.898       Isolation/Infection:   Isolation            No Isolation          Patient Infection Status       Infection Onset Added Last Indicated Last Indicated By Review Planned Expiration Resolved Resolved By    None active    Resolved    COVID-19 (Rule Out) 12/28/22 12/28/22 12/28/22 Respiratory Panel, Molecular, with COVID-19 (Restricted: peds pts or suitable admitted adults) (Ordered)   12/28/22 Rule-Out Test Resulted    COVID-19 (Rule Out) 12/27/22 12/27/22 12/27/22 COVID-19, Rapid (Ordered)   12/27/22 Rule-Out Test Resulted    Pulmonary Tuberculosis (Rule Out) 07/11/22 07/11/22 07/11/22 Quantiferon, Incubated (Ordered)   07/14/22 Rule-Out Test Resulted            Nurse Assessment:  Last Vital Signs: BP 88/63   Pulse 91   Temp 97.6 °F (36.4 °C)   Resp 21   Ht 5' 4\" (1.626 m)   Wt 178 lb 6.4 oz (80.9 kg)   SpO2 99%   BMI 30.62 kg/m²     Last documented pain score (0-10 scale): Pain Level: 5  Last Weight:   Wt Readings from Last 1 Encounters:   12/28/22 178 lb 6.4 oz (80.9 kg)     Mental Status:  {IP PT MENTAL STATUS:20030}    IV Access:  {[facility]ESS:542488232}    Nursing Mobility/ADLs:  Walking   {CHP DME IJGY:654868382}  Transfer  {CHP DME BSLW:125101600}  Bathing  {CHP DME CGVI:075945740}  Dressing  {CHP DME WEUV:461917547}  Toileting  {CHP DME XTBY:807288247}  Feeding  {CHP DME IYWK:040196010}  Med Admin  {CHP DME LOCN:194846622}  Med Delivery   { [facility] MED Delivery:804013499}    Wound Care Documentation and Therapy:  Incision 12/22/21 Left;Upper (Active)   Number of days: 720 Elimination:  Continence: Bowel: {YES / OQ:05016}  Bladder: {YES / CK:68809}  Urinary Catheter: {Urinary Catheter:196581116}   Colostomy/Ileostomy/Ileal Conduit: {YES / PN:23357}       Date of Last BM: ***  No intake or output data in the 24 hours ending 22 1003  No intake/output data recorded.     Safety Concerns:     [address] [facility] Safety Concerns:058252137}    Impairments/Disabilities:      [address] [facility] Impairments/Disabilities:306304744}        Patient's personal belongings (please select all that are sent with patient):  {CHP DME Belongings:423142568}    RN SIGNATURE:  {Esignature:375280548}    CASE MANAGEMENT/SOCIAL WORK SECTION    Inpatient Status Date: ***    Readmission Risk Assessment Score:  Readmission Risk              Risk of Unplanned Readmission:  24           Discharging to Facility/ Agency   Name:   Address:  Phone:  Fax:    Dialysis Facility (if applicable)   Name:  Address:  Dialysis Schedule:  Phone:  Fax:    / signature: {Esignature:331720175}    PHYSICIAN SECTION  Nutrition Therapy:  Current Nutrition Therapy:   [address] [place] [address] Diet List:368196733}    Routes of Feeding: {CHP DME Other Feedings:818794872}  Liquids: {Slp liquid thickness:98867}  Daily Fluid Restriction: {CHP DME Yes amt example:947186479}  Last Modified Barium Swallow with Video (Video Swallowing Test): {Done Not Done QNFO:474543489}    Treatments at the Time of Hospital Discharge:   Respiratory Treatments: ***  Oxygen Therapy:  {Therapy; copd oxygen:47775}  Ventilator:    { CC Vent VXWX:111332684}    Rehab Therapies: {THERAPEUTIC INTERVENTION:9731721547}  Weight Bearing Status/Restrictions: [address] [place] [address] Weight Bearin}  Other Medical Equipment (for information only, NOT a DME order):  {EQUIPMENT:932119799}  Other Treatments: ***  Prognosis: {Prognosis:2672695315}    Condition at Discharge: [address] [facility] Patient Condition:797424044}    Rehab Potential (if transferring to Rehab): {Prognosis:4711456168}    Recommended Labs or Other Treatments After Discharge: ***    Physician Certification: I certify the above information and transfer of Gillian Long  is necessary for the continuing treatment of the diagnosis listed and that she requires {Admit to Appropriate Level of Care:21902} for {GREATER/LESS:406725285} 30 days.      Update Admission H&P: {CHP DME Changes in YAEKL:590019062}    PHYSICIAN SIGNATURE:  {Esignature:978444727}

## 2022-12-29 PROBLEM — I82.621 ACUTE DEEP VEIN THROMBOSIS (DVT) OF BRACHIAL VEIN OF RIGHT UPPER EXTREMITY (HCC): Status: ACTIVE | Noted: 2022-01-01

## 2022-12-29 NOTE — PROCEDURES
PROCEDURE PERFORMED: Central Venous Catheter placement  Right EJ         INFORMED CONSENT:  Obtained prior to procedure. Consent placed in chart. PT TRANSPORTED FROM:  4112                                  TO THE IR ROOM:    large room                     ARRIVED TO ROOM: 0814    ASSESSMENT: A&O X 4 with VSS    STAFF PRESENT: Ingrid Tucker RN, Daiva Bernheim RN, Promise Flannery RT    BARRIER PRECAUTIONS & STERILE TECHNIQUE:               Pt transferred to the table and positioned  supine for comfort. Warm blankets given. Pt placed on Cardiac Monitor. Pt prepped  and draped in a sterile fashion with chlorhexadine. TIME OUT:  4650     Procedure started:0837      PAIN/LOCAL ANESTHESIA/SEDATION MANAGEMENT:           Local: Lidocaine 1% given by           INTRAOPERATIVE:           ACCESS TIME: 6367 with micro puncture introducer set           US/FLUORO: US 8 images/ FT 2.1/K 14           WIRE USED: [place] wire           SHEATH USED:           CATHETER USED:   7fr 3 lumen 20 Cm [place] Blue Plus pressure injectable multi-lumen CVC            FINAL IMAGE TAKEN TO CONFIRM PLACEMENT OF: catheter          CONTRAST/CC: Iso Yosef 370 0cc used     0846-Unable to obtain Right IJ access - now attempting Right EJ access   8520 - access to Right EJ, brisk blood return noted, flushed without issue.               STERILE DRESSINGS: biopatch,tegaderm    SPECIMENS: None    EBL:    >1cc    Procedure ended: 3920            COMPLICATIONS/ OUTCOME: patient remained A&O with VSS and no signs or symptoms of distress            LEFT THE ROOM: 0905    REPORT CALLED TO:  Deisy Rodriguez RN [phone]

## 2022-12-29 NOTE — PROGRESS NOTES
Hospitalist Progress Note      Name:  Yaya Conde /Age/Sex: 1950  (81 y.o. female)   MRN & CSN:  [id number] & [id number] Admission Date/Time: 2022  1:16 PM   Location:  [facility] PCP: Ashley Hines, [address] Day: 3                                               Attending Physician Dr. Trever Gallegos and Plan:   Yaya Conde is a 81 y.o.  female  who presents with History of progressive weakness    # Chronic progressive weakness  - Having progressive weakness in all 4 extremities with difficulty swallowing worsened over past 2 weeks. Requiring assistance with ADLs. Exam non-focal, no ocular involvement. Labs unremarkable. On chronic Prednisone for RA. Hx of posterior C3-T2 posterior fusion. Hx of CVA in .  -Initial CT head was negative, repeat head CT negative  - Neurology consulted, appreciate recommendations  - SLP evaluated again, recommended soft and bite size diet/thin liquids with aspiration precautions, crush pills and give in puree  -Code status changed to limited today (son involved in conversation at bedside)     # ESRD on HD MF  -Plan for HD in AM     #Acute right brachial, cephalic, basilic vein DVT  -Started on heparin gtt, switched to eliquis per cardiology  -Consulted cardiology for recommendations. Echo pending    #Sinus tachycardia  -Possibly 2/2 pneumonia and UTI  -metprolol started by cardiology, monitor    # NSTEMI, type II, in the setting of CAD and ESRD   - Denied any CP.  - Initial Tn mildly elevated ECG without acute ischemic changes.  -Continue ASA, statin, ranexa     # Acute hypoxic respiratory failure 2/2 pneumonia, possibly aspiration  - No leukocytosis. On RA. CT showing multifocal pneumonia.  Respiratory disease panel negative, COVID and influenza negative  - Started on Vanc/Cefepime in the ED, switched to rocephin and azithromycin as patient is not showing signs of SIRS/sepsis  - BC NG currently     # Klebsiella UTI   - Denied any urinary symptoms.   -Urine culture growing klebsiella, await sensitivities, continue on rocephin/azithro    #Poor venous access  -Required IR placement of central line on 12/29    # Gout  -Continue allopurinol    #RA  -Continue home chronic prednisone    #COPD, not in exacerbation    #DM II  -Not on home antiglycemics, last A1C 5.5    #CHF  -Echo EF 50-55%    #Hypothyroidism  -Continue synthroid    Diet ADULT DIET; Dysphagia - Soft and Bite Sized   DVT Prophylaxis eliquis   GI Prophylaxis [] PPI,  [] H2 Blocker,  [] Carafate,  [] Diet/Tube Feeds   Code Status Limited   Disposition Patient requires continued admission due to neuro eval, needs HD in AM, evaluate swallowing ability, treatment for UTI/PNA     -Patient assessment and plan discussed with supervising physician-  Overnight events:     Antwan Gay is a 81 y.o.  female, who presented with Altered Mental Status (Sent from [address] [place] [address] for altered mental status. ) and Other (Patient says had dialysis Saturday [facility] claims has missed last two treatments. )    Patient was seen and evaluated at bedside by myself. No acute overnight events. Patient reports no complaints    Objective:   No intake or output data in the 24 hours ending 12/29/22 1401     Vitals:   Vitals:    12/29/22 0842 12/29/22 0849 12/29/22 1024 12/29/22 1139   BP: (!) 147/97 (!) 154/82  (!) 154/82   Pulse: (!) 107 (!) 105     Resp:       Temp:       TempSrc:       SpO2: 100% 100% 99%    Weight:       Height:         Physical Exam: 12/29/22     General: NAD, obese  Eyes: EOMI  ENT: neck supple  Cardiovascular: Regular rate, right IJ CVC in place  Respiratory: Clear to auscultation  Gastrointestinal: Soft, non tender  Genitourinary: no suprapubic tenderness  Musculoskeletal: No edema  Skin: warm, dry  Neuro: Alert. Overall weak without focal deficits  Psych: Mood appropriate.      Medications:   Medications:    mirtazapine  15 mg Oral Nightly    sucralfate  1 g Oral 2 times per day betamethasone dipropionate   Topical Daily    apixaban  5 mg Oral BID    atorvastatin  20 mg Oral Nightly    metoprolol tartrate  25 mg Oral BID    midodrine  5 mg Oral TID WC    amLODIPine  5 mg Oral Daily    aspirin  81 mg Oral Daily    levothyroxine  100 mcg Oral Daily    montelukast  10 mg Oral Nightly    pantoprazole  40 mg Oral BID AC    predniSONE  20 mg Oral Daily    QUEtiapine  50 mg Oral Nightly    ranolazine  500 mg Oral BID    sevelamer  800 mg Oral TID WC    tiotropium-olodaterol  2 puff Inhalation Daily    sodium chloride flush  5-40 mL IntraVENous 2 times per day    cefTRIAXone (ROCEPHIN) IV  1,000 mg IntraVENous Q24H    azithromycin  500 mg IntraVENous Q24H    allopurinol  200 mg Oral Daily    levomilnacipran  40 mg Oral Nightly    LORazepam  0.5 mg Oral BID      Infusions:    sodium chloride       PRN Meds: ipratropium-albuterol, 1 vial, Q6H PRN  sodium chloride flush, 5-40 mL, PRN  sodium chloride, , PRN  ondansetron, 4 mg, Q8H PRN   Or  ondansetron, 4 mg, Q6H PRN  polyethylene glycol, 17 g, Daily PRN  traMADol, 50 mg, Q6H PRN      LABS  CBC   Recent Labs     12/27/22  1835   WBC 5.3   HGB 12.9   HCT 40.3           RENAL  Recent Labs     12/27/22  1700 12/28/22  0456    137   K 4.9 4.8   CL 98* 100   CO2 26 20*   BUN 73* 76*   CREATININE 4.1* 3.9*       LFT'S  Recent Labs     12/27/22  1700   AST 21   ALT 14   BILITOT 0.2   ALKPHOS 102       COAG  No results for input(s): INR in the last 72 hours. CARDIAC ENZYMES  No results for input(s): CKTOTAL, CKMB, CKMBINDEX, TROPONINI in the last 72 hours. Radiology this visit:  Reviewed. CT ABDOMEN PELVIS WO CONTRAST Additional Contrast? None    Result Date: 12/27/2022  EXAMINATION: CT OF THE ABDOMEN AND PELVIS WITHOUT CONTRAST 12/27/2022 3:42 pm TECHNIQUE: CT of the abdomen and pelvis was performed without the administration of intravenous contrast. Multiplanar reformatted images are provided for review.  Automated exposure control, iterative reconstruction, and/or weight based adjustment of the mA/kV was utilized to reduce the radiation dose to as low as reasonably achievable. COMPARISON: 04/30/2017 and 12/27/2022 HISTORY: ORDERING SYSTEM PROVIDED HISTORY: abd pain, generalized weakness TECHNOLOGIST PROVIDED HISTORY: Reason for exam:->abd pain, generalized weakness Additional Contrast?->None Decision Support Exception - unselect if not a suspected or confirmed emergency medical condition->Emergency Medical Condition (MA) Reason for Exam: abd pain, generalized weakness FINDINGS: Evaluation of the solid and hollow abdominal viscera is limited without intravenous contrast administration. Study limited by motion and beam hardening artifacts. Lower Chest:   Trace left pleural fluid. Lower lobe parenchymal consolidations bilaterally, left greater than right. Organs: Punctate nonobstructing superior right renal calculus. Otherwise unremarkable. GI/Bowel:   Colonic diverticulosis without diverticulitis. Moderate to large amount of retained colonic stool suggesting constipation. Mild nonspecific edema within the perirectal fat. Status post bariatric surgery. Pelvis: Hysterectomy. Peritoneum/Retroperitoneum:   Mild-to-moderate atherosclerotic calcification of the abdominal aorta and major branch vessels. Bones/Soft Tissues:   Nonspecific edema/skin thickening within the lateral left breast/chest wall. Posterior ronald/screw fixation of the L4-S1 levels bilaterally. Multilevel disc disease. Interval development of a lytic focus within the superior L2 vertebral body measuring 1.6 x 0.8 cm, likely an intravertebral disc herniation. 1.  Bilateral lower lobe consolidations concerning for multifocal pneumonia. Radiographic follow-up recommended to document resolution following treatment. 2.  Probable constipation. Colonic diverticulosis without diverticulitis. 3.  Mild nonspecific edema within the perirectal fat without appreciable wall thickening. Correlation with digital rectal exam may be helpful. 4.  Nonspecific edema/skin thickening within the left lateral breast and chest wall which may be iatrogenic or related to recent trauma. Follow-up mammography may be useful as clinically warranted. 5.  Additional nonemergent findings, as above. CT HEAD WO CONTRAST    Result Date: 12/27/2022  EXAMINATION: CT OF THE HEAD WITHOUT CONTRAST  12/27/2022 3:41 pm TECHNIQUE: CT of the head was performed without the administration of intravenous contrast. Automated exposure control, iterative reconstruction, and/or weight based adjustment of the mA/kV was utilized to reduce the radiation dose to as low as reasonably achievable. COMPARISON: 01/12/2022 HISTORY: ORDERING SYSTEM PROVIDED HISTORY: frequent falls, generalized weakness, difficulty swallowing TECHNOLOGIST PROVIDED HISTORY: Reason for exam:->frequent falls, generalized weakness, difficulty swallowing Has a \"code stroke\" or \"stroke alert\" been called? ->No Decision Support Exception - unselect if not a suspected or confirmed emergency medical condition->Emergency Medical Condition (MA) Reason for Exam: frequent falls, generalized weakness, difficulty swallowing FINDINGS: BRAIN/VENTRICLES: There is mild generalized atrophy and there is mild periventricular and subcortical white matter low attenuation that is nonspecific but most consistent with chronic small vessel ischemia. There is a small remote right basal ganglia lacunar infarct. There are few bilateral basal ganglia calcifications. There is no acute intracranial hemorrhage, mass effect or midline shift. No abnormal extra-axial fluid collection. The gray-white differentiation is maintained without evidence of an acute infarct. ORBITS: The visualized portion of the orbits demonstrate no acute abnormality. SINUSES: The visualized paranasal sinuses and mastoid air cells demonstrate no acute abnormality.   Mild mucosal thickening in the right sphenoid locule. SOFT TISSUES/SKULL:  No acute abnormality of the visualized skull or soft tissues. No acute intracranial abnormality. CT HEAD W CONTRAST    Result Date: 12/28/2022  EXAMINATION: CT OF THE HEAD WITH CONTRAST  12/28/2022 8:18 am TECHNIQUE: CT of the head/brain was performed with the administration of intravenous contrast. Multiplanar reformatted images are provided for review. Automated exposure control, iterative reconstruction, and/or weight based adjustment of the mA/kV was utilized to reduce the radiation dose to as low as reasonably achievable. COMPARISON: 12/27/2022 HISTORY: ORDERING SYSTEM PROVIDED HISTORY: worse neuro symptoms and prior cva and doing dialysis today TECHNOLOGIST PROVIDED HISTORY: Reason for exam:->worse neuro symptoms and prior cva and doing dialysis today Reason for Exam: worse neuro symptoms and prior cva and doing dialysis today FINDINGS: BRAIN/VENTRICLES: There is no acute infarct or acute intracranial hemorrhage present. There is no mass effect or midline shift present. There is no ventriculomegaly or abnormal extra-axial fluid collection present. There is mild periventricular hypoattenuation. ORBITS: Limited evaluation of the orbits is unremarkable. SINUSES: The paranasal sinuses and mastoid air cells are clear. SOFT TISSUES/SKULL:  No lytic or blastic osseous lesions are identified. Stable appearance of the brain without acute intracranial process identified. CT CERVICAL SPINE WO CONTRAST    Result Date: 12/27/2022  EXAMINATION: CT OF THE CERVICAL SPINE WITHOUT CONTRAST 12/27/2022 3:41 pm TECHNIQUE: CT of the cervical spine was performed without the administration of intravenous contrast. Multiplanar reformatted images are provided for review. Automated exposure control, iterative reconstruction, and/or weight based adjustment of the mA/kV was utilized to reduce the radiation dose to as low as reasonably achievable.  COMPARISON: 12/29/2020 HISTORY: ORDERING SYSTEM PROVIDED HISTORY: frequent falls, chronic falls, generalized weakness, difficulty swallowing TECHNOLOGIST PROVIDED HISTORY: Reason for exam:->frequent falls, chronic falls, generalized weakness, difficulty swallowing Decision Support Exception - unselect if not a suspected or confirmed emergency medical condition->Emergency Medical Condition (MA) Reason for Exam: frequent falls, chronic falls, generalized weakness, difficulty swallowing FINDINGS: BONES/ALIGNMENT: The previously noted cervical fusion from C3-C6 has been extended inferiorly to T2 with posterior fixation rods and pedicle screws at T1 and T2. The left pedicle screw at T2 extends into the left transverse process but does not extend into the vertebral body. The tip of the left T2 pedicle screw is directly adjacent to the posterior aortic arch on the last image obtained (axial image 74). Pedicle screws at T1 are in satisfactory position. Remote C4, C5, C6 and C7 laminectomies. There is no acute fracture or traumatic malalignment of the cervical spine. DEGENERATIVE CHANGES: There are multilevel degenerative changes in the cervical spine. There is new bone fusion of the disc space at C7-T1. SOFT TISSUES: There is no prevertebral soft tissue swelling. No acute fracture or traumatic malalignment of the cervical spine Posterior fusion extends from C3-T2 with laminectomies at C4, C5, C6 and C7. The left pedicle at T2 extends into the left transverse process but does not extend into the vertebral body. Tip of the left T2 pedicle screw is directly adjacent to the posterior aortic arch. XR CHEST PORTABLE    Result Date: 12/27/2022  EXAMINATION: ONE XRAY VIEW OF THE CHEST 12/27/2022 3:34 pm COMPARISON: 07/08/2022 HISTORY: ORDERING SYSTEM PROVIDED HISTORY: weakness TECHNOLOGIST PROVIDED HISTORY: Reason for exam:->weakness Reason for Exam: weakness FINDINGS: Fusion hardware again noted at the cervicothoracic junction.  Cardiomediastinal silhouette appears unremarkable. Low lung volumes accentuate the central bronchovascular markings bilaterally. Small left basilar parenchymal opacity may represent atelectasis or developing infiltrate. Minimal right basilar atelectasis. No effusion or pneumothorax. No aggressive osseous lesion. Left basilar parenchymal opacity may represent atelectasis versus developing infiltrate. Radiographic follow-up recommended to document resolution following treatment, including PA/lateral views. IR FISTULAGRAM    Result Date: 12/6/2022  PROCEDURE: IR FISTULAGRAM 12/6/2022 HISTORY: ORDERING SYSTEM PROVIDED HISTORY: ESRD (end stage renal disease) ([facility] [address] TECHNIQUE: Maximum sterile barrier technique including hand hygiene, skin prep and sterile ultrasound technique utilized for procedure. Following informed consent, pause a confirm/time-out patient is placed in supine position on fluoroscopic table with left upper extremity in the abducted and externally rotated position. Skin was prepped and draped in sterile fashion from the left axillary through mid forearm regions. 5 cc 1% lidocaine without epinephrine for local anesthesia. Percutaneous antegrade access of left upper arm dialysis access graft near arterial anastomosis was achieved. Guidewires advanced over which 4 Yemeni introducer was placed and graftogram was performed. 4 Yemeni introducer was exchanged for a 6 Western Kelly sheath. Balloon angioplasty of the graft-venous anastomosis to maximal diameter of 6 mm. Balloon catheter deflated and removed. Post angioplasty shuntogram. Access sheath removed. Direct pressure applied the puncture site until hemostasis achieved. Dressing applied. Patient tolerated procedure well.  CONTRAST: 20 cc Isovue 370 SEDATION: None FLUOROSCOPY DOSE AND TYPE OR TIME AND EXPOSURES: 10 minutes fluoroscopy time Air kerma: 120 minutes DESCRIPTION OF PROCEDURE: Informed consent was obtained after a detailed explanation of the procedure including risks, benefits, and alternatives. Universal protocol was observed. Sterile gowns, masks, hats and gloves utilized for maximal sterile barrier. As above in technique section FINDINGS: Focal high-grade stenosis noted at graft-venous anastomosis. Intraprocedural images demonstrate balloon angioplasty to maximal diameter of 6 mm. Postprocedure shuntogram demonstrate marked improvement in vessel diameter at graft-venous anastomosis. No complication suggested. Moderate-high-grade stenosis also seen at the graft-arterial anastomosis however, patient states clinical symptoms of dialysis access steal with episodes of numbness, pain and cold sensation of the left hand. No intervention the arterial anastomosis was performed. Correlation with graft function suggested. Intended primary outflow vein is uncertain but roughly corresponds to position of basilic vein. Primary outflow provided via collateral opacification of well-developed brachial vein. Left subclavian, brachiocephalic vein and superior vena cava are widely patent. High-grade/Flow limiting stenosis graft-venous anastomosis angioplasty to maximal diameter of 6 mm (maximal patient tolerance) with marked improvement in luminal diameter at the angioplasty site. Intended primary outflow vein is occluded however, well-developed collateral opacification of well-developed brachial vein noted suggested chronic process. Correlation with graft function suggested. Hemodynamically significant appearing stenosis graft-arterial anastomosis. Patient relates symptoms of graft steal.  Intervention at arterial anastomosis deferred pending evaluation of graft function.        Vladimir Tracy PA-C  Hospitalist  12/29/2022, 2:01 PM

## 2022-12-29 NOTE — PROGRESS NOTES
Speech Language Pathology  Facility/Department: [address]   CLINICAL BEDSIDE SWALLOW EVALUATION    NAME: Steve Burns  : 1950  MRN: [id number]    IMPRESSIONS AND RECOMMENDATIONS: Steve Burns was referred for a bedside swallow evaluation following admission to [facility] with chronic progressive weakness, PNA. Medical hx includes ESRD on HD, CAD, CVA, HTN, HLD, hypothyroidism. Pt and son report pt began having difficulty swallowing about 3 months ago, and it has acutely worsened over the past few weeks. Most recent MBS report from 2022 states no laryngeal penetration or aspiration identified; SLP note not found, only radiology report available. Pt seen for today's evaluation seated upright in bed, alert, cooperative. Noted dysarthric speech with reduced loudness and decreased articulatory precision. Son present throughout and assisted in providing case history. Oral mechanism examination grossly intact; reduced lingual ROM/coordination noted, no asymmetry identified. Pt presented with PO trials of ice chips, thin liquids via tsp/cup/straw, puree, soft solids, and regular solids. Oral stage mild-moderately impaired, characterized by intact labial seal, prolonged mastication, reduced bolus formation with minimal residue of regular solids on hard palate. Suspect premature pharyngeal entry with liquids. Suspect pharyngeal dysphagia with adequate swallow initiation, multiple swallows per bolus. No overt s/s aspiration identified. Pt reports sensation of pharyngeal residue and states she has had episodes of being \"strangled\" when drinking liquids prior to admission. Recommend initiation of soft and bite-sized diet/thin liquids with strict aspiration precautions. Please crush pills as able and give in pureed/pudding consistency. Pt would benefit from further evaluation with modified barium swallow study. SLP will follow. Recommendations/plan d/w pt and son.       ADMISSION DATE: 12/27/2022  ADMITTING DIAGNOSIS: has CAD (coronary artery disease); Nausea & vomiting; Hypothyroidism, adult; Acute on chronic renal failure ([address] Hypertensive kidney disease with CKD stage III ([address] Type 2 diabetes mellitus without complication ([address] Hypercalcemia; Chronic venous hypertension (idiopathic) with inflammation of bilateral lower extremity; Fluid overload; Acute on chronic diastolic heart failure ([address] Morbid obesity with BMI of 45.0-49.9, adult ([address] Diabetes 1.5, managed as type 2 ([address] Hyperglycemia; Chronic fatigue; Solitary pulmonary nodule; Acute hypercapnic respiratory failure ([address] Acute metabolic encephalopathy; Chronic diastolic heart failure ([address] Acute renal failure (ARF) ([address] Acute encephalopathy; YRIS (obstructive sleep apnea); Hiatal hernia; Status post laparoscopic sleeve gastrectomy; Status post bariatric surgery; WD-Chronic buttock pain; Chronic kidney disease, stage IV (severe) ([address] Essential hypertension; ESRD (end stage renal disease) ([address] ESRD needing dialysis ([address] Type 2 diabetes mellitus with hyperglycemia, with long-term current use of insulin ([address] Class 3 severe obesity due to excess calories with body mass index (BMI) of 40.0 to 44.9 in adult [facility]); Fistula; Cerebrovascular accident (CVA) due to occlusion of precerebral artery ([address]  Aphagia; Lymphedema of left upper extremity; Strain of muscle, fascia and tendon of long head of biceps, left arm, initial encounter; and History of progressive weakness on their problem list.  ONSET DATE: this admission    Recent Chest Xray/CT of Chest: see chart    Date of Eval: 12/29/2022  Evaluating Therapist: JUAN DAVID Ruiz    Current Diet level:  Current Diet : NPO      Primary Complaint  Patient Complaint: progressive worsening of speech and swallowing over the past few months, acutely worsening    Pain:  Pain Assessment  Pain Assessment: None - Denies Pain  Pain Level: 5  Patient's Stated Pain Goal: 0 - No pain  Pain Location: Neck  Pain Orientation: Posterior  Pain Descriptors: Aching  Pain Type: Chronic pain    Reason for Referral  Corby Broderick was referred for a bedside swallow evaluation to assess the efficiency of her swallow function, identify signs and symptoms of aspiration and make recommendations regarding safe dietary consistencies, effective compensatory strategies, and safe eating environment. Impression  Dysphagia Diagnosis: Mild to moderate oral stage dysphagia; Suspected needs further assessment  Dysphagia Outcome Severity Scale: Level 4: Mild moderate dysphagia- Intermittent supervision/cueing. One - two diet consistencies restricted     Treatment Plan  Requires SLP Intervention: Yes  Duration of Treatment: LOS  D/C Recommendations: To be determined       Recommended Diet and Intervention        Recommended Form of Meds: Crushed in puree as able  Recommendations: Modified barium swallow study  Therapeutic Interventions: Diet tolerance monitoring;Patient/Family education; Other (comment) (MBS to further evaluate and determine additional goals of care)    Compensatory Swallowing Strategies  Compensatory Swallowing Strategies : Upright as possible for all oral intake;Small bites/sips;Assist feed;Eat/Feed slowly    Treatment/Goals  Short-term Goals  Timeframe for Short-term Goals: length of admission  Goal 1: Pt will tolerate soft/bite-sized diet and thin liquids with adequate oral manipulation/clearance and no s/s aspiration. Goal 2: Pt will participate in modified barium swallow study to further evaluate dysphagia and determine POC. Goal 3: Pt/caregivers will indicate understanding of education/recommendations. General  Chart Reviewed: Yes  Behavior/Cognition: Alert; Cooperative  Respiratory Status: O2 via nasual cannula  O2 Device: Nasal cannula  Communication Observation: Dysarthria  Follows Directions: Simple  Dentition: Adequate  Patient Positioning: Upright in bed  Baseline Vocal Quality:  (reduced loudness)  Volitional Cough: Weak  Prior Dysphagia History: none known prior to admission  Consistencies Administered: Regular; Soft and Bite-Sized;Pureed; Thin - teaspoon; Thin - cup; Thin - straw; Ice Chips           Vision/Hearing  Hearing  Hearing: Within functional limits    Oral Motor Deficits  Oral/Motor  Oral Hygiene: Moist;Clean    Oral Phase Dysfunction  Oral Phase  Oral Phase: Exceptions  Oral Phase  Oral Phase - Comment: mild-moderate     Indicators of Pharyngeal Phase Dysfunction   Pharyngeal Phase   Pharyngeal Phase: suspect impairment, needs further evaluation    Prognosis  Individuals consulted  Consulted and agree with results and recommendations: Patient; Family member;RN  Family member consulted: son    Education  Patient Education: recommendations, plan  Patient Education Response: Demonstrated understanding  Safety Devices in place: Yes  Type of devices: All fall risk precautions in place; Left in bed       Therapy Time  SLP Individual Minutes  Time In: 8708  Time Out: 52015 Treva Almanza  Minutes: [address]  12/29/2022 1:08 PM

## 2022-12-29 NOTE — PROGRESS NOTES
[address] [facility], 1950, [id number], 12/29/2022    Thank you for the referral. Per the rehabilitation triage process, the OT order will be discontinued. Per chart review/CM notes, pt from LTC, has bed hold in place and can return when medically ready. Please re-order IP OT services if needs arise and pt is appropriate.     Yary Burns, OTR/L  12/29/2022, 8:15 AM

## 2022-12-29 NOTE — PROGRESS NOTES
Instructed and educated patient on use of IS. Patient unable to move bellow at all. Explained to family member to encourage to do.

## 2022-12-29 NOTE — CONSULTS
Dictation [id number]  Elevated troponin; secondary to ESRD  Missed HD on Monday  Increase fatigue at nursing facility  DVT in Right upper arm on duplex  Will start on [facility]  Place on tele  Echo for SOB-pending     Hx of ESRD on  HD-M/F-twice a week  Has a fistula left arm working  Not arm fistula not working   Hx of CVA   Hx of gastric bypass  Hx of CAD and PCI in 2015-at [facility]-BMS stent om branch  Hx of CAD elevated trop-watch for now  Keep on telemetry  Right arm -DVT -start on oral AC  No acute ekg changes noted   She missed one HD treatment --reviewed nephro note   Conservative treatment   She is on ASA/statin and Norvasc  Hx of hypotension  Treat pneumonia    Sinus tachy -heart rate in 100's start lopressor and ProAmatine      Stress 2/16/21   Summary    No EKG changes suggestive of ischemia with Lexiscan infusion. Normal perfusion uptake noted    no reversible ischemia noted    gating shows EF 60%         Echo 2/16/21   Summary   Left ventricular systolic function is normal.   Ejection fraction is visually estimated at 50-55%. Mild left ventricular hypertrophy. Moderately dilated left atrium. No evidence of any pericardial effusion. Grade I diastolic dysfunction . US of right arm 12/22   Impression:       1. Acute DVT involving the brachial vein near the antecubital fossa. 2. Acute SVT involving the cephalic and basilic veins. 3. Clotted fistula. 4. Indeterminate 17 x 1.5 x 2.2 cm area of fluid in the right upper extremity. Findings were discussed with Go Martinez at 10:17 p.m. on 12/28/2022        Ct abdomen 12/22  Impression:        1. Bilateral lower lobe consolidations concerning for multifocal pneumonia. Radiographic follow-up recommended to document resolution following treatment. 2.  Probable constipation. Colonic diverticulosis without diverticulitis. 3.  Mild nonspecific edema within the perirectal fat without appreciable wall   thickening.   Correlation with digital rectal exam may be helpful. 4.  Nonspecific edema/skin thickening within the left lateral breast and   chest wall which may be iatrogenic or related to recent trauma. Follow-up   mammography may be useful as clinically warranted. 5.  Additional nonemergent findings, as above. PAST MEDICAL HISTORY:  The patient has a history of having obstructive  sleep apnea, uses CPAP, history of hypertension, hyperlipidemia,  end-stage renal disease, on dialysis present, and diabetes present. History of CVA and TIA not too long ago in 08/2020; hypertension,  hyperlipidemia, COPD, sees Dr. Yvonne Alonso for that. PAST SURGICAL HISTORY:  She has a fistula in both arms, which are  nonfunctional, appendectomy, back surgery, temporary dialysis catheter  placement, hysterectomy, and thyroidectomy. SOCIAL HISTORY:  She does not smoke or drink anymore.     Electronically signed by Baljeet Geronimo MD on 12/29/22 at 11:56 AM EST

## 2022-12-29 NOTE — PROCEDURES
INSTRUMENTAL SWALLOW REPORT  MODIFIED BARIUM SWALLOW    NAME: Khoi Rogers   : 1950  MRN: [id number]       Date of Eval: 2022     Ordering Physician: Dr. Edy Calderon  Radiologist: Dr. Atul Keyes     Referring Diagnosis(es): Referring Diagnosis: dysphagia R13.10    Past Medical History:  has a past medical history of Acid reflux, Anemia, Arthritis, CAD (coronary artery disease), Cerebral artery occlusion with cerebral infarction ([address] CHF (congestive heart failure) ([address] Chronic back pain, Chronic constipation, Chronic kidney disease, COPD (chronic obstructive pulmonary disease) ([address] Depression, Diabetes mellitus ([address] Emphysema lung ([address] Glaucoma, Gout, Hemodialysis patient ([address] Hiatal hernia, History of blood transfusion, Kaw (hard of hearing), Hyperlipidemia, Hypertension, Itching, Morbid obesity ([address] Rheumatoid arthritis ([address] Shortness of breath on exertion, Sleep apnea, Teeth missing, Thyroid disease, Urinary incontinence, WD-Chronic buttock pain, and Wears glasses. Past Surgical History:  has a past surgical history that includes  section (3-30-68 And 10-17-69); Rotator cuff repair (Left, 2010); Carpal tunnel release (Right, ); Thyroidectomy (); Colonoscopy (); Endoscopy, colon, diagnostic (); back surgery (); back surgery (); Dental surgery; Coronary angioplasty (2016); Appendectomy (); Hysterectomy (); Dilation and curettage of uterus (Last Done In [address] [place] [address]); Sleeve Gastrectomy (2018); IR TUNNELED CVC PLACE WO SQ PORT/PUMP > 5 YEARS (2019); IR TUNNELED CVC PLACE WO SQ PORT/PUMP > 5 YEARS (3/3/2021); AV fistula creation (Left, ); and IR NONTUNNELED VASCULAR CATHETER > 5 YEARS (2022).             Date of Prior Study: 2022  Type of Study: Repeat MBS  Results of Prior Study: no laryngeal penetration/aspiration; full report not available in chart, study completed at [address] CXR/CT of Chest: see chart    Patient Complaints/Reason for Referral:  Venora Bloch was referred for a MBS to assess the efficiency of his/her swallow function, assess for aspiration, and to make recommendations regarding safe dietary consistencies, effective compensatory strategies, and safe eating environment. Patient complaints: occasional difficulty drinking liquids    Onset of problem:   Date of Onset: 3-4 months ago per pt/son report            Behavior/Cognition/Vision/Hearing:  Behavior/Cognition: Alert; Cooperative  Hearing: Within functional limits    Impressions:  Venora Bloch was seen for inpatient modified barium swallow study. She was seated upright in chair, alert, cooperative. She required total feeding assistance throughout. Pt presented with PO trials of thin liquids via tsp/cup/straw, puree, and regular solids. Oral stage is mildly impaired, characterized by intact labial seal, slow mastication/bolus formation, intact AP transit and adequate clearance. Premature pharyngeal entry noted intermittently with liquids to the valleculae. Pharyngeal stage is WFL, characterized by adequate swallow initiation, intact laryngeal elevation/anterior hyoid excursion, minimally reduced tongue base retraction, intact palatal elevation/epiglottic inversion/posterior pharyngeal stripping wave/UES distention. Minimal residue remained within the valleculae/pyriform sinuses. Laryngeal penetration identified with thin liquids midway to the vocal folds; all material cleared spontaneously with completion of the swallow. No laryngeal penetration identified with pureed or solid consistencies. No aspiration identified with any presented consistency. Recommendations: Advance to regular diet per pt request - discussed continuing soft/bite-sized diet for comfort but pt clearly states a preference for regular foods at this time. Continue to thin liquids.  Continue to crush pills at pt request (as able) and give in pureed or pudding consistency. SLP will follow-up x1 for education. Treatment Dx and ICD 10: Mild oral dysphagia R13.11   Patient Position: Lateral and upright      Consistencies Administered: Regular;Pureed; Thin teaspoon; Thin straw; Thin cup           Dysphagia Outcome Severity Scale: Level 5: Mild dysphagia- Distant supervision. May need one diet consistency restricted  Penetration-Aspiration Scale (PAS): 2 - Material enters the airway, remains above the vocal folds, and is ejected from the airway    Recommended Diet:  Solid consistency: Regular  Liquid consistency: Thin  Liquid administration via: Cup;Straw    Medication administration: Meds in puree    Safe Swallow Protocol:  Supervision: Close  Compensatory Swallowing Strategies : Upright as possible for all oral intake;Small bites/sips;Assist feed;Eat/Feed slowly          Recommendations/Treatment  Requires SLP Intervention: Yes        D/C Recommendations: No follow up therapy recommended post discharge         Recommended Exercises:    Therapeutic Interventions: Diet tolerance monitoring;Patient/Family education         Education: Images and recommendations were reviewed with Abhishek Xiao following this exam.   Patient Education: recommendations, plan  Patient Education Response: Demonstrated understanding    Duration/Frequency of Treatment  Duration of Treatment: LOS  Frequency of Treatment: 1-2x/week  Safety Devices  Safety Devices in place: Yes  Type of devices: All fall risk precautions in place; Left in bed  Restraints Initially in Place: No      Goals:    Long Term:               Short Term:     Goal 1: Pt will tolerate regular diet/thin liquids with adequate oral manipulation/clearance and no s/s aspiration. Goal 2: Pt/caregivers will demonstrate understanding of recommendations/education/strategies. Goal 3: Pt/caregivers will indicate understanding of education/recommendations.                     Oral Preparation / Oral Phase  Mild impairment        Pharyngeal Phase  [facility]      Esophageal Phase  Esophageal Screen: WFL        Pain      Pain Level: 5  Pain Type: Chronic pain  Pain Location: Neck      Therapy Time:   Individual Concurrent Group Co-treatment   Time In 1520         Time Out 1600         Minutes [place], [place] CCC-SLP, 12/29/2022, 4:47 PM

## 2022-12-29 NOTE — PROGRESS NOTES
Nephrology Progress Note  12/29/2022 2:57 PM  Subjective: Interval History: Loree Parker is a 81 y.o. female with weak overall seen this morning with patient's son appears more awake today        Data:   Scheduled Meds:   mirtazapine  15 mg Oral Nightly    sucralfate  1 g Oral 2 times per day    betamethasone dipropionate   Topical Daily    apixaban  5 mg Oral BID    atorvastatin  20 mg Oral Nightly    metoprolol tartrate  25 mg Oral BID    midodrine  5 mg Oral TID WC    amLODIPine  5 mg Oral Daily    aspirin  81 mg Oral Daily    levothyroxine  100 mcg Oral Daily    montelukast  10 mg Oral Nightly    pantoprazole  40 mg Oral BID AC    predniSONE  20 mg Oral Daily    QUEtiapine  50 mg Oral Nightly    ranolazine  500 mg Oral BID    sevelamer  800 mg Oral TID WC    tiotropium-olodaterol  2 puff Inhalation Daily    sodium chloride flush  5-40 mL IntraVENous 2 times per day    cefTRIAXone (ROCEPHIN) IV  1,000 mg IntraVENous Q24H    azithromycin  500 mg IntraVENous Q24H    allopurinol  200 mg Oral Daily    levomilnacipran  40 mg Oral Nightly    LORazepam  0.5 mg Oral BID     Continuous Infusions:   sodium chloride           CBC   Recent Labs     12/27/22  1835   WBC 5.3   HGB 12.9   HCT 40.3         BMP   Recent Labs     12/27/22  1700 12/28/22  0456    137   K 4.9 4.8   CL 98* 100   CO2 26 20*   BUN 73* 76*   CREATININE 4.1* 3.9*     Hepatic:   Recent Labs     12/27/22  1700   AST 21   ALT 14   BILITOT 0.2   ALKPHOS 102     Troponin: No results for input(s): TROPONINI in the last 72 hours. BNP: No results for input(s): BNP in the last 72 hours. Lipids: No results for input(s): CHOL, HDL in the last 72 hours. Invalid input(s): LDLCALCU  ABGs:   Lab Results   Component Value Date/Time    PO2ART 58 09/21/2017 12:00 AM    ZVX6CWI 44.0 09/21/2017 12:00 AM     INR: No results for input(s): INR in the last 72 hours.   Renal Labs  Albumin:    Lab Results   Component Value Date/Time    LABALBU 4.2 12/27/2022 05:00 PM     Calcium:    Lab Results   Component Value Date/Time    CALCIUM 9.1 12/28/2022 04:56 AM     Phosphorus:    Lab Results   Component Value Date/Time    PHOS 7.6 02/17/2021 06:11 AM     U/A:    Lab Results   Component Value Date/Time    NITRU NEGATIVE 12/27/2022 05:45 PM    NITRU Negative 07/10/2019 11:09 AM    COLORU YELLOW 12/27/2022 05:45 PM    PHUR 6.0 07/10/2019 11:09 AM    WBCUA 265 12/27/2022 05:45 PM    RBCUA 48 12/27/2022 05:45 PM    MUCUS RARE 12/27/2022 05:45 PM    TRICHOMONAS NONE SEEN 12/27/2022 05:45 PM    BACTERIA FEW 12/27/2022 05:45 PM    CLARITYU CLOUDY 12/27/2022 05:45 PM    SPECGRAV 1.020 12/27/2022 05:45 PM    UROBILINOGEN 0.2 12/27/2022 05:45 PM    BILIRUBINUR NEGATIVE 12/27/2022 05:45 PM    BLOODU SMALL NUMBER OR AMOUNT OBSERVED 12/27/2022 05:45 PM    GLUCOSEU Negative 07/10/2019 11:09 AM    KETUA NEGATIVE 12/27/2022 05:45 PM     ABG:    Lab Results   Component Value Date/Time    JFR9FWB 44.0 09/21/2017 12:00 AM    PO2ART 58 09/21/2017 12:00 AM    LAJ9XQQ 28.5 09/21/2017 12:00 AM     HgBA1c:    Lab Results   Component Value Date/Time    LABA1C 5.5 09/02/2021 10:30 AM     Microalbumen/Creatinine ratio:  No components found for: RUCREAT  TSH:    Lab Results   Component Value Date/Time    TSH 2.19 10/06/2017 12:15 PM     IRON:    Lab Results   Component Value Date/Time    IRON 53 12/01/2017 09:15 AM     Iron Saturation:  No components found for: PERCENTFE  TIBC:    Lab Results   Component Value Date/Time    TIBC 367 12/01/2017 09:15 AM     FERRITIN:    Lab Results   Component Value Date/Time    FERRITIN 17 12/01/2017 09:15 AM     RPR:  No results found for: RPR  MADDY:    Lab Results   Component Value Date/Time    MADDY None Detected 06/27/2022 11:00 AM     24 Hour Urine for Creatinine Clearance:  No components found for: CREAT4, UHRS10, UTV10      Objective:   I/O: 12/28 0701 - 12/29 0700  In: 500   Out: 98   I/O last 3 completed shifts:   In: 500   Out: 98   No intake/output data recorded. Vitals: BP (!) 154/82   Pulse (!) 105   Temp 97.9 °F (36.6 °C) (Oral)   Resp 16   Ht 5' 4\" (1.626 m)   Wt 180 lb 3.2 oz (81.7 kg)   SpO2 99%   BMI 30.93 kg/m²  {  General appearance: awake weak  HEENT: Head: Normal, normocephalic, atraumatic. Neck: supple, symmetrical, trachea midline  Lungs: diminished breath sounds bilaterally  Heart: S1, S2 normal  Abdomen: abnormal findings:  soft nt  Extremities: edema trace  Neurologic: Mental status: alertness: alert        Assessment and Plan:      IMP:   #1 end-stage renal disease on dialysis Monday Friday   #2 history of CVA with overall decline neurologically   #3 high blood pressure   #4?   Aspiration pneumonia/UTI  #5 positive DVT       Plan     #1 do next dialysis on Friday  #2 follow-up neurology evaluation and await barium swallow for aspiration  #3 blood pressure somewhat increased adjust medications  #4 treat for UTI  #5 agree starting on Eliquis  #6 discussed with patient and son changed to DNR CCA  Supportive care once diet is improved and affect improved, can discharge back to skilled nursing likely on Saturday           Yaya Figueredo MD, MD

## 2022-12-30 NOTE — PROGRESS NOTES
Nephrology Progress Note  12/30/2022 12:04 PM  Subjective: Interval History: Khoi Rogers is a 81 y.o. female  doing somewhat better more awake today and doing dialysis today      Data:   Scheduled Meds:   mirtazapine  15 mg Oral Nightly    sucralfate  1 g Oral 2 times per day    betamethasone dipropionate   Topical Daily    apixaban  5 mg Oral BID    atorvastatin  20 mg Oral Nightly    metoprolol tartrate  25 mg Oral BID    midodrine  5 mg Oral TID WC    cloNIDine  0.1 mg Oral BID    amLODIPine  5 mg Oral Daily    aspirin  81 mg Oral Daily    levothyroxine  100 mcg Oral Daily    montelukast  10 mg Oral Nightly    pantoprazole  40 mg Oral BID AC    predniSONE  20 mg Oral Daily    QUEtiapine  50 mg Oral Nightly    ranolazine  500 mg Oral BID    sevelamer  800 mg Oral TID WC    tiotropium-olodaterol  2 puff Inhalation Daily    sodium chloride flush  5-40 mL IntraVENous 2 times per day    cefTRIAXone (ROCEPHIN) IV  1,000 mg IntraVENous Q24H    azithromycin  500 mg IntraVENous Q24H    allopurinol  200 mg Oral Daily    levomilnacipran  40 mg Oral Nightly    LORazepam  0.5 mg Oral BID     Continuous Infusions:   sodium chloride           CBC   Recent Labs     12/27/22  1835 12/29/22  1804 12/30/22  0813   WBC 5.3 8.6 7.3   HGB 12.9 12.8 12.2*   HCT 40.3 39.1 37.0    204 203      BMP   Recent Labs     12/28/22  0456 12/29/22  1804 12/30/22  0813    137 136   K 4.8 5.3* 4.5    98* 100   CO2 20* 24 23   BUN 76* 62* 67*   CREATININE 3.9* 3.7* 4.0*     Hepatic:   Recent Labs     12/27/22  1700   AST 21   ALT 14   BILITOT 0.2   ALKPHOS 102     Troponin: No results for input(s): TROPONINI in the last 72 hours. BNP: No results for input(s): BNP in the last 72 hours. Lipids: No results for input(s): CHOL, HDL in the last 72 hours.     Invalid input(s): LDLCALCU  ABGs:   Lab Results   Component Value Date/Time    PO2ART 58 09/21/2017 12:00 AM    EHA7HDL 44.0 09/21/2017 12:00 AM     INR: No results for input(s): INR in the last 72 hours.   Renal Labs  Albumin:    Lab Results   Component Value Date/Time    LABALBU 4.2 12/27/2022 05:00 PM     Calcium:    Lab Results   Component Value Date/Time    CALCIUM 9.4 12/30/2022 08:13 AM     Phosphorus:    Lab Results   Component Value Date/Time    PHOS 7.6 02/17/2021 06:11 AM     U/A:    Lab Results   Component Value Date/Time    NITRU NEGATIVE 12/27/2022 05:45 PM    NITRU Negative 07/10/2019 11:09 AM    COLORU YELLOW 12/27/2022 05:45 PM    PHUR 6.0 07/10/2019 11:09 AM    WBCUA 265 12/27/2022 05:45 PM    RBCUA 48 12/27/2022 05:45 PM    MUCUS RARE 12/27/2022 05:45 PM    TRICHOMONAS NONE SEEN 12/27/2022 05:45 PM    BACTERIA FEW 12/27/2022 05:45 PM    CLARITYU CLOUDY 12/27/2022 05:45 PM    SPECGRAV 1.020 12/27/2022 05:45 PM    UROBILINOGEN 0.2 12/27/2022 05:45 PM    BILIRUBINUR NEGATIVE 12/27/2022 05:45 PM    BLOODU SMALL NUMBER OR AMOUNT OBSERVED 12/27/2022 05:45 PM    GLUCOSEU Negative 07/10/2019 11:09 AM    KETUA NEGATIVE 12/27/2022 05:45 PM     ABG:    Lab Results   Component Value Date/Time    WKE8UHZ 44.0 09/21/2017 12:00 AM    PO2ART 58 09/21/2017 12:00 AM    YBF7UOP 28.5 09/21/2017 12:00 AM     HgBA1c:    Lab Results   Component Value Date/Time    LABA1C 5.5 09/02/2021 10:30 AM     Microalbumen/Creatinine ratio:  No components found for: RUCREAT  TSH:    Lab Results   Component Value Date/Time    TSH 2.19 10/06/2017 12:15 PM     IRON:    Lab Results   Component Value Date/Time    IRON 53 12/01/2017 09:15 AM     Iron Saturation:  No components found for: PERCENTFE  TIBC:    Lab Results   Component Value Date/Time    TIBC 367 12/01/2017 09:15 AM     FERRITIN:    Lab Results   Component Value Date/Time    FERRITIN 17 12/01/2017 09:15 AM     RPR:  No results found for: RPR  MADDY:    Lab Results   Component Value Date/Time    MADDY None Detected 06/27/2022 11:00 AM     24 Hour Urine for Creatinine Clearance:  No components found for: CREAT4, UHRS10, UTV10      Objective:   I/O: No intake/output data recorded. No intake/output data recorded. I/O this shift:  In: 120 [P.O.:120]  Out: -   Vitals: BP (!) 147/59   Pulse 98   Temp 97.6 °F (36.4 °C)   Resp 16   Ht 5' 4\" (1.626 m)   Wt 175 lb 1.6 oz (79.4 kg)   SpO2 94%   BMI 30.06 kg/m²  {  General appearance: awake weak  HEENT: Head: Normal, normocephalic, atraumatic. Neck: supple, symmetrical, trachea midline  Lungs: diminished breath sounds bilaterally  Heart: S1, S2 normal  Abdomen: abnormal findings:  soft nt  Extremities: edema trace  Neurologic: Mental status: alertness: alert        Assessment and Plan:      IMP:   #1 end-stage renal disease on dialysis Monday Friday   #2 history of CVA with overall decline neurologically   #3 high blood pressure   #4?   Aspiration pneumonia/UTI  #5 positive DVT       Plan      #1 maintain on dialysis Monday Friday with dialysis today   #2 neuro status appears slowly improving with treatment of UTI no new CVA events   #3 blood pressure overall stable for now   #4 treating UTI tolerating p.o. diet adjust with dietary   #5 maintain low-dose anticoagulation with DVT   #6 maintain as limited code   if overall stable can hopefully return back to skilled nursing in the morning         Todd Mercado MD, MD

## 2022-12-30 NOTE — PROGRESS NOTES
Spoke with lab over the phone. They will be checking to see if I need to draw more blood from her port in regards to the hepatitis B orders.

## 2022-12-30 NOTE — PROGRESS NOTES
Patient Name: Alina Dobbins  Patient : 1950  MRN: [id number]     Acct: [de-identified]  Date of Admission: 2022  Room/Bed: 4112/4112-A  Code Status:  Limited  Allergies:    Allergies   Allergen Reactions    Percocet [Oxycodone-Acetaminophen]      hallucinations    Tramadol Itching    Vicodin [Hydrocodone-Acetaminophen] Itching    Narcan [Naloxone Hcl] Anxiety     Feels like out of body, things are speeding     Diagnosis:    Patient Active Problem List   Diagnosis    CAD (coronary artery disease)    Nausea & vomiting    Hypothyroidism, adult    Acute on chronic renal failure (HCC)    Hypertensive kidney disease with CKD stage III (HCC)    Type 2 diabetes mellitus without complication (HCC)    Hypercalcemia    Chronic venous hypertension (idiopathic) with inflammation of bilateral lower extremity    Fluid overload    Acute on chronic diastolic heart failure ([facility]    Morbid obesity with BMI of 45.0-49.9, adult (HCC)    Diabetes 1.5, managed as type 2 (HCC)    Hyperglycemia    Chronic fatigue    Solitary pulmonary nodule    Acute hypercapnic respiratory failure (HCC)    Acute metabolic encephalopathy    Chronic diastolic heart failure (HCC)    Acute renal failure (ARF) (HCC)    Acute encephalopathy    YRIS (obstructive sleep apnea)    Hiatal hernia    Status post laparoscopic sleeve gastrectomy    Status post bariatric surgery    WD-Chronic buttock pain    Chronic kidney disease, stage IV (severe) ([facility])    Essential hypertension    ESRD (end stage renal disease) ([facility]    ESRD needing dialysis ([facility]    Type 2 diabetes mellitus with hyperglycemia, with long-term current use of insulin (HCC)    Class 3 severe obesity due to excess calories with body mass index (BMI) of 40.0 to 44.9 in adult [facility])    Fistula    Cerebrovascular accident (CVA) due to occlusion of precerebral artery (HCC)    Aphagia    Lymphedema of left upper extremity    Strain of muscle, fascia and tendon of long head of biceps, left arm, initial encounter    History of progressive weakness    Acute deep vein thrombosis (DVT) of brachial vein of right upper extremity (HCC)         Treatment:  Hemodilaysis 2:1  Priority: Routine  Location: Acute Room    Diabetic: Yes  NPO: No  Isolation Precautions: None     Consent for Treatment Verified: Yes  Blood Consent Verified: Not Applicable     Safety Verified: Identify (I), Consent (C), Equipment (E), HepB Status (B), Orders Complete (O), Access Verified (A), and Timeliness (T)  Time out performed prior to access at 1245 hours. Report Received from Primary RN at 1200 hours. Primary RN (First Initial, Last Name, Title): MISHA Ro  Incapacitated Nurse Education Completed: Not Applicable     HBsAg ONLY:  Date Drawn: December 12, 2022       Results: Negative  HBsAb:  Date Drawn:  December 12, 2022       Results: Immune >10    Order  Dialysis Bath  K+ (Potassium): 3  Ca+ (Calcium): 2.5  Na+ (Sodium): 138  HCO3 (Bicarb): 35  Bicarbonate Concentrate Lot No.: [id number]  Acid Concentrate Lot No.: [id number]     Na+ Modeling: Not Applicable  Dialyzer: E655  Dialysate Temperature (C):  35  Blood Flow Rate (BFR):  300   Dialysate Flow Rate (DFR):   600        Access to be Utilized   Access: AVG  Location: Upper Extremity  Side: Left   Needle gauge:  15  + Bruit/Thrill: Yes    First Use X-ray Verified: Not Applicable  OK to use line order: Not Applicable    Non Dialysis Use?: No  Comment:    Flows: Good, Patent  If access problem, who was notified:     Pre and Post-Assessment  Patient Vitals for the past 8 hrs:   Level of Consciousness O2 Device Pain Level   12/30/22 0600 -- None (Room air) 0   12/30/22 0745 -- None (Room air) --   12/30/22 1228 0 None (Room air) --     Labs  Recent Labs     12/27/22  1835 12/29/22  1804 12/30/22  0813   WBC 5.3 8.6 7.3   HGB 12.9 12.8 12.2*   HCT 40.3 39.1 37.0    204 203                                                                  Recent Labs     12/28/22  0456 12/29/22  1804 12/30/22  0813    137 136   K 4.8 5.3* 4.5    98* 100   CO2 20* 24 23   BUN 76* 62* 67*   CREATININE 3.9* 3.7* 4.0*   GLUCOSE 83 120* 88     IV Drips and Rate/Dose   sodium chloride        Safety - Before each treatment:   Dialysis Machine No.: [id number]   Machine Number: [id number]  Dialyzer Lot No.: [id number]  RO Machine Log Sheet Completed: Yes  Machine Alarm Self Test: Completed; Passed (12/30/22 1228)     Air Foam Detector: Tested, Proper Function  Extracorporeal Circuit Tested for Integrity: Yes  Machine Conductivity: 13.9  Manual Conductivity: 13.8     Bicarbonate Concentrate Lot No.: [id number]  Acid Concentrate Lot No.: [id number]  Manual Ph: 7.4  Bleach Test (Neg): Yes  Bath Temperature: 95 °F (35 °C)  Tubing Lot#: [id number]  Conductivity Meter Serial #: [id number]  All Connections Secure?: Yes  Venous Parameters Set?: Yes  Arterial Parameters Set?: Yes  Saline Line Double Clamped?: Yes  Air Foam Detector Engaged?: Yes  Machine Functioning Alarm Free?  Yes  Prime Given: 200ml    Chlorine Testing - Before each treatment and every 4 hours:   Treatment  Treatment Number: 2  Time On: 1221  Time Off: 1250  Treatment Goal: 3 hours  Weight: 172 lb 4.8 oz (78.2 kg) (12/30/22 1228)  1st check: less than 0.1 ppm at: 0655 hours  2nd check: less than 0.1 ppm at: 0955 hours  3rd check: Not Applicable  (if greater than 0.1 ppm, then check every 30 minutes from secondary)    Access Flows and Pressures  Patient Vitals for the past 8 hrs:   Blood Flow Rate (ml/min) Ultrafiltration Rate (ml/hr) Arterial Pressure (mmHg) Venous Pressure (mmHg) TMP DFR Comments Access Visible   12/30/22 1228 300 ml/min 1000 ml/hr -60 mmHg 110 60 600 tx initiated Yes     Vital Signs  Patient Vitals for the past 8 hrs:   BP Temp Pulse Resp SpO2 Weight Weight Method Percent Weight Change   12/30/22 0600 (!) 148/78 97.7 °F (36.5 °C) 89 16 95 % -- -- --   12/30/22 0745 (!) 147/59 97.6 °F (36.4 °C) 98 16 95 % -- -- --   12/30/22 0755 -- -- -- -- 94 % -- -- --   12/30/22 1228 (!) 131/50 97.5 °F (36.4 °C) 70 16 -- 172 lb 4.8 oz (78.2 kg) Bed scale -1.6   12/30/22 1315 (!) 133/52 97.7 °F (36.5 °C) 66 18 -- -- -- --     Post-Dialysis  Arterial Catheter Locking Solution: Not Applicable  Venous Catheter Locking Solution: Not Applicable  Post-Treatment Procedures: Blood returned, Access bleeding time > 10 minutes  Machine Disinfection Process: Acid/Vinegar Clean, Heat Disinfect, Exterior Machine Disinfection  Rinseback Volume (ml): 300 ml  Blood Volume Processed (Liters): 166 l/min  Dialyzer Clearance: Moderately streaked  Duration of Treatment (minutes): 30 minutes     Hemodialysis Intake (ml): 500 ml  Hemodialysis Output (ml): 166 ml     Tolerated Treatment: Poor  Patient Response to Treatment:  (patient requested to come off early)  Physician Notified: Yes       Provider Notification   Provider notified that patient requested to come off early. Pt refused to stay on treatment per Dr and RN requests     Handoff complete and report given to Primary RN at 1315 hours. Primary RN (First Initial, Last Name, Title):  MISHA Nuno       Electronically signed by Coco Myers RN on 12/30/2022 at 1:29 PM

## 2022-12-30 NOTE — CONSULTS
[address] [place] [address], [facility]                                  CONSULTATION    PATIENT NAME: Nadia Flores                   :        1950  MED REC NO:   [id number]                          ROOM:       4112  ACCOUNT NO:   [de-identified]                           ADMIT DATE: 2022  PROVIDER:     Arun Tello    CONSULT DATE:  2022    INDICATIONS:  Deep vein thrombosis. HISTORY OF PRESENT ILLNESS:  The patient is a 62-year-old female who  presented to the hospital with progressive weakness and amnesia on  Monday. She has a history of end-stage renal disease, is on HD, but she  did not go to HD on Monday and is also having poor oral intake. Unfortunately, she was unable to be home during the  season. She was also having right upper arm swelling and found to have an acute  DVT in the brachial vein near the antecubital fossa. She also had acute  superficial venous thrombosis involving the cephalic and basilic veins,  clotted fistula and also intramedullary fluid as well. She did get a  temporary HD line placed. Our plan is to have HD in the a.m. To me,  she complains of some chest tightness and shortness of breath. Denies  any chest pain per se. No palpitations. Heart rate is in the 100s  yesterday. She denies any episodes of dizziness or lightheadedness. Her troponin on admission was elevated to 0.097. This since had gone  down to 0.094, likely secondary to chronic kidney disease.  _____ does  appear to be decently controlled here today. PAST MEDICAL HISTORY:  Acid reflux, anemia, arthritis, CAD, CVA, CHF,  chronic back pain, chronic constipation, chronic kidney disease,  end-stage renal disease, COPD, diabetes, emphysema, hypertension,  hyperlipidemia, rheumatoid arthritis, sleep apnea, thyroid disease.     PAST SURGICAL HISTORY:  Appendectomy, AV fistula creation, back surgery,  carpal tunnel release, , coronary angioplasty, dental surgery,  hysterectomy, rotator cuff repair, gastric sleeve, thyroidectomy. SOCIAL HISTORY:  She does not smoke, denies any illicit drugs, does not  use any alcohol. ALLERGIES:  She has allergies to PERCOCET, TRAMADOL, VICODIN, and  NARCAN. REVIEW OF SYSTEMS:  A 10-point review of systems is otherwise negative  unless noted above in the HPI. PHYSICAL EXAMINATION:  GENERAL:  The patient is awake, alert, lethargic, denies any chest pain,  some shortness of breath present, answering questions appropriately. Family is at bedside. VITAL SIGNS:  The patient is afebrile at 97.9, respirations are 16,  pulse is at 105, blood pressure 154/82, saturating at 99% on 2 L nasal  cannula. HEENT:  Head is normocephalic, atraumatic. Pupils are equal and  reactive to light. CHEST:  Equal and expansive. LUNGS:  Clear to auscultation, but diminished in bases. ABDOMEN:  Bowel sounds are present in all four quadrants. No  hepatosplenomegaly noted. EXTREMITIES:  Non-pitting lower extremity edema. No clubbing or  cyanosis. NEUROLOGICAL:  Cranial nerves II through XII are grossly intact. LABORATORY DATA:  Potassium is at 4.8, creatinine is at 3.9, troponin is  at 0.094. IMPRESSION AND PLAN:  The patient is a 68-year-old female who presented  to the hospital with ongoing weakness, amnesia, HD, possible pneumonia  in the lungs and now presents with DVT in right upper arm. At this  time, we will initiate anticoagulation. We will also place on  telemetry. Heart rate has been elevated. We will add low-dose  beta-blocker to try to help with heart rate and blood pressure. We will  obtain limited echo. We will continue to follow alongside the case and  make recommendations when appropriate. This plan was discussed with Dr. Raysa Fernando.     Agree with above       Twin Valenzuela    D: 2022 11:29:44       T: 2022 1:46:56 [id number]  Job#: [id number]     Doc#: [id number]    CC:

## 2022-12-30 NOTE — CONSENT
Thank you for the referral.  Chart reviewed. Per the physical rehabilitation triage process, the PT referral was discontinued. Patient from [address] [place] [address], has safe d/c plan back to LTC, no acute care physical therapy needs identified. OT consult previously discontinued by OT on 12/29.   Lc Andrade[address] [place] [address] [id number]  2:27 PM 12/30/2022

## 2022-12-30 NOTE — PROGRESS NOTES
Hospitalist Progress Note      Name:  Gi Haq /Age/Sex: 1950  (81 y.o. female)   MRN & CSN:  [id number] & [id number] Admission Date/Time: 2022  1:16 PM   [facility]  [facility] PCP: Kacy Núñez, [address] Day: 4                                               Attending Physician Dr. Tod Garcia and Plan:   Gi Haq is a 81 y.o.  female  who presents with History of progressive weakness    # Chronic progressive weakness  - Having progressive weakness in all 4 extremities with difficulty swallowing worsened over past 2 weeks. Requiring assistance with ADLs. Exam non-focal, no ocular involvement. Labs unremarkable. On chronic Prednisone for RA. Hx of posterior C3-T2 posterior fusion. Hx of CVA in .  -Initial CT head was negative, repeat head CT negative  - Neurology consulted, appreciate recommendations  - SLP evaluated again, recommended soft and bite size diet/thin liquids with aspiration precautions, crush pills and give in puree  -Code status is limited     # ESRD on HD MF  -Plan for HD per nephrology  -Appreciate nephrology recommendations    #Acute right brachial, cephalic, basilic vein DVT  -Started on heparin gtt, switched to eliquis   -Consulted cardiology for recommendations  -Echo 22 with EF 60%, G1DD    #Sinus tachycardia, resolved  -Possibly 2/2 pneumonia and UTI  -metprolol started by cardiology, monitor    # NSTEMI, type II, in the setting of CAD and ESRD   - Denied any CP.  - Initial Tn mildly elevated ECG without acute ischemic changes.  -Continue ASA, statin, ranexa     # Acute hypoxic respiratory failure 2/2 pneumonia, possibly aspiration  - No leukocytosis. On RA. CT showing multifocal pneumonia.  Respiratory disease panel negative, COVID and influenza negative  - Started on Vanc/Cefepime in the ED, switched to rocephin and azithromycin as patient is not showing signs of SIRS/sepsis  - BC NG   - Urinary antigens pending  - Deescalate off azithromycin if urinary antigens are negative     # Klebsiella UTI   - Denied any urinary symptoms.   -Urine culture growing klebsiella, await sensitivities, continue on rocephin/azithro    #Poor venous access  -Required IR placement of central line on 12/29    # Gout  -Continue allopurinol    #RA  -Continue home chronic prednisone    #COPD, not in exacerbation    #DM II  -Not on home antiglycemics, last A1C 5.5    #CHF  -Echo EF 50-55%    #Hypothyroidism  -Continue synthroid    Diet ADULT DIET; Regular   DVT Prophylaxis eliquis   GI Prophylaxis [] PPI,  [] H2 Blocker,  [] Carafate,  [] Diet/Tube Feeds   Code Status Limited   Disposition Patient requires continued admission due to neuro eval, HD, possible DC in AM     -Patient assessment and plan discussed with supervising physician-  Overnight events:     Mallie Nyhan is a 81 y.o.  female, who presented with Altered Mental Status (Sent from [address] [place] [address] for altered mental status. ) and Other (Patient says had dialysis Saturday [facility] claims has missed last two treatments. )    Patient was seen and evaluated at bedside by myself. No acute overnight events. States she is eating better. Denies new complaints. Objective: Intake/Output Summary (Last 24 hours) at 12/30/2022 1007  Last data filed at 12/30/2022 1005  Gross per 24 hour   Intake 120 ml   Output --   Net 120 ml        Vitals:   Vitals:    12/30/22 0302 12/30/22 0600 12/30/22 0745 12/30/22 0755   BP: (!) 140/74 (!) 148/78 (!) 147/59    Pulse: 88 89 98    Resp: 15 16 16    Temp:  97.7 °F (36.5 °C) 97.6 °F (36.4 °C)    TempSrc: Oral Oral     SpO2: 94% 95% 95% 94%   Weight: 175 lb 1.6 oz (79.4 kg)      Height:         Physical Exam: 12/30/22     General: NAD, obese  Eyes: EOMI  ENT: neck supple  Cardiovascular: Regular rate, right IJ CVC in place.  Good pulses BL upper extremities  Respiratory: Clear to auscultation  Gastrointestinal: Soft, non tender  Genitourinary: no suprapubic tenderness  Musculoskeletal: No edema. Skin: warm, dry  Neuro: Alert. Overall weak without focal deficits  Psych: Mood appropriate. Medications:   Medications:    mirtazapine  15 mg Oral Nightly    sucralfate  1 g Oral 2 times per day    betamethasone dipropionate   Topical Daily    apixaban  5 mg Oral BID    atorvastatin  20 mg Oral Nightly    metoprolol tartrate  25 mg Oral BID    midodrine  5 mg Oral TID WC    cloNIDine  0.1 mg Oral BID    amLODIPine  5 mg Oral Daily    aspirin  81 mg Oral Daily    levothyroxine  100 mcg Oral Daily    montelukast  10 mg Oral Nightly    pantoprazole  40 mg Oral BID AC    predniSONE  20 mg Oral Daily    QUEtiapine  50 mg Oral Nightly    ranolazine  500 mg Oral BID    sevelamer  800 mg Oral TID WC    tiotropium-olodaterol  2 puff Inhalation Daily    sodium chloride flush  5-40 mL IntraVENous 2 times per day    cefTRIAXone (ROCEPHIN) IV  1,000 mg IntraVENous Q24H    azithromycin  500 mg IntraVENous Q24H    allopurinol  200 mg Oral Daily    levomilnacipran  40 mg Oral Nightly    LORazepam  0.5 mg Oral BID      Infusions:    sodium chloride       PRN Meds: ipratropium-albuterol, 1 vial, Q6H PRN  sodium chloride flush, 5-40 mL, PRN  sodium chloride, , PRN  ondansetron, 4 mg, Q8H PRN   Or  ondansetron, 4 mg, Q6H PRN  polyethylene glycol, 17 g, Daily PRN  traMADol, 50 mg, Q6H PRN      LABS  CBC   Recent Labs     12/27/22  1835 12/29/22  1804 12/30/22  0813   WBC 5.3 8.6 7.3   HGB 12.9 12.8 12.2*   HCT 40.3 39.1 37.0    204 203        RENAL  Recent Labs     12/28/22  0456 12/29/22  1804 12/30/22  0813    137 136   K 4.8 5.3* 4.5    98* 100   CO2 20* 24 23   BUN 76* 62* 67*   CREATININE 3.9* 3.7* 4.0*       LFT'S  Recent Labs     12/27/22  1700   AST 21   ALT 14   BILITOT 0.2   ALKPHOS 102       COAG  No results for input(s): INR in the last 72 hours. CARDIAC ENZYMES  No results for input(s): CKTOTAL, CKMB, CKMBINDEX, TROPONINI in the last 72 hours.       Radiology this visit:  Reviewed. CT ABDOMEN PELVIS WO CONTRAST Additional Contrast? None    Result Date: 12/27/2022  EXAMINATION: CT OF THE ABDOMEN AND PELVIS WITHOUT CONTRAST 12/27/2022 3:42 pm TECHNIQUE: CT of the abdomen and pelvis was performed without the administration of intravenous contrast. Multiplanar reformatted images are provided for review. Automated exposure control, iterative reconstruction, and/or weight based adjustment of the mA/kV was utilized to reduce the radiation dose to as low as reasonably achievable. COMPARISON: 04/30/2017 and 12/27/2022 HISTORY: ORDERING SYSTEM PROVIDED HISTORY: abd pain, generalized weakness TECHNOLOGIST PROVIDED HISTORY: Reason for exam:->abd pain, generalized weakness Additional Contrast?->None Decision Support Exception - unselect if not a suspected or confirmed emergency medical condition->Emergency Medical Condition (MA) Reason for Exam: abd pain, generalized weakness FINDINGS: Evaluation of the solid and hollow abdominal viscera is limited without intravenous contrast administration. Study limited by motion and beam hardening artifacts. Lower Chest:   Trace left pleural fluid. Lower lobe parenchymal consolidations bilaterally, left greater than right. Organs: Punctate nonobstructing superior right renal calculus. Otherwise unremarkable. GI/Bowel:   Colonic diverticulosis without diverticulitis. Moderate to large amount of retained colonic stool suggesting constipation. Mild nonspecific edema within the perirectal fat. Status post bariatric surgery. Pelvis: Hysterectomy. Peritoneum/Retroperitoneum:   Mild-to-moderate atherosclerotic calcification of the abdominal aorta and major branch vessels. Bones/Soft Tissues:   Nonspecific edema/skin thickening within the lateral left breast/chest wall. Posterior ronald/screw fixation of the L4-S1 levels bilaterally. Multilevel disc disease.   Interval development of a lytic focus within the superior L2 vertebral body measuring 1.6 x 0.8 cm, likely an intravertebral disc herniation. 1.  Bilateral lower lobe consolidations concerning for multifocal pneumonia. Radiographic follow-up recommended to document resolution following treatment. 2.  Probable constipation. Colonic diverticulosis without diverticulitis. 3.  Mild nonspecific edema within the perirectal fat without appreciable wall thickening. Correlation with digital rectal exam may be helpful. 4.  Nonspecific edema/skin thickening within the left lateral breast and chest wall which may be iatrogenic or related to recent trauma. Follow-up mammography may be useful as clinically warranted. 5.  Additional nonemergent findings, as above. CT HEAD WO CONTRAST    Result Date: 12/27/2022  EXAMINATION: CT OF THE HEAD WITHOUT CONTRAST  12/27/2022 3:41 pm TECHNIQUE: CT of the head was performed without the administration of intravenous contrast. Automated exposure control, iterative reconstruction, and/or weight based adjustment of the mA/kV was utilized to reduce the radiation dose to as low as reasonably achievable. COMPARISON: 01/12/2022 HISTORY: ORDERING SYSTEM PROVIDED HISTORY: frequent falls, generalized weakness, difficulty swallowing TECHNOLOGIST PROVIDED HISTORY: Reason for exam:->frequent falls, generalized weakness, difficulty swallowing Has a \"code stroke\" or \"stroke alert\" been called? ->No Decision Support Exception - unselect if not a suspected or confirmed emergency medical condition->Emergency Medical Condition (MA) Reason for Exam: frequent falls, generalized weakness, difficulty swallowing FINDINGS: BRAIN/VENTRICLES: There is mild generalized atrophy and there is mild periventricular and subcortical white matter low attenuation that is nonspecific but most consistent with chronic small vessel ischemia. There is a small remote right basal ganglia lacunar infarct. There are few bilateral basal ganglia calcifications.   There is no acute intracranial hemorrhage, mass effect or midline shift. No abnormal extra-axial fluid collection. The gray-white differentiation is maintained without evidence of an acute infarct. ORBITS: The visualized portion of the orbits demonstrate no acute abnormality. SINUSES: The visualized paranasal sinuses and mastoid air cells demonstrate no acute abnormality. Mild mucosal thickening in the right sphenoid locule. SOFT TISSUES/SKULL:  No acute abnormality of the visualized skull or soft tissues. No acute intracranial abnormality. CT HEAD W CONTRAST    Result Date: 12/28/2022  EXAMINATION: CT OF THE HEAD WITH CONTRAST  12/28/2022 8:18 am TECHNIQUE: CT of the head/brain was performed with the administration of intravenous contrast. Multiplanar reformatted images are provided for review. Automated exposure control, iterative reconstruction, and/or weight based adjustment of the mA/kV was utilized to reduce the radiation dose to as low as reasonably achievable. COMPARISON: 12/27/2022 HISTORY: ORDERING SYSTEM PROVIDED HISTORY: worse neuro symptoms and prior cva and doing dialysis today TECHNOLOGIST PROVIDED HISTORY: Reason for exam:->worse neuro symptoms and prior cva and doing dialysis today Reason for Exam: worse neuro symptoms and prior cva and doing dialysis today FINDINGS: BRAIN/VENTRICLES: There is no acute infarct or acute intracranial hemorrhage present. There is no mass effect or midline shift present. There is no ventriculomegaly or abnormal extra-axial fluid collection present. There is mild periventricular hypoattenuation. ORBITS: Limited evaluation of the orbits is unremarkable. SINUSES: The paranasal sinuses and mastoid air cells are clear. SOFT TISSUES/SKULL:  No lytic or blastic osseous lesions are identified. Stable appearance of the brain without acute intracranial process identified.      CT CERVICAL SPINE WO CONTRAST    Result Date: 12/27/2022  EXAMINATION: CT OF THE CERVICAL SPINE WITHOUT CONTRAST 12/27/2022 3:41 pm TECHNIQUE: CT of the cervical spine was performed without the administration of intravenous contrast. Multiplanar reformatted images are provided for review. Automated exposure control, iterative reconstruction, and/or weight based adjustment of the mA/kV was utilized to reduce the radiation dose to as low as reasonably achievable. COMPARISON: 12/29/2020 HISTORY: ORDERING SYSTEM PROVIDED HISTORY: frequent falls, chronic falls, generalized weakness, difficulty swallowing TECHNOLOGIST PROVIDED HISTORY: Reason for exam:->frequent falls, chronic falls, generalized weakness, difficulty swallowing Decision Support Exception - unselect if not a suspected or confirmed emergency medical condition->Emergency Medical Condition (MA) Reason for Exam: frequent falls, chronic falls, generalized weakness, difficulty swallowing FINDINGS: BONES/ALIGNMENT: The previously noted cervical fusion from C3-C6 has been extended inferiorly to T2 with posterior fixation rods and pedicle screws at T1 and T2. The left pedicle screw at T2 extends into the left transverse process but does not extend into the vertebral body. The tip of the left T2 pedicle screw is directly adjacent to the posterior aortic arch on the last image obtained (axial image 74). Pedicle screws at T1 are in satisfactory position. Remote C4, C5, C6 and C7 laminectomies. There is no acute fracture or traumatic malalignment of the cervical spine. DEGENERATIVE CHANGES: There are multilevel degenerative changes in the cervical spine. There is new bone fusion of the disc space at C7-T1. SOFT TISSUES: There is no prevertebral soft tissue swelling. No acute fracture or traumatic malalignment of the cervical spine Posterior fusion extends from C3-T2 with laminectomies at C4, C5, C6 and C7. The left pedicle at T2 extends into the left transverse process but does not extend into the vertebral body.   Tip of the left T2 pedicle screw is directly adjacent to the posterior aortic arch. XR CHEST PORTABLE    Result Date: 12/27/2022  EXAMINATION: ONE XRAY VIEW OF THE CHEST 12/27/2022 3:34 pm COMPARISON: 07/08/2022 HISTORY: ORDERING SYSTEM PROVIDED HISTORY: weakness TECHNOLOGIST PROVIDED HISTORY: Reason for exam:->weakness Reason for Exam: weakness FINDINGS: Fusion hardware again noted at the cervicothoracic junction. Cardiomediastinal silhouette appears unremarkable. Low lung volumes accentuate the central bronchovascular markings bilaterally. Small left basilar parenchymal opacity may represent atelectasis or developing infiltrate. Minimal right basilar atelectasis. No effusion or pneumothorax. No aggressive osseous lesion. Left basilar parenchymal opacity may represent atelectasis versus developing infiltrate. Radiographic follow-up recommended to document resolution following treatment, including PA/lateral views. IR FISTULAGRAM    Result Date: 12/6/2022  PROCEDURE: IR FISTULAGRAM 12/6/2022 HISTORY: ORDERING SYSTEM PROVIDED HISTORY: ESRD (end stage renal disease) ([facility] [address] TECHNIQUE: Maximum sterile barrier technique including hand hygiene, skin prep and sterile ultrasound technique utilized for procedure. Following informed consent, pause a confirm/time-out patient is placed in supine position on fluoroscopic table with left upper extremity in the abducted and externally rotated position. Skin was prepped and draped in sterile fashion from the left axillary through mid forearm regions. 5 cc 1% lidocaine without epinephrine for local anesthesia. Percutaneous antegrade access of left upper arm dialysis access graft near arterial anastomosis was achieved. Guidewires advanced over which 4 Burmese introducer was placed and graftogram was performed. 4 Burmese introducer was exchanged for a 6 Western Kelly sheath. Balloon angioplasty of the graft-venous anastomosis to maximal diameter of 6 mm. Balloon catheter deflated and removed.   Post angioplasty shuntogram. Access sheath removed. Direct pressure applied the puncture site until hemostasis achieved. Dressing applied. Patient tolerated procedure well. CONTRAST: 20 cc Isovue 370 SEDATION: None FLUOROSCOPY DOSE AND TYPE OR TIME AND EXPOSURES: 10 minutes fluoroscopy time Air kerma: 120 minutes DESCRIPTION OF PROCEDURE: Informed consent was obtained after a detailed explanation of the procedure including risks, benefits, and alternatives. Universal protocol was observed. Sterile gowns, masks, hats and gloves utilized for maximal sterile barrier. As above in technique section FINDINGS: Focal high-grade stenosis noted at graft-venous anastomosis. Intraprocedural images demonstrate balloon angioplasty to maximal diameter of 6 mm. Postprocedure shuntogram demonstrate marked improvement in vessel diameter at graft-venous anastomosis. No complication suggested. Moderate-high-grade stenosis also seen at the graft-arterial anastomosis however, patient states clinical symptoms of dialysis access steal with episodes of numbness, pain and cold sensation of the left hand. No intervention the arterial anastomosis was performed. Correlation with graft function suggested. Intended primary outflow vein is uncertain but roughly corresponds to position of basilic vein. Primary outflow provided via collateral opacification of well-developed brachial vein. Left subclavian, brachiocephalic vein and superior vena cava are widely patent. High-grade/Flow limiting stenosis graft-venous anastomosis angioplasty to maximal diameter of 6 mm (maximal patient tolerance) with marked improvement in luminal diameter at the angioplasty site. Intended primary outflow vein is occluded however, well-developed collateral opacification of well-developed brachial vein noted suggested chronic process. Correlation with graft function suggested. Hemodynamically significant appearing stenosis graft-arterial anastomosis.  Patient relates symptoms of graft steal. Intervention at arterial anastomosis deferred pending evaluation of graft function.        Taniya Calloway PA-C  Hospitalist  12/30/2022, 10:07 AM

## 2022-12-30 NOTE — PROGRESS NOTES
Daily Progress Note  Subjective:  Awake and alert; feeling okay  SOB today, denies any CP  Place on tele  HD today    Attending Note:  Patient is awake alert   Off telemetry  Has dyspnea  DVT right arm on AC  Pneumonia per primary team   Hx of CAD and PCI -keep on ASA and statin  ESRD on HD  From cardiac stand stable   Noted renal note-regarding conservative treatment     Impression and Plan:   DVT    DVT noted in US of Right arm     Will start on AC    Eliquis 5mg BID    Weakness    Increase in weakness over the last couple of weeks at [facility]    Difficulty swallowing now    Neuro has been consulted    Speech has been following    Multifocal PNA    Noted on CT chest    ABX per primary    Negative viral respiratory panel     On RA; but complains of some SOB    Treatment per primary    ESRD    Renal following; HD today    Hx of CAD    PCI back in 2015 at [facility] to OM    Continue on Aspirin lipitor and lopressor    Currently on Ranexa    Troponin elevated; multifactorial; secondary to renal function and infection    Most Recent Echo  Echo 12/30/22   Left ventricular systolic function is normal.   Ejection fraction is visually estimated at 60%. Mild left ventricular hypertrophy. Grade I diastolic dysfunction. Central line visualized terminating in the right atrium. Sclerotic, but non-stenotic aortic valve. Mild tricuspid regurgitation is present. RVSP is 40 mmHg. No evidence of any pericardial effusion. Most Recent Stress test  Stress 2/16/21   Summary    No EKG changes suggestive of ischemia with Lexiscan infusion. Normal perfusion uptake noted    no reversible ischemia noted    gating shows EF 60%     Radiology  US of right arm 12/22    Impression:       1. Acute DVT involving the brachial vein near the antecubital fossa. 2. Acute SVT involving the cephalic and basilic veins. 3. Clotted fistula. 4. Indeterminate 17 x 1.5 x 2.2 cm area of fluid in the right upper extremity.    Findings were discussed with Chris Caldwell at 10:17 p.m. on 2022       Ct abdomen        Impression:         1. Bilateral lower lobe consolidations concerning for multifocal pneumonia. Radiographic follow-up recommended to document resolution following treatment. 2.  Probable constipation. Colonic diverticulosis without diverticulitis. 3.  Mild nonspecific edema within the perirectal fat without appreciable wall   thickening. Correlation with digital rectal exam may be helpful. 4.  Nonspecific edema/skin thickening within the left lateral breast and   chest wall which may be iatrogenic or related to recent trauma. Follow-up   mammography may be useful as clinically warranted. 5.  Additional nonemergent findings, as above. PAST MEDICAL HISTORY:  Acid reflux, anemia, arthritis, CAD, CVA, CHF,  chronic back pain, chronic constipation, chronic kidney disease,  end-stage renal disease, COPD, diabetes, emphysema, hypertension,  hyperlipidemia, rheumatoid arthritis, sleep apnea, thyroid disease. PAST SURGICAL HISTORY:  Appendectomy, AV fistula creation, back surgery,  carpal tunnel release, , coronary angioplasty, dental surgery,  hysterectomy, rotator cuff repair, gastric sleeve, thyroidectomy. SOCIAL HISTORY:  She does not smoke, denies any illicit drugs, does not  use any alcohol. ALLERGIES:  She has allergies to PERCOCET, TRAMADOL, VICODIN, and  NARCAN.   Objective:   BP (!) 147/59   Pulse 98   Temp 97.6 °F (36.4 °C)   Resp 16   Ht 5' 4\" (1.626 m)   Wt 175 lb 1.6 oz (79.4 kg)   SpO2 94%   BMI 30.06 kg/m²     Intake/Output Summary (Last 24 hours) at 2022 1111  Last data filed at 2022 1005  Gross per 24 hour   Intake 120 ml   Output --   Net 120 ml       Medications:   Scheduled Meds:   mirtazapine  15 mg Oral Nightly    sucralfate  1 g Oral 2 times per day    betamethasone dipropionate   Topical Daily    apixaban  5 mg Oral BID    atorvastatin  20 mg Oral Nightly    metoprolol tartrate  25 mg Oral BID    midodrine  5 mg Oral TID WC    cloNIDine  0.1 mg Oral BID    amLODIPine  5 mg Oral Daily    aspirin  81 mg Oral Daily    levothyroxine  100 mcg Oral Daily    montelukast  10 mg Oral Nightly    pantoprazole  40 mg Oral BID AC    predniSONE  20 mg Oral Daily    QUEtiapine  50 mg Oral Nightly    ranolazine  500 mg Oral BID    sevelamer  800 mg Oral TID WC    tiotropium-olodaterol  2 puff Inhalation Daily    sodium chloride flush  5-40 mL IntraVENous 2 times per day    cefTRIAXone (ROCEPHIN) IV  1,000 mg IntraVENous Q24H    azithromycin  500 mg IntraVENous Q24H    allopurinol  200 mg Oral Daily    levomilnacipran  40 mg Oral Nightly    LORazepam  0.5 mg Oral BID      Infusions:   sodium chloride        PRN Meds:  ipratropium-albuterol, sodium chloride flush, sodium chloride, ondansetron **OR** ondansetron, polyethylene glycol, traMADol     Physical Exam:  Vitals:    12/30/22 0755   BP:    Pulse:    Resp:    Temp:    SpO2: 94%        General: AAO, NAD  Chest: Nontender  Cardiac: First and Second Heart Sounds are Normal, No Murmurs or Gallops noted  Lungs:Clear to auscultation and percussion. Abdomen: Soft, NT, ND, +BS  Extremities: No clubbing, no edema  Vascular:  Equal 2+ peripheral pulses. Lab Data:  CBC:   Recent Labs     12/27/22  1835 12/29/22  1804 12/30/22  0813   WBC 5.3 8.6 7.3   HGB 12.9 12.8 12.2*   HCT 40.3 39.1 37.0   MCV 89.2 88.7 87.5    204 203     BMP:   Recent Labs     12/28/22  0456 12/29/22  1804 12/30/22  0813    137 136   K 4.8 5.3* 4.5    98* 100   CO2 20* 24 23   BUN 76* 62* 67*   CREATININE 3.9* 3.7* 4.0*     LIVER PROFILE:   Recent Labs     12/27/22  1700   AST 21   ALT 14   LIPASE 116*   BILITOT 0.2   ALKPHOS 102     PT/INR: No results for input(s): PROTIME, INR in the last 72 hours.   APTT:   Recent Labs     12/29/22  1804 12/29/22 2059 12/30/22  0813   APTT 20.3* 35.5 33.0     BNP:  No results for input(s): BNP in the last 72 hours.       Assessment:  Patient Active Problem List    Diagnosis Date Noted    Acute deep vein thrombosis (DVT) of brachial vein of right upper extremity ([address] 12/29/2022    History of progressive weakness 12/27/2022    Strain of muscle, fascia and tendon of long head of biceps, left arm, initial encounter 09/23/2021    Lymphedema of left upper extremity 08/21/2021    Aphagia 02/15/2021    Cerebrovascular accident (CVA) due to occlusion of precerebral artery ([address] 10/08/2020    Fistula 05/11/2020    ESRD (end stage renal disease) ([address] 07/17/2019    ESRD needing dialysis ([facility] 07/17/2019    Type 2 diabetes mellitus with hyperglycemia, with long-term current use of insulin ([address] 07/17/2019    Class 3 severe obesity due to excess calories with body mass index (BMI) of 40.0 to 44.9 in adult ([address] 07/17/2019    Essential hypertension 06/05/2019    Chronic kidney disease, stage IV (severe) ([address] 01/18/2019    WD-Chronic buttock pain 05/09/2018    Status post bariatric surgery 04/25/2018    Status post laparoscopic sleeve gastrectomy 04/20/2018    Hiatal hernia     YRIS (obstructive sleep apnea) 02/02/2018    Acute renal failure (ARF) ([address] 10/13/2017    Acute encephalopathy 10/13/2017    Acute hypercapnic respiratory failure ([address] 83/28/4188    Acute metabolic encephalopathy 11/24/0742    Chronic diastolic heart failure ([address] 09/19/2017    Solitary pulmonary nodule     Morbid obesity with BMI of 45.0-49.9, adult ([address] 05/17/2017    Diabetes 1.5, managed as type 2 ([address] 05/17/2017    Hyperglycemia 05/17/2017    Chronic fatigue 05/17/2017    Acute on chronic diastolic heart failure (HCC) 02/04/2017    Fluid overload 02/03/2017    Chronic venous hypertension (idiopathic) with inflammation of bilateral lower extremity 08/05/2016    Hypercalcemia 08/04/2016    Hypertensive kidney disease with CKD stage III ([address] 05/31/2016    Type 2 diabetes mellitus without complication ([facility] 94/47/1451    Acute on chronic renal failure ([facility] 02/18/2015    CAD (coronary artery disease) 02/17/2015    Nausea & vomiting 02/17/2015    Hypothyroidism, adult 02/17/2015       Electronically signed by FIDEL Russ CNP on 12/30/2022 at 11:11 AM    Electronically signed by Luan Terry MD on 12/30/22 at 11:46 AM EST

## 2022-12-30 NOTE — PROGRESS NOTES
[id number] [place] OF SPEECH/LANGUAGE PATHOLOGY  DAILY PROGRESS NOTE  Iris Lyon  12/30/2022  [id number]  Generalized weakness [R53.1]  ESRD (end stage renal disease) ([facility] [N18.6]  Acute cystitis with hematuria [N30.01]  HCAP (healthcare-associated pneumonia) [J18.9]  History of progressive weakness [Z87.898]  Allergies   Allergen Reactions    Percocet [Oxycodone-Acetaminophen]      hallucinations    Tramadol Itching    Vicodin [Hydrocodone-Acetaminophen] Itching    Narcan [Naloxone Hcl] Anxiety     Feels like out of body, things are speeding         Pt was seen this date for dysphagia treatment. IMPRESSION AND RECOMMENDATIONS: Iris Lyon was seen for dysphagia follow-up with her son. Reviewed results of yesterday's MBS and advancement to regular diet/thin liquids. Pt reports ongoing difficulty swallowing pills; discussed crushing pills in pureed/pudding consistency. Pt reports some oral residue with regular solids; discussed options to choose softer foods as preferred, alternate solids/liquids to clear residue from oral cavity. Reviewed precautions including upright positioning, small bites/drinks, slow rate of intake, and alternating solids/liquids to minimize dysphagia symptoms. Pt/son verbalize understanding of results/recommendations. Recommend continuing regular diet/thin liquids. Pt to self-select softer foods as needed. No further direct dysphagia tx or exercises are indicated based upon imaging completed. SLP will sign off. GOALS (current status in bold):  Short-term Goals  Timeframe for Short-term Goals: length of admission  Goal 1: Pt will tolerate regular diet/thin liquids with adequate oral manipulation/clearance and no s/s aspiration. Deferred  Goal 2: Pt/caregivers will demonstrate understanding of recommendations/education/strategies.  Goal met          EDUCATION: as above    PAIN RATING (0-10 Scale): no pain reported, appears comfortable  Time in/Time out: SLP Individual Minutes  Time In: 3839  Time Out: [address]  Minutes: 10    Visit number: [address] Bravo  12/30/2022  4:02 PM

## 2022-12-31 NOTE — PROGRESS NOTES
I discussed with patient and son Scottie Wooten. He is aware the patient refused to go to dialysis today and next dialysis will now be Monday per patient's request.  That is somewhat harmful in the patient and she is aware but she does not want to do dialysis today    Patient started with work-up for MS and will follow up with neurology and treatment plan and possible need MRI. From renal standpoint as well as cardiology patient stable    I discussed with patient and son once neurology clears patient can be discharged back to skilled nursing and plan on next dialysis Monday.     Maintain as limited code

## 2022-12-31 NOTE — PROGRESS NOTES
Neurology Service Progress Note  [address]   Patient Name: Gillian Long  : 1950        Subjective:   Reason for consult: Progressive weakness of the lower extremities  Chart was reviewed in detail. Patient was seen and assessed. She is resting in bed, awake alert and oriented x4. She has started with Prostigmin and has had 2 doses prior to assessment today. She has not noticed much improvement in strength or voice quality but does feel that she is breathing easier today. We will continue with this treatment and reassess for signs of improvement in extremity strength, voice quality. It was noted that patient refused dialysis today.   Son was not at bedside during exam.      Past Medical History:   Diagnosis Date    Acid reflux     Anemia     Arthritis     \"Shoulders, Hips And Knees\"    CAD (coronary artery disease)     Sees Dr. Galen Rhoades    Cerebral artery occlusion with cerebral infarction ([facility]     CHF (congestive heart failure) ([facility])     Chronic back pain     Chronic constipation     Chronic kidney disease     Sees Dr. Lucas Adams    COPD (chronic obstructive pulmonary disease) ([facility]     Sees Dr. Ciro Rashid    Depression     Diabetes mellitus [facility]) Dx     Sees Dr. Sage Amezcua    Emphysema lung [facility])     Glaucoma     \"Both Eyes\"    Gout     Hemodialysis patient ([facility]     Hiatal hernia     History of blood transfusion 's    No Reaction To Blood Transfusion Received    [place] (hard of hearing)     Bilateral Ears    Hyperlipidemia     Hypertension     Dr. Mynor Woodward    Itching     \"Whole Body Itches The [de-identified] Of The Time\"    Morbid obesity ([facility]     Rheumatoid arthritis ([facility]     Shortness of breath on exertion     Sleep apnea     Uses CPAP    Teeth missing     Upper And Lower    Thyroid disease     Thyroidectomy In     Urinary incontinence     WD-Chronic buttock pain 2018    Wears glasses     :   Past Surgical History:   Procedure Laterality Date    APPENDECTOMY 1968    AV FISTULA CREATION Left     BACK SURGERY  2017    Lower Back With Hardware    BACK SURGERY  2016    Cervical Back With Hardware    CARPAL TUNNEL RELEASE Right      SECTION  3-30-68 And 10-17-69    COLONOSCOPY  2017    Polyps Removed    CORONARY ANGIOPLASTY  2016    Heart Stent D Circ    DENTAL SURGERY      Teeth Extracted In Past    DILATION AND CURETTAGE OF UTERUS  Last Done In [address] [place] [address] 281    \"Numerous\"    ENDOSCOPY, COLON, DIAGNOSTIC  2017    HYSTERECTOMY (CERVIX STATUS UNKNOWN)      Partial Abdominal Hysterectomy    IR NONTUNNELED VASCULAR CATHETER  2022    IR NONTUNNELED VASCULAR CATHETER 2022 [facility]    IR TUNNELED CATHETER PLACEMENT GREATER THAN 5 YEARS  2019    IR TUNNELED CATHETER PLACEMENT GREATER THAN 5 YEARS 2019 [facility]    IR TUNNELED CATHETER PLACEMENT GREATER THAN 5 YEARS  3/3/2021    IR TUNNELED CATHETER PLACEMENT GREATER THAN 5 YE[facility]    ROTATOR CUFF REPAIR Left 2010    SLEEVE GASTRECTOMY  2018    robotic assisted gastric sleeve, hiatal hernia repair    THYROIDECTOMY       Medications:  Scheduled Meds:   pyridostigmine  30 mg Oral 3 times per day    mirtazapine  15 mg Oral Nightly    sucralfate  1 g Oral 2 times per day    betamethasone dipropionate   Topical Daily    apixaban  5 mg Oral BID    atorvastatin  20 mg Oral Nightly    metoprolol tartrate  25 mg Oral BID    midodrine  5 mg Oral TID WC    cloNIDine  0.1 mg Oral BID    amLODIPine  5 mg Oral Daily    aspirin  81 mg Oral Daily    levothyroxine  100 mcg Oral Daily    montelukast  10 mg Oral Nightly    pantoprazole  40 mg Oral BID AC    predniSONE  20 mg Oral Daily    QUEtiapine  50 mg Oral Nightly    ranolazine  500 mg Oral BID    sevelamer  800 mg Oral TID WC    tiotropium-olodaterol  2 puff Inhalation Daily    sodium chloride flush  5-40 mL IntraVENous 2 times per day    cefTRIAXone (ROCEPHIN) IV  1,000 mg IntraVENous Q24H    azithromycin  500 mg IntraVENous Q24H    allopurinol  200 mg Oral Daily    levomilnacipran  40 mg Oral Nightly    LORazepam  0.5 mg Oral BID     Continuous Infusions:   sodium chloride       PRN Meds:.melatonin, ipratropium-albuterol, sodium chloride flush, sodium chloride, ondansetron **OR** ondansetron, polyethylene glycol, traMADol    Allergies   Allergen Reactions    Percocet [Oxycodone-Acetaminophen]      hallucinations    Tramadol Itching    Vicodin [Hydrocodone-Acetaminophen] Itching    Narcan [Naloxone Hcl] Anxiety     Feels like out of body, things are speeding     Social History     Socioeconomic History    Marital status: Single     Spouse name: Not on file    Number of children: 2    Years of education: Not on file    Highest education level: Not on file   Occupational History    Not on file   Tobacco Use    Smoking status: Never    Smokeless tobacco: Never   Vaping Use    Vaping Use: Never used   Substance and Sexual Activity    Alcohol use: No    Drug use: No    Sexual activity: Never   Other Topics Concern    Not on file   Social History Narrative    Not on file     Social Determinants of Health     Financial Resource Strain: Not on file   Food Insecurity: Not on file   Transportation Needs: Not on file   Physical Activity: Not on file   Stress: Not on file   Social Connections: Not on file   Intimate Partner Violence: Not on file   Housing Stability: Not on file      Family History   Problem Relation Age of Onset    Kidney Disease Mother         \"Kidney Failure\"    Heart Disease Mother         CHF    Arthritis Mother     Diabetes Mother     Depression Mother     High Blood Pressure Mother     Obesity Mother     Vision Loss Mother     Heart Attack Father     Heart Disease Father         Heart Attack    Diabetes Father     High Blood Pressure Father     High Blood Pressure Daughter          ROS (10 systems)  In addition to that documented in the HPI above, the additional ROS was obtained:  Constitutional: Denies fevers or chills  Eyes: Sensation of black spot behind her eye  ENMT: Denies sore throat  CV: Denies chest pain  Resp: Feels short of breath, difficulty taking deep respirations  GI: Denies vomiting or diarrhea  : Denies painful urination  MSK: Denies recent trauma  Skin: Denies new rashes  Neuro: Worsening weakness to the upper and lower extremities  Endocrine: Denies unexpected weight loss  Heme: Denies bleeding disorders    Physical Exam:      Wt Readings from Last 3 Encounters:   12/31/22 174 lb 8 oz (79.2 kg)   12/30/22 172 lb (78 kg)   12/06/22 180 lb (81.6 kg)     Temp Readings from Last 3 Encounters:   12/31/22 98.6 °F (37 °C) (Oral)   12/06/22 97.7 °F (36.5 °C)   08/24/22 97.1 °F (36.2 °C)     BP Readings from Last 3 Encounters:   12/31/22 131/60   12/06/22 137/84   05/10/22 125/75     Pulse Readings from Last 3 Encounters:   12/31/22 85   12/06/22 97   08/24/22 91        Gen: A&O x 4, NAD, cooperative  HEENT: NC/AT, EOMI, PERRL, mmm, neck supple, no meningeal signs  Heart: NSR/ST  Lungs: Respirations shallow, increased WOB  Ext: no edema, no calf tenderness b/l  Psych: normal mood and affect  Skin: no rashes or lesions    NEUROLOGIC EXAM:    Mental Status: A&O to self, location, month and year, NAD, speech clear with weak voice and hypophonia, language fluent, repetition and naming intact, follows commands appropriately    Cranial Nerve Exam:   CN II-XII:  PERRL, VFF, no nystagmus, no gaze paresis, sensation V1-V3 intact b/l, muscles of facial expression symmetric; hearing intact to conversational tone, palate elevates symmetrically, shoulder elevation symmetric and tongue protrudes midline with movement side to side.     Motor Exam:       Strength unable to lift upper or lower extremities to antigravity, weak attempt to lift extremities off of bed  Tone and bulk normal   Bilateral extremity pronator drift    Deep Tendon Reflexes: Trace/4 biceps, triceps, brachioradialis, patellar, and achilles b/l; flexor plantar responses b/l    Sensation: Intact light touch/pinprick/vibration UE's/LE's b/l    Coordination/Cerebellum:       Tremors--none      Rapidly alternating movements:  dysdiadochokinesia b/l                Heel-to-Shin:  dysmetria b/l      Finger-to-Nose:  dysmetria b/l    Gait and stance:      Gait: deferred      LABS:     Recent Labs     12/29/22  1804 12/30/22  0813 12/31/22  0452   WBC 8.6 7.3 7.9    136 136   K 5.3* 4.5 5.0   CL 98* 100 98*   CO2 24 23 27   BUN 62* 67* 68*   CREATININE 3.7* 4.0* 4.5*   GLUCOSE 120* 88 79         IMAGING:    CT head w/o contrast:  Impression   Stable appearance of the brain without acute intracranial process identified. CT cervical spine:  Impression   No acute fracture or traumatic malalignment of the cervical spine       Posterior fusion extends from C3-T2 with laminectomies at C4, C5, C6 and C7. The left pedicle at T2 extends into the left transverse process but does not   extend into the vertebral body. Tip of the left T2 pedicle screw is directly   adjacent to the posterior aortic arch. MRI cervical spine w/o contrast:  Impression   1. Posterior instrumented fusion of C3-T3 with associated laminectomy changes   as described above. 2. Mild/moderate multilevel cervical spondylosis. No high-grade spinal canal   stenosis. 3. Marrow signal appears T1 hypointense. Findings may related to anemia   versus an alternative marrow infiltrative process. All imaging was personally reviewed    ASSESSMENT/PLAN:   72-year-old female presenting with worsening extremity weakness. Patient is alert and oriented x4. Speech is clear with weak, hypophonic voice. Language is fluent. Face is symmetric with masklike facies. No ptosis noted to eyes on exam, EOMs intact, pupils equal, round and reactive. Can lift extremities ever so slightly upward but cannot hold to antigravity.   Reflexes significantly diminished in the upper and lower extremities with sensation intact. Patient does feel that she is breathing easier today. No improvement in extremity weakness or voice quality. Patient continues to have neck pain today, recommended as needed Ultram that was ordered by IM. No complaints of headache today. Progressive extremity weakness secondary to myasthenia gravis v cervical stenosis. Neuroimaging as above, nonacute  MRI cervical spine  -nonacute myasthenia gravis panel ordered  myasthenia gravis panel ordered  Will order Mestinon 30 mg p.o. every 8 hours for possible myasthenia gravis  Continue Eliquis, for DVT  Continue aspirin, Lipitor for secondary stroke prevention  Creatinine 4.0, ESRD on dialysis, management per nephrology  PT/OT as recommended, feel patient would benefit from passive range of motion exercises even if unable to participate in formal PT  Further recommendations pending imaging, MG panel and response to Mestinon    2. Bifrontal headache management  Will order Depacon 500 mg IV to be infused over 10 minutes x 2 doses. > 35 minutes of time spent included chart review, obtaining history, patient examination, developing plan of care, and documentation. Patient was discussed with attending neurologist Dr. Eric Rogers. Thank you for allowing us to participate in the care of your patient. If there are any questions regarding evaluation please feel free to contact us. FIDEL Pedraza CNP, 12/31/2022       ------------------------------------    Attending Note:  I have rounded on this patient with Lucía Bailey CNP. I have reviewed the chart and we have discussed this case in detail. The patient was seen and examined by myself. Pertinent labs and imaging have been personally reviewed. Our findings and impressions were discussed with the patient. I concur with the Nurse Practioner's assessment and plan. She did get several doses of Mestinon but no significant improvement endorsed today.   We will continue to follow-up and with hopes if we are dealing with a myasthenia gravis, she will start to feel some benefit after a few days on treatment. Further recommendations are detailed above.     Nahomy Mo DO 12/31/2022 7:28 PM

## 2022-12-31 NOTE — PROGRESS NOTES
Pt yelling out during shift change and report pt was redirected and told she would be helped as soon as report was over. Pt was not in any distress at the time.

## 2022-12-31 NOTE — PROGRESS NOTES
Daily Progress Note  Subjective:  Pt sleeping-awakens easily  No SOB today, denies any CP  BP, HR stable Off Tele  HD yesterday  Nephrology and Neurology following    Attending Note:      Impression and Plan:     DVT    DVT noted in US of Right arm     Eliquis 5mg BID     Weakness    Increase in weakness over the last couple of weeks at [facility]    Difficulty swallowing now    Neuro ordering MRI C spine MG w/u in progress    Speech has been following     Multifocal PNA    Noted on CT chest    ABX per primary    Negative viral respiratory panel     Treatment per primary     ESRD    Renal following; HD 12/30    Refused today     Hx of CAD    PCI back in 2015 at [facility] to OM    Continue on Aspirin lipitor and lopressor    Currently on Ranexa    Troponin elevated; multifactorial; secondary to renal function and infection     Most Recent Echo  Echo 12/30/22   Left ventricular systolic function is normal.   Ejection fraction is visually estimated at 60%. Mild left ventricular hypertrophy. Grade I diastolic dysfunction. Central line visualized terminating in the right atrium. Sclerotic, but non-stenotic aortic valve. Mild tricuspid regurgitation is present. RVSP is 40 mmHg. No evidence of any pericardial effusion. Most Recent Stress test  Stress 2/16/21   Summary    No EKG changes suggestive of ischemia with Lexiscan infusion. Normal perfusion uptake noted    no reversible ischemia noted    gating shows EF 60%      Radiology  US of right arm 12/22    Impression:       1. Acute DVT involving the brachial vein near the antecubital fossa. 2. Acute SVT involving the cephalic and basilic veins. 3. Clotted fistula. 4. Indeterminate 17 x 1.5 x 2.2 cm area of fluid in the right upper extremity. Findings were discussed with Magdy Guevara at 10:17 p.m. on 12/28/2022       Ct abdomen 12/22          Impression:         1. Bilateral lower lobe consolidations concerning for multifocal pneumonia.    Radiographic follow-up recommended to document resolution following treatment. 2.  Probable constipation. Colonic diverticulosis without diverticulitis. 3.  Mild nonspecific edema within the perirectal fat without appreciable wall   thickening. Correlation with digital rectal exam may be helpful. 4.  Nonspecific edema/skin thickening within the left lateral breast and   chest wall which may be iatrogenic or related to recent trauma. Follow-up   mammography may be useful as clinically warranted. 5.  Additional nonemergent findings, as above. PAST MEDICAL HISTORY:  Acid reflux, anemia, arthritis, CAD, CVA, CHF,  chronic back pain, chronic constipation, chronic kidney disease,  end-stage renal disease, COPD, diabetes, emphysema, hypertension,  hyperlipidemia, rheumatoid arthritis, sleep apnea, thyroid disease. PAST SURGICAL HISTORY:  Appendectomy, AV fistula creation, back surgery,  carpal tunnel release, , coronary angioplasty, dental surgery,  hysterectomy, rotator cuff repair, gastric sleeve, thyroidectomy. SOCIAL HISTORY:  She does not smoke, denies any illicit drugs, does not  use any alcohol. ALLERGIES:  She has allergies to PERCOCET, TRAMADOL, VICODIN, and  NARCAN.       Objective:   /62   Pulse 83   Temp 97.8 °F (36.6 °C) (Oral)   Resp 18   Ht 5' 4\" (1.626 m)   Wt 174 lb 8 oz (79.2 kg)   SpO2 98%   BMI 29.95 kg/m²     Intake/Output Summary (Last 24 hours) at 2022 0754  Last data filed at 2022 1315  Gross per 24 hour   Intake 620 ml   Output 166 ml   Net 454 ml       Medications:   Scheduled Meds:   pyridostigmine  30 mg Oral 3 times per day    mirtazapine  15 mg Oral Nightly    sucralfate  1 g Oral 2 times per day    betamethasone dipropionate   Topical Daily    apixaban  5 mg Oral BID    atorvastatin  20 mg Oral Nightly    metoprolol tartrate  25 mg Oral BID    midodrine  5 mg Oral TID WC    cloNIDine  0.1 mg Oral BID    amLODIPine  5 mg Oral Daily aspirin  81 mg Oral Daily    levothyroxine  100 mcg Oral Daily    montelukast  10 mg Oral Nightly    pantoprazole  40 mg Oral BID AC    predniSONE  20 mg Oral Daily    QUEtiapine  50 mg Oral Nightly    ranolazine  500 mg Oral BID    sevelamer  800 mg Oral TID WC    tiotropium-olodaterol  2 puff Inhalation Daily    sodium chloride flush  5-40 mL IntraVENous 2 times per day    cefTRIAXone (ROCEPHIN) IV  1,000 mg IntraVENous Q24H    azithromycin  500 mg IntraVENous Q24H    allopurinol  200 mg Oral Daily    levomilnacipran  40 mg Oral Nightly    LORazepam  0.5 mg Oral BID      Infusions:   sodium chloride        PRN Meds:  melatonin, ipratropium-albuterol, sodium chloride flush, sodium chloride, ondansetron **OR** ondansetron, polyethylene glycol, traMADol     Physical Exam:  Vitals:    12/31/22 0746   BP: 121/62   Pulse: 83   Resp: 18   Temp: 97.8 °F (36.6 °C)   SpO2: 98%        General: AAO, NAD  Chest: Nontender  Cardiac: First and Second Heart Sounds are Normal, No Murmurs or Gallops noted  Lungs:Clear to auscultation and percussion. Abdomen: Soft, NT, ND, +BS  Extremities: No clubbing, trace edema  Vascular:  Equal 2+ peripheral pulses. Lab Data:  CBC:   Recent Labs     12/29/22  1804 12/30/22  0813 12/31/22  0452   WBC 8.6 7.3 7.9   HGB 12.8 12.2* 11.7*   HCT 39.1 37.0 36.6*   MCV 88.7 87.5 89.5    203 226     BMP:   Recent Labs     12/29/22  1804 12/30/22  0813 12/31/22  0452    136 136   K 5.3* 4.5 5.0   CL 98* 100 98*   CO2 24 23 27   BUN 62* 67* 68*   CREATININE 3.7* 4.0* 4.5*     LIVER PROFILE: No results for input(s): AST, ALT, LIPASE, BILIDIR, BILITOT, ALKPHOS in the last 72 hours. Invalid input(s): AMYLASE,  ALB  PT/INR: No results for input(s): PROTIME, INR in the last 72 hours. APTT:   Recent Labs     12/30/22  0813 12/30/22  1711 12/31/22  0450   APTT 33.0 36.0 35.7     BNP:  No results for input(s): BNP in the last 72 hours.       Assessment:  Patient Active Problem List Diagnosis Date Noted    Acute deep vein thrombosis (DVT) of brachial vein of right upper extremity ([address] 12/29/2022    History of progressive weakness 12/27/2022    Strain of muscle, fascia and tendon of long head of biceps, left arm, initial encounter 09/23/2021    Lymphedema of left upper extremity 08/21/2021    Aphagia 02/15/2021    Cerebrovascular accident (CVA) due to occlusion of precerebral artery ([address] 10/08/2020    Fistula 05/11/2020    ESRD (end stage renal disease) ([address] 07/17/2019    ESRD needing dialysis ([address] 07/17/2019    Type 2 diabetes mellitus with hyperglycemia, with long-term current use of insulin ([address] 07/17/2019    Class 3 severe obesity due to excess calories with body mass index (BMI) of 40.0 to 44.9 in adult ([address] 07/17/2019    Essential hypertension 06/05/2019    Chronic kidney disease, stage IV (severe) ([address] 01/18/2019    WD-Chronic buttock pain 05/09/2018    Status post bariatric surgery 04/25/2018    Status post laparoscopic sleeve gastrectomy 04/20/2018    Hiatal hernia     YRIS (obstructive sleep apnea) 02/02/2018    Acute renal failure (ARF) ([address] 10/13/2017    Acute encephalopathy 10/13/2017    Acute hypercapnic respiratory failure ([address] 33/34/2710    Acute metabolic encephalopathy 66/61/9624    Chronic diastolic heart failure ([address] 09/19/2017    Solitary pulmonary nodule     Morbid obesity with BMI of 45.0-49.9, adult ([address] 05/17/2017    Diabetes 1.5, managed as type 2 ([address] 05/17/2017    Hyperglycemia 05/17/2017    Chronic fatigue 05/17/2017    Acute on chronic diastolic heart failure (HCC) 02/04/2017    Fluid overload 02/03/2017    Chronic venous hypertension (idiopathic) with inflammation of bilateral lower extremity 08/05/2016    Hypercalcemia 08/04/2016    Hypertensive kidney disease with CKD stage III ([address] 05/31/2016    Type 2 diabetes mellitus without complication ([address] 89/74/7815    Acute on chronic renal failure ([facility] 02/18/2015    CAD (coronary artery disease) 02/17/2015 Nausea & vomiting 02/17/2015    Hypothyroidism, adult 02/17/2015       Electronically signed by Harshil Rees PA-C on 12/31/2022 at 7:54 AM

## 2022-12-31 NOTE — PROGRESS NOTES
Nephrology Progress Note  12/31/2022 8:25 AM  Subjective: Interval History: Delilah Garcia is a 81 y.o. female weak but stable and then range short dialysis treatments wanted off early and discussed with her that this will not work if she keeps doing that. She agreed to have dialysis again today for 2 hours time. Data:   Scheduled Meds:   pyridostigmine  30 mg Oral 3 times per day    mirtazapine  15 mg Oral Nightly    sucralfate  1 g Oral 2 times per day    betamethasone dipropionate   Topical Daily    apixaban  5 mg Oral BID    atorvastatin  20 mg Oral Nightly    metoprolol tartrate  25 mg Oral BID    midodrine  5 mg Oral TID WC    cloNIDine  0.1 mg Oral BID    amLODIPine  5 mg Oral Daily    aspirin  81 mg Oral Daily    levothyroxine  100 mcg Oral Daily    montelukast  10 mg Oral Nightly    pantoprazole  40 mg Oral BID AC    predniSONE  20 mg Oral Daily    QUEtiapine  50 mg Oral Nightly    ranolazine  500 mg Oral BID    sevelamer  800 mg Oral TID WC    tiotropium-olodaterol  2 puff Inhalation Daily    sodium chloride flush  5-40 mL IntraVENous 2 times per day    cefTRIAXone (ROCEPHIN) IV  1,000 mg IntraVENous Q24H    azithromycin  500 mg IntraVENous Q24H    allopurinol  200 mg Oral Daily    levomilnacipran  40 mg Oral Nightly    LORazepam  0.5 mg Oral BID     Continuous Infusions:   sodium chloride           CBC   Recent Labs     12/29/22  1804 12/30/22  0813 12/31/22  0452   WBC 8.6 7.3 7.9   HGB 12.8 12.2* 11.7*   HCT 39.1 37.0 36.6*    203 226      BMP   Recent Labs     12/29/22  1804 12/30/22  0813 12/31/22  0452    136 136   K 5.3* 4.5 5.0   CL 98* 100 98*   CO2 24 23 27   BUN 62* 67* 68*   CREATININE 3.7* 4.0* 4.5*     Hepatic:   No results for input(s): AST, ALT, ALB, BILITOT, ALKPHOS in the last 72 hours. Troponin: No results for input(s): TROPONINI in the last 72 hours. BNP: No results for input(s): BNP in the last 72 hours.   Lipids: No results for input(s): CHOL, HDL in the last 72 hours. Invalid input(s): LDLCALCU  ABGs:   Lab Results   Component Value Date/Time    PO2ART 58 09/21/2017 12:00 AM    CIZ3AQD 44.0 09/21/2017 12:00 AM     INR: No results for input(s): INR in the last 72 hours.   Renal Labs  Albumin:    Lab Results   Component Value Date/Time    LABALBU 4.2 12/27/2022 05:00 PM     Calcium:    Lab Results   Component Value Date/Time    CALCIUM 9.1 12/31/2022 04:52 AM     Phosphorus:    Lab Results   Component Value Date/Time    PHOS 7.6 02/17/2021 06:11 AM     U/A:    Lab Results   Component Value Date/Time    NITRU NEGATIVE 12/27/2022 05:45 PM    NITRU Negative 07/10/2019 11:09 AM    COLORU YELLOW 12/27/2022 05:45 PM    PHUR 6.0 07/10/2019 11:09 AM    WBCUA 265 12/27/2022 05:45 PM    RBCUA 48 12/27/2022 05:45 PM    MUCUS RARE 12/27/2022 05:45 PM    TRICHOMONAS NONE SEEN 12/27/2022 05:45 PM    BACTERIA FEW 12/27/2022 05:45 PM    CLARITYU CLOUDY 12/27/2022 05:45 PM    SPECGRAV 1.020 12/27/2022 05:45 PM    UROBILINOGEN 0.2 12/27/2022 05:45 PM    BILIRUBINUR NEGATIVE 12/27/2022 05:45 PM    BLOODU SMALL NUMBER OR AMOUNT OBSERVED 12/27/2022 05:45 PM    GLUCOSEU Negative 07/10/2019 11:09 AM    KETUA NEGATIVE 12/27/2022 05:45 PM     ABG:    Lab Results   Component Value Date/Time    RRL3PKJ 44.0 09/21/2017 12:00 AM    PO2ART 58 09/21/2017 12:00 AM    YEW8MIL 28.5 09/21/2017 12:00 AM     HgBA1c:    Lab Results   Component Value Date/Time    LABA1C 5.5 09/02/2021 10:30 AM     Microalbumen/Creatinine ratio:  No components found for: RUCREAT  TSH:    Lab Results   Component Value Date/Time    TSH 2.19 10/06/2017 12:15 PM     IRON:    Lab Results   Component Value Date/Time    IRON 53 12/01/2017 09:15 AM     Iron Saturation:  No components found for: PERCENTFE  TIBC:    Lab Results   Component Value Date/Time    TIBC 367 12/01/2017 09:15 AM     FERRITIN:    Lab Results   Component Value Date/Time    FERRITIN 17 12/01/2017 09:15 AM     RPR:  No results found for: RPR  MADDY:    Lab Results   Component Value Date/Time    MDADY None Detected 06/27/2022 11:00 AM     24 Hour Urine for Creatinine Clearance:  No components found for: CREAT4, UHRS10, UTV10      Objective:   I/O: 12/30 0701 - 12/31 0700  In: 620 [P.O.:120]  Out: 166   I/O last 3 completed shifts: In: 620 [P.O.:120]  Out: 166   No intake/output data recorded. Vitals: /62   Pulse 62   Temp 97.8 °F (36.6 °C) (Oral)   Resp 18   Ht 5' 4\" (1.626 m)   Wt 174 lb 8 oz (79.2 kg)   SpO2 97%   BMI 29.95 kg/m²  {  General appearance: awake weak  HEENT: Head: Normal, normocephalic, atraumatic. Neck: supple, symmetrical, trachea midline  Lungs: diminished breath sounds bilaterally  Heart: S1, S2 normal  Abdomen: abnormal findings:  soft nt  Extremities: edema trace  Neurologic: Mental status: alertness: Awake verbal        Assessment and Plan:      IMP:   #1 end-stage renal disease on dialysis Monday Friday   #2 history of CVA with overall decline neurologically   #3 high blood pressure   #4? Aspiration pneumonia/UTI  #5 positive DVT       Plan     #1 do 2 hours of dialysis today and then routine dialysis Monday Friday and set a short treatment yesterday explained to her why is important do full dialysis  #2 neuro status appears back to her baseline we will need therapy outpatient  #3 blood pressure holding stable  #4 blood cultures have been negative positive UTI that was treated no fever at this time  #5 current anticoagulation  #6 potassium will correct with dialysis hemoglobin stable    Patient's main issues are more chronic in nature will need time to recover in rehab maintain is a limited code  After short dialysis session today she would be stable to go back to skilled nursing.   Swallow eval done shows she can tolerate regular diet with thin liquids  Continue to monitor for aspiration  Abdominal pain does appear better after the Carafate can likely stop in a few days time    I will continue to follow as an outpatient at dialysis.          Niki Connolly MD, MD

## 2023-01-01 ENCOUNTER — APPOINTMENT (OUTPATIENT)
Dept: GENERAL RADIOLOGY | Age: 73
DRG: 056 | End: 2023-01-01

## 2023-01-01 ENCOUNTER — HOSPITAL ENCOUNTER (INPATIENT)
Age: 73
LOS: 2 days | End: 2023-01-06
Attending: FAMILY MEDICINE | Admitting: FAMILY MEDICINE
Payer: COMMERCIAL

## 2023-01-01 VITALS
WEIGHT: 174.16 LBS | OXYGEN SATURATION: 99 % | SYSTOLIC BLOOD PRESSURE: 161 MMHG | HEART RATE: 108 BPM | RESPIRATION RATE: 20 BRPM | DIASTOLIC BLOOD PRESSURE: 88 MMHG | TEMPERATURE: 98.1 F | BODY MASS INDEX: 29.9 KG/M2

## 2023-01-01 VITALS
TEMPERATURE: 97.5 F | HEIGHT: 64 IN | BODY MASS INDEX: 30.85 KG/M2 | OXYGEN SATURATION: 96 % | SYSTOLIC BLOOD PRESSURE: 147 MMHG | DIASTOLIC BLOOD PRESSURE: 72 MMHG | HEART RATE: 99 BPM | WEIGHT: 180.7 LBS | RESPIRATION RATE: 20 BRPM

## 2023-01-01 LAB
ACETYLCHOLINE BINDING ANTIBODY: 0 NMOL/L (ref 0–0.4)
ACETYLCHOLINE BLOCKING AB: 13 % (ref 0–26)
ANION GAP SERPL CALCULATED.3IONS-SCNC: 11 MMOL/L (ref 4–16)
ANION GAP SERPL CALCULATED.3IONS-SCNC: 12 MMOL/L (ref 4–16)
ANION GAP SERPL CALCULATED.3IONS-SCNC: 9 MMOL/L (ref 4–16)
BASOPHILS ABSOLUTE: 0 K/CU MM
BASOPHILS RELATIVE PERCENT: 0.4 % (ref 0–1)
BUN BLDV-MCNC: 54 MG/DL (ref 6–23)
BUN BLDV-MCNC: 78 MG/DL (ref 6–23)
BUN BLDV-MCNC: 87 MG/DL (ref 6–23)
CALCIUM SERPL-MCNC: 8.8 MG/DL (ref 8.3–10.6)
CALCIUM SERPL-MCNC: 8.9 MG/DL (ref 8.3–10.6)
CALCIUM SERPL-MCNC: 9.1 MG/DL (ref 8.3–10.6)
CHLORIDE BLD-SCNC: 97 MMOL/L (ref 99–110)
CHLORIDE BLD-SCNC: 97 MMOL/L (ref 99–110)
CHLORIDE BLD-SCNC: 99 MMOL/L (ref 99–110)
CO2: 25 MMOL/L (ref 21–32)
CO2: 26 MMOL/L (ref 21–32)
CO2: 30 MMOL/L (ref 21–32)
CREAT SERPL-MCNC: 4.7 MG/DL (ref 0.6–1.1)
CREAT SERPL-MCNC: 5.9 MG/DL (ref 0.6–1.1)
CREAT SERPL-MCNC: 5.9 MG/DL (ref 0.6–1.1)
CULTURE: NORMAL
CULTURE: NORMAL
DIFFERENTIAL TYPE: ABNORMAL
EKG ATRIAL RATE: 90 BPM
EKG DIAGNOSIS: NORMAL
EKG P AXIS: 61 DEGREES
EKG P-R INTERVAL: 184 MS
EKG Q-T INTERVAL: 374 MS
EKG QRS DURATION: 116 MS
EKG QTC CALCULATION (BAZETT): 457 MS
EKG R AXIS: 45 DEGREES
EKG T AXIS: 32 DEGREES
EKG VENTRICULAR RATE: 90 BPM
EOSINOPHILS ABSOLUTE: 0 K/CU MM
EOSINOPHILS RELATIVE PERCENT: 0.4 % (ref 0–3)
GFR SERPL CREATININE-BSD FRML MDRD: 7 ML/MIN/1.73M2
GFR SERPL CREATININE-BSD FRML MDRD: 7 ML/MIN/1.73M2
GFR SERPL CREATININE-BSD FRML MDRD: 9 ML/MIN/1.73M2
GLUCOSE BLD-MCNC: 79 MG/DL (ref 70–99)
GLUCOSE BLD-MCNC: 79 MG/DL (ref 70–99)
GLUCOSE BLD-MCNC: 89 MG/DL (ref 70–99)
HCT VFR BLD CALC: 34.4 % (ref 37–47)
HCT VFR BLD CALC: 35.7 % (ref 37–47)
HCT VFR BLD CALC: 39.8 % (ref 37–47)
HEMOGLOBIN: 11.2 GM/DL (ref 12.5–16)
HEMOGLOBIN: 11.5 GM/DL (ref 12.5–16)
HEMOGLOBIN: 12.5 GM/DL (ref 12.5–16)
HEPATITIS B CORE TOTAL ANTIBODY: POSITIVE
IMMATURE NEUTROPHIL %: 0.1 % (ref 0–0.43)
LYMPHOCYTES ABSOLUTE: 1.1 K/CU MM
LYMPHOCYTES RELATIVE PERCENT: 13.7 % (ref 24–44)
Lab: NORMAL
Lab: NORMAL
MCH RBC QN AUTO: 28.6 PG (ref 27–31)
MCH RBC QN AUTO: 28.9 PG (ref 27–31)
MCH RBC QN AUTO: 29 PG (ref 27–31)
MCHC RBC AUTO-ENTMCNC: 31.4 % (ref 32–36)
MCHC RBC AUTO-ENTMCNC: 32.2 % (ref 32–36)
MCHC RBC AUTO-ENTMCNC: 32.6 % (ref 32–36)
MCV RBC AUTO: 89.1 FL (ref 78–100)
MCV RBC AUTO: 89.7 FL (ref 78–100)
MCV RBC AUTO: 91.1 FL (ref 78–100)
MONOCYTES ABSOLUTE: 0.6 K/CU MM
MONOCYTES RELATIVE PERCENT: 7.7 % (ref 0–4)
NUCLEATED RBC %: 0 %
PDW BLD-RTO: 15.9 % (ref 11.7–14.9)
PDW BLD-RTO: 16 % (ref 11.7–14.9)
PDW BLD-RTO: 16 % (ref 11.7–14.9)
PLATELET # BLD: 232 K/CU MM (ref 140–440)
PLATELET # BLD: 245 K/CU MM (ref 140–440)
PLATELET # BLD: 255 K/CU MM (ref 140–440)
PMV BLD AUTO: 11.4 FL (ref 7.5–11.1)
PMV BLD AUTO: 12.9 FL (ref 7.5–11.1)
PMV BLD AUTO: 13 FL (ref 7.5–11.1)
POTASSIUM SERPL-SCNC: 5 MMOL/L (ref 3.5–5.1)
POTASSIUM SERPL-SCNC: 5.3 MMOL/L (ref 3.5–5.1)
POTASSIUM SERPL-SCNC: 5.3 MMOL/L (ref 3.5–5.1)
PROCALCITONIN: 4.4
RBC # BLD: 3.86 M/CU MM (ref 4.2–5.4)
RBC # BLD: 3.98 M/CU MM (ref 4.2–5.4)
RBC # BLD: 4.37 M/CU MM (ref 4.2–5.4)
SEGMENTED NEUTROPHILS ABSOLUTE COUNT: 6.2 K/CU MM
SEGMENTED NEUTROPHILS RELATIVE PERCENT: 77.7 % (ref 36–66)
SODIUM BLD-SCNC: 135 MMOL/L (ref 135–145)
SODIUM BLD-SCNC: 135 MMOL/L (ref 135–145)
SODIUM BLD-SCNC: 136 MMOL/L (ref 135–145)
SPECIMEN: NORMAL
SPECIMEN: NORMAL
STRIATED MUSCLE AB, IGG SCREEN: NORMAL
TITIN ANTIBODY: <0.09 IV (ref 0–0.45)
TOTAL IMMATURE NEUTOROPHIL: 0.01 K/CU MM
TOTAL NUCLEATED RBC: 0 K/CU MM
WBC # BLD: 8 K/CU MM (ref 4–10.5)
WBC # BLD: 8.5 K/CU MM (ref 4–10.5)
WBC # BLD: 9.6 K/CU MM (ref 4–10.5)

## 2023-01-01 PROCEDURE — 2580000003 HC RX 258: Performed by: PHYSICIAN ASSISTANT

## 2023-01-01 PROCEDURE — 2580000003 HC RX 258: Performed by: STUDENT IN AN ORGANIZED HEALTH CARE EDUCATION/TRAINING PROGRAM

## 2023-01-01 PROCEDURE — 93005 ELECTROCARDIOGRAM TRACING: CPT

## 2023-01-01 PROCEDURE — 36556 INSERT NON-TUNNEL CV CATH: CPT

## 2023-01-01 PROCEDURE — 89220 SPUTUM SPECIMEN COLLECTION: CPT

## 2023-01-01 PROCEDURE — 6370000000 HC RX 637 (ALT 250 FOR IP): Performed by: STUDENT IN AN ORGANIZED HEALTH CARE EDUCATION/TRAINING PROGRAM

## 2023-01-01 PROCEDURE — 94761 N-INVAS EAR/PLS OXIMETRY MLT: CPT

## 2023-01-01 PROCEDURE — 90935 HEMODIALYSIS ONE EVALUATION: CPT

## 2023-01-01 PROCEDURE — 1250000000 HC SEMI PRIVATE HOSPICE R&B

## 2023-01-01 PROCEDURE — 2700000000 HC OXYGEN THERAPY PER DAY

## 2023-01-01 PROCEDURE — 6370000000 HC RX 637 (ALT 250 FOR IP): Performed by: PHYSICIAN ASSISTANT

## 2023-01-01 PROCEDURE — 80048 BASIC METABOLIC PNL TOTAL CA: CPT

## 2023-01-01 PROCEDURE — 6370000000 HC RX 637 (ALT 250 FOR IP): Performed by: NURSE PRACTITIONER

## 2023-01-01 PROCEDURE — 6360000002 HC RX W HCPCS: Performed by: FAMILY MEDICINE

## 2023-01-01 PROCEDURE — 99233 SBSQ HOSP IP/OBS HIGH 50: CPT | Performed by: NURSE PRACTITIONER

## 2023-01-01 PROCEDURE — 6370000000 HC RX 637 (ALT 250 FOR IP): Performed by: INTERNAL MEDICINE

## 2023-01-01 PROCEDURE — 6370000000 HC RX 637 (ALT 250 FOR IP): Performed by: FAMILY MEDICINE

## 2023-01-01 PROCEDURE — 94150 VITAL CAPACITY TEST: CPT

## 2023-01-01 PROCEDURE — 1200000000 HC SEMI PRIVATE

## 2023-01-01 PROCEDURE — 94640 AIRWAY INHALATION TREATMENT: CPT

## 2023-01-01 PROCEDURE — 94664 DEMO&/EVAL PT USE INHALER: CPT

## 2023-01-01 PROCEDURE — 2580000003 HC RX 258: Performed by: FAMILY MEDICINE

## 2023-01-01 PROCEDURE — 94799 UNLISTED PULMONARY SVC/PX: CPT

## 2023-01-01 PROCEDURE — 84145 PROCALCITONIN (PCT): CPT

## 2023-01-01 PROCEDURE — 6360000002 HC RX W HCPCS: Performed by: INTERNAL MEDICINE

## 2023-01-01 PROCEDURE — 93010 ELECTROCARDIOGRAM REPORT: CPT | Performed by: INTERNAL MEDICINE

## 2023-01-01 PROCEDURE — 74018 RADEX ABDOMEN 1 VIEW: CPT

## 2023-01-01 PROCEDURE — 6360000002 HC RX W HCPCS: Performed by: STUDENT IN AN ORGANIZED HEALTH CARE EDUCATION/TRAINING PROGRAM

## 2023-01-01 PROCEDURE — 99211 OFF/OP EST MAY X REQ PHY/QHP: CPT

## 2023-01-01 PROCEDURE — 85027 COMPLETE CBC AUTOMATED: CPT

## 2023-01-01 PROCEDURE — 85025 COMPLETE CBC W/AUTO DIFF WBC: CPT

## 2023-01-01 PROCEDURE — 6360000002 HC RX W HCPCS: Performed by: PHYSICIAN ASSISTANT

## 2023-01-01 RX ORDER — LORAZEPAM 2 MG/ML
0.5 INJECTION INTRAMUSCULAR
Status: CANCELLED | OUTPATIENT
Start: 2023-01-01

## 2023-01-01 RX ORDER — QUETIAPINE FUMARATE 50 MG/1
50 TABLET, EXTENDED RELEASE ORAL NIGHTLY
Status: DISCONTINUED | OUTPATIENT
Start: 2023-01-01 | End: 2023-01-01 | Stop reason: HOSPADM

## 2023-01-01 RX ORDER — SODIUM CHLORIDE 0.9 % (FLUSH) 0.9 %
5-40 SYRINGE (ML) INJECTION PRN
Status: CANCELLED | OUTPATIENT
Start: 2023-01-01

## 2023-01-01 RX ORDER — MIRTAZAPINE 15 MG/1
15 TABLET, ORALLY DISINTEGRATING ORAL NIGHTLY
Status: DISCONTINUED | OUTPATIENT
Start: 2023-01-01 | End: 2023-01-01 | Stop reason: HOSPADM

## 2023-01-01 RX ORDER — ONDANSETRON 2 MG/ML
4 INJECTION INTRAMUSCULAR; INTRAVENOUS EVERY 6 HOURS PRN
Status: CANCELLED | OUTPATIENT
Start: 2023-01-01

## 2023-01-01 RX ORDER — LEVOTHYROXINE SODIUM 0.1 MG/1
100 TABLET ORAL DAILY
Status: DISCONTINUED | OUTPATIENT
Start: 2023-01-01 | End: 2023-01-01 | Stop reason: HOSPADM

## 2023-01-01 RX ORDER — GLYCOPYRROLATE 0.2 MG/ML
0.2 INJECTION INTRAMUSCULAR; INTRAVENOUS EVERY 4 HOURS PRN
Status: DISCONTINUED | OUTPATIENT
Start: 2023-01-01 | End: 2023-01-01 | Stop reason: HOSPADM

## 2023-01-01 RX ORDER — ACETAMINOPHEN 325 MG/1
650 TABLET ORAL EVERY 4 HOURS PRN
Status: DISCONTINUED | OUTPATIENT
Start: 2023-01-01 | End: 2023-01-01 | Stop reason: HOSPADM

## 2023-01-01 RX ORDER — ONDANSETRON 2 MG/ML
4 INJECTION INTRAMUSCULAR; INTRAVENOUS EVERY 6 HOURS PRN
Status: DISCONTINUED | OUTPATIENT
Start: 2023-01-01 | End: 2023-01-01 | Stop reason: HOSPADM

## 2023-01-01 RX ORDER — ACETAMINOPHEN 325 MG/1
650 TABLET ORAL EVERY 4 HOURS PRN
Status: CANCELLED | OUTPATIENT
Start: 2023-01-01

## 2023-01-01 RX ORDER — SODIUM CHLORIDE 0.9 % (FLUSH) 0.9 %
5-40 SYRINGE (ML) INJECTION EVERY 12 HOURS SCHEDULED
Status: DISCONTINUED | OUTPATIENT
Start: 2023-01-01 | End: 2023-01-01 | Stop reason: HOSPADM

## 2023-01-01 RX ORDER — TRAMADOL HYDROCHLORIDE 50 MG/1
50 TABLET ORAL EVERY 6 HOURS PRN
Status: DISCONTINUED | OUTPATIENT
Start: 2023-01-01 | End: 2023-01-01 | Stop reason: HOSPADM

## 2023-01-01 RX ORDER — PYRIDOSTIGMINE BROMIDE 60 MG/1
60 TABLET ORAL EVERY 8 HOURS SCHEDULED
Status: DISCONTINUED | OUTPATIENT
Start: 2023-01-01 | End: 2023-01-01 | Stop reason: HOSPADM

## 2023-01-01 RX ORDER — IPRATROPIUM BROMIDE AND ALBUTEROL SULFATE 2.5; .5 MG/3ML; MG/3ML
1 SOLUTION RESPIRATORY (INHALATION) EVERY 6 HOURS PRN
Status: CANCELLED | OUTPATIENT
Start: 2023-01-01

## 2023-01-01 RX ORDER — PREDNISONE 20 MG/1
20 TABLET ORAL DAILY
Status: DISCONTINUED | OUTPATIENT
Start: 2023-01-01 | End: 2023-01-01 | Stop reason: HOSPADM

## 2023-01-01 RX ORDER — SODIUM CHLORIDE 9 MG/ML
INJECTION, SOLUTION INTRAVENOUS PRN
Status: CANCELLED | OUTPATIENT
Start: 2023-01-01

## 2023-01-01 RX ORDER — AMLODIPINE BESYLATE 5 MG/1
5 TABLET ORAL DAILY
Status: CANCELLED | OUTPATIENT
Start: 2023-01-01

## 2023-01-01 RX ORDER — GLYCOPYRROLATE 0.2 MG/ML
0.2 INJECTION INTRAMUSCULAR; INTRAVENOUS EVERY 4 HOURS PRN
Status: CANCELLED | OUTPATIENT
Start: 2023-01-01

## 2023-01-01 RX ORDER — LORAZEPAM 2 MG/ML
0.5 INJECTION INTRAMUSCULAR
Status: DISCONTINUED | OUTPATIENT
Start: 2023-01-01 | End: 2023-01-01 | Stop reason: HOSPADM

## 2023-01-01 RX ORDER — SODIUM CHLORIDE 0.9 % (FLUSH) 0.9 %
5-40 SYRINGE (ML) INJECTION EVERY 12 HOURS SCHEDULED
Status: CANCELLED | OUTPATIENT
Start: 2023-01-01

## 2023-01-01 RX ORDER — MIRTAZAPINE 15 MG/1
15 TABLET, ORALLY DISINTEGRATING ORAL NIGHTLY
Status: CANCELLED | OUTPATIENT
Start: 2023-01-01

## 2023-01-01 RX ORDER — QUETIAPINE FUMARATE 50 MG/1
50 TABLET, EXTENDED RELEASE ORAL NIGHTLY
Status: CANCELLED | OUTPATIENT
Start: 2023-01-01

## 2023-01-01 RX ORDER — SODIUM CHLORIDE 0.9 % (FLUSH) 0.9 %
5-40 SYRINGE (ML) INJECTION PRN
Status: DISCONTINUED | OUTPATIENT
Start: 2023-01-01 | End: 2023-01-01 | Stop reason: HOSPADM

## 2023-01-01 RX ORDER — BISACODYL 10 MG
10 SUPPOSITORY, RECTAL RECTAL DAILY PRN
Status: DISCONTINUED | OUTPATIENT
Start: 2023-01-01 | End: 2023-01-01 | Stop reason: HOSPADM

## 2023-01-01 RX ORDER — PYRIDOSTIGMINE BROMIDE 60 MG/1
60 TABLET ORAL EVERY 8 HOURS SCHEDULED
Status: CANCELLED | OUTPATIENT
Start: 2023-01-01

## 2023-01-01 RX ORDER — PREDNISONE 20 MG/1
20 TABLET ORAL DAILY
Status: CANCELLED | OUTPATIENT
Start: 2023-01-01

## 2023-01-01 RX ORDER — BISACODYL 10 MG
10 SUPPOSITORY, RECTAL RECTAL DAILY PRN
Status: CANCELLED | OUTPATIENT
Start: 2023-01-01

## 2023-01-01 RX ORDER — LEVOTHYROXINE SODIUM 0.1 MG/1
100 TABLET ORAL DAILY
Status: CANCELLED | OUTPATIENT
Start: 2023-01-01

## 2023-01-01 RX ORDER — SODIUM CHLORIDE 9 MG/ML
INJECTION, SOLUTION INTRAVENOUS PRN
Status: DISCONTINUED | OUTPATIENT
Start: 2023-01-01 | End: 2023-01-01 | Stop reason: HOSPADM

## 2023-01-01 RX ORDER — ACETAMINOPHEN 650 MG/1
650 SUPPOSITORY RECTAL EVERY 4 HOURS PRN
Status: CANCELLED | OUTPATIENT
Start: 2023-01-01

## 2023-01-01 RX ORDER — LORAZEPAM 2 MG/ML
0.5 INJECTION INTRAMUSCULAR EVERY 6 HOURS PRN
Status: DISCONTINUED | OUTPATIENT
Start: 2023-01-01 | End: 2023-01-01 | Stop reason: HOSPADM

## 2023-01-01 RX ORDER — IPRATROPIUM BROMIDE AND ALBUTEROL SULFATE 2.5; .5 MG/3ML; MG/3ML
1 SOLUTION RESPIRATORY (INHALATION) EVERY 6 HOURS PRN
Status: DISCONTINUED | OUTPATIENT
Start: 2023-01-01 | End: 2023-01-01 | Stop reason: HOSPADM

## 2023-01-01 RX ORDER — LORAZEPAM 2 MG/ML
1 INJECTION INTRAMUSCULAR EVERY 8 HOURS SCHEDULED
Status: DISCONTINUED | OUTPATIENT
Start: 2023-01-01 | End: 2023-01-01 | Stop reason: HOSPADM

## 2023-01-01 RX ORDER — ONDANSETRON 4 MG/1
4 TABLET, ORALLY DISINTEGRATING ORAL EVERY 8 HOURS PRN
Status: DISCONTINUED | OUTPATIENT
Start: 2023-01-01 | End: 2023-01-01 | Stop reason: HOSPADM

## 2023-01-01 RX ORDER — ONDANSETRON 4 MG/1
4 TABLET, ORALLY DISINTEGRATING ORAL EVERY 8 HOURS PRN
Status: CANCELLED | OUTPATIENT
Start: 2023-01-01

## 2023-01-01 RX ORDER — ACETAMINOPHEN 650 MG/1
650 SUPPOSITORY RECTAL EVERY 4 HOURS PRN
Status: DISCONTINUED | OUTPATIENT
Start: 2023-01-01 | End: 2023-01-01 | Stop reason: HOSPADM

## 2023-01-01 RX ORDER — AMLODIPINE BESYLATE 5 MG/1
5 TABLET ORAL DAILY
Status: DISCONTINUED | OUTPATIENT
Start: 2023-01-01 | End: 2023-01-01 | Stop reason: HOSPADM

## 2023-01-01 RX ADMIN — PANTOPRAZOLE SODIUM 40 MG: 40 TABLET, DELAYED RELEASE ORAL at 15:34

## 2023-01-01 RX ADMIN — CLONIDINE HYDROCHLORIDE 0.1 MG: 0.1 TABLET ORAL at 22:28

## 2023-01-01 RX ADMIN — CEFTRIAXONE SODIUM 1000 MG: 1 INJECTION, POWDER, FOR SOLUTION INTRAMUSCULAR; INTRAVENOUS at 22:22

## 2023-01-01 RX ADMIN — RANOLAZINE 500 MG: 500 TABLET, FILM COATED, EXTENDED RELEASE ORAL at 21:54

## 2023-01-01 RX ADMIN — SODIUM ZIRCONIUM CYCLOSILICATE 10 G: 10 POWDER, FOR SUSPENSION ORAL at 14:10

## 2023-01-01 RX ADMIN — SODIUM CHLORIDE, PRESERVATIVE FREE 10 ML: 5 INJECTION INTRAVENOUS at 08:07

## 2023-01-01 RX ADMIN — LORAZEPAM 0.5 MG: 2 INJECTION INTRAMUSCULAR; INTRAVENOUS at 08:07

## 2023-01-01 RX ADMIN — SODIUM CHLORIDE 25 ML: 9 INJECTION, SOLUTION INTRAVENOUS at 22:19

## 2023-01-01 RX ADMIN — HYDROMORPHONE HYDROCHLORIDE 0.5 MG: 1 INJECTION, SOLUTION INTRAMUSCULAR; INTRAVENOUS; SUBCUTANEOUS at 05:48

## 2023-01-01 RX ADMIN — LORAZEPAM 0.5 MG: 2 INJECTION INTRAMUSCULAR; INTRAVENOUS at 14:21

## 2023-01-01 RX ADMIN — CLONIDINE HYDROCHLORIDE 0.1 MG: 0.1 TABLET ORAL at 20:19

## 2023-01-01 RX ADMIN — ATORVASTATIN CALCIUM 20 MG: 10 TABLET, FILM COATED ORAL at 20:19

## 2023-01-01 RX ADMIN — PYRIDOSTIGMINE BROMIDE 60 MG: 60 TABLET ORAL at 07:02

## 2023-01-01 RX ADMIN — PYRIDOSTIGMINE BROMIDE 60 MG: 60 TABLET ORAL at 06:17

## 2023-01-01 RX ADMIN — PYRIDOSTIGMINE BROMIDE 60 MG: 60 TABLET ORAL at 14:10

## 2023-01-01 RX ADMIN — SODIUM CHLORIDE, PRESERVATIVE FREE 10 ML: 5 INJECTION INTRAVENOUS at 12:26

## 2023-01-01 RX ADMIN — SEVELAMER CARBONATE 800 MG: 800 TABLET, FILM COATED ORAL at 12:45

## 2023-01-01 RX ADMIN — AMLODIPINE BESYLATE 5 MG: 5 TABLET ORAL at 09:42

## 2023-01-01 RX ADMIN — ATORVASTATIN CALCIUM 20 MG: 10 TABLET, FILM COATED ORAL at 21:55

## 2023-01-01 RX ADMIN — HYDROMORPHONE HYDROCHLORIDE 0.5 MG: 1 INJECTION, SOLUTION INTRAMUSCULAR; INTRAVENOUS; SUBCUTANEOUS at 15:26

## 2023-01-01 RX ADMIN — LORAZEPAM 0.5 MG: 2 INJECTION INTRAMUSCULAR; INTRAVENOUS at 17:42

## 2023-01-01 RX ADMIN — RANOLAZINE 500 MG: 500 TABLET, FILM COATED, EXTENDED RELEASE ORAL at 12:26

## 2023-01-01 RX ADMIN — HYDROMORPHONE HYDROCHLORIDE 0.5 MG: 1 INJECTION, SOLUTION INTRAMUSCULAR; INTRAVENOUS; SUBCUTANEOUS at 21:14

## 2023-01-01 RX ADMIN — HYDROMORPHONE HYDROCHLORIDE 0.5 MG: 1 INJECTION, SOLUTION INTRAMUSCULAR; INTRAVENOUS; SUBCUTANEOUS at 14:53

## 2023-01-01 RX ADMIN — LORAZEPAM 0.5 MG: 2 INJECTION INTRAMUSCULAR; INTRAVENOUS at 12:19

## 2023-01-01 RX ADMIN — SEVELAMER CARBONATE 800 MG: 800 TABLET, FILM COATED ORAL at 09:52

## 2023-01-01 RX ADMIN — METOPROLOL TARTRATE 25 MG: 25 TABLET, FILM COATED ORAL at 22:08

## 2023-01-01 RX ADMIN — MIRTAZAPINE 15 MG: 15 TABLET, ORALLY DISINTEGRATING ORAL at 21:55

## 2023-01-01 RX ADMIN — SODIUM CHLORIDE, PRESERVATIVE FREE 10 ML: 5 INJECTION INTRAVENOUS at 21:20

## 2023-01-01 RX ADMIN — TIOTROPIUM BROMIDE AND OLODATEROL 2 PUFF: 3.124; 2.736 SPRAY, METERED RESPIRATORY (INHALATION) at 08:11

## 2023-01-01 RX ADMIN — METOPROLOL TARTRATE 25 MG: 25 TABLET, FILM COATED ORAL at 20:19

## 2023-01-01 RX ADMIN — RANOLAZINE 500 MG: 500 TABLET, FILM COATED, EXTENDED RELEASE ORAL at 20:18

## 2023-01-01 RX ADMIN — PANTOPRAZOLE SODIUM 40 MG: 40 TABLET, DELAYED RELEASE ORAL at 07:02

## 2023-01-01 RX ADMIN — SODIUM CHLORIDE, PRESERVATIVE FREE 10 ML: 5 INJECTION INTRAVENOUS at 21:00

## 2023-01-01 RX ADMIN — PYRIDOSTIGMINE BROMIDE 60 MG: 60 TABLET ORAL at 21:09

## 2023-01-01 RX ADMIN — LORAZEPAM 0.5 MG: 0.5 TABLET ORAL at 21:55

## 2023-01-01 RX ADMIN — APIXABAN 5 MG: 5 TABLET, FILM COATED ORAL at 21:55

## 2023-01-01 RX ADMIN — PYRIDOSTIGMINE BROMIDE 60 MG: 60 TABLET ORAL at 14:58

## 2023-01-01 RX ADMIN — MONTELUKAST 10 MG: 10 TABLET, FILM COATED ORAL at 21:54

## 2023-01-01 RX ADMIN — MIRTAZAPINE 15 MG: 15 TABLET, ORALLY DISINTEGRATING ORAL at 21:14

## 2023-01-01 RX ADMIN — PYRIDOSTIGMINE BROMIDE 60 MG: 60 TABLET ORAL at 15:27

## 2023-01-01 RX ADMIN — AMLODIPINE BESYLATE 5 MG: 5 TABLET ORAL at 09:52

## 2023-01-01 RX ADMIN — HYDROMORPHONE HYDROCHLORIDE 0.5 MG: 1 INJECTION, SOLUTION INTRAMUSCULAR; INTRAVENOUS; SUBCUTANEOUS at 16:29

## 2023-01-01 RX ADMIN — LORAZEPAM 0.5 MG: 0.5 TABLET ORAL at 12:25

## 2023-01-01 RX ADMIN — ASPIRIN 81 MG CHEWABLE TABLET 81 MG: 81 TABLET CHEWABLE at 09:53

## 2023-01-01 RX ADMIN — SUCRALFATE 1 G: 1 TABLET ORAL at 20:18

## 2023-01-01 RX ADMIN — LORAZEPAM 0.5 MG: 0.5 TABLET ORAL at 20:19

## 2023-01-01 RX ADMIN — HYDROMORPHONE HYDROCHLORIDE 0.5 MG: 1 INJECTION, SOLUTION INTRAMUSCULAR; INTRAVENOUS; SUBCUTANEOUS at 08:07

## 2023-01-01 RX ADMIN — TRAMADOL HYDROCHLORIDE 50 MG: 50 TABLET, COATED ORAL at 08:09

## 2023-01-01 RX ADMIN — AZITHROMYCIN MONOHYDRATE 500 MG: 500 INJECTION, POWDER, LYOPHILIZED, FOR SOLUTION INTRAVENOUS at 09:58

## 2023-01-01 RX ADMIN — AMLODIPINE BESYLATE 5 MG: 5 TABLET ORAL at 12:25

## 2023-01-01 RX ADMIN — PREDNISONE 20 MG: 20 TABLET ORAL at 08:20

## 2023-01-01 RX ADMIN — LEVOTHYROXINE SODIUM 100 MCG: 0.1 TABLET ORAL at 05:47

## 2023-01-01 RX ADMIN — HYDROMORPHONE HYDROCHLORIDE 0.5 MG: 1 INJECTION, SOLUTION INTRAMUSCULAR; INTRAVENOUS; SUBCUTANEOUS at 02:46

## 2023-01-01 RX ADMIN — HYDROMORPHONE HYDROCHLORIDE 0.5 MG: 1 INJECTION, SOLUTION INTRAMUSCULAR; INTRAVENOUS; SUBCUTANEOUS at 12:19

## 2023-01-01 RX ADMIN — HYDROMORPHONE HYDROCHLORIDE 0.5 MG: 1 INJECTION, SOLUTION INTRAMUSCULAR; INTRAVENOUS; SUBCUTANEOUS at 21:09

## 2023-01-01 RX ADMIN — APIXABAN 5 MG: 5 TABLET, FILM COATED ORAL at 09:51

## 2023-01-01 RX ADMIN — RANOLAZINE 500 MG: 500 TABLET, FILM COATED, EXTENDED RELEASE ORAL at 09:51

## 2023-01-01 RX ADMIN — PANTOPRAZOLE SODIUM 40 MG: 40 TABLET, DELAYED RELEASE ORAL at 05:14

## 2023-01-01 RX ADMIN — HYDROMORPHONE HYDROCHLORIDE 0.5 MG: 1 INJECTION, SOLUTION INTRAMUSCULAR; INTRAVENOUS; SUBCUTANEOUS at 18:04

## 2023-01-01 RX ADMIN — HYDROMORPHONE HYDROCHLORIDE 0.5 MG: 1 INJECTION, SOLUTION INTRAMUSCULAR; INTRAVENOUS; SUBCUTANEOUS at 00:50

## 2023-01-01 RX ADMIN — ONDANSETRON 4 MG: 2 INJECTION INTRAMUSCULAR; INTRAVENOUS at 12:59

## 2023-01-01 RX ADMIN — QUETIAPINE 50 MG: 50 TABLET, EXTENDED RELEASE ORAL at 21:14

## 2023-01-01 RX ADMIN — PYRIDOSTIGMINE BROMIDE 60 MG: 60 TABLET ORAL at 21:14

## 2023-01-01 RX ADMIN — SODIUM CHLORIDE, PRESERVATIVE FREE 10 ML: 5 INJECTION INTRAVENOUS at 20:20

## 2023-01-01 RX ADMIN — ALLOPURINOL 200 MG: 100 TABLET ORAL at 09:51

## 2023-01-01 RX ADMIN — QUETIAPINE 50 MG: 50 TABLET, EXTENDED RELEASE ORAL at 21:55

## 2023-01-01 RX ADMIN — TIOTROPIUM BROMIDE AND OLODATEROL 2 PUFF: 3.124; 2.736 SPRAY, METERED RESPIRATORY (INHALATION) at 07:35

## 2023-01-01 RX ADMIN — PREDNISONE 20 MG: 20 TABLET ORAL at 09:52

## 2023-01-01 RX ADMIN — HYDROMORPHONE HYDROCHLORIDE 0.5 MG: 1 INJECTION, SOLUTION INTRAMUSCULAR; INTRAVENOUS; SUBCUTANEOUS at 02:27

## 2023-01-01 RX ADMIN — PYRIDOSTIGMINE BROMIDE 60 MG: 60 TABLET ORAL at 20:19

## 2023-01-01 RX ADMIN — MONTELUKAST 10 MG: 10 TABLET, FILM COATED ORAL at 20:19

## 2023-01-01 RX ADMIN — PANTOPRAZOLE SODIUM 40 MG: 40 TABLET, DELAYED RELEASE ORAL at 06:17

## 2023-01-01 RX ADMIN — MIRTAZAPINE 15 MG: 15 TABLET, ORALLY DISINTEGRATING ORAL at 21:09

## 2023-01-01 RX ADMIN — LORAZEPAM 1 MG: 2 INJECTION INTRAMUSCULAR; INTRAVENOUS at 21:11

## 2023-01-01 RX ADMIN — TRAMADOL HYDROCHLORIDE 50 MG: 50 TABLET, COATED ORAL at 20:19

## 2023-01-01 RX ADMIN — METOPROLOL TARTRATE 25 MG: 25 TABLET, FILM COATED ORAL at 12:26

## 2023-01-01 RX ADMIN — LEVOTHYROXINE SODIUM 100 MCG: 0.1 TABLET ORAL at 07:02

## 2023-01-01 RX ADMIN — CLONIDINE HYDROCHLORIDE 0.1 MG: 0.1 TABLET ORAL at 09:51

## 2023-01-01 RX ADMIN — PYRIDOSTIGMINE BROMIDE 30 MG: 60 TABLET ORAL at 05:14

## 2023-01-01 RX ADMIN — SODIUM CHLORIDE, PRESERVATIVE FREE 10 ML: 5 INJECTION INTRAVENOUS at 21:12

## 2023-01-01 RX ADMIN — PYRIDOSTIGMINE BROMIDE 60 MG: 60 TABLET ORAL at 05:47

## 2023-01-01 RX ADMIN — METOPROLOL TARTRATE 25 MG: 25 TABLET, FILM COATED ORAL at 09:51

## 2023-01-01 RX ADMIN — SEVELAMER CARBONATE 800 MG: 800 TABLET, FILM COATED ORAL at 14:11

## 2023-01-01 RX ADMIN — LORAZEPAM 0.5 MG: 2 INJECTION INTRAMUSCULAR; INTRAVENOUS at 02:46

## 2023-01-01 RX ADMIN — SODIUM CHLORIDE, PRESERVATIVE FREE 10 ML: 5 INJECTION INTRAVENOUS at 08:47

## 2023-01-01 RX ADMIN — QUETIAPINE 50 MG: 50 TABLET, EXTENDED RELEASE ORAL at 20:18

## 2023-01-01 RX ADMIN — PREDNISONE 20 MG: 20 TABLET ORAL at 12:26

## 2023-01-01 RX ADMIN — ALLOPURINOL 200 MG: 100 TABLET ORAL at 12:25

## 2023-01-01 RX ADMIN — ONDANSETRON 4 MG: 4 TABLET, ORALLY DISINTEGRATING ORAL at 22:27

## 2023-01-01 RX ADMIN — SEVELAMER CARBONATE 800 MG: 800 TABLET, FILM COATED ORAL at 12:26

## 2023-01-01 RX ADMIN — ONDANSETRON 4 MG: 2 INJECTION INTRAMUSCULAR; INTRAVENOUS at 14:53

## 2023-01-01 RX ADMIN — HYDROMORPHONE HYDROCHLORIDE 0.5 MG: 1 INJECTION, SOLUTION INTRAMUSCULAR; INTRAVENOUS; SUBCUTANEOUS at 08:47

## 2023-01-01 RX ADMIN — LORAZEPAM 0.5 MG: 0.5 TABLET ORAL at 09:52

## 2023-01-01 RX ADMIN — Medication 3 MG: at 20:18

## 2023-01-01 RX ADMIN — SUCRALFATE 1 G: 1 TABLET ORAL at 21:54

## 2023-01-01 RX ADMIN — MIRTAZAPINE 15 MG: 15 TABLET, ORALLY DISINTEGRATING ORAL at 20:19

## 2023-01-01 RX ADMIN — ONDANSETRON 4 MG: 2 INJECTION INTRAMUSCULAR; INTRAVENOUS at 09:26

## 2023-01-01 RX ADMIN — SEVELAMER CARBONATE 800 MG: 800 TABLET, FILM COATED ORAL at 17:28

## 2023-01-01 RX ADMIN — LORAZEPAM 0.5 MG: 2 INJECTION INTRAMUSCULAR; INTRAVENOUS at 16:58

## 2023-01-01 RX ADMIN — LEVOTHYROXINE SODIUM 100 MCG: 0.1 TABLET ORAL at 05:14

## 2023-01-01 RX ADMIN — TRAMADOL HYDROCHLORIDE 50 MG: 50 TABLET, COATED ORAL at 12:04

## 2023-01-01 RX ADMIN — SODIUM CHLORIDE, PRESERVATIVE FREE 10 ML: 5 INJECTION INTRAVENOUS at 09:53

## 2023-01-01 RX ADMIN — TRAMADOL HYDROCHLORIDE 50 MG: 50 TABLET, COATED ORAL at 12:45

## 2023-01-01 RX ADMIN — LEVOTHYROXINE SODIUM 100 MCG: 0.1 TABLET ORAL at 06:17

## 2023-01-01 RX ADMIN — QUETIAPINE 50 MG: 50 TABLET, EXTENDED RELEASE ORAL at 21:09

## 2023-01-01 RX ADMIN — TRAMADOL HYDROCHLORIDE 50 MG: 50 TABLET, COATED ORAL at 14:58

## 2023-01-01 RX ADMIN — ONDANSETRON 4 MG: 2 INJECTION INTRAMUSCULAR; INTRAVENOUS at 18:02

## 2023-01-01 RX ADMIN — PYRIDOSTIGMINE BROMIDE 60 MG: 60 TABLET ORAL at 21:55

## 2023-01-01 RX ADMIN — PANTOPRAZOLE SODIUM 40 MG: 40 TABLET, DELAYED RELEASE ORAL at 17:28

## 2023-01-01 RX ADMIN — SODIUM CHLORIDE, PRESERVATIVE FREE 10 ML: 5 INJECTION INTRAVENOUS at 09:26

## 2023-01-01 RX ADMIN — APIXABAN 5 MG: 5 TABLET, FILM COATED ORAL at 20:18

## 2023-01-01 RX ADMIN — HYDROMORPHONE HYDROCHLORIDE 0.5 MG: 1 INJECTION, SOLUTION INTRAMUSCULAR; INTRAVENOUS; SUBCUTANEOUS at 12:13

## 2023-01-01 RX ADMIN — LORAZEPAM 0.5 MG: 2 INJECTION INTRAMUSCULAR; INTRAVENOUS at 18:47

## 2023-01-01 RX ADMIN — ASPIRIN 81 MG CHEWABLE TABLET 81 MG: 81 TABLET CHEWABLE at 12:25

## 2023-01-01 RX ADMIN — SUCRALFATE 1 G: 1 TABLET ORAL at 09:51

## 2023-01-01 RX ADMIN — APIXABAN 5 MG: 5 TABLET, FILM COATED ORAL at 12:25

## 2023-01-01 RX ADMIN — SODIUM CHLORIDE, PRESERVATIVE FREE 10 ML: 5 INJECTION INTRAVENOUS at 22:07

## 2023-01-01 RX ADMIN — SEVELAMER CARBONATE 800 MG: 800 TABLET, FILM COATED ORAL at 16:54

## 2023-01-01 ASSESSMENT — PAIN SCALES - GENERAL
PAINLEVEL_OUTOF10: 5
PAINLEVEL_OUTOF10: 0
PAINLEVEL_OUTOF10: 8
PAINLEVEL_OUTOF10: 0
PAINLEVEL_OUTOF10: 6
PAINLEVEL_OUTOF10: 2
PAINLEVEL_OUTOF10: 0
PAINLEVEL_OUTOF10: 5
PAINLEVEL_OUTOF10: 6
PAINLEVEL_OUTOF10: 0
PAINLEVEL_OUTOF10: 0
PAINLEVEL_OUTOF10: 8
PAINLEVEL_OUTOF10: 5
PAINLEVEL_OUTOF10: 7
PAINLEVEL_OUTOF10: 8

## 2023-01-01 ASSESSMENT — PAIN DESCRIPTION - LOCATION
LOCATION: LEG
LOCATION: GENERALIZED
LOCATION: NECK;BUTTOCKS
LOCATION: ABDOMEN
LOCATION: ABDOMEN
LOCATION: BACK;LEG
LOCATION: ABDOMEN;CHEST;BACK;LEG
LOCATION: NECK
LOCATION: LEG
LOCATION: NECK;ABDOMEN
LOCATION: OTHER (COMMENT)

## 2023-01-01 ASSESSMENT — ENCOUNTER SYMPTOMS
DIARRHEA: 0
VOMITING: 0
ABDOMINAL PAIN: 1
ABDOMINAL PAIN: 1
DIARRHEA: 0
NAUSEA: 0
NAUSEA: 0
VOMITING: 0

## 2023-01-01 ASSESSMENT — PAIN DESCRIPTION - DESCRIPTORS
DESCRIPTORS: ACHING
DESCRIPTORS: PATIENT UNABLE TO DESCRIBE
DESCRIPTORS: DISCOMFORT
DESCRIPTORS: DISCOMFORT;ACHING
DESCRIPTORS: DISCOMFORT;ACHING
DESCRIPTORS: ACHING
DESCRIPTORS: ACHING;SORE
DESCRIPTORS: DISCOMFORT;ACHING

## 2023-01-01 ASSESSMENT — PAIN - FUNCTIONAL ASSESSMENT
PAIN_FUNCTIONAL_ASSESSMENT: PREVENTS OR INTERFERES SOME ACTIVE ACTIVITIES AND ADLS
PAIN_FUNCTIONAL_ASSESSMENT: ACTIVITIES ARE NOT PREVENTED
PAIN_FUNCTIONAL_ASSESSMENT: PREVENTS OR INTERFERES SOME ACTIVE ACTIVITIES AND ADLS

## 2023-01-01 ASSESSMENT — PAIN DESCRIPTION - ORIENTATION
ORIENTATION: ANTERIOR
ORIENTATION: RIGHT;LEFT
ORIENTATION: RIGHT;LEFT
ORIENTATION: ANTERIOR
ORIENTATION: ANTERIOR

## 2023-01-01 ASSESSMENT — PAIN DESCRIPTION - ONSET: ONSET: ON-GOING

## 2023-01-01 ASSESSMENT — PAIN DESCRIPTION - FREQUENCY
FREQUENCY: CONTINUOUS

## 2023-01-01 ASSESSMENT — PAIN DESCRIPTION - PAIN TYPE
TYPE: CHRONIC PAIN

## 2023-01-01 NOTE — PROGRESS NOTES
Neurology Service Progress Note  [address]   Patient Name: Remi Garcia  : 1950        Subjective:   Reason for consult: Progressive weakness of the lower extremities  Chart was reviewed in detail. Patient was seen and assessed. She is completed to full days of Mestinon. Patient's eyes appear more open and her face is more animated today. Voice strength and quality seems to be improved. Patient feels that she is able to lift her arms higher off of the bed than she has previously. Lower extremity strength appears to be about the same. She appears to be breathing easier today. No family at bedside today during the exam.  Did discuss importance of dialysis as ordered without skipping treatments. Explained that by doing this we may cloud our ability to assess her true recovery and extremity strength. Patient expresses understanding and states that she is planning to undergo dialysis tomorrow. Headache has resolved after Depacon treatment. Patient appears to have less neck discomfort today.       Past Medical History:   Diagnosis Date    Acid reflux     Anemia     Arthritis     \"Shoulders, Hips And Knees\"    CAD (coronary artery disease)     Sees Dr. Adelita Parry    Cerebral artery occlusion with cerebral infarction [facility])     CHF (congestive heart failure) ([facility])     Chronic back pain     Chronic constipation     Chronic kidney disease     Sees Dr. Kelle Pereyra    COPD (chronic obstructive pulmonary disease) ([facility]     Sees Dr. Daniella Park    Depression     Diabetes mellitus [facility]) Dx     Sees Dr. Kartik Sun    Emphysema lung [facility])     Glaucoma     \"Both Eyes\"    Gout     Hemodialysis patient ([facility]     Hiatal hernia     History of blood transfusion     No Reaction To Blood Transfusion Received    [place] (hard of hearing)     Bilateral Ears    Hyperlipidemia     Hypertension     Dr. Mendez Favor    Itching     \"Whole Body Itches The Majority Of The Time\"    Morbid obesity ([facility] Rheumatoid arthritis (HCC)     Shortness of breath on exertion     Sleep apnea     Uses CPAP    Teeth missing     Upper And Lower    Thyroid disease     Thyroidectomy In     Urinary incontinence     WD-Chronic buttock pain 2018    Wears glasses     :   Past Surgical History:   Procedure Laterality Date    APPENDECTOMY  1968    AV FISTULA CREATION Left     BACK SURGERY  2017    Lower Back With Hardware    BACK SURGERY  2016    Cervical Back With Hardware    CARPAL TUNNEL RELEASE Right 1993     SECTION  3-30-68 And 10-17-69    COLONOSCOPY  2017    Polyps Removed    CORONARY ANGIOPLASTY  2016    Heart Stent D Circ    DENTAL SURGERY      Teeth Extracted In Past    DILATION AND CURETTAGE OF UTERUS  Last Done In [address] [place] [address] 281    \"Numerous\"    ENDOSCOPY, COLON, DIAGNOSTIC  2017    HYSTERECTOMY (CERVIX STATUS UNKNOWN)      Partial Abdominal Hysterectomy    IR NONTUNNELED VASCULAR CATHETER  2022    IR NONTUNNELED VASCULAR CATHETER 2022 [facility]    IR TUNNELED CATHETER PLACEMENT GREATER THAN 5 YEARS  2019    IR TUNNELED CATHETER PLACEMENT GREATER THAN 5 YEARS 2019 [facility]    IR TUNNELED CATHETER PLACEMENT GREATER THAN 5 YEARS  3/3/2021    IR TUNNELED CATHETER PLACEMENT GREATER THAN 5 YEARS [facility]    ROTATOR CUFF REPAIR Left 2010    SLEEVE GASTRECTOMY  2018    robotic assisted gastric sleeve, hiatal hernia repair    THYROIDECTOMY       Medications:  Scheduled Meds:   pyridostigmine  60 mg Oral 3 times per day    mirtazapine  15 mg Oral Nightly    sucralfate  1 g Oral 2 times per day    betamethasone dipropionate   Topical Daily    apixaban  5 mg Oral BID    atorvastatin  20 mg Oral Nightly    metoprolol tartrate  25 mg Oral BID    cloNIDine  0.1 mg Oral BID    amLODIPine  5 mg Oral Daily    aspirin  81 mg Oral Daily    levothyroxine  100 mcg Oral Daily    montelukast  10 mg Oral Nightly    pantoprazole  40 mg Oral BID AC    predniSONE  20 mg Oral Daily    QUEtiapine  50 mg Oral Nightly    ranolazine  500 mg Oral BID    sevelamer  800 mg Oral TID WC    tiotropium-olodaterol  2 puff Inhalation Daily    sodium chloride flush  5-40 mL IntraVENous 2 times per day    cefTRIAXone (ROCEPHIN) IV  1,000 mg IntraVENous Q24H    allopurinol  200 mg Oral Daily    levomilnacipran  40 mg Oral Nightly    LORazepam  0.5 mg Oral BID     Continuous Infusions:   sodium chloride       PRN Meds:.melatonin, ipratropium-albuterol, sodium chloride flush, sodium chloride, ondansetron **OR** ondansetron, polyethylene glycol, traMADol    Allergies   Allergen Reactions    Percocet [Oxycodone-Acetaminophen]      hallucinations    Tramadol Itching    Vicodin [Hydrocodone-Acetaminophen] Itching    Narcan [Naloxone Hcl] Anxiety     Feels like out of body, things are speeding     Social History     Socioeconomic History    Marital status: Single     Spouse name: Not on file    Number of children: 2    Years of education: Not on file    Highest education level: Not on file   Occupational History    Not on file   Tobacco Use    Smoking status: Never    Smokeless tobacco: Never   Vaping Use    Vaping Use: Never used   Substance and Sexual Activity    Alcohol use: No    Drug use: No    Sexual activity: Never   Other Topics Concern    Not on file   Social History Narrative    Not on file     Social Determinants of Health     Financial Resource Strain: Not on file   Food Insecurity: Not on file   Transportation Needs: Not on file   Physical Activity: Not on file   Stress: Not on file   Social Connections: Not on file   Intimate Partner Violence: Not on file   Housing Stability: Not on file      Family History   Problem Relation Age of Onset    Kidney Disease Mother         \"Kidney Failure\"    Heart Disease Mother         CHF    Arthritis Mother     Diabetes Mother     Depression Mother     High Blood Pressure Mother     Obesity Mother     Vision Loss Mother Heart Attack Father     Heart Disease Father         Heart Attack    Diabetes Father     High Blood Pressure Father     High Blood Pressure Daughter          ROS (10 systems)  In addition to that documented in the HPI above, the additional ROS was obtained:  Constitutional: Denies fevers or chills  Eyes: Sensation of black spot behind her eye  ENMT: Denies sore throat  CV: Denies chest pain  Resp: Feels short of breath, difficulty taking deep respirations  GI: Denies vomiting or diarrhea  : Denies painful urination  MSK: Denies recent trauma  Skin: Denies new rashes  Neuro:  Worsening weakness to the upper and lower extremities  Endocrine: Denies unexpected weight loss  Heme: Denies bleeding disorders    Physical Exam:      Wt Readings from Last 3 Encounters:   12/31/22 174 lb 8 oz (79.2 kg)   12/30/22 172 lb (78 kg)   12/06/22 180 lb (81.6 kg)     Temp Readings from Last 3 Encounters:   01/01/23 97.8 °F (36.6 °C) (Oral)   12/06/22 97.7 °F (36.5 °C)   08/24/22 97.1 °F (36.2 °C)     BP Readings from Last 3 Encounters:   01/01/23 (!) 155/77   12/06/22 137/84   05/10/22 125/75     Pulse Readings from Last 3 Encounters:   01/01/23 87   12/06/22 97   08/24/22 91        Gen: A&O x 4, NAD, cooperative  HEENT: NC/AT, EOMI, PERRL, mmm, neck supple, no meningeal signs  Heart: NSR/ST  Lungs: Respirations even and unlabored  Ext: no edema, no calf tenderness b/l  Psych: normal mood and affect  Skin: no rashes or lesions    NEUROLOGIC EXAM:    Mental Status: A&O to self, location, month and year, NAD, speech clear with increased strength of voice, language fluent, repetition and naming intact, follows commands appropriately    Cranial Nerve Exam:   CN II-XII:  PERRL, VFF, no nystagmus, no gaze paresis, sensation V1-V3 intact b/l, muscles of facial expression symmetric; hearing intact to conversational tone, palate elevates symmetrically, shoulder elevation symmetric and tongue protrudes midline with movement side to side.    Motor Exam:       Strength unable to lift upper or lower extremities to antigravity, weak attempt to lift extremities off of bed  Tone and bulk normal   Bilateral extremity pronator drift    Deep Tendon Reflexes: Trace/4 biceps, triceps, brachioradialis, patellar, and achilles b/l; flexor plantar responses b/l    Sensation: Intact light touch/pinprick/vibration UE's/LE's b/l    Coordination/Cerebellum:       Tremors--none      Rapidly alternating movements:  dysdiadochokinesia b/l                Heel-to-Shin:  dysmetria b/l      Finger-to-Nose:  dysmetria b/l    Gait and stance:      Gait: deferred      LABS:     Recent Labs     12/30/22  0813 12/31/22  0452 01/01/23  0520   WBC 7.3 7.9 8.0    136 135   K 4.5 5.0 5.3*    98* 99   CO2 23 27 25   BUN 67* 68* 78*   CREATININE 4.0* 4.5* 5.9*   GLUCOSE 88 79 79         IMAGING:    CT head w/o contrast:  Impression   Stable appearance of the brain without acute intracranial process identified. CT cervical spine:  Impression   No acute fracture or traumatic malalignment of the cervical spine       Posterior fusion extends from C3-T2 with laminectomies at C4, C5, C6 and C7. The left pedicle at T2 extends into the left transverse process but does not   extend into the vertebral body. Tip of the left T2 pedicle screw is directly   adjacent to the posterior aortic arch. MRI cervical spine w/o contrast:  Impression   1. Posterior instrumented fusion of C3-T3 with associated laminectomy changes   as described above. 2. Mild/moderate multilevel cervical spondylosis. No high-grade spinal canal   stenosis. 3. Marrow signal appears T1 hypointense. Findings may related to anemia   versus an alternative marrow infiltrative process. All imaging was personally reviewed    ASSESSMENT/PLAN:   77-year-old female presenting with worsening extremity weakness. Patient is alert and oriented x4. Speech is clear with weak, hypophonic voice.   Language is fluent. Face is symmetric with masklike facies. No ptosis noted to eyes on exam, EOMs intact, pupils equal, round and reactive. Can lift extremities ever so slightly upward but cannot hold to antigravity. Reflexes significantly diminished in the upper and lower extremities with sensation intact. Patient does feel that she is breathing easier today. No improvement in extremity weakness or voice quality. Patient continues to have neck pain today, recommended as needed Ultram that was ordered by IM. No complaints of headache today. Progressive extremity weakness secondary to myasthenia gravis v cervical stenosis. Neuroimaging as above, nonacute  MRI cervical spine  -nonacute   myasthenia gravis panel ordered -pending final results  Will increase Mestinon 60 mg p.o. every 8 hours given positive response to initial dose  Continue Eliquis, for DVT  Continue aspirin, Lipitor for secondary stroke prevention  Creatinine 4.0, ESRD on dialysis, management per nephrology  PT/OT as recommended, feel patient would benefit from passive range of motion exercises even if unable to participate in formal PT  Given today's increase in Mestinon dose, would like to continue to monitor the patient until tomorrow. If she remains stable with continue improvement in symptoms feel patient can be discharged back to her facility at that time  Follow-up: With our office after discharge      > 35 minutes of time spent included chart review, obtaining history, patient examination, developing plan of care, and documentation. Patient was discussed with attending neurologist Dr. Gricel House. Thank you for allowing us to participate in the care of your patient. If there are any questions regarding evaluation please feel free to contact us.      FIDEL Griffin - MENG, 1/1/2023

## 2023-01-01 NOTE — PROGRESS NOTES
Nephrology Progress Note  1/1/2023 11:23 AM  Subjective: Interval History: Alethea Cross is a 81 y.o. female appears doing somewhat better today I discussed with patient and son yesterday and discussed with neurology today currently may be responding to treatment for myasthenia gravis    Data:   Scheduled Meds:   pyridostigmine  30 mg Oral 3 times per day    mirtazapine  15 mg Oral Nightly    sucralfate  1 g Oral 2 times per day    betamethasone dipropionate   Topical Daily    apixaban  5 mg Oral BID    atorvastatin  20 mg Oral Nightly    metoprolol tartrate  25 mg Oral BID    midodrine  5 mg Oral TID WC    cloNIDine  0.1 mg Oral BID    amLODIPine  5 mg Oral Daily    aspirin  81 mg Oral Daily    levothyroxine  100 mcg Oral Daily    montelukast  10 mg Oral Nightly    pantoprazole  40 mg Oral BID AC    predniSONE  20 mg Oral Daily    QUEtiapine  50 mg Oral Nightly    ranolazine  500 mg Oral BID    sevelamer  800 mg Oral TID WC    tiotropium-olodaterol  2 puff Inhalation Daily    sodium chloride flush  5-40 mL IntraVENous 2 times per day    cefTRIAXone (ROCEPHIN) IV  1,000 mg IntraVENous Q24H    allopurinol  200 mg Oral Daily    levomilnacipran  40 mg Oral Nightly    LORazepam  0.5 mg Oral BID     Continuous Infusions:   sodium chloride           CBC   Recent Labs     12/30/22  0813 12/31/22  0452 01/01/23  0520   WBC 7.3 7.9 8.0   HGB 12.2* 11.7* 11.2*   HCT 37.0 36.6* 34.4*    226 245      BMP   Recent Labs     12/30/22  0813 12/31/22  0452 01/01/23  0520    136 135   K 4.5 5.0 5.3*    98* 99   CO2 23 27 25   BUN 67* 68* 78*   CREATININE 4.0* 4.5* 5.9*     Hepatic:   No results for input(s): AST, ALT, ALB, BILITOT, ALKPHOS in the last 72 hours. Troponin: No results for input(s): TROPONINI in the last 72 hours. BNP: No results for input(s): BNP in the last 72 hours. Lipids: No results for input(s): CHOL, HDL in the last 72 hours.     Invalid input(s): LDLCALCU  ABGs:   Lab Results Component Value Date/Time    PO2ART 58 09/21/2017 12:00 AM    OPJ2RFX 44.0 09/21/2017 12:00 AM     INR: No results for input(s): INR in the last 72 hours.   Renal Labs  Albumin:    Lab Results   Component Value Date/Time    LABALBU 4.2 12/27/2022 05:00 PM     Calcium:    Lab Results   Component Value Date/Time    CALCIUM 9.1 01/01/2023 05:20 AM     Phosphorus:    Lab Results   Component Value Date/Time    PHOS 7.6 02/17/2021 06:11 AM     U/A:    Lab Results   Component Value Date/Time    NITRU NEGATIVE 12/27/2022 05:45 PM    NITRU Negative 07/10/2019 11:09 AM    COLORU YELLOW 12/27/2022 05:45 PM    PHUR 6.0 07/10/2019 11:09 AM    WBCUA 265 12/27/2022 05:45 PM    RBCUA 48 12/27/2022 05:45 PM    MUCUS RARE 12/27/2022 05:45 PM    TRICHOMONAS NONE SEEN 12/27/2022 05:45 PM    BACTERIA FEW 12/27/2022 05:45 PM    CLARITYU CLOUDY 12/27/2022 05:45 PM    SPECGRAV 1.020 12/27/2022 05:45 PM    UROBILINOGEN 0.2 12/27/2022 05:45 PM    BILIRUBINUR NEGATIVE 12/27/2022 05:45 PM    BLOODU SMALL NUMBER OR AMOUNT OBSERVED 12/27/2022 05:45 PM    GLUCOSEU Negative 07/10/2019 11:09 AM    KETUA NEGATIVE 12/27/2022 05:45 PM     ABG:    Lab Results   Component Value Date/Time    RKS1AXS 44.0 09/21/2017 12:00 AM    PO2ART 58 09/21/2017 12:00 AM    BJM0TRF 28.5 09/21/2017 12:00 AM     HgBA1c:    Lab Results   Component Value Date/Time    LABA1C 5.5 09/02/2021 10:30 AM     Microalbumen/Creatinine ratio:  No components found for: RUCREAT  TSH:    Lab Results   Component Value Date/Time    TSH 2.19 10/06/2017 12:15 PM     IRON:    Lab Results   Component Value Date/Time    IRON 53 12/01/2017 09:15 AM     Iron Saturation:  No components found for: PERCENTFE  TIBC:    Lab Results   Component Value Date/Time    TIBC 367 12/01/2017 09:15 AM     FERRITIN:    Lab Results   Component Value Date/Time    FERRITIN 17 12/01/2017 09:15 AM     RPR:  No results found for: RPR  MADDY:    Lab Results   Component Value Date/Time    MADDY None Detected 06/27/2022 11:00 AM     24 Hour Urine for Creatinine Clearance:  No components found for: CREAT4, UHRS10, UTV10      Objective:   I/O: No intake/output data recorded. No intake/output data recorded. I/O this shift:  In: 30 [P.O.:20; I.V.:10]  Out: -   Vitals: BP (!) 155/77   Pulse 87   Temp 97.8 °F (36.6 °C) (Oral)   Resp 24   Ht 5' 4\" (1.626 m)   Wt 174 lb 8 oz (79.2 kg)   SpO2 97%   BMI 29.95 kg/m²  {  General appearance: awake weak  HEENT: Head: Normal, normocephalic, atraumatic.   Neck: supple, symmetrical, trachea midline  Lungs: diminished breath sounds bilaterally  Heart: S1, S2 normal  Abdomen: abnormal findings:  soft nt  Extremities: edema trace  Neurologic: Mental status: alertness: Awake verbal monitor active        Assessment and Plan:      IMP:   #1 end-stage renal disease on dialysis Monday Friday   #2 history of CVA with possible myasthenia gravis   #3  Hypertension   #4 UTI  #5 positive DVT       Plan     #1 patient although did not do dialysis yesterday she is agreeable to going for regular dialysis Monday and will stay on that schedule Monday Friday either inpatient or outpatient discharge today  #2 discussed with neurology we will keep on my salon MRI is negative for any cervical stenosis they can do further work-up for myasthenia as an outpatient but supportive care with current treatment for now  #3 blood pressure overall stable for her no change somewhat anxious as well  #4 treated UTI  #5 maintain anticoagulation  #6 affect improved daily able to swallow take p.o. diet monitor potassium for now with supportive care    From renal standpoint okay to be discharged later today if neurology clears patient back to [facility] MD, MD

## 2023-01-01 NOTE — PROGRESS NOTES
V2.0  [facility] Hospitalist Progress Note      Name:  Iris Lyon /Age/Sex: 1950  (81 y.o. female)   MRN & CSN:  [id number] & [id number] Encounter Date/Time: 2023 7:37 AM EST    Location:  [address] [place], [place] [address] PCP: Lamonte Colin MD       Hospital Day: 6    Assessment and Plan:   Iris Lyon is a 81 y.o. female with pmh of ESRD on HD, CAD, and hypothyroidism who presents with History of progressive weakness      Plan:  #Acute on chronic progressive weakness  #Dysphagia - improving  #Concern for MG  --Pt w/ progressive weakness in all 4 extremities with difficulty swallowing worsened over past 2 weeks. Requiring assistance with ADLs. --Initial CT head was negative, repeat head CT negative. --Neuro on board, recommended MRI of C-spine, results noted:posterior instrumented fusion of c3-T3 w/ associated laminectomy changes. Mild/moderate multilevel cervical spondylosis, no high grade canal stenosis. Marrow signal appears T1 hypointensive which could be r/t anemia vs an alt marrow infitrative process. --Will follow up neuro recs for today  --MG panel was sent and is currently pending  Patient started on Mestinon, continue to monitor   --Dysphagia improving, SLP re-evaluated and recommended soft and bite size diet/thin liquids with aspiration precautions, crush pills and give in puree    #Acute hypoxic respiratory failure 2/2 pneumonia, possibly aspiration  --No leukocytosis. On 1L of O2 via NC, wean off as able  --CT A/P showed bilateral lower lob consolidations, Respiratory disease panel negative, started on Vanc/Cefepime in the ED, switched to rocephin and azithromycin as patient is not showing signs of SIRS/sepsis.   --BCx NGTD, she completed abx      #UTI  --Urine culture with > 100,000 CFU Klebsiella, 25,000 CFU Enterococcus faecalis  --had some mild dysuria, afebrile without leukocytosis   --Completed abx, no urinary symptoms at this time, continue to monitor    #ESRD on HD MF  --Plan for dialysis on Monday as patient refused dialysis yesterday  --Appreciate nephrology recommendations     #Acute right brachial, cephalic, basilic vein DVT  --Started on heparin gtt, switched to eliquis   --Cardiology on board, appreciate recs, recommended to continue Eliquis     #Sinus tachycardia - resolved  --Continue metoprolol, monitor and adjust as appropriate. HR wnl at this time on current therapy     #Elevated troponin   --No CP, initial Tn mildly elevated ECG without acute ischemic changes, flat trend  --Continue ASA, statin, ranexa  --cardiology following      #Gout  --Continue allopurinol     #RA  --Continue home chronic prednisone     #Hypothyroidism  --Continue synthroid     #Abnormal CT  --CT A/P showed nonspecific edema/skin thickening within the left lateral breast, f/u with mammogram may be useful    Diet ADULT DIET; Regular   DVT Prophylaxis [] Lovenox, []  Heparin, [] SCDs, [] Ambulation,  [x] Eliquis, [] Xarelto  [] Coumadin   Code Status Limited   Disposition From: [facility] ([place], [place])  Expected Disposition: Same as above  Estimated Date of Discharge: 1-2 days  Patient requires continued admission due to Neuro eval and work up for [facility]   Surrogate Decision Maker/ POA      Subjective:     Chief Complaint: Altered Mental Status (Sent from  [address] for altered mental status. ) and Other (Patient says had dialysis Saturday [facility] ([place], [place]) claims has missed last two treatments. )       Patient seen and examined at bedside. She is endorsing improvement in SOB and fatigue. She is however endorsing some abdominal pain that's generalized and asking for something for pain, when asked to describe, she reports that its all over and that she feels like she messed herself up. Review of Systems:    Review of Systems   Constitutional:  Positive for fatigue. HENT: Negative. Respiratory:          SOB improving   Cardiovascular: Negative. Gastrointestinal:  Positive for abdominal pain. Genitourinary: Negative. Musculoskeletal: Negative. Skin: Negative. Neurological: Negative. Psychiatric/Behavioral: Negative. Objective:   No intake or output data in the 24 hours ending 01/01/23 0737     Vitals:   Vitals:    12/31/22 2046   BP: (!) 155/76   Pulse: 90   Resp: 18   Temp: 97.7 °F (36.5 °C)   SpO2: 100%       Physical Exam:   General: NAD on O2 via NC  Eyes: EOMI  ENT: Moist mucous membranes  Cardiovascular: Regular rate. Respiratory: Clear to auscultation  Gastrointestinal: Soft, non tender to palpation, normal BS  Genitourinary: no suprapubic tenderness  Musculoskeletal: No edema  Skin: warm, dry  Neuro: Alert. Psych: Mood appropriate.      Medications:   Medications:    pyridostigmine  30 mg Oral 3 times per day    mirtazapine  15 mg Oral Nightly    sucralfate  1 g Oral 2 times per day    betamethasone dipropionate   Topical Daily    apixaban  5 mg Oral BID    atorvastatin  20 mg Oral Nightly    metoprolol tartrate  25 mg Oral BID    midodrine  5 mg Oral TID WC    cloNIDine  0.1 mg Oral BID    amLODIPine  5 mg Oral Daily    aspirin  81 mg Oral Daily    levothyroxine  100 mcg Oral Daily    montelukast  10 mg Oral Nightly    pantoprazole  40 mg Oral BID AC    predniSONE  20 mg Oral Daily    QUEtiapine  50 mg Oral Nightly    ranolazine  500 mg Oral BID    sevelamer  800 mg Oral TID WC    tiotropium-olodaterol  2 puff Inhalation Daily    sodium chloride flush  5-40 mL IntraVENous 2 times per day    cefTRIAXone (ROCEPHIN) IV  1,000 mg IntraVENous Q24H    azithromycin  500 mg IntraVENous Q24H    allopurinol  200 mg Oral Daily    levomilnacipran  40 mg Oral Nightly    LORazepam  0.5 mg Oral BID      Infusions:    sodium chloride       PRN Meds: melatonin, 3 mg, Nightly PRN  ipratropium-albuterol, 1 vial, Q6H PRN  sodium chloride flush, 5-40 mL, PRN  sodium chloride, , PRN  ondansetron, 4 mg, Q8H PRN   Or  ondansetron, 4 mg, Q6H PRN  polyethylene glycol, 17 g, Daily PRN  traMADol, 50 mg, Q6H PRN        Labs Recent Results (from the past 24 hour(s))   CBC with Auto Differential    Collection Time: 01/01/23  5:20 AM   Result Value Ref Range    WBC 8.0 4.0 - 10.5 K/CU MM    RBC 3.86 (L) 4.2 - 5.4 M/CU MM    Hemoglobin 11.2 (L) 12.5 - 16.0 GM/DL    Hematocrit 34.4 (L) 37 - 47 %    MCV 89.1 78 - 100 FL    MCH 29.0 27 - 31 PG    MCHC 32.6 32.0 - 36.0 %    RDW 16.0 (H) 11.7 - 14.9 %    Platelets 740 695 - 691 K/CU MM    MPV 12.9 (H) 7.5 - 11.1 FL    Differential Type AUTOMATED DIFFERENTIAL     Segs Relative 77.7 (H) 36 - 66 %    Lymphocytes % 13.7 (L) 24 - 44 %    Monocytes % 7.7 (H) 0 - 4 %    Eosinophils % 0.4 0 - 3 %    Basophils % 0.4 0 - 1 %    Segs Absolute 6.2 K/CU MM    Lymphocytes Absolute 1.1 K/CU MM    Monocytes Absolute 0.6 K/CU MM    Eosinophils Absolute 0.0 K/CU MM    Basophils Absolute 0.0 K/CU MM    Nucleated RBC % 0.0 %    Total Nucleated RBC 0.0 K/CU MM    Total Immature Neutrophil 0.01 K/CU MM    Immature Neutrophil % 0.1 0 - 0.43 %   Basic Metabolic Panel    Collection Time: 01/01/23  5:20 AM   Result Value Ref Range    Sodium 135 135 - 145 MMOL/L    Potassium 5.3 (H) 3.5 - 5.1 MMOL/L    Chloride 99 99 - 110 mMol/L    CO2 25 21 - 32 MMOL/L    Anion Gap 11 4 - 16    BUN 78 (H) 6 - 23 MG/DL    Creatinine 5.9 (H) 0.6 - 1.1 MG/DL    Est, Glom Filt Rate 7 (L) >60 mL/min/1.73m2    Glucose 79 70 - 99 MG/DL    Calcium 9.1 8.3 - 10.6 MG/DL        Imaging/Diagnostics Last 24 Hours   MRI CERVICAL SPINE WO CONTRAST    Result Date: 12/31/2022  EXAMINATION: MRI OF THE CERVICAL SPINE WITHOUT CONTRAST 12/31/2022 9:17 am TECHNIQUE: Multiplanar multisequence MRI of the cervical spine was performed without the administration of intravenous contrast. COMPARISON: 12/28/2020. HISTORY: ORDERING SYSTEM PROVIDED HISTORY: profound extremity weakness. TECHNOLOGIST PROVIDED HISTORY: Reason for exam:->profound extremity weakness.  What is the sedation requirement?->None Reason for Exam: neck pain - hx multiple sx's - metal suppression seq ran  - pt moaning through-out exam FINDINGS: BONES/ALIGNMENT: Posterior instrumented fusion of C3-T3 with bilateral lateral mass screws present in C3-C6 and bilateral transpedicular screws present in T2-T3. Associated laminectomy changes present of C4-C7. Straightening of the cervical spine. No significant spondylolisthesis. Vertebral body heights appear preserved. Marrow signal appears T1 hypointense. No evidence of acute fracture or aggressive appearing osseous lesions. SPINAL CORD: No abnormal cord signal is seen. SOFT TISSUES: No paraspinal mass identified. Status post left thyroidectomy. C2-C3: Mild congenital spinal canal stenosis. No significant foraminal narrowing. C3-C4: Decompressed spinal canal.  Facet hypertrophy. No significant spinal canal stenosis or foraminal narrowing. C4-C5: Decompressed spinal canal.  Osteophytosis. Facet hypertrophy. No significant spinal canal stenosis. Mild bilateral foraminal narrowing. C5-C6: Decompressed spinal canal.  Osteophytosis. Facet hypertrophy. No significant spinal canal stenosis or foraminal narrowing. C6-C7: Decompressed spinal canal.  Osteophytosis. Facet hypertrophy. No significant spinal canal stenosis. Mild bilateral foraminal narrowing. C7-T1: Decompressed spinal canal.  Osteophytosis. Facet hypertrophy. No significant spinal canal stenosis. Mild/moderate bilateral foraminal narrowing. 1. Posterior instrumented fusion of C3-T3 with associated laminectomy changes as described above. 2. Mild/moderate multilevel cervical spondylosis. No high-grade spinal canal stenosis. 3. Marrow signal appears T1 hypointense. Findings may related to anemia versus an alternative marrow infiltrative process.        Electronically signed by Arvind Tyler MD on 1/1/2023 at 7:37 AM

## 2023-01-01 NOTE — PROGRESS NOTES
Daily Progress Note  Subjective:  Awake and alert;   Breathing improved, no CP  BP and HR stable; NSR on tele    Attending Note:      Impression and Plan:   DVT    DVT noted in US of Right arm     Eliquis 5mg BID     Weakness    Increase in weakness over the last couple of weeks at [facility]    Difficulty swallowing now    Neuro ordered MRI; Resulted noted    Speech has been following     Multifocal PNA    Noted on CT chest    ABX per primary    Negative viral respiratory panel     Treatment per primary     ESRD    Renal following; HD 12/30    Refused today     Hx of CAD    PCI back in 2015 at [address] [place] [address] to OM    Continue on Aspirin lipitor and lopressor    Currently on Ranexa    Troponin elevated; multifactorial; secondary to renal function and infection     Most Recent Echo  Echo 12/30/22   Left ventricular systolic function is normal.   Ejection fraction is visually estimated at 60%. Mild left ventricular hypertrophy. Grade I diastolic dysfunction. Central line visualized terminating in the right atrium. Sclerotic, but non-stenotic aortic valve. Mild tricuspid regurgitation is present. RVSP is 40 mmHg. No evidence of any pericardial effusion. Most Recent Stress test  Stress 2/16/21   Summary    No EKG changes suggestive of ischemia with Lexiscan infusion. Normal perfusion uptake noted    no reversible ischemia noted    gating shows EF 60%      Radiology  US of right arm 12/22    Impression:       1. Acute DVT involving the brachial vein near the antecubital fossa. 2. Acute SVT involving the cephalic and basilic veins. 3. Clotted fistula. 4. Indeterminate 17 x 1.5 x 2.2 cm area of fluid in the right upper extremity. Findings were discussed with Toni Young at 10:17 p.m. on 12/28/2022       Ct abdomen 12/22          Impression:         1. Bilateral lower lobe consolidations concerning for multifocal pneumonia. Radiographic follow-up recommended to document resolution following treatment. 2. Probable constipation. Colonic diverticulosis without diverticulitis. 3.  Mild nonspecific edema within the perirectal fat without appreciable wall   thickening. Correlation with digital rectal exam may be helpful. 4.  Nonspecific edema/skin thickening within the left lateral breast and   chest wall which may be iatrogenic or related to recent trauma. Follow-up   mammography may be useful as clinically warranted. 5.  Additional nonemergent findings, as above. PAST MEDICAL HISTORY:  Acid reflux, anemia, arthritis, CAD, CVA, CHF,  chronic back pain, chronic constipation, chronic kidney disease,  end-stage renal disease, COPD, diabetes, emphysema, hypertension,  hyperlipidemia, rheumatoid arthritis, sleep apnea, thyroid disease. PAST SURGICAL HISTORY:  Appendectomy, AV fistula creation, back surgery,  carpal tunnel release, , coronary angioplasty, dental surgery,  hysterectomy, rotator cuff repair, gastric sleeve, thyroidectomy. SOCIAL HISTORY:  She does not smoke, denies any illicit drugs, does not  use any alcohol. ALLERGIES:  She has allergies to PERCOCET, TRAMADOL, VICODIN, and  NARCAN.     Objective:   BP (!) 155/77   Pulse 87   Temp 97.8 °F (36.6 °C) (Oral)   Resp 24   Ht 5' 4\" (1.626 m)   Wt 174 lb 8 oz (79.2 kg)   SpO2 97%   BMI 29.95 kg/m²     Intake/Output Summary (Last 24 hours) at 2023 1037  Last data filed at 2023 6723  Gross per 24 hour   Intake 30 ml   Output --   Net 30 ml       Medications:   Scheduled Meds:   pyridostigmine  30 mg Oral 3 times per day    mirtazapine  15 mg Oral Nightly    sucralfate  1 g Oral 2 times per day    betamethasone dipropionate   Topical Daily    apixaban  5 mg Oral BID    atorvastatin  20 mg Oral Nightly    metoprolol tartrate  25 mg Oral BID    midodrine  5 mg Oral TID WC    cloNIDine  0.1 mg Oral BID    amLODIPine  5 mg Oral Daily    aspirin  81 mg Oral Daily    levothyroxine  100 mcg Oral Daily montelukast  10 mg Oral Nightly    pantoprazole  40 mg Oral BID AC    predniSONE  20 mg Oral Daily    QUEtiapine  50 mg Oral Nightly    ranolazine  500 mg Oral BID    sevelamer  800 mg Oral TID WC    tiotropium-olodaterol  2 puff Inhalation Daily    sodium chloride flush  5-40 mL IntraVENous 2 times per day    cefTRIAXone (ROCEPHIN) IV  1,000 mg IntraVENous Q24H    azithromycin  500 mg IntraVENous Q24H    allopurinol  200 mg Oral Daily    levomilnacipran  40 mg Oral Nightly    LORazepam  0.5 mg Oral BID      Infusions:   sodium chloride        PRN Meds:  melatonin, ipratropium-albuterol, sodium chloride flush, sodium chloride, ondansetron **OR** ondansetron, polyethylene glycol, traMADol     Physical Exam:  Vitals:    01/01/23 0924   BP: (!) 155/77   Pulse: 87   Resp: 24   Temp: 97.8 °F (36.6 °C)   SpO2: 97%        General: AAO, NAD  Chest: Nontender  Cardiac: First and Second Heart Sounds are Normal, No Murmurs or Gallops noted  Lungs:Clear to auscultation and percussion. Abdomen: Soft, NT, ND, +BS  Extremities: No clubbing, no edema  Vascular:  Equal 2+ peripheral pulses. Lab Data:  CBC:   Recent Labs     12/30/22  0813 12/31/22 0452 01/01/23  0520   WBC 7.3 7.9 8.0   HGB 12.2* 11.7* 11.2*   HCT 37.0 36.6* 34.4*   MCV 87.5 89.5 89.1    226 245     BMP:   Recent Labs     12/30/22  0813 12/31/22 0452 01/01/23  0520    136 135   K 4.5 5.0 5.3*    98* 99   CO2 23 27 25   BUN 67* 68* 78*   CREATININE 4.0* 4.5* 5.9*     LIVER PROFILE: No results for input(s): AST, ALT, LIPASE, BILIDIR, BILITOT, ALKPHOS in the last 72 hours. Invalid input(s): AMYLASE,  ALB  PT/INR: No results for input(s): PROTIME, INR in the last 72 hours. APTT:   Recent Labs     12/30/22  0813 12/30/22  1711 12/31/22  0450   APTT 33.0 36.0 35.7     BNP:  No results for input(s): BNP in the last 72 hours.       Assessment:  Patient Active Problem List    Diagnosis Date Noted    Acute deep vein thrombosis (DVT) of brachial vein of right upper extremity ([facility] 12/29/2022    History of progressive weakness 12/27/2022    Strain of muscle, fascia and tendon of long head of biceps, left arm, initial encounter 09/23/2021    Lymphedema of left upper extremity 08/21/2021    Aphagia 02/15/2021    Cerebrovascular accident (CVA) due to occlusion of precerebral artery ([facility] 10/08/2020    Fistula 05/11/2020    ESRD (end stage renal disease) ([facility] 07/17/2019    ESRD needing dialysis ([facility] 07/17/2019    Type 2 diabetes mellitus with hyperglycemia, with long-term current use of insulin ([facility] 07/17/2019    Class 3 severe obesity due to excess calories with body mass index (BMI) of 40.0 to 44.9 in adult ([address] 07/17/2019    Essential hypertension 06/05/2019    Chronic kidney disease, stage IV (severe) ([facility] 01/18/2019    WD-Chronic buttock pain 05/09/2018    Status post bariatric surgery 04/25/2018    Status post laparoscopic sleeve gastrectomy 04/20/2018    Hiatal hernia     YRIS (obstructive sleep apnea) 02/02/2018    Acute renal failure (ARF) ([address] 10/13/2017    Acute encephalopathy 10/13/2017    Acute hypercapnic respiratory failure ([facility] 84/81/4628    Acute metabolic encephalopathy 27/87/6456    Chronic diastolic heart failure ([facility] 09/19/2017    Solitary pulmonary nodule     Morbid obesity with BMI of 45.0-49.9, adult ([address] 05/17/2017    Diabetes 1.5, managed as type 2 ([address] 05/17/2017    Hyperglycemia 05/17/2017    Chronic fatigue 05/17/2017    Acute on chronic diastolic heart failure (HCC) 02/04/2017    Fluid overload 02/03/2017    Chronic venous hypertension (idiopathic) with inflammation of bilateral lower extremity 08/05/2016    Hypercalcemia 08/04/2016    Hypertensive kidney disease with CKD stage III ([address] 05/31/2016    Type 2 diabetes mellitus without complication ([address] 56/63/5969    Acute on chronic renal failure ([facility] 02/18/2015    CAD (coronary artery disease) 02/17/2015    Nausea & vomiting 02/17/2015    Hypothyroidism, adult 02/17/2015 Electronically signed by FIDEL Dowd CNP on 1/1/2023 at 10:37 AM

## 2023-01-02 NOTE — PROGRESS NOTES
Removed 960 ml. Tx ended early d/t pt request.      Patient Name: Sultana Green  Patient : 1950  MRN: [id number]     Acct: [de-identified]  Date of Admission: 2022  Room/Bed: 4112/4112-A  Code Status:  Limited  Allergies:    Allergies   Allergen Reactions    Percocet [Oxycodone-Acetaminophen]      hallucinations    Tramadol Itching    Vicodin [Hydrocodone-Acetaminophen] Itching    Narcan [Naloxone Hcl] Anxiety     Feels like out of body, things are speeding     Diagnosis:    Patient Active Problem List   Diagnosis    CAD (coronary artery disease)    Nausea & vomiting    Hypothyroidism, adult    Acute on chronic renal failure (HCC)    Hypertensive kidney disease with CKD stage III (HCC)    Type 2 diabetes mellitus without complication (HCC)    Hypercalcemia    Chronic venous hypertension (idiopathic) with inflammation of bilateral lower extremity    Fluid overload    Acute on chronic diastolic heart failure ([facility]    Morbid obesity with BMI of 45.0-49.9, adult (HCC)    Diabetes 1.5, managed as type 2 (HCC)    Hyperglycemia    Chronic fatigue    Solitary pulmonary nodule    Acute hypercapnic respiratory failure (HCC)    Acute metabolic encephalopathy    Chronic diastolic heart failure (HCC)    Acute renal failure (ARF) (HCC)    Acute encephalopathy    YRIS (obstructive sleep apnea)    Hiatal hernia    Status post laparoscopic sleeve gastrectomy    Status post bariatric surgery    WD-Chronic buttock pain    Chronic kidney disease, stage IV (severe) ([facility])    Essential hypertension    ESRD (end stage renal disease) ([facility] [address]    ESRD needing dialysis ([facility] [address]    Type 2 diabetes mellitus with hyperglycemia, with long-term current use of insulin (HCC)    Class 3 severe obesity due to excess calories with body mass index (BMI) of 40.0 to 44.9 in adult [facility])    Fistula    Cerebrovascular accident (CVA) due to occlusion of precerebral artery ([facility] [address]    Aphagia    Lymphedema of left upper extremity    Strain of muscle, fascia and tendon of long head of biceps, left arm, initial encounter    History of progressive weakness    Acute deep vein thrombosis (DVT) of brachial vein of right upper extremity (HCC)         Treatment:  Hemodilaysis 2:1  Priority: Routine  Location: Acute Room    Diabetic: No  NPO: No  Isolation Precautions: Dialysis     Consent for Treatment Verified: Yes  Blood Consent Verified: Not Applicable     Safety Verified: Identify (I), Consent (C), Equipment (E), HepB Status (B), Orders Complete (O), Access Verified (A), and Timeliness (T)  Time out performed prior to access at 0845 hours. Report Received from Primary RN at 0730 hours. Primary RN (First Initial, Last Name, Title): MILVIA Mc RN  Incapacitated Nurse Education Completed: Not Applicable     HBsAg ONLY:  Date Drawn: December 30, 2022       Results: Negative  HBsAb:  Date Drawn:  December 30, 2022       Results: Immune >10    Order  Dialysis Bath  K+ (Potassium): 3  Ca+ (Calcium): 2.5  Na+ (Sodium): 138  HCO3 (Bicarb): 35  Bicarbonate Concentrate Lot No.: [id number]  Acid Concentrate Lot No.: [id number]     Na+ Modeling: Not Applicable  Dialyzer: [id number]  Dialysate Temperature (C):  35  Blood Flow Rate (BFR):  300   Dialysate Flow Rate (DFR):   600        Access to be Utilized   Access: AVF  Location: Upper Extremity  Side: Right   Needle gauge:  15  + Bruit/Thrill: Yes    First Use X-ray Verified: Not Applicable  OK to use line order: Not Applicable    Site Assessment:  Signs and Symptoms of Infection/Inflammation: None  If yes: Not Applicable  Dressing: Dry and Intact  Site Prep: Medical Aseptic Technique  Dressing Changed this Treatment: Yes  Comment:    Flows: Good, Patent  If access problem, who was notified:     Pre and Post-Assessment  Patient Vitals for the past 8 hrs:   Level of Consciousness Heart Rhythm Respiratory Quality/Effort O2 Device Bilateral Breath Sounds Skin Condition/Temp Abdomen Inspection Bowel Sounds (All Quadrants) Edema Pain Level 01/02/23 0615 0 -- -- Nasal cannula -- -- -- -- -- --   01/02/23 0809 -- -- -- -- -- -- -- -- -- 6   01/02/23 0845 0 Regular Unlabored Nasal cannula Clear;Diminished Warm Rounded; Soft Active None 0   01/02/23 1115 0 Regular Unlabored Nasal cannula Clear;Diminished Dry; Warm Rounded; Soft Active None --     Labs  Recent Labs     12/31/22  0452 01/01/23  0520 01/02/23  0600   WBC 7.9 8.0 8.5   HGB 11.7* 11.2* 11.5*   HCT 36.6* 34.4* 35.7*    245 232                                                                  Recent Labs     12/31/22 0452 01/01/23  0520 01/02/23  0600    135 135   K 5.0 5.3* 5.3*   CL 98* 99 97*   CO2 27 25 26   BUN 68* 78* 87*   CREATININE 4.5* 5.9* 5.9*   GLUCOSE 79 79 79     IV Drips and Rate/Dose   sodium chloride 25 mL (01/01/23 2219)      Safety - Before each treatment:   Dialysis Machine No.: [id number]   Machine Number: [id number]  Dialyzer Lot No.: [id number]  RO Machine Log Sheet Completed: Yes  Machine Alarm Self Test: Completed; Passed (01/02/23 0845)     Air Foam Detector: Proper Function, Tested, pH Reading  Extracorporeal Circuit Tested for Integrity: Yes  Machine Conductivity: 13.8  Manual Conductivity: 13.8     Bicarbonate Concentrate Lot No.: [id number]  Acid Concentrate Lot No.: [id number]  Manual Ph: 7.4  Bleach Test (Neg): Yes  Bath Temperature: 95 °F (35 °C)  Tubing Lot#: [id number]  Conductivity Meter Serial #: [id number]  All Connections Secure?: Yes  Venous Parameters Set?: Yes  Arterial Parameters Set?: Yes  Saline Line Double Clamped?: Yes  Air Foam Detector Engaged?: Yes  Machine Functioning Alarm Free?  Yes  Prime Given: 200ml    Chlorine Testing - Before each treatment and every 4 hours:   Treatment  Treatment Number: 2  Time On: 9295  Time Off: 1108  Treatment Goal: 3 hours  Weight: 180 lb 11.2 oz (82 kg) (01/02/23 0845)  1st check: less than 0.1 ppm at: 0700 hours  2nd check: less than 0.1 ppm at: 1020 hours  3rd check: Not Applicable  (if greater than 0.1 ppm, then check every 30 minutes from secondary)    Access Flows and Pressures  Patient Vitals for the past 8 hrs:   Blood Flow Rate (ml/min) Ultrafiltration Rate (ml/hr) Arterial Pressure (mmHg) Venous Pressure (mmHg) TMP DFR Comments Access Visible   01/02/23 0855 300 ml/min 670 ml/hr -70 mmHg 160 30 600 tx initiated Yes   01/02/23 0915 300 ml/min 670 ml/hr -70 mmHg 160 40 600 lines secure Yes   01/02/23 0930 300 ml/min 670 ml/hr -90 mmHg 150 40 600 no distress Yes   01/02/23 0945 300 ml/min 670 ml/hr -80 mmHg 160 40 600 gave warm blanket Yes   01/02/23 1000 300 ml/min 670 ml/hr -100 mmHg 120 80 600 no change Yes   01/02/23 1015 300 ml/min 670 ml/hr -100 mmHg 190 30 600 gave warm blanket Yes   01/02/23 1030 300 ml/min 670 ml/hr -100 mmHg 220 40 600 no distress Yes   01/02/23 1045 300 ml/min 670 ml/hr -100 mmHg 200 40 600 new bicarb Yes   01/02/23 1100 300 ml/min 670 ml/hr -100 mmHg 200 40 600 asking to stop tx Yes   01/02/23 1108 -- -- -- -- -- -- tx ended Yes     Vital Signs  Patient Vitals for the past 8 hrs:   BP Temp Pulse Resp SpO2 Weight Weight Method Percent Weight Change   01/02/23 0600 -- -- -- -- -- 175 lb (79.4 kg) Bed scale 0   01/02/23 0615 137/73 97.3 °F (36.3 °C) 77 18 100 % -- -- --   01/02/23 0809 -- -- -- 20 -- -- -- --   01/02/23 0820 136/69 97.5 °F (36.4 °C) 79 14 99 % -- -- --   01/02/23 0845 116/61 97.6 °F (36.4 °C) 76 14 99 % 180 lb 11.2 oz (82 kg) -- 3.26   01/02/23 0855 122/62 -- 76 -- -- -- -- --   01/02/23 0915 118/64 -- 72 -- -- -- -- --   01/02/23 0930 115/69 -- 76 -- -- -- -- --   01/02/23 0945 111/66 -- 76 -- -- -- -- --   01/02/23 1000 107/68 -- 77 -- -- -- -- --   01/02/23 1015 106/61 -- 79 -- -- -- -- --   01/02/23 1030 108/64 -- 79 -- -- -- -- --   01/02/23 1045 97/68 -- 81 -- -- -- -- --   01/02/23 1100 104/64 -- 81 -- -- -- -- --   01/02/23 1108 111/73 -- 81 -- -- -- -- --   01/02/23 1115 116/63 97.7 °F (36.5 °C) 81 14 99 % -- -- --     Post-Dialysis  Arterial Catheter Locking Solution: Not Applicable  Venous Catheter Locking Solution: Not Applicable  Post-Treatment Procedures: Blood returned, Access bleeding time < 10 minutes  Machine Disinfection Process: Acid/Vinegar Clean, Heat Disinfect, Exterior Machine Disinfection  Rinseback Volume (ml): 300 ml  Blood Volume Processed (Liters): 35.9 l/min  Dialyzer Clearance: Lightly streaked  Duration of Treatment (minutes): 30 minutes     Hemodialysis Intake (ml): 500 ml  Hemodialysis Output (ml): 1460 ml     Tolerated Treatment: Good  Patient Response to Treatment:  (patient requested to come off early)  Physician Notified: Yes       Provider Notification        Handoff complete and report given to Primary RN at 1130 hours. Primary RN (First Initial, Last Name, Title):  MILVIA Roman RN     Education  Person Educated: Patient   Knowledge Base: Substantial  Barriers to Learning?: None  Preferred method of Learning: Oral  Topic(s): Access Care, Signs and Symptoms of Infection, and Procedural   Teaching Tools: Explanation   Response to Education: Verbalized Understanding     Electronically signed by Debara Rinne, RN on 1/2/2023 at 11:31 AM

## 2023-01-02 NOTE — PROGRESS NOTES
Nephrology Progress Note  1/2/2023 1:43 PM  Subjective: Interval History: Jessica Correa is a 81 y.o. female . Doing somewhat better weak tired seen on dialysis today. Doing well at the beginning but then got a little bit irritated with a temperature needing blankets in 1 to cut time down. She did agree to stay longer. Data:   Scheduled Meds:   epoetin yessica-epbx  8,000 Units IntraVENous Once in dialysis    pyridostigmine  60 mg Oral 3 times per day    mirtazapine  15 mg Oral Nightly    sucralfate  1 g Oral 2 times per day    betamethasone dipropionate   Topical Daily    apixaban  5 mg Oral BID    atorvastatin  20 mg Oral Nightly    metoprolol tartrate  25 mg Oral BID    cloNIDine  0.1 mg Oral BID    amLODIPine  5 mg Oral Daily    aspirin  81 mg Oral Daily    levothyroxine  100 mcg Oral Daily    montelukast  10 mg Oral Nightly    pantoprazole  40 mg Oral BID AC    predniSONE  20 mg Oral Daily    QUEtiapine  50 mg Oral Nightly    ranolazine  500 mg Oral BID    sevelamer  800 mg Oral TID WC    tiotropium-olodaterol  2 puff Inhalation Daily    sodium chloride flush  5-40 mL IntraVENous 2 times per day    cefTRIAXone (ROCEPHIN) IV  1,000 mg IntraVENous Q24H    allopurinol  200 mg Oral Daily    levomilnacipran  40 mg Oral Nightly    LORazepam  0.5 mg Oral BID     Continuous Infusions:   sodium chloride 25 mL (01/01/23 2219)         CBC   Recent Labs     12/31/22  0452 01/01/23  0520 01/02/23  0600   WBC 7.9 8.0 8.5   HGB 11.7* 11.2* 11.5*   HCT 36.6* 34.4* 35.7*    245 232      BMP   Recent Labs     12/31/22  0452 01/01/23  0520 01/02/23  0600    135 135   K 5.0 5.3* 5.3*   CL 98* 99 97*   CO2 27 25 26   BUN 68* 78* 87*   CREATININE 4.5* 5.9* 5.9*     Hepatic:   No results for input(s): AST, ALT, ALB, BILITOT, ALKPHOS in the last 72 hours. Troponin: No results for input(s): TROPONINI in the last 72 hours. BNP: No results for input(s): BNP in the last 72 hours.   Lipids: No results for input(s): CHOL, HDL in the last 72 hours. Invalid input(s): LDLCALCU  ABGs:   Lab Results   Component Value Date/Time    PO2ART 58 09/21/2017 12:00 AM    AQB8GNZ 44.0 09/21/2017 12:00 AM     INR: No results for input(s): INR in the last 72 hours.   Renal Labs  Albumin:    Lab Results   Component Value Date/Time    LABALBU 4.2 12/27/2022 05:00 PM     Calcium:    Lab Results   Component Value Date/Time    CALCIUM 8.8 01/02/2023 06:00 AM     Phosphorus:    Lab Results   Component Value Date/Time    PHOS 7.6 02/17/2021 06:11 AM     U/A:    Lab Results   Component Value Date/Time    NITRU NEGATIVE 12/27/2022 05:45 PM    NITRU Negative 07/10/2019 11:09 AM    COLORU YELLOW 12/27/2022 05:45 PM    PHUR 6.0 07/10/2019 11:09 AM    WBCUA 265 12/27/2022 05:45 PM    RBCUA 48 12/27/2022 05:45 PM    MUCUS RARE 12/27/2022 05:45 PM    TRICHOMONAS NONE SEEN 12/27/2022 05:45 PM    BACTERIA FEW 12/27/2022 05:45 PM    CLARITYU CLOUDY 12/27/2022 05:45 PM    SPECGRAV 1.020 12/27/2022 05:45 PM    UROBILINOGEN 0.2 12/27/2022 05:45 PM    BILIRUBINUR NEGATIVE 12/27/2022 05:45 PM    BLOODU SMALL NUMBER OR AMOUNT OBSERVED 12/27/2022 05:45 PM    GLUCOSEU Negative 07/10/2019 11:09 AM    KETUA NEGATIVE 12/27/2022 05:45 PM     ABG:    Lab Results   Component Value Date/Time    IMW1LFT 44.0 09/21/2017 12:00 AM    PO2ART 58 09/21/2017 12:00 AM    SOU8GGF 28.5 09/21/2017 12:00 AM     HgBA1c:    Lab Results   Component Value Date/Time    LABA1C 5.5 09/02/2021 10:30 AM     Microalbumen/Creatinine ratio:  No components found for: RUCREAT  TSH:    Lab Results   Component Value Date/Time    TSH 2.19 10/06/2017 12:15 PM     IRON:    Lab Results   Component Value Date/Time    IRON 53 12/01/2017 09:15 AM     Iron Saturation:  No components found for: PERCENTFE  TIBC:    Lab Results   Component Value Date/Time    TIBC 367 12/01/2017 09:15 AM     FERRITIN:    Lab Results   Component Value Date/Time    FERRITIN 17 12/01/2017 09:15 AM     RPR:  No results found for: RPR  MADDY:    Lab Results   Component Value Date/Time    MADDY None Detected 06/27/2022 11:00 AM     24 Hour Urine for Creatinine Clearance:  No components found for: CREAT4, UHRS10, UTV10      Objective:   I/O: 01/01 0701 - 01/02 0700  In: 30 [P.O.:20; I.V.:10]  Out: -   I/O last 3 completed shifts: In: 27 [P.O.:20; I.V.:10]  Out: -   I/O this shift:  In: 630 [I.V.:130]  Out: 1460   Vitals: /61   Pulse 87   Temp 97.7 °F (36.5 °C)   Resp 14   Ht 5' 4\" (1.626 m)   Wt 180 lb 11.2 oz (82 kg)   SpO2 99%   BMI 31.02 kg/m²  {  General appearance: awake weak  HEENT: Head: Normal, normocephalic, atraumatic.   Neck: supple, symmetrical, trachea midline  Lungs: diminished breath sounds bilaterally  Heart: S1, S2 normal  Abdomen: abnormal findings:  soft nt  Extremities: edema trace  Neurologic: Mental status: alertness: Awake       Assessment and Plan:      IMP:   #1 end-stage renal disease on dialysis Monday Friday   #2 history of CVA with possible myasthenia gravis   #3  Hypertension   #4 UTI  #5 positive DVT       Plan       #1 try to maintain 3 of the dialysis with patient for treatment slightly short continue to provide supportive care as patient agrees currently is limited code   #2 continue following up with neurology work-up outpatient for myasthenia can do at the nursing facility   #3 blood pressure stable overall   #4 treated for UTI   #5 maintain anticoagulation     okay to return to skilled nursing later today or in the morning   Next outpatient dialysis Friday         Amisha Bolanos MD, MD

## 2023-01-02 NOTE — PROGRESS NOTES
Patient seen and examined on dialysis tolerating well at this time still somewhat tired and fatigued but no other acute distress correct potassium with dialysis follow-up neuro work-up and evaluation as well.   Full note to follow

## 2023-01-02 NOTE — PROGRESS NOTES
Daily Progress Note  Subjective:  Awake and alert; feeling okay  BP and HR stable  NSR on tele  Has been doing well on eliquis  Overall stable from cardiac standpoint    Attending Note:  More awake alert  Remain in sinus rate is stable  Did  HD today  Left arm DVT on AC  ESRD on HD  Possible MG neuro on case   Pneumonia  Hx of CAD and PCI in 2015 and hx of HTN on meds       Impression and Plan:   DVT    DVT noted in US of Right arm     Eliquis 5mg BID     Weakness    Increase in weakness over the last couple of weeks at [facility]    Difficulty swallowing now; has been improving; on regular diet    Neuro ordered MRI; Resulted noted     Multifocal PNA    Noted on CT chest    ABX per primary    Negative viral respiratory panel     Treatment per primary     ESRD    Renal following; HD 12/30    HD planned for today; normal M and F     Hx of CAD    PCI back in 2015 at [facility] to OM    Continue on Aspirin lipitor and lopressor    Currently on Ranexa    Troponin elevated; multifactorial; secondary to renal function and infection     Most Recent Echo  Echo 12/30/22   Left ventricular systolic function is normal.   Ejection fraction is visually estimated at 60%. Mild left ventricular hypertrophy. Grade I diastolic dysfunction. Central line visualized terminating in the right atrium. Sclerotic, but non-stenotic aortic valve. Mild tricuspid regurgitation is present. RVSP is 40 mmHg. No evidence of any pericardial effusion. Most Recent Stress test  Stress 2/16/21   Summary    No EKG changes suggestive of ischemia with Lexiscan infusion. Normal perfusion uptake noted    no reversible ischemia noted    gating shows EF 60%      Radiology  US of right arm 12/22    Impression:       1. Acute DVT involving the brachial vein near the antecubital fossa. 2. Acute SVT involving the cephalic and basilic veins. 3. Clotted fistula. 4. Indeterminate 17 x 1.5 x 2.2 cm area of fluid in the right upper extremity.    Findings were discussed with Sammie Ross at 10:17 p.m. on 2022       Ct abdomen           Impression:         1. Bilateral lower lobe consolidations concerning for multifocal pneumonia. Radiographic follow-up recommended to document resolution following treatment. 2.  Probable constipation. Colonic diverticulosis without diverticulitis. 3.  Mild nonspecific edema within the perirectal fat without appreciable wall   thickening. Correlation with digital rectal exam may be helpful. 4.  Nonspecific edema/skin thickening within the left lateral breast and   chest wall which may be iatrogenic or related to recent trauma. Follow-up   mammography may be useful as clinically warranted. 5.  Additional nonemergent findings, as above. PAST MEDICAL HISTORY:  Acid reflux, anemia, arthritis, CAD, CVA, CHF,  chronic back pain, chronic constipation, chronic kidney disease,  end-stage renal disease, COPD, diabetes, emphysema, hypertension,  hyperlipidemia, rheumatoid arthritis, sleep apnea, thyroid disease. PAST SURGICAL HISTORY:  Appendectomy, AV fistula creation, back surgery,  carpal tunnel release, , coronary angioplasty, dental surgery,  hysterectomy, rotator cuff repair, gastric sleeve, thyroidectomy. SOCIAL HISTORY:  She does not smoke, denies any illicit drugs, does not  use any alcohol. ALLERGIES:  She has allergies to PERCOCET, TRAMADOL, VICODIN, and  NARCAN.     Objective:   /69   Pulse 79   Temp 97.5 °F (36.4 °C)   Resp 14   Ht 5' 4\" (1.626 m)   Wt 175 lb (79.4 kg)   SpO2 99%   BMI 30.04 kg/m²     Intake/Output Summary (Last 24 hours) at 2023 0836  Last data filed at 2023 0090  Gross per 24 hour   Intake 30 ml   Output --   Net 30 ml       Medications:   Scheduled Meds:   epoetin yessica-epbx  8,000 Units IntraVENous Once in dialysis    pyridostigmine  60 mg Oral 3 times per day    mirtazapine  15 mg Oral Nightly    sucralfate  1 g Oral 2 times per day betamethasone dipropionate   Topical Daily    apixaban  5 mg Oral BID    atorvastatin  20 mg Oral Nightly    metoprolol tartrate  25 mg Oral BID    cloNIDine  0.1 mg Oral BID    amLODIPine  5 mg Oral Daily    aspirin  81 mg Oral Daily    levothyroxine  100 mcg Oral Daily    montelukast  10 mg Oral Nightly    pantoprazole  40 mg Oral BID AC    predniSONE  20 mg Oral Daily    QUEtiapine  50 mg Oral Nightly    ranolazine  500 mg Oral BID    sevelamer  800 mg Oral TID WC    tiotropium-olodaterol  2 puff Inhalation Daily    sodium chloride flush  5-40 mL IntraVENous 2 times per day    cefTRIAXone (ROCEPHIN) IV  1,000 mg IntraVENous Q24H    allopurinol  200 mg Oral Daily    levomilnacipran  40 mg Oral Nightly    LORazepam  0.5 mg Oral BID      Infusions:   sodium chloride 25 mL (01/01/23 2219)      PRN Meds:  melatonin, ipratropium-albuterol, sodium chloride flush, sodium chloride, ondansetron **OR** ondansetron, polyethylene glycol, traMADol     Physical Exam:  Vitals:    01/02/23 0820   BP: 136/69   Pulse: 79   Resp: 14   Temp: 97.5 °F (36.4 °C)   SpO2: 99%        General: AAO, NAD  Chest: Nontender  Cardiac: First and Second Heart Sounds are Normal, No Murmurs or Gallops noted  Lungs:Clear to auscultation and percussion. Abdomen: Soft, NT, ND, +BS  Extremities: No clubbing, no edema  Vascular:  Equal 2+ peripheral pulses. Lab Data:  CBC:   Recent Labs     12/31/22 0452 01/01/23  0520 01/02/23  0600   WBC 7.9 8.0 8.5   HGB 11.7* 11.2* 11.5*   HCT 36.6* 34.4* 35.7*   MCV 89.5 89.1 89.7    245 232     BMP:   Recent Labs     12/31/22 0452 01/01/23  0520 01/02/23  0600    135 135   K 5.0 5.3* 5.3*   CL 98* 99 97*   CO2 27 25 26   BUN 68* 78* 87*   CREATININE 4.5* 5.9* 5.9*     LIVER PROFILE: No results for input(s): AST, ALT, LIPASE, BILIDIR, BILITOT, ALKPHOS in the last 72 hours. Invalid input(s): AMYLASE,  ALB  PT/INR: No results for input(s): PROTIME, INR in the last 72 hours.   APTT:   Recent Labs 12/30/22  1711 12/31/22  0450   APTT 36.0 35.7     BNP:  No results for input(s): BNP in the last 72 hours.       Assessment:  Patient Active Problem List    Diagnosis Date Noted    Acute deep vein thrombosis (DVT) of brachial vein of right upper extremity ([facility] 12/29/2022    History of progressive weakness 12/27/2022    Strain of muscle, fascia and tendon of long head of biceps, left arm, initial encounter 09/23/2021    Lymphedema of left upper extremity 08/21/2021    Aphagia 02/15/2021    Cerebrovascular accident (CVA) due to occlusion of precerebral artery ([facility] [address] 10/08/2020    Fistula 05/11/2020    ESRD (end stage renal disease) ([facility] [address] 07/17/2019    ESRD needing dialysis ([facility] [address] 07/17/2019    Type 2 diabetes mellitus with hyperglycemia, with long-term current use of insulin ([facility] [address] 07/17/2019    Class 3 severe obesity due to excess calories with body mass index (BMI) of 40.0 to 44.9 in adult ([facility] [address] 07/17/2019    Essential hypertension 06/05/2019    Chronic kidney disease, stage IV (severe) ([facility] [address] 01/18/2019    WD-Chronic buttock pain 05/09/2018    Status post bariatric surgery 04/25/2018    Status post laparoscopic sleeve gastrectomy 04/20/2018    Hiatal hernia     YRIS (obstructive sleep apnea) 02/02/2018    Acute renal failure (ARF) ([facility] [address] 10/13/2017    Acute encephalopathy 10/13/2017    Acute hypercapnic respiratory failure ([facility] [address] 73/67/1064    Acute metabolic encephalopathy 92/26/3911    Chronic diastolic heart failure ([facility] [address] 09/19/2017    Solitary pulmonary nodule     Morbid obesity with BMI of 45.0-49.9, adult ([facility] [address] 05/17/2017    Diabetes 1.5, managed as type 2 ([address] 05/17/2017    Hyperglycemia 05/17/2017    Chronic fatigue 05/17/2017    Acute on chronic diastolic heart failure (HCC) 02/04/2017    Fluid overload 02/03/2017    Chronic venous hypertension (idiopathic) with inflammation of bilateral lower extremity 08/05/2016    Hypercalcemia 08/04/2016    Hypertensive kidney disease with CKD stage III ([facility] [address] 05/31/2016 Type 2 diabetes mellitus without complication ([facility] 92/44/5051    Acute on chronic renal failure ([facility] 02/18/2015    CAD (coronary artery disease) 02/17/2015    Nausea & vomiting 02/17/2015    Hypothyroidism, adult 02/17/2015       Electronically signed by FIDEL Subramanian CNP on 1/2/2023 at 8:36 AM  '  Electronically signed by Latrice Carranza MD on 1/2/23 at 12:31 PM EST

## 2023-01-02 NOTE — PROGRESS NOTES
Brief neurology note: Attempted to round on patient today to assess how she is doing with increase in [place]. Patient was off the floor during time of rounding. Chart review note she did agree to dialysis today. We will follow-up in the a.m.   Eva Arguello, FIDEL - CNP

## 2023-01-02 NOTE — PROGRESS NOTES
V2.0  [facility] Progress Note      Name:  Jessica Correa /Age/Sex: 1950  (81 y.o. female)   MRN & CSN:  [id number] & [id number] Encounter Date/Time: 2023 7:37 AM EST    Location:  [address] [place], [place] [address] PCP: Edmundo Burkett MD       Hospital Day: 7    Assessment and Plan:   Jessica Correa is a 81 y.o. female with pmh of ESRD on HD, CAD, and hypothyroidism who presents with History of progressive weakness      Plan:  #Acute on chronic progressive weakness  #Dysphagia - improving  #Concern for MG  --Pt w/ progressive weakness in all 4 extremities with difficulty swallowing worsened over past 2 weeks. Requiring assistance with ADLs. --Initial CT head was negative, repeat head CT negative. --Neuro on board, recommended MRI of C-spine, results noted:posterior instrumented fusion of c3-T3 w/ associated laminectomy changes. Mild/moderate multilevel cervical spondylosis, no high grade canal stenosis. Marrow signal appears T1 hypointensive which could be r/t anemia vs an alt marrow infitrative process. --Neuro increased the dose of Mestinon to 60mg PO and will reassess  --MG panel was sent and is currently pending  --Dysphagia improving, SLP re-evaluated and recommended soft and bite size diet/thin liquids with aspiration precautions, crush pills and give in puree    #Acute hypoxic respiratory failure 2/2 pneumonia, possibly aspiration  --No leukocytosis. On 1L of O2 via NC, wean off as able  --CT A/P showed bilateral lower lob consolidations, Respiratory disease panel negative, started on Vanc/Cefepime in the ED, switched to rocephin and azithromycin as patient is not showing signs of SIRS/sepsis.   --BCx NGTD, she completed abx      #UTI  --Urine culture with > 100,000 CFU Klebsiella, 25,000 CFU Enterococcus faecalis  --had some mild dysuria, afebrile without leukocytosis   --Completed abx, no urinary symptoms at this time, continue to monitor    #ESRD on HD MF  --Dialyzed for 2hrs today  --Appreciate nephrology recommendations     #Acute right brachial, cephalic, basilic vein DVT  --Started on heparin gtt, switched to eliquis   --Cardiology on board, appreciate recs, recommended to continue Eliquis     #Sinus tachycardia - resolved  --Continue metoprolol, monitor and adjust as appropriate. HR wnl at this time on current therapy     #Elevated troponin   --No CP, initial Tn mildly elevated ECG without acute ischemic changes, flat trend  --Continue ASA, statin, ranexa  --cardiology following      #Gout  --Continue allopurinol     #RA  --Continue home chronic prednisone     #Hypothyroidism  --Continue synthroid     #Abnormal CT  --CT A/P showed nonspecific edema/skin thickening within the left lateral breast, f/u with mammogram may be useful    Diet ADULT DIET; Regular   DVT Prophylaxis [] Lovenox, []  Heparin, [] SCDs, [] Ambulation,  [x] Eliquis, [] Xarelto  [] Coumadin   Code Status Limited   Disposition From: [facility]  Expected Disposition: Same as above  Estimated Date of Discharge: 1-2 days  Patient requires continued admission due to Neuro eval and work up for [facility]   Surrogate Decision Maker/ POA      Subjective:     Chief Complaint: Altered Mental Status (Sent from [address] [place] [address] for altered mental status. ) and Other (Patient says had dialysis Saturday [facility] claims has missed last two treatments. )       Patient seen and examined at bedside. She is endorsing improvement in SOB and fatigue. She continues to endorse some abdominal pain that's generalized but is denying any diarrhea. Review of Systems:    Review of Systems   Constitutional:  Positive for fatigue. States her strength is improving   HENT: Negative. Respiratory:          SOB improving   Cardiovascular: Negative. Gastrointestinal:  Positive for abdominal pain. Genitourinary: Negative. Musculoskeletal: Negative. Skin: Negative. Neurological: Negative. Psychiatric/Behavioral: Negative.        Objective: Intake/Output Summary (Last 24 hours) at 1/2/2023 1313  Last data filed at 1/2/2023 1108  Gross per 24 hour   Intake 630 ml   Output 1460 ml   Net -830 ml          Vitals:   Vitals:    01/02/23 1225   BP: 129/61   Pulse: 87   Resp:    Temp:    SpO2:        Physical Exam:   General: NAD on 1L O2 via NC, speech is still not clear   Eyes: EOMI  ENT: Moist mucous membranes  Cardiovascular: Regular rate. Respiratory: Clear to auscultation  Gastrointestinal: Soft, non tender to palpation, normal BS  Genitourinary: no suprapubic tenderness  Musculoskeletal: No edema  Skin: warm, dry  Neuro: Alert. Psych: Mood appropriate.      Medications:   Medications:    epoetin yessica-epbx  8,000 Units IntraVENous Once in dialysis    pyridostigmine  60 mg Oral 3 times per day    mirtazapine  15 mg Oral Nightly    sucralfate  1 g Oral 2 times per day    betamethasone dipropionate   Topical Daily    apixaban  5 mg Oral BID    atorvastatin  20 mg Oral Nightly    metoprolol tartrate  25 mg Oral BID    cloNIDine  0.1 mg Oral BID    amLODIPine  5 mg Oral Daily    aspirin  81 mg Oral Daily    levothyroxine  100 mcg Oral Daily    montelukast  10 mg Oral Nightly    pantoprazole  40 mg Oral BID AC    predniSONE  20 mg Oral Daily    QUEtiapine  50 mg Oral Nightly    ranolazine  500 mg Oral BID    sevelamer  800 mg Oral TID WC    tiotropium-olodaterol  2 puff Inhalation Daily    sodium chloride flush  5-40 mL IntraVENous 2 times per day    cefTRIAXone (ROCEPHIN) IV  1,000 mg IntraVENous Q24H    allopurinol  200 mg Oral Daily    levomilnacipran  40 mg Oral Nightly    LORazepam  0.5 mg Oral BID      Infusions:    sodium chloride 25 mL (01/01/23 2645)     PRN Meds: melatonin, 3 mg, Nightly PRN  ipratropium-albuterol, 1 vial, Q6H PRN  sodium chloride flush, 5-40 mL, PRN  sodium chloride, , PRN  ondansetron, 4 mg, Q8H PRN   Or  ondansetron, 4 mg, Q6H PRN  polyethylene glycol, 17 g, Daily PRN  traMADol, 50 mg, Q6H PRN      Labs      Recent Results (from the past 24 hour(s))   CBC    Collection Time: 01/02/23  6:00 AM   Result Value Ref Range    WBC 8.5 4.0 - 10.5 K/CU MM    RBC 3.98 (L) 4.2 - 5.4 M/CU MM    Hemoglobin 11.5 (L) 12.5 - 16.0 GM/DL    Hematocrit 35.7 (L) 37 - 47 %    MCV 89.7 78 - 100 FL    MCH 28.9 27 - 31 PG    MCHC 32.2 32.0 - 36.0 %    RDW 15.9 (H) 11.7 - 14.9 %    Platelets 348 735 - 071 K/CU MM    MPV 11.4 (H) 7.5 - 11.1 FL   Basic Metabolic Panel    Collection Time: 01/02/23  6:00 AM   Result Value Ref Range    Sodium 135 135 - 145 MMOL/L    Potassium 5.3 (H) 3.5 - 5.1 MMOL/L    Chloride 97 (L) 99 - 110 mMol/L    CO2 26 21 - 32 MMOL/L    Anion Gap 12 4 - 16    BUN 87 (H) 6 - 23 MG/DL    Creatinine 5.9 (H) 0.6 - 1.1 MG/DL    Est, Glom Filt Rate 7 (L) >60 mL/min/1.73m2    Glucose 79 70 - 99 MG/DL    Calcium 8.8 8.3 - 10.6 MG/DL   Procalcitonin    Collection Time: 01/02/23  6:00 AM   Result Value Ref Range    Procalcitonin 4.40         Imaging/Diagnostics Last 24 Hours   MRI CERVICAL SPINE WO CONTRAST    Result Date: 12/31/2022  EXAMINATION: MRI OF THE CERVICAL SPINE WITHOUT CONTRAST 12/31/2022 9:17 am TECHNIQUE: Multiplanar multisequence MRI of the cervical spine was performed without the administration of intravenous contrast. COMPARISON: 12/28/2020. HISTORY: ORDERING SYSTEM PROVIDED HISTORY: profound extremity weakness. TECHNOLOGIST PROVIDED HISTORY: Reason for exam:->profound extremity weakness. What is the sedation requirement?->None Reason for Exam: neck pain - hx multiple sx's - metal suppression seq ran  - pt moaning through-out exam FINDINGS: BONES/ALIGNMENT: Posterior instrumented fusion of C3-T3 with bilateral lateral mass screws present in C3-C6 and bilateral transpedicular screws present in T2-T3. Associated laminectomy changes present of C4-C7. Straightening of the cervical spine. No significant spondylolisthesis. Vertebral body heights appear preserved. Marrow signal appears T1 hypointense.   No evidence of acute fracture or aggressive appearing osseous lesions. SPINAL CORD: No abnormal cord signal is seen. SOFT TISSUES: No paraspinal mass identified. Status post left thyroidectomy. C2-C3: Mild congenital spinal canal stenosis. No significant foraminal narrowing. C3-C4: Decompressed spinal canal.  Facet hypertrophy. No significant spinal canal stenosis or foraminal narrowing. C4-C5: Decompressed spinal canal.  Osteophytosis. Facet hypertrophy. No significant spinal canal stenosis. Mild bilateral foraminal narrowing. C5-C6: Decompressed spinal canal.  Osteophytosis. Facet hypertrophy. No significant spinal canal stenosis or foraminal narrowing. C6-C7: Decompressed spinal canal.  Osteophytosis. Facet hypertrophy. No significant spinal canal stenosis. Mild bilateral foraminal narrowing. C7-T1: Decompressed spinal canal.  Osteophytosis. Facet hypertrophy. No significant spinal canal stenosis. Mild/moderate bilateral foraminal narrowing. 1. Posterior instrumented fusion of C3-T3 with associated laminectomy changes as described above. 2. Mild/moderate multilevel cervical spondylosis. No high-grade spinal canal stenosis. 3. Marrow signal appears T1 hypointense. Findings may related to anemia versus an alternative marrow infiltrative process.        Electronically signed by Sarah Matute MD on 1/2/2023 at 1:13 PM

## 2023-01-03 PROBLEM — E44.0 MODERATE MALNUTRITION (HCC): Chronic | Status: ACTIVE | Noted: 2023-01-01

## 2023-01-03 NOTE — PROGRESS NOTES
Patient seen discussed with me her care at this time. He prefers her to go to hospice care which is what the patient wants.   We will consult [facility] inpatient hospice team to see if she would qualify for inpatient hospice or hospice facility for son's request  Read as needed Ativan and Dilaudid for pain control for now switch to a DNR CC CODE STATUS  Will stop all treatments including dialysis at this time and monitor closely for comfort care

## 2023-01-03 NOTE — PROGRESS NOTES
Comprehensive Nutrition Assessment    Type and Reason for Visit:  Initial, RD Nutrition Re-Screen/LOS    Nutrition Recommendations/Plan:   Begin Clear liquid oral nutrition supplement (Apple) TID   Continue Regular diet with speech recommendation/precautions   Document po intakes in I/O   Encourage small bites of meals as able      Malnutrition Assessment:  Malnutrition Status: Moderate malnutrition (01/03/23 1601)    Context:  Chronic Illness     Findings of the 6 clinical characteristics of malnutrition:  Energy Intake:  75% or less estimated energy requirements for 1 month or longer (50% or less in the last 5 days with dysphagia x 2 weeks PTA)  Weight Loss:  Mild weight loss (specify amount and time period) (10% in 7 months)     Body Fat Loss:  Mild body fat loss Orbital, Buccal region   Muscle Mass Loss:   (Moderate muscle mass loss) Thigh (quadraceps), Calf (gastrocnemius)  Fluid Accumulation:  Unable to assess     Strength:  Not Performed    Nutrition Assessment:    Admitted with Progressive weakness of the LE. Pt advanced to regular diet, code status [id number]. Pt reports poor intake over 3-4 days, c/o abdominal pain. Noted difficulty swallowing x 2 weeks prior to admission. Poor intakes recorded of 0-25% over the last 6 days. Pt states \"I don't want dialysis or any aggressive treatment. \" Provided comfort to patient, will send apple juice for comfort. Will send clear liquid oral nutrition supplement if pt is willing. Noted 10% wt loss in 7 months. Follow as high nutrition risk. Nutrition Related Findings:    Meets criteria for moderate malnutrition. Wound Type: None       Current Nutrition Intake & Therapies:    Average Meal Intake: 1-25%, 0% (x 6 days)  Average Supplements Intake: None Ordered  ADULT DIET;  Regular    Anthropometric Measures:  Height: 5' 4\" (162.6 cm)  Ideal Body Weight (IBW): 120 lbs (55 kg)    Admission Body Weight: 174 lb 13.2 oz (79.3 kg)  Current Body Weight: 180 lb 12.4 oz (82 kg), 150.6 % IBW. Weight Source: Bed Scale  Current BMI (kg/m2): 31  Usual Body Weight: 200 lb (90.7 kg) (8/2022 per office visits)  % Weight Change (Calculated): -9.6  Weight Adjustment For: No Adjustment                 BMI Categories: Obese Class 1 (BMI 30.0-34. 9)    Estimated Daily Nutrient Needs:  Energy Requirements Based On: Formula  Weight Used for Energy Requirements: Current  Energy (kcal/day): 5850-0484 ([place] [address] Jeor)  Weight Used for Protein Requirements: Ideal  Protein (g/day): 54-65 (1-1.2g/kg)  Method Used for Fluid Requirements: 1 ml/kcal  Fluid (ml/day): 7281-4667    Nutrition Diagnosis:   Moderate malnutrition, In context of chronic illness related to swallowing difficulty, acute injury/trauma, inadequate protein-energy intake as evidenced by poor intake prior to admission, moderate muscle loss, mild loss of subcutaneous fat, weight loss (poor intake x 1 month)    Nutrition Interventions:   Food and/or Nutrient Delivery: Continue Current Diet, Start Oral Nutrition Supplement  Nutrition Education/Counseling: No recommendation at this time, Education not appropriate  Coordination of Nutrition Care: Continue to monitor while inpatient, Feeding Assistance/Environment Change, Speech Therapy, Swallow Evaluation, Coordination of Care       Goals:     Goals: PO intake 50% or greater, other (specify)  Specify Other Goals: Pt will consume at least half of meals or as tolerated per comfort care    Nutrition Monitoring and Evaluation:   Behavioral-Environmental Outcomes: None Identified  Food/Nutrient Intake Outcomes: Food and Nutrient Intake, Supplement Intake  Physical Signs/Symptoms Outcomes: Biochemical Data, Chewing or Swallowing, GI Status, Meal Time Behavior, Nutrition Focused Physical Findings, Weight    Discharge Planning:    Continue current diet     Sheryl Lima RD, LD  Contact: [phone]

## 2023-01-03 NOTE — PROGRESS NOTES
Daily Progress Note  Subjective:  Awake, alert, tearful  C/o generalized pain, nausea  Denies CP or SOB   SR on tele, HR and BP stable   Stable from cardiac standpoint     Attending Note:  Patient is awake alert    ESRD on HD-  She is nauseated this am   She wants to go home  She did HD yesterday   MG-neuro on case started on meds   Stable from cardiac stand   HTN on meds    Impression and Plan:   DVT    DVT noted in US of Right arm     Eliquis 5mg BID     Weakness    Increase in weakness over the last couple of weeks at [facility]    Workup for MG in progress     Difficulty swallowing now; has been improving; on regular diet    Neuro ordered MRI; result noted     Neuro following      Multifocal PNA    Noted on CT chest    ABX per primary    Negative viral respiratory panel     Treatment per primary     ESRD    Renal following;     HD yesterday     Normal M and F     Hx of CAD    PCI back in 2015 at [facility] to OM    Continue on Aspirin, lipitor, and lopressor    Currently on Ranexa    Troponin elevated; multifactorial; secondary to renal function and infection       Most Recent Echo  Echo 12/30/22   Left ventricular systolic function is normal.   Ejection fraction is visually estimated at 60%. Mild left ventricular hypertrophy. Grade I diastolic dysfunction. Central line visualized terminating in the right atrium. Sclerotic, but non-stenotic aortic valve. Mild tricuspid regurgitation is present. RVSP is 40 mmHg. No evidence of any pericardial effusion. Most Recent Stress test  Stress 2/16/21   Summary    No EKG changes suggestive of ischemia with Lexiscan infusion. Normal perfusion uptake noted    no reversible ischemia noted    gating shows EF 60%       Radiology  US of right arm 12/22    Impression:       1. Acute DVT involving the brachial vein near the antecubital fossa. 2. Acute SVT involving the cephalic and basilic veins. 3. Clotted fistula.    4. Indeterminate 17 x 1.5 x 2.2 cm area of fluid in the right upper extremity. Findings were discussed with Cody Johnston at 10:17 p.m. on 2022       Ct abdomen           Impression:         1. Bilateral lower lobe consolidations concerning for multifocal pneumonia. Radiographic follow-up recommended to document resolution following treatment. 2.  Probable constipation. Colonic diverticulosis without diverticulitis. 3.  Mild nonspecific edema within the perirectal fat without appreciable wall   thickening. Correlation with digital rectal exam may be helpful. 4.  Nonspecific edema/skin thickening within the left lateral breast and   chest wall which may be iatrogenic or related to recent trauma. Follow-up   mammography may be useful as clinically warranted. 5.  Additional nonemergent findings, as above. PAST MEDICAL HISTORY:  Acid reflux, anemia, arthritis, CAD, CVA, CHF,  chronic back pain, chronic constipation, chronic kidney disease,  end-stage renal disease, COPD, diabetes, emphysema, hypertension,  hyperlipidemia, rheumatoid arthritis, sleep apnea, thyroid disease. PAST SURGICAL HISTORY:  Appendectomy, AV fistula creation, back surgery,  carpal tunnel release, , coronary angioplasty, dental surgery,  hysterectomy, rotator cuff repair, gastric sleeve, thyroidectomy. SOCIAL HISTORY:  She does not smoke, denies any illicit drugs, does not  use any alcohol. ALLERGIES:  She has allergies to PERCOCET, TRAMADOL, VICODIN, and  NARCAN.       Objective:   /66   Pulse 81   Temp 97.4 °F (36.3 °C) (Axillary)   Resp 17   Ht 5' 4\" (1.626 m)   Wt 180 lb 11.2 oz (82 kg)   SpO2 100%   BMI 31.02 kg/m²     Intake/Output Summary (Last 24 hours) at 1/3/2023 1025  Last data filed at 2023  Gross per 24 hour   Intake 750 ml   Output 1460 ml   Net -710 ml       Medications:   Scheduled Meds:   pyridostigmine  60 mg Oral 3 times per day    mirtazapine  15 mg Oral Nightly    sucralfate  1 g Oral 2 times per day betamethasone dipropionate   Topical Daily    apixaban  5 mg Oral BID    atorvastatin  20 mg Oral Nightly    metoprolol tartrate  25 mg Oral BID    cloNIDine  0.1 mg Oral BID    amLODIPine  5 mg Oral Daily    aspirin  81 mg Oral Daily    levothyroxine  100 mcg Oral Daily    montelukast  10 mg Oral Nightly    pantoprazole  40 mg Oral BID AC    predniSONE  20 mg Oral Daily    QUEtiapine  50 mg Oral Nightly    ranolazine  500 mg Oral BID    sevelamer  800 mg Oral TID WC    tiotropium-olodaterol  2 puff Inhalation Daily    sodium chloride flush  5-40 mL IntraVENous 2 times per day    allopurinol  200 mg Oral Daily    levomilnacipran  40 mg Oral Nightly    LORazepam  0.5 mg Oral BID      Infusions:   sodium chloride 25 mL (01/01/23 9548)      PRN Meds:  traMADol, melatonin, ipratropium-albuterol, sodium chloride flush, sodium chloride, ondansetron **OR** ondansetron, polyethylene glycol     Physical Exam:  Vitals:    01/03/23 0903   BP: 131/66   Pulse: 81   Resp: 17   Temp: 97.4 °F (36.3 °C)   SpO2: 100%        General: AAO, NAD  Chest: Nontender  Cardiac: First and Second Heart Sounds are Normal, No Murmurs or Gallops noted  Lungs:Clear to auscultation and percussion. Abdomen: Soft, NT, ND, +BS  Extremities: No clubbing, no edema  Vascular:  Equal 2+ peripheral pulses. Lab Data:  CBC:   Recent Labs     01/01/23  0520 01/02/23  0600 01/03/23  0650   WBC 8.0 8.5 9.6   HGB 11.2* 11.5* 12.5   HCT 34.4* 35.7* 39.8   MCV 89.1 89.7 91.1    232 255     BMP:   Recent Labs     01/01/23  0520 01/02/23  0600 01/03/23  0650    135 136   K 5.3* 5.3* 5.0   CL 99 97* 97*   CO2 25 26 30   BUN 78* 87* 54*   CREATININE 5.9* 5.9* 4.7*     LIVER PROFILE: No results for input(s): AST, ALT, LIPASE, BILIDIR, BILITOT, ALKPHOS in the last 72 hours. Invalid input(s): AMYLASE,  ALB  PT/INR: No results for input(s): PROTIME, INR in the last 72 hours. APTT: No results for input(s): APTT in the last 72 hours.   BNP:  No results for input(s): BNP in the last 72 hours.       Assessment:  Patient Active Problem List    Diagnosis Date Noted    Acute deep vein thrombosis (DVT) of brachial vein of right upper extremity ([address] 12/29/2022    History of progressive weakness 12/27/2022    Strain of muscle, fascia and tendon of long head of biceps, left arm, initial encounter 09/23/2021    Lymphedema of left upper extremity 08/21/2021    Aphagia 02/15/2021    Cerebrovascular accident (CVA) due to occlusion of precerebral artery ([address] 10/08/2020    Fistula 05/11/2020    ESRD (end stage renal disease) ([address] 07/17/2019    ESRD needing dialysis ([address] 07/17/2019    Type 2 diabetes mellitus with hyperglycemia, with long-term current use of insulin ([address] 07/17/2019    Class 3 severe obesity due to excess calories with body mass index (BMI) of 40.0 to 44.9 in adult ([address] 07/17/2019    Essential hypertension 06/05/2019    Chronic kidney disease, stage IV (severe) ([address] 01/18/2019    WD-Chronic buttock pain 05/09/2018    Status post bariatric surgery 04/25/2018    Status post laparoscopic sleeve gastrectomy 04/20/2018    Hiatal hernia     YRIS (obstructive sleep apnea) 02/02/2018    Acute renal failure (ARF) ([address] 10/13/2017    Acute encephalopathy 10/13/2017    Acute hypercapnic respiratory failure ([address] 47/56/0025    Acute metabolic encephalopathy 84/52/6328    Chronic diastolic heart failure ([address] 09/19/2017    Solitary pulmonary nodule     Morbid obesity with BMI of 45.0-49.9, adult ([address] 05/17/2017    Diabetes 1.5, managed as type 2 ([address] 05/17/2017    Hyperglycemia 05/17/2017    Chronic fatigue 05/17/2017    Acute on chronic diastolic heart failure (HCC) 02/04/2017    Fluid overload 02/03/2017    Chronic venous hypertension (idiopathic) with inflammation of bilateral lower extremity 08/05/2016    Hypercalcemia 08/04/2016    Hypertensive kidney disease with CKD stage III ([address] 05/31/2016    Type 2 diabetes mellitus without complication ([facility] 05/31/2016    Acute on chronic renal failure ([facility] 02/18/2015    CAD (coronary artery disease) 02/17/2015    Nausea & vomiting 02/17/2015    Hypothyroidism, adult 02/17/2015       Electronically signed by FIDEL Christian - CNP on 1/3/2023 at 10:25 AM     Electronically signed by Sara Sousa MD on 1/3/23 at 11:07 AM EST

## 2023-01-03 NOTE — PROGRESS NOTES
V2.0  [facility] Hospitalist Progress Note      Name:  Iwona Gillis /Age/Sex: 1950  (81 y.o. female)   MRN & CSN:  [id number] & [id number] Encounter Date/Time: 1/3/2023 12:01 PM EST    Location:  [address] [place] [address]-A PCP: Amy Jasso MD       Hospital Day: 8    Assessment and Plan:   Iwona Gillis is a 81 y.o. female with pmh as noted below who presents with History of progressive weakness      Plan:    Acute on chronic progressive weakness  Dysphagia - improving  Concern for MG  --Pt w/ progressive weakness in all 4 extremities with difficulty swallowing worsened over past 2 weeks. Requiring assistance with ADLs. --Initial CT head was negative, repeat head CT negative. --Neuro on board, recommended MRI of C-spine, results noted:posterior instrumented fusion of c3-T3 w/ associated laminectomy changes. Mild/moderate multilevel cervical spondylosis, no high grade canal stenosis. Marrow signal appears T1 hypointensive which could be r/t anemia vs an alt marrow infitrative process. --Neuro increased the dose of Mestinon to 60mg PO and will reassess  --MG panel was sent and is currently pending-respiratory vitals ordered  --SLP re-evaluated and recommended soft and bite size diet/thin liquids with aspiration precautions, crush pills and give in puree     Acute hypoxic respiratory failure 2/2 pneumonia, possibly aspiration  --No leukocytosis. On 2L of O2 via NC, wean off as able  --CT A/P showed bilateral lower lob consolidations, Respiratory disease panel negative, started on Vanc/Cefepime in the ED, switched to rocephin and azithromycin as patient is not showing signs of SIRS/sepsis.   --BCx NGTD, she completed abx       UTI  --Urine culture with > 100,000 CFU Klebsiella, 25,000 CFU Enterococcus faecalis  --had some mild dysuria, afebrile without leukocytosis   --Completed abx, no urinary symptoms at this time, continue to monitor     ESRD on HD MF  --Dialyzed for 2hrs yesterday  -- Patient discusses wanting to stop dialysis, and become a DNR CC, discussed with nephrology, whom is discussing with patient's son today. Acute right brachial, cephalic, basilic vein DVT  --Started on heparin gtt, switched to eliquis   --Cardiology on board, appreciate recs, recommended to continue Eliquis     Sinus tachycardia - resolved  --Continue metoprolol, monitor and adjust as appropriate. HR wnl at this time on current therapy     Elevated troponin   --No CP, initial Tn mildly elevated ECG without acute ischemic changes, flat trend  --Continue ASA, statin, ranexa  --cardiology following      Gout  --Continue allopurinol     RA  --Continue home chronic prednisone     Hypothyroidism  --Continue synthroid     Abnormal CT  --CT A/P showed nonspecific edema/skin thickening within the left lateral breast, f/u with mammogram may be useful         Diet ADULT DIET; Regular   DVT Prophylaxis [] Lovenox, []  Heparin, [] SCDs, [] Ambulation,  [x] Eliquis, [] Xarelto  [] Coumadin   Code Status Limited   Disposition From: [facility]  Expected Disposition: [facility]  Estimated Date of Discharge: 1-2 days  Patient requires continued admission due to outstanding MG panel, ongoing neurology, nephrology and cardiology consult   Surrogate Decision Maker/ POA Son, self     Subjective:     Chief Complaint: Altered Mental Status (Sent from [address] [place] [address] for altered mental status. ) and Other (Patient says had dialysis Saturday [facility] claims has missed last two treatments. )  Patient seen at bedside this afternoon, requesting to stop dialysis. Did speak with bedside nurse who informed that patient's son will be in today to further discuss. Spoke with nephrology, who is waiting to speak with family as well. Patient reports feeling fatigued, oxygen 97% on 2 L. Review of Systems:    Review of Systems   Constitutional:  Positive for fatigue. Cardiovascular:  Positive for leg swelling. Negative for chest pain.    Gastrointestinal:  Negative for diarrhea, nausea and vomiting. Musculoskeletal:  Positive for myalgias. All other systems reviewed and are negative. Objective: Intake/Output Summary (Last 24 hours) at 1/3/2023 1201  Last data filed at 1/2/2023 2011  Gross per 24 hour   Intake 120 ml   Output --   Net 120 ml        Vitals:   Vitals:    01/03/23 0903   BP: 131/66   Pulse: 81   Resp: 17   Temp: 97.4 °F (36.3 °C)   SpO2: 100%       Physical Exam:     General: NAD, fatigued, not toxic appearing  Eyes: EOMI  ENT: neck supple  Cardiovascular: Regular rate. Respiratory: Decreased in bases Bilaterally  Gastrointestinal: Soft, non tender  Genitourinary: no suprapubic tenderness  Musculoskeletal: Nonpitting edema bilateral LE  Skin: warm, dry  Neuro: Alert.   Oriented x3  Psych: Mood tearful at times    Medications:   Medications:    pyridostigmine  60 mg Oral 3 times per day    mirtazapine  15 mg Oral Nightly    sucralfate  1 g Oral 2 times per day    betamethasone dipropionate   Topical Daily    apixaban  5 mg Oral BID    atorvastatin  20 mg Oral Nightly    metoprolol tartrate  25 mg Oral BID    cloNIDine  0.1 mg Oral BID    amLODIPine  5 mg Oral Daily    aspirin  81 mg Oral Daily    levothyroxine  100 mcg Oral Daily    montelukast  10 mg Oral Nightly    pantoprazole  40 mg Oral BID AC    predniSONE  20 mg Oral Daily    QUEtiapine  50 mg Oral Nightly    ranolazine  500 mg Oral BID    sevelamer  800 mg Oral TID WC    tiotropium-olodaterol  2 puff Inhalation Daily    sodium chloride flush  5-40 mL IntraVENous 2 times per day    allopurinol  200 mg Oral Daily    levomilnacipran  40 mg Oral Nightly    LORazepam  0.5 mg Oral BID      Infusions:    sodium chloride 25 mL (01/01/23 5415)     PRN Meds: traMADol, 50 mg, Q6H PRN  melatonin, 3 mg, Nightly PRN  ipratropium-albuterol, 1 vial, Q6H PRN  sodium chloride flush, 5-40 mL, PRN  sodium chloride, , PRN  ondansetron, 4 mg, Q8H PRN   Or  ondansetron, 4 mg, Q6H PRN  polyethylene glycol, 17 g, Daily PRN        Labs      Recent Results (from the past 24 hour(s))   CBC    Collection Time: 01/03/23  6:50 AM   Result Value Ref Range    WBC 9.6 4.0 - 10.5 K/CU MM    RBC 4.37 4.2 - 5.4 M/CU MM    Hemoglobin 12.5 12.5 - 16.0 GM/DL    Hematocrit 39.8 37 - 47 %    MCV 91.1 78 - 100 FL    MCH 28.6 27 - 31 PG    MCHC 31.4 (L) 32.0 - 36.0 %    RDW 16.0 (H) 11.7 - 14.9 %    Platelets 756 097 - 095 K/CU MM    MPV 13.0 (H) 7.5 - 11.1 FL   Basic Metabolic Panel    Collection Time: 01/03/23  6:50 AM   Result Value Ref Range    Sodium 136 135 - 145 MMOL/L    Potassium 5.0 3.5 - 5.1 MMOL/L    Chloride 97 (L) 99 - 110 mMol/L    CO2 30 21 - 32 MMOL/L    Anion Gap 9 4 - 16    BUN 54 (H) 6 - 23 MG/DL    Creatinine 4.7 (H) 0.6 - 1.1 MG/DL    Est, Glom Filt Rate 9 (L) >60 mL/min/1.73m2    Glucose 89 70 - 99 MG/DL    Calcium 8.9 8.3 - 10.6 MG/DL        Imaging/Diagnostics Last 24 Hours   XR ABDOMEN (KUB) (SINGLE AP VIEW)    Result Date: 1/2/2023  EXAMINATION: ONE SUPINE XRAY VIEW(S) OF THE ABDOMEN 1/2/2023 3:41 pm COMPARISON: None. HISTORY: ORDERING SYSTEM PROVIDED HISTORY: Abdominal pain TECHNOLOGIST PROVIDED HISTORY: Reason for exam:->Abdominal pain Reason for Exam: Abdominal pain Initial evaluation FINDINGS: Two views the abdomen are obtained to include the entire abdominal contents. Monitor wires overlie the abdomen. There is contrast in the bowel likely related to a prior procedure. There is no gross obstruction. No acute abdominal finding by plain film imaging. There has been previous surgery in the lower lumbar spine. Contrast in the colon from a prior procedure. No gross obstruction. No obvious acute finding by plain film imaging.        Electronically signed by FIDEL Corbett CNP on 1/3/2023 at 12:01 PM

## 2023-01-03 NOTE — CARE COORDINATION
Reviewed chart and spoke with pt's son/daughter in-law, Dr Yosef Simms has discussed hospice with them and they are interested in [place]. Called Referral to Cecille Haney at [facility], [facility]. CM will continue to follow.

## 2023-01-03 NOTE — PROGRESS NOTES
Neurology Service Progress Note  [address]   Patient Name: Romero Varela  : 1950        Subjective:   Reason for consult: Progressive weakness of the lower extremities  Chart was reviewed in detail, patient was seen and assessed. She is resting in bed this morning. She has improved strength of voice and is able to lift her arms higher off the bed this morning. She does appear to be more anxious today and tearful. Reviewed patients medications and she does have Zofran available, explained that she will need to request this medication if nauseated. Discussed with nurse, requested dose for patient. Updated patient that she is stable to discharge from our standpoint, will have her follow up in the outpatient setting. MG panel is still in process.        Past Medical History:   Diagnosis Date    Acid reflux     Anemia     Arthritis     \"Shoulders, Hips And Knees\"    CAD (coronary artery disease)     Sees Dr. Kathy Benavidez    Cerebral artery occlusion with cerebral infarction ([facility]     CHF (congestive heart failure) ([facility])     Chronic back pain     Chronic constipation     Chronic kidney disease     Sees Dr. Santy Rendon    COPD (chronic obstructive pulmonary disease) ([facility]     Sees Dr. Dung Elder    Depression     Diabetes mellitus [facility]) Dx     Sees Dr. Jonelle Hernanedz    Emphysema lung [facility])     Glaucoma     \"Both Eyes\"    Gout     Hemodialysis patient ([facility] [address]     Hiatal hernia     History of blood transfusion 's    No Reaction To Blood Transfusion Received    [place] (hard of hearing)     Bilateral Ears    Hyperlipidemia     Hypertension     Dr. Bryanna Prado    Itching     \"Whole Body Itches The [de-identified] Of The Time\"    Morbid obesity ([facility] [facility]     Rheumatoid arthritis ([facility] [facility]     Shortness of breath on exertion     Sleep apnea     Uses CPAP    Teeth missing     Upper And Lower    Thyroid disease     Thyroidectomy In     Urinary incontinence     WD-Chronic buttock pain 2018    Wears glasses     : Past Surgical History:   Procedure Laterality Date    APPENDECTOMY  1968    AV FISTULA CREATION Left     BACK SURGERY  2017    Lower Back With Hardware    BACK SURGERY  2016    Cervical Back With Hardware    CARPAL TUNNEL RELEASE Right 1993     SECTION  3-30-68 And 10-17-69    COLONOSCOPY  2017    Polyps Removed    CORONARY ANGIOPLASTY  2016    Heart Stent D Circ    DENTAL SURGERY      Teeth Extracted In Past    DILATION AND CURETTAGE OF UTERUS  Last Done In [address] [place] [address] [facility]    \"Numerous\"    ENDOSCOPY, COLON, DIAGNOSTIC  2017    HYSTERECTOMY (CERVIX STATUS UNKNOWN)      Partial Abdominal Hysterectomy    IR NONTUNNELED VASCULAR CATHETER  2022    IR NONTUNNELED VASCULAR CATHETER 2022 [facility] SPECIAL PROCEDURES    IR TUNNELED CATHETER PLACEMENT GREATER THAN 5 YEARS  2019    IR TUNNELED CATHETER PLACEMENT GREATER THAN 5 YEARS 2019 [facility] SPECIAL PROCEDURES    IR TUNNELED CATHETER PLACEMENT GREATER THAN 5 YEARS  3/3/2021    IR TUNNELED CATHETER PLACEMENT GREATER THAN 5 YEARS [facility] SPECIAL PROCEDURES    ROTATOR CUFF REPAIR Left 2010    SLEEVE GASTRECTOMY  2018    robotic assisted gastric sleeve, hiatal hernia repair    THYROIDECTOMY       Medications:  Scheduled Meds:   pyridostigmine  60 mg Oral 3 times per day    mirtazapine  15 mg Oral Nightly    sucralfate  1 g Oral 2 times per day    betamethasone dipropionate   Topical Daily    apixaban  5 mg Oral BID    atorvastatin  20 mg Oral Nightly    metoprolol tartrate  25 mg Oral BID    cloNIDine  0.1 mg Oral BID    amLODIPine  5 mg Oral Daily    aspirin  81 mg Oral Daily    levothyroxine  100 mcg Oral Daily    montelukast  10 mg Oral Nightly    pantoprazole  40 mg Oral BID AC    predniSONE  20 mg Oral Daily    QUEtiapine  50 mg Oral Nightly    ranolazine  500 mg Oral BID    sevelamer  800 mg Oral TID WC    tiotropium-olodaterol  2 puff Inhalation Daily    sodium chloride flush  5-40 mL IntraVENous 2 times per day allopurinol  200 mg Oral Daily    levomilnacipran  40 mg Oral Nightly    LORazepam  0.5 mg Oral BID     Continuous Infusions:   sodium chloride 25 mL (01/01/23 0715)     PRN Meds:.traMADol, melatonin, ipratropium-albuterol, sodium chloride flush, sodium chloride, ondansetron **OR** ondansetron, polyethylene glycol    Allergies   Allergen Reactions    Percocet [Oxycodone-Acetaminophen]      hallucinations    Tramadol Itching    Vicodin [Hydrocodone-Acetaminophen] Itching    Narcan [Naloxone Hcl] Anxiety     Feels like out of body, things are speeding     Social History     Socioeconomic History    Marital status: Single     Spouse name: Not on file    Number of children: 2    Years of education: Not on file    Highest education level: Not on file   Occupational History    Not on file   Tobacco Use    Smoking status: Never    Smokeless tobacco: Never   Vaping Use    Vaping Use: Never used   Substance and Sexual Activity    Alcohol use: No    Drug use: No    Sexual activity: Never   Other Topics Concern    Not on file   Social History Narrative    Not on file     Social Determinants of Health     Financial Resource Strain: Not on file   Food Insecurity: Not on file   Transportation Needs: Not on file   Physical Activity: Not on file   Stress: Not on file   Social Connections: Not on file   Intimate Partner Violence: Not on file   Housing Stability: Not on file      Family History   Problem Relation Age of Onset    Kidney Disease Mother         \"Kidney Failure\"    Heart Disease Mother         CHF    Arthritis Mother     Diabetes Mother     Depression Mother     High Blood Pressure Mother     Obesity Mother     Vision Loss Mother     Heart Attack Father     Heart Disease Father         Heart Attack    Diabetes Father     High Blood Pressure Father     High Blood Pressure Daughter          ROS (10 systems)  In addition to that documented in the HPI above, the additional ROS was obtained:  Constitutional: Denies fevers or chills  Eyes: Sensation of black spot behind her eye  ENMT: Denies sore throat  CV: Denies chest pain  Resp: Feels short of breath, difficulty taking deep respirations  GI: Denies vomiting or diarrhea  : Denies painful urination  MSK: Denies recent trauma  Skin: Denies new rashes  Neuro: Worsening weakness to the upper and lower extremities  Endocrine: Denies unexpected weight loss  Heme: Denies bleeding disorders    Physical Exam:      Wt Readings from Last 3 Encounters:   01/02/23 180 lb 11.2 oz (82 kg)   12/30/22 172 lb (78 kg)   12/06/22 180 lb (81.6 kg)     Temp Readings from Last 3 Encounters:   01/03/23 97.4 °F (36.3 °C) (Axillary)   12/06/22 97.7 °F (36.5 °C)   08/24/22 97.1 °F (36.2 °C)     BP Readings from Last 3 Encounters:   01/03/23 131/66   12/06/22 137/84   05/10/22 125/75     Pulse Readings from Last 3 Encounters:   01/03/23 81   12/06/22 97   08/24/22 91        Gen: A&O x 4, NAD, cooperative but anxious  HEENT: NC/AT, EOMI, PERRL, mmm, neck supple, no meningeal signs  Heart: NSR/ST  Lungs: Respirations even and unlabored  Ext: no edema, no calf tenderness b/l  Psych: anxious mood and flat affect  Skin: no rashes or lesions    NEUROLOGIC EXAM:    Mental Status: A&O to self, location, month and year, NAD, speech clear with increased strength of voice, language fluent, repetition and naming intact, follows commands appropriately    Cranial Nerve Exam:   CN II-XII:  PERRL, VFF, no nystagmus, no gaze paresis, sensation V1-V3 intact b/l, muscles of facial expression symmetric; hearing intact to conversational tone, palate elevates symmetrically, shoulder elevation symmetric and tongue protrudes midline with movement side to side. Motor Exam:       Strength Lifts upper extremities higher off bed from previous assessment. Able to lift lower extremities today to antigravity but unable to hold for long period of time.    Tone and bulk normal   Bilateral extremity pronator drift    Deep Tendon Reflexes: Trace/4 biceps, triceps, brachioradialis, patellar, and achilles b/l; flexor plantar responses b/l    Sensation: Intact light touch/pinprick/vibration UE's/LE's b/l    Coordination/Cerebellum:       Tremors--none      Rapidly alternating movements:  dysdiadochokinesia b/l                Heel-to-Shin:  dysmetria b/l      Finger-to-Nose:  dysmetria b/l    Gait and stance:      Gait: deferred      LABS:     Recent Labs     01/01/23  0520 01/02/23  0600 01/03/23  0650   WBC 8.0 8.5 9.6    135 136   K 5.3* 5.3* 5.0   CL 99 97* 97*   CO2 25 26 30   BUN 78* 87* 54*   CREATININE 5.9* 5.9* 4.7*   GLUCOSE 79 79 89         IMAGING:    CT head w/o contrast:  Impression   Stable appearance of the brain without acute intracranial process identified. CT cervical spine:  Impression   No acute fracture or traumatic malalignment of the cervical spine       Posterior fusion extends from C3-T2 with laminectomies at C4, C5, C6 and C7. The left pedicle at T2 extends into the left transverse process but does not   extend into the vertebral body. Tip of the left T2 pedicle screw is directly   adjacent to the posterior aortic arch. MRI cervical spine w/o contrast:  Impression   1. Posterior instrumented fusion of C3-T3 with associated laminectomy changes   as described above. 2. Mild/moderate multilevel cervical spondylosis. No high-grade spinal canal   stenosis. 3. Marrow signal appears T1 hypointense. Findings may related to anemia   versus an alternative marrow infiltrative process. All imaging was personally reviewed    ASSESSMENT/PLAN:   81-year-old female presenting with worsening extremity weakness. Patient is alert and oriented x4. Speech is clear with weak, hypophonic voice. Language is fluent. Face is symmetric with masklike facies. No ptosis noted to eyes on exam, EOMs intact, pupils equal, round and reactive. Can lift extremities ever so slightly upward but cannot hold to antigravity.   Reflexes significantly diminished in the upper and lower extremities with sensation intact. Patient does feel that she is breathing easier today. No improvement in extremity weakness or voice quality. Patient continues to have neck pain today, recommended as needed Ultram that was ordered by IM. No complaints of headache today. Progressive extremity weakness suspect secondary to myasthenia gravis given improvement with Mestinon  Neuroimaging as above, nonacute  MRI cervical spine  -nonacute   myasthenia gravis panel ordered -pending final results  Continue Mestinon 60 mg p.o. every 8 hours   Utilize Zofran as needed for nausea  Continue Eliquis, for DVT  Continue aspirin, Lipitor for secondary stroke prevention  ESRD on dialysis, management per nephrology  PT/OT as recommended, feel patient would benefit from passive range of motion exercises even if unable to participate in formal PT  From neurology standpoint patient is stable for discharge, tolerating inreased Mestinon dose with some nausea but continued slow improvement primarily in voice and upper extremity weakness, showing some improvement in lower extremity weakness. Follow-up: With our office after discharge      > 35 minutes of time spent included chart review, obtaining history, patient examination, developing plan of care, and documentation. Patient was discussed with attending neurologist Dr. Man Remy    Thank you for allowing us to participate in the care of your patient. If there are any questions regarding evaluation please feel free to contact us.      FIDEL Roe - MENG, 1/3/2023

## 2023-01-03 NOTE — PROGRESS NOTES
Nephrology Progress Note  1/3/2023 1:04 PM  Subjective: Interval History: Alina Dobbins is a 81 y.o. female . Weak tired resting in bed feels abdominal pain and bloating and wants to quit dialysis and all treatment wants to be comfortable. And I called the patient's son to discuss    Data:   Scheduled Meds:   pyridostigmine  60 mg Oral 3 times per day    mirtazapine  15 mg Oral Nightly    sucralfate  1 g Oral 2 times per day    betamethasone dipropionate   Topical Daily    apixaban  5 mg Oral BID    atorvastatin  20 mg Oral Nightly    metoprolol tartrate  25 mg Oral BID    cloNIDine  0.1 mg Oral BID    amLODIPine  5 mg Oral Daily    aspirin  81 mg Oral Daily    levothyroxine  100 mcg Oral Daily    montelukast  10 mg Oral Nightly    pantoprazole  40 mg Oral BID AC    predniSONE  20 mg Oral Daily    QUEtiapine  50 mg Oral Nightly    ranolazine  500 mg Oral BID    sevelamer  800 mg Oral TID WC    tiotropium-olodaterol  2 puff Inhalation Daily    sodium chloride flush  5-40 mL IntraVENous 2 times per day    allopurinol  200 mg Oral Daily    levomilnacipran  40 mg Oral Nightly    LORazepam  0.5 mg Oral BID     Continuous Infusions:   sodium chloride 25 mL (01/01/23 2219)         CBC   Recent Labs     01/01/23  0520 01/02/23  0600 01/03/23  0650   WBC 8.0 8.5 9.6   HGB 11.2* 11.5* 12.5   HCT 34.4* 35.7* 39.8    232 255      BMP   Recent Labs     01/01/23  0520 01/02/23  0600 01/03/23  0650    135 136   K 5.3* 5.3* 5.0   CL 99 97* 97*   CO2 25 26 30   BUN 78* 87* 54*   CREATININE 5.9* 5.9* 4.7*     Hepatic:   No results for input(s): AST, ALT, ALB, BILITOT, ALKPHOS in the last 72 hours. Troponin: No results for input(s): TROPONINI in the last 72 hours. BNP: No results for input(s): BNP in the last 72 hours. Lipids: No results for input(s): CHOL, HDL in the last 72 hours.     Invalid input(s): LDLCALCU  ABGs:   Lab Results   Component Value Date/Time    PO2ART 58 09/21/2017 12:00 AM    GBW1ZEF 44.0 09/21/2017 12:00 AM     INR: No results for input(s): INR in the last 72 hours.   Renal Labs  Albumin:    Lab Results   Component Value Date/Time    LABALBU 4.2 12/27/2022 05:00 PM     Calcium:    Lab Results   Component Value Date/Time    CALCIUM 8.9 01/03/2023 06:50 AM     Phosphorus:    Lab Results   Component Value Date/Time    PHOS 7.6 02/17/2021 06:11 AM     U/A:    Lab Results   Component Value Date/Time    NITRU NEGATIVE 12/27/2022 05:45 PM    NITRU Negative 07/10/2019 11:09 AM    COLORU YELLOW 12/27/2022 05:45 PM    PHUR 6.0 07/10/2019 11:09 AM    WBCUA 265 12/27/2022 05:45 PM    RBCUA 48 12/27/2022 05:45 PM    MUCUS RARE 12/27/2022 05:45 PM    TRICHOMONAS NONE SEEN 12/27/2022 05:45 PM    BACTERIA FEW 12/27/2022 05:45 PM    CLARITYU CLOUDY 12/27/2022 05:45 PM    SPECGRAV 1.020 12/27/2022 05:45 PM    UROBILINOGEN 0.2 12/27/2022 05:45 PM    BILIRUBINUR NEGATIVE 12/27/2022 05:45 PM    BLOODU SMALL NUMBER OR AMOUNT OBSERVED 12/27/2022 05:45 PM    GLUCOSEU Negative 07/10/2019 11:09 AM    KETUA NEGATIVE 12/27/2022 05:45 PM     ABG:    Lab Results   Component Value Date/Time    NVH6GLO 44.0 09/21/2017 12:00 AM    PO2ART 58 09/21/2017 12:00 AM    EWN4ESL 28.5 09/21/2017 12:00 AM     HgBA1c:    Lab Results   Component Value Date/Time    LABA1C 5.5 09/02/2021 10:30 AM     Microalbumen/Creatinine ratio:  No components found for: RUCREAT  TSH:    Lab Results   Component Value Date/Time    TSH 2.19 10/06/2017 12:15 PM     IRON:    Lab Results   Component Value Date/Time    IRON 53 12/01/2017 09:15 AM     Iron Saturation:  No components found for: PERCENTFE  TIBC:    Lab Results   Component Value Date/Time    TIBC 367 12/01/2017 09:15 AM     FERRITIN:    Lab Results   Component Value Date/Time    FERRITIN 17 12/01/2017 09:15 AM     RPR:  No results found for: RPR  MADDY:    Lab Results   Component Value Date/Time    MADDY None Detected 06/27/2022 11:00 AM     24 Hour Urine for Creatinine Clearance:  No components found for: KLEVER, [id number], [id number]      Objective:   I/O: 01/02 0701 - 01/03 0700  In: 750 [P.O.:120; I.V.:130]  Out: 1460   I/O last 3 completed shifts: In: 750 [P.O.:120; I.V.:130]  Out: 1460   No intake/output data recorded. Vitals: /66   Pulse 81   Temp 97.4 °F (36.3 °C) (Axillary)   Resp 20   Ht 5' 4\" (1.626 m)   Wt 180 lb 11.2 oz (82 kg)   SpO2 100%   BMI 31.02 kg/m²  {  General appearance: awake weak  HEENT: Head: Normal, normocephalic, atraumatic. Neck: supple, symmetrical, trachea midline  Lungs: diminished breath sounds bilaterally  Heart: S1, S2 normal  Abdomen: abnormal findings:  soft nt  Extremities: edema trace  Neurologic: Mental status: alertness: Awake       Assessment and Plan:      IMP:   #1 end-stage renal disease on dialysis Monday Friday   #2 history of CVA with possible myasthenia gravis   #3  Hypertension   #4 UTI  #5 positive DVT       Plan     #1 Next dialysis planned for Friday  #2 neurology plan to maintain with Mestinon and seeing if this may be truly myasthenia  #3 blood pressure holding stable  #4 treated for UTI  #5 maintain anticoagulation    Patient overall feels generalized weakness abdominal pain still  Wanting to consider possible withdrawing care stopping all treatment    I called and discussed with patient's son he will be coming to see patient today  He will have a discussion with her and discussed with her the options of treatment including hospice  After discussion we will talk to the patient's daughter which is a sister and then he will give me a call to see what he wants to do for her.     For right now supportive care monitor         Yaya Figueredo MD, MD

## 2023-01-03 NOTE — PROGRESS NOTES
A VC and NIF were ordered for this patient. I took tool to perform NIF and patient refused. Left on counter as she may cooperate tomorrow. The equipment to do the VC was unable to be found.

## 2023-01-04 PROBLEM — G93.40 ACUTE ENCEPHALOPATHY: Status: RESOLVED | Noted: 2017-10-13 | Resolved: 2023-01-01

## 2023-01-04 PROBLEM — I50.33 ACUTE ON CHRONIC DIASTOLIC HEART FAILURE (HCC): Status: RESOLVED | Noted: 2017-02-04 | Resolved: 2023-01-01

## 2023-01-04 PROBLEM — N17.9 ACUTE RENAL FAILURE (ARF) (HCC): Status: RESOLVED | Noted: 2017-10-13 | Resolved: 2023-01-01

## 2023-01-04 PROBLEM — R13.0 APHAGIA: Status: RESOLVED | Noted: 2021-02-15 | Resolved: 2023-01-01

## 2023-01-04 PROBLEM — F41.1 GENERALIZED ANXIETY DISORDER: Status: ACTIVE | Noted: 2022-01-01

## 2023-01-04 PROBLEM — J96.02 ACUTE HYPERCAPNIC RESPIRATORY FAILURE (HCC): Status: RESOLVED | Noted: 2017-09-19 | Resolved: 2023-01-01

## 2023-01-04 PROBLEM — E66.01 CLASS 3 SEVERE OBESITY DUE TO EXCESS CALORIES WITH BODY MASS INDEX (BMI) OF 40.0 TO 44.9 IN ADULT (HCC): Status: RESOLVED | Noted: 2019-07-17 | Resolved: 2023-01-01

## 2023-01-04 PROBLEM — E43 SEVERE MALNUTRITION (HCC): Status: ACTIVE | Noted: 2023-01-01

## 2023-01-04 PROBLEM — S46.112A STRAIN OF MUSCLE, FASCIA AND TENDON OF LONG HEAD OF BICEPS, LEFT ARM, INITIAL ENCOUNTER: Status: RESOLVED | Noted: 2021-09-23 | Resolved: 2023-01-01

## 2023-01-04 PROBLEM — N18.4 CHRONIC KIDNEY DISEASE, STAGE IV (SEVERE) (HCC): Status: RESOLVED | Noted: 2019-01-18 | Resolved: 2023-01-01

## 2023-01-04 PROBLEM — Z51.5 HOSPICE CARE: Status: ACTIVE | Noted: 2023-01-01

## 2023-01-04 PROBLEM — E13.9 DIABETES 1.5, MANAGED AS TYPE 2 (HCC): Status: RESOLVED | Noted: 2017-05-17 | Resolved: 2023-01-01

## 2023-01-04 NOTE — H&P
[address] [place] [address]+    Date: 1/4/2023  Name: Ev Preston  MRN: [id number]  YOB: 1950   Patient's PCP: Charles Ang MD  Consultants during acute care: Cardiology, Neurology, Nephrology   Referring Physician: Dr Rod Cotter care admission date: 12/27 to 1/4/2023   Admit to General Inpatient Hospice: 1/4/2023     Informant: Chart reviewed, discussed with hospice nurse liaison. I met with the patient at the bedside. There are no family here at the time of my visit. CC: \"I want to be at peace\"    [place]: This is a 81 y.o. female resident at [facility] with a history of end stage renal disease on twice weekly hemodialysis (likely due to hypertension and DM), prior CVA in 2020, coronary artery disease with prior PCI, congestive heart failure, hypertension, Klebsiella UTI, prior cervical spine surgery with chronic neck and back pain, COPD, GERD, Rheumatoid Arthritis, Type 2 diabetes mellitus, glaucoma, who was admitted on 12/27/2022 with progressive lower extremity weakness. The patient has been seen by Neurology with concern for myasthenia gravis. The patient has been started on Pyridostigmine while awaiting results of the Myasthenia gravis panel. The patient has had dysphagia and was treated for aspiration pneumonitis and is on oxygen and completing course of Ceftriaxone and Azithromycin. Dr Jm Barker has followed, and documents discussion with the patient and family regarding hemodialysis, and that the patient wants to stop hemodialysis. The patient has refused oral medications 1/3/23 and she is having some trouble swallowing. The patient is somewhat slow to respond, but does answer my questions. She clearly tells me that she \"wants to be at peace\". When I ask what she means, she states \"no more pain\". She does not want to continue hemodialysis.  She was aware that Dr Jm Barker asked hospice to visit and understood that I am a hospice physician. She is aware that someone is meeting with her family later today. The patient tells me she has not been able to walk for several months. She has difficulty swallowing. She has generalized pain, and prior cervical spine surgery with chronic pain. She is chronically on Prednisone per the med list. The patient has had 3 doses of Hydromorphone IV in the past 24 hours. The hospice nurse liaison met with the patient's family. The family is agreeable to the patient's decision for comfort care and stopping hemodialysis. Hospice philosophy was discussed regarding care and comfort at the end of life. Questions were answered, and emotional support was provided. It was discussed that [address] [place] [address] is for management of symptoms, and that if the patient stabilizes, alternate arrangements for care will be needed They are aware that Hospice does not provide for the patients 24 hour care giving needs at home or ECF. The patient is DNR-comfort care status. The patient is admitted to [address] [place] [address] for the management of pain, dyspnea, congestion. PPS is 20% and prognosis is quite guarded and likely to continue to decline off hemodialysis.      Past Medical History:   Diagnosis Date    Acid reflux     Anemia     Arthritis     \"Shoulders, Hips And Knees\"    CAD (coronary artery disease)     Sees Dr. Manuel Olszewski    Cerebral artery occlusion with cerebral infarction [facility])     CHF (congestive heart failure) (HCC)     Chronic back pain     Chronic constipation     Chronic kidney disease     Sees Dr. Logan Call    COPD [facility]chronic obstructive pulmonary disease) ([facility]     Sees Dr. Elizabet Kolb    Depression     Diabetes mellitus [facility]) Dx 2012    Sees Dr. Bruna Maxwell    Emphysema lung [facility])     Glaucoma     \"Both Eyes\"    Gout     Hemodialysis patient ([facility]     Hiatal hernia     History of blood transfusion 1960's    No Reaction To Blood Transfusion Received    [place] (hard of hearing)     Bilateral Ears Hyperlipidemia     Hypertension     Dr. Gracie Reeves    Itching     \"Whole Body Itches The [de-identified] Of The Time\"    Morbid obesity ([address]     Rheumatoid arthritis ([address]     Shortness of breath on exertion     Sleep apnea     Uses CPAP    Teeth missing     Upper And Lower    Thyroid disease     Thyroidectomy In     Urinary incontinence     WD-Chronic buttock pain 2018    Wears glasses        Past Surgical History:   Procedure Laterality Date    APPENDECTOMY  1968    AV FISTULA CREATION Left     BACK SURGERY  2017    Lower Back With Hardware    BACK SURGERY  2016    Cervical Back With Hardware    CARPAL TUNNEL RELEASE Right 1993     SECTION  3-30-68 And 10-17-69    COLONOSCOPY  2017    Polyps Removed    CORONARY ANGIOPLASTY  2016    Heart Stent D Circ    DENTAL SURGERY      Teeth Extracted In Past    DILATION AND CURETTAGE OF UTERUS  Last Done In [address] [place] [address]    \"Numerous\"    ENDOSCOPY, COLON, DIAGNOSTIC  2017    HYSTERECTOMY (CERVIX STATUS UNKNOWN)      Partial Abdominal Hysterectomy    IR NONTUNNELED VASCULAR CATHETER  2022    IR NONTUNNELED VASCULAR CATHETER 2022 [facility]    IR TUNNELED CATHETER PLACEMENT GREATER THAN 5 YEARS  2019    IR TUNNELED CATHETER PLACEMENT GREATER THAN 5 YEARS [facility]    IR TUNNELED CATHETER PLACEMENT GREATER THAN 5 YEARS  3/3/2021    IR TUNNELED CATHETER PLACEMENT GREATER THAN 5 YEARS [facility] SPECIAL PROCEDURES    ROTATOR CUFF REPAIR Left 2010    SLEEVE GASTRECTOMY  2018    robotic assisted gastric sleeve, hiatal hernia repair    THYROIDECTOMY         Social History     Socioeconomic History    Marital status: Single     Spouse name: Not on file    Number of children: 2    Years of education: Not on file    Highest education level: Not on file   Occupational History    Not on file   Tobacco Use    Smoking status: Never    Smokeless tobacco: Never   Vaping Use    Vaping Use: Never used   Substance and Sexual Activity    Alcohol use: No    Drug use: No    Sexual activity: Never   Other Topics Concern    Not on file   Social History Narrative    Not on file     Social Determinants of Health     Financial Resource Strain: Not on file   Food Insecurity: Not on file   Transportation Needs: Not on file   Physical Activity: Not on file   Stress: Not on file   Social Connections: Not on file   Intimate Partner Violence: Not on file   Housing Stability: Not on file       Family History   Problem Relation Age of Onset    Kidney Disease Mother         \"Kidney Failure\"    Heart Disease Mother         CHF    Arthritis Mother     Diabetes Mother     Depression Mother     High Blood Pressure Mother     Obesity Mother     Vision Loss Mother     Heart Attack Father     Heart Disease Father         Heart Attack    Diabetes Father     High Blood Pressure Father     High Blood Pressure Daughter        Allergies   Allergen Reactions    Percocet [Oxycodone-Acetaminophen]      hallucinations    Tramadol Itching    Vicodin [Hydrocodone-Acetaminophen] Itching    Narcan [Naloxone Hcl] Anxiety     Feels like out of body, things are speeding       Medication list reviewed  Prior to Admission medications    Medication Sig Start Date End Date Taking?  Authorizing Provider   MUCUS RELIEF 600 MG extended release tablet TAKE 1 TABLET BY MOUTH TWICE A DAY 11/29/22   Jai Navarrete MD   diclofenac sodium (VOLTAREN) 1 % GEL APPLY TOPICALLY 2 TIMES DAILY APPLY TO LOW BACK AND FLANK AREA TWICE A DAY 11/28/22   Bia Ac MD   sevelamer (RENVELA) 800 MG tablet Take one tablet by mouth three times a day and 1 tablet by mouth with snack 10/5/22   Tisha Toussaint MD   tiotropium (SPIRIVA RESPIMAT) 2.5 MCG/ACT AERS inhaler Inhale 2 puffs into the lungs daily 8/24/22   Jai Nolen MD   montelukast (SINGULAIR) 10 MG tablet Take 1 tablet by mouth nightly 8/5/22   Martínez Wick MD   ondansetron (ZOFRAN) 4 MG tablet Take 1 tablet by mouth every 8 hours as needed for Nausea or Vomiting 7/5/22   Lucero Ram MD   acetaminophen (TYLENOL) 500 MG tablet Take 1 tablet by mouth 3 times daily as needed for Pain 6/15/22   Salvatore Iverson MD   ipratropium-albuterol (DUONEB) 0.5-2.5 (3) MG/3ML SOLN nebulizer solution Inhale 3 mLs into the lungs every 4 hours 5/23/22   Zahra Martini MD   amLODIPine (NORVASC) 5 MG tablet Take 1 tablet by mouth daily 4/7/22   Lucero Ram MD   promethazine (PHENERGAN) 25 MG tablet Take 1 tablet by mouth daily for 10 days. 4/7/22   Alberto Barber MD   midodrine (PROAMATINE) 10 MG tablet TAKE 1 TABLET BY MOUTH DAILY TAKE ONE TO TWO TABLET THREE TIMES A WEEK PRIOR TO EACH DIALYSIS TREATMENT 1/18/22   Lucero Ram MD   Adalimumab (HUMIRA PEN) 40 MG/0.4ML PNKT Humira(CF) Pen 40 mg/0.4 mL subcutaneous kit    Historical Provider, MD   benzonatate (TESSALON) 100 MG capsule Take 100 mg by mouth 3 times daily as needed for Cough    Historical Provider, MD   fluocinolone (DERMA-SMOOTHE) 0.01 % external oil Apply topically 3 times daily Apply topically. Historical Provider, MD   hydrOXYzine (ATARAX) 25 MG tablet Take 25 mg by mouth 3 times daily as needed for Itching    Historical Provider, MD   lidocaine viscous hcl (XYLOCAINE) 2 % SOLN solution Take 15 mLs by mouth as needed for Irritation    Historical Provider, MD   mupirocin (BACTROBAN) 2 % ointment Apply topically 3 times daily Apply topically 3 times daily. Historical Provider, MD   predniSONE (DELTASONE) 20 MG tablet Take 20 mg by mouth daily    Historical Provider, MD   sulfacetamide (BLEPH-10) 10 % ophthalmic ointment every 6 hours Every 6 hours.     Historical Provider, MD   aspirin (ASPIRIN CHILDRENS) 81 MG chewable tablet Take 1 tablet by mouth daily 2/17/21   Yeyo Alanis MD   Tiotropium Bromide-Olodaterol (STIOLTO RESPIMAT) 2.5-2.5 MCG/ACT AERS Inhale 2 puffs into the lungs daily 10/27/20   Jai Mckenzie, MD   Multiple Vitamin (DAILY-NICOLE) TABS TAKE ONE TABLET BY MOUTH DAILY 1/28/20   Veronica Mejia MD   ketoconazole (NIZORAL) 2 % shampoo Apply topically daily as needed. 7/22/19   Dina Grant MD   allopurinol (ZYLOPRIM) 100 MG tablet Take 2 tablets by mouth daily 7/23/19   Dina Grant MD   promethazine (PHENERGAN) 25 MG tablet Take 25 mg by mouth every 6 hours as needed for Nausea    Historical Provider, MD   REFRESH OPTIVE 0.5-0.9 % SOLN Place 2 drops into both eyes 2 times daily 6/17/19   Historical Provider, MD   levothyroxine (SYNTHROID) 100 MCG tablet Take 100 mcg by mouth daily 7/13/19   Historical Provider, MD   ketoconazole (NIZORAL) 2 % cream Apply topically daily Apply topically daily. Historical Provider, MD   triamcinolone (ARISTOCORT) 0.5 % cream Apply topically 3 times daily Apply topically 3 times daily. Historical Provider, MD   pantoprazole (PROTONIX) 40 MG tablet Take 1 tablet by mouth 2 times daily 4/20/18   FIDEL Xavier CNP   LORazepam (ATIVAN) 0.5 MG tablet Take 0.5 mg by mouth 2 times daily.     Historical Provider, MD   atorvastatin (LIPITOR) 80 MG tablet Take 80 mg by mouth nightly    Historical Provider, MD   ranolazine (RANEXA) 500 MG extended release tablet Take 500 mg by mouth 2 times daily    Historical Provider, MD   Nebulizer MISC Inhale into the lungs as needed    Historical Provider, MD   QUEtiapine (SEROQUEL XR) 300 MG extended release tablet Take 50 mg by mouth nightly     Historical Provider, MD   levomilnacipran (FETZIMA) 40 MG CP24 extended release capsule Take 1 capsule by mouth daily  Patient taking differently: Take 40 mg by mouth nightly 10/15/17   Woo Lal MD   nitroGLYCERIN (NITROSTAT) 0.4 MG SL tablet Place 0.4 mg under the tongue every 5 minutes as needed for Chest pain    Historical Provider, MD   fluticasone (FLONASE) 50 MCG/ACT nasal spray 2 sprays by Nasal route every morning     Historical Provider, MD       ROS: As noted in [address], all other systems are negative or as follows:  Constitutional: generally weak, no fever, chills, + weight loss  HEENT:  No acute visual changes, nasal drainage,   CV:  No chest pain, palpitations  PULM:  + cough, occasional shortness of breath, no hemoptysis  GI:  poor appetite  :  ESRD  Musculoskeletal:  right upper extremity AV fistula and right upper extremity DVT  Neuro: diffuse weakness with concerns for Myasthenia gravis and recently started on Pyridostigmine    Weight:    Wt Readings from Last 5 Encounters:   01/02/23 180 lb 11.2 oz (82 kg)   12/30/22 172 lb (78 kg)   12/06/22 180 lb (81.6 kg)   08/24/22 200 lb (90.7 kg)   05/23/22 200 lb (90.7 kg)       Data reviewed 1/4/2023:  Transthoracic Echocardiogram 12/30/22   Left ventricular systolic function is normal.   Ejection fraction is visually estimated at 60%. Mild left ventricular hypertrophy. Grade I diastolic dysfunction. Central line visualized terminating in the right atrium. Sclerotic, but non-stenotic aortic valve. Mild tricuspid regurgitation is present. RVSP is 40 mmHg. No evidence of any pericardial effusion. CT-scan of the brain 12/28/22:  BRAIN/VENTRICLES: There is no acute infarct or acute intracranial hemorrhage present. There is no mass effect or midline shift present. There is no ventriculomegaly or abnormal extra-axial fluid collection present. There is mild periventricular hypoattenuation. US right upper extremity 12/28/22  1. Acute DVT involving the brachial vein near the antecubital fossa. 2. Acute SVT involving the cephalic and basilic veins. 3. Clotted fistula. 4. Indeterminate 17 x 1.5 x 2.2 cm area of fluid in the right upper extremity. MRI C spine 12/30/22  1. Posterior instrumented fusion of C3-T3 with associated laminectomy changes as described above. 2. Mild/moderate multilevel cervical spondylosis. No high-grade spinal canal stenosis. 3. Marrow signal appears T1 hypointense.   Findings may related to anemia versus an alternative marrow infiltrative process. Hepatic Function Panel:    Lab Results   Component Value Date/Time    ALKPHOS 102 12/27/2022 05:00 PM    ALT 14 12/27/2022 05:00 PM    AST 21 12/27/2022 05:00 PM    PROT 6.6 12/27/2022 05:00 PM    PROT 7.1 02/16/2011 01:20 PM    BILITOT 0.2 12/27/2022 05:00 PM    BILIDIR 0.2 02/03/2017 06:45 PM    IBILI 0.0 02/03/2017 06:45 PM    LABALBU 4.2 12/27/2022 05:00 PM     CBC:   Recent Labs     01/02/23  0600 01/03/23  0650   WBC 8.5 9.6   HGB 11.5* 12.5   HCT 35.7* 39.8   MCV 89.7 91.1    255       BMP:   Recent Labs     01/02/23  0600 01/03/23  0650    136   K 5.3* 5.0   CL 97* 97*   CO2 26 30   BUN 87* 54*   CREATININE 5.9* 4.7*   GLUCOSE 79 89       Physical Exam:   BP (!) 147/72   Pulse 99   Temp 97.5 °F (36.4 °C) (Oral)   Resp 20   Ht 5' 4\" (1.626 m)   Wt 180 lb 11.2 oz (82 kg)   SpO2 96%   BMI 31.02 kg/m²   General: drowsy but arousable, answers in a soft voice, generally weak  HEENT: Mucous membranes are dry, sclerae are clear   Neck: right dialysis catheter, carotid upstrokes are diminished without bruit, no meningismus   Heart: tachycardic RRR, S1S2, no murmurs  Lungs:  equal coarse breath sounds bilaterally, diminished at the bases, without rales, scattered rhonchi   Abdomen: soft, bowel sounds quietly present, no apparent tenderness, nondistended  Extremities:  No mottling, + edema right arm and feet, right upper extremity AV fistula   Neurologic: generally weak in all extremities able to briefly raise feet off the bed, no clonus    Assessment/Plan:  End stage renal disease with diabetic and hypertensive renal disease choosing to stop hemodialysis. The patient clearly is able to express her wishes and her family agrees. She has diffuse pain, and with dysphagia she is requiring parenteral meds. I placed orders to transition to [facility] for the management of pain, anxiety, congestion, dyspnea.  Comfort meds are addressed. Allow comfort feeds as able, and will continue some oral meds as she can tolerate. PPS is 20% and prognosis is guarded and likely to worsen off hemodialysis.    Hypoxic respiratory failure with aspiration pneumonitis secondary to dysphagia, continue oxygen and comfort meds  Progressive weakness, concern for Myasthenia gravis, continue Pyridostigmine as she can tolerate  Klebsiella UTI, treated  Right upper extremity DVT refusing anticoagulation    Rheumatoid Arthritis, chronically on Prednisone, continue as able  Hypothyroidism, on Levothyroxine as able    Patient Active Problem List   Diagnosis Code    CAD (coronary artery disease) I25.10    Nausea & vomiting R11.2    Hypothyroidism, adult E03.9    Acute on chronic renal failure (HCC) N17.9, N18.9    Type 2 diabetes mellitus without complication ([facility]) Y78.0    Hypercalcemia E83.52    Chronic venous hypertension (idiopathic) with inflammation of bilateral lower extremity I87.323    Fluid overload E87.70    Morbid obesity with BMI of 45.0-49.9, adult ([facility]) E66.01, Z68.42    Hyperglycemia R73.9    Chronic fatigue R53.82    Solitary pulmonary nodule Q09.7    Acute metabolic encephalopathy J99.53    Chronic diastolic heart failure ([facility]) I50.32    YRIS (obstructive sleep apnea) G47.33    Hiatal hernia K44.9    Status post laparoscopic sleeve gastrectomy Z98.84    Status post bariatric surgery Z98.84    WD-Chronic buttock pain M79.18, G89.29    Essential hypertension I10    ESRD (end stage renal disease) ([facility] N18.6    ESRD needing dialysis ([facility] N18.6, Z99.2    Type 2 diabetes mellitus with hyperglycemia, with long-term current use of insulin ([facility]) E11.65, Z79.4    Fistula L98.8    Cerebrovascular accident (CVA) due to occlusion of precerebral artery ([facility]) I63.20    Lymphedema of left upper extremity I89.0    History of progressive weakness Z87.898    Acute deep vein thrombosis (DVT) of brachial vein of right upper extremity ([facility]) I82.621    Severe malnutrition ([facility] E43    Generalized anxiety disorder F41.1    Hospice care L95.0     Certification of Terminal Illness: I certify that this patient is eligible for Hospice services for a terminal diagnosis of end stage renal disease stopping hemodialysis with a life expectancy predicted to be less than 6 months if this illness follows its expected course.     Paresh Car MD

## 2023-01-04 NOTE — CONSULTS
[address] [address] Continence Nurse  Consult Note       Loli Wild  AGE: 81 y.o.    GENDER: female  : 1950  TODAY'S DATE:  2023    Subjective:     Reason for Evaluation and Assessment: wound care evghada Wild is a 81 y.o. female referred by:   [x] Physician  [] Nursing  [] Other:     Wound Identification:  Wound Type: pressure  Contributing Factors: chronic pressure, decreased mobility, and malnutrition        PAST MEDICAL HISTORY        Diagnosis Date    Acid reflux     Anemia     Arthritis     \"Shoulders, Hips And Knees\"    CAD (coronary artery disease)     Sees Dr. Kaela Muro    Cerebral artery occlusion with cerebral infarction ([facility]     CHF (congestive heart failure) ([facility]     Chronic back pain     Chronic constipation     Chronic kidney disease     Sees Dr. Roosevelt Presley    COPD (chronic obstructive pulmonary disease) [facility]     Sees Dr. Juan Pablo Lemon    Depression     Diabetes mellitus [facility]) Dx     Sees Dr. Ange Kennedy    Emphysema lung [facility])     Glaucoma     \"Both Eyes\"    Gout     Hemodialysis patient ([facility]     Hiatal hernia     History of blood transfusion     No Reaction To Blood Transfusion Received    [place] (hard of hearing)     Bilateral Ears    Hyperlipidemia     Hypertension     Dr. Serene Spears    Itching     \"Whole Body Itches The [de-identified] Of The Time\"    Morbid obesity ([facility]     Rheumatoid arthritis ([facility]     Shortness of breath on exertion     Sleep apnea     Uses CPAP    Teeth missing     Upper And Lower    Thyroid disease     Thyroidectomy In     Urinary incontinence     WD-Chronic buttock pain 2018    Wears glasses        PAST SURGICAL HISTORY    Past Surgical History:   Procedure Laterality Date    APPENDECTOMY  1968    AV FISTULA CREATION Left     BACK SURGERY  2017    Lower Back With Hardware    BACK SURGERY  2016    Cervical Back With Hardware    CARPAL TUNNEL RELEASE Right      SECTION  3-30-68 And 10-17-69    COLONOSCOPY  2017 Polyps Removed    CORONARY ANGIOPLASTY  04/24/2016    Heart Stent D Circ    DENTAL SURGERY      Teeth Extracted In Past    DILATION AND CURETTAGE OF UTERUS  Last Done In [id number] [place] [address]    \"Numerous\"    ENDOSCOPY, COLON, DIAGNOSTIC  2017    HYSTERECTOMY (CERVIX STATUS UNKNOWN)  1990    Partial Abdominal Hysterectomy    IR NONTUNNELED VASCULAR CATHETER  12/29/2022    IR NONTUNNELED VASCULAR CATHETER 12/29/2022 [facility]    IR TUNNELED CATHETER PLACEMENT GREATER THAN 5 YEARS  7/18/2019    IR TUNNELED CATHETER PLACEMENT GREATER THAN 5 YEARS 7/18/2019 [facility]    IR TUNNELED CATHETER PLACEMENT GREATER THAN 5 YEARS  3/3/2021    IR TUNNELED CATHETER PLACEMENT GREATER THAN 5 YEARS [facility]    ROTATOR CUFF REPAIR Left 06/2010    SLEEVE GASTRECTOMY  04/16/2018    robotic assisted gastric sleeve, hiatal hernia repair    THYROIDECTOMY  1993       FAMILY HISTORY    Family History   Problem Relation Age of Onset    Kidney Disease Mother         \"Kidney Failure\"    Heart Disease Mother         CHF    Arthritis Mother     Diabetes Mother     Depression Mother     High Blood Pressure Mother     Obesity Mother     Vision Loss Mother     Heart Attack Father     Heart Disease Father         Heart Attack    Diabetes Father     High Blood Pressure Father     High Blood Pressure Daughter        SOCIAL HISTORY    Social History     Tobacco Use    Smoking status: Never    Smokeless tobacco: Never   Vaping Use    Vaping Use: Never used   Substance Use Topics    Alcohol use: No    Drug use: No       ALLERGIES    Allergies   Allergen Reactions    Percocet [Oxycodone-Acetaminophen]      hallucinations    Tramadol Itching    Vicodin [Hydrocodone-Acetaminophen] Itching    Narcan [Naloxone Hcl] Anxiety     Feels like out of body, things are speeding       MEDICATIONS    No current facility-administered medications on file prior to encounter.      Current Outpatient Medications on File Prior to Encounter Medication Sig Dispense Refill    MUCUS RELIEF 600 MG extended release tablet TAKE 1 TABLET BY MOUTH TWICE A DAY 60 tablet 3    diclofenac sodium (VOLTAREN) 1 % GEL APPLY TOPICALLY 2 TIMES DAILY APPLY TO LOW BACK AND FLANK AREA TWICE A  g 1    sevelamer (RENVELA) 800 MG tablet Take one tablet by mouth three times a day and 1 tablet by mouth with snack 360 tablet 3    tiotropium (SPIRIVA RESPIMAT) 2.5 MCG/ACT AERS inhaler Inhale 2 puffs into the lungs daily 1 each 3    montelukast (SINGULAIR) 10 MG tablet Take 1 tablet by mouth nightly 30 tablet 1    ondansetron (ZOFRAN) 4 MG tablet Take 1 tablet by mouth every 8 hours as needed for Nausea or Vomiting 20 tablet 0    acetaminophen (TYLENOL) 500 MG tablet Take 1 tablet by mouth 3 times daily as needed for Pain 90 tablet 1    ipratropium-albuterol (DUONEB) 0.5-2.5 (3) MG/3ML SOLN nebulizer solution Inhale 3 mLs into the lungs every 4 hours 360 mL 2    amLODIPine (NORVASC) 5 MG tablet Take 1 tablet by mouth daily 90 tablet 3    promethazine (PHENERGAN) 25 MG tablet Take 1 tablet by mouth daily for 10 days. 10 tablet 0    midodrine (PROAMATINE) 10 MG tablet TAKE 1 TABLET BY MOUTH DAILY TAKE ONE TO TWO TABLET THREE TIMES A WEEK PRIOR TO EACH DIALYSIS TREATMENT 36 tablet 3    Adalimumab (HUMIRA PEN) 40 MG/0.4ML PNKT Humira(CF) Pen 40 mg/0.4 mL subcutaneous kit      benzonatate (TESSALON) 100 MG capsule Take 100 mg by mouth 3 times daily as needed for Cough      fluocinolone (DERMA-SMOOTHE) 0.01 % external oil Apply topically 3 times daily Apply topically. hydrOXYzine (ATARAX) 25 MG tablet Take 25 mg by mouth 3 times daily as needed for Itching      lidocaine viscous hcl (XYLOCAINE) 2 % SOLN solution Take 15 mLs by mouth as needed for Irritation      mupirocin (BACTROBAN) 2 % ointment Apply topically 3 times daily Apply topically 3 times daily.       predniSONE (DELTASONE) 20 MG tablet Take 20 mg by mouth daily      sulfacetamide (BLEPH-10) 10 % ophthalmic ointment every 6 hours Every 6 hours. aspirin (ASPIRIN CHILDRENS) 81 MG chewable tablet Take 1 tablet by mouth daily 60 tablet 0    Tiotropium Bromide-Olodaterol (STIOLTO RESPIMAT) 2.5-2.5 MCG/ACT AERS Inhale 2 puffs into the lungs daily 1 Inhaler 3    Multiple Vitamin (DAILY-NICOLE) TABS TAKE ONE TABLET BY MOUTH DAILY 30 tablet 0    ketoconazole (NIZORAL) 2 % shampoo Apply topically daily as needed. 1 Bottle 0    allopurinol (ZYLOPRIM) 100 MG tablet Take 2 tablets by mouth daily 30 tablet 3    promethazine (PHENERGAN) 25 MG tablet Take 25 mg by mouth every 6 hours as needed for Nausea      REFRESH OPTIVE 0.5-0.9 % SOLN Place 2 drops into both eyes 2 times daily      levothyroxine (SYNTHROID) 100 MCG tablet Take 100 mcg by mouth daily      ketoconazole (NIZORAL) 2 % cream Apply topically daily Apply topically daily. triamcinolone (ARISTOCORT) 0.5 % cream Apply topically 3 times daily Apply topically 3 times daily. pantoprazole (PROTONIX) 40 MG tablet Take 1 tablet by mouth 2 times daily 60 tablet 3    LORazepam (ATIVAN) 0.5 MG tablet Take 0.5 mg by mouth 2 times daily.       atorvastatin (LIPITOR) 80 MG tablet Take 80 mg by mouth nightly      ranolazine (RANEXA) 500 MG extended release tablet Take 500 mg by mouth 2 times daily      Nebulizer MISC Inhale into the lungs as needed      QUEtiapine (SEROQUEL XR) 300 MG extended release tablet Take 50 mg by mouth nightly       levomilnacipran (FETZIMA) 40 MG CP24 extended release capsule Take 1 capsule by mouth daily (Patient taking differently: Take 40 mg by mouth nightly) 30 capsule 0    nitroGLYCERIN (NITROSTAT) 0.4 MG SL tablet Place 0.4 mg under the tongue every 5 minutes as needed for Chest pain      fluticasone (FLONASE) 50 MCG/ACT nasal spray 2 sprays by Nasal route every morning            Objective:      BP (!) 147/72   Pulse 99   Temp 97.5 °F (36.4 °C) (Oral)   Resp 20   Ht 5' 4\" (1.626 m)   Wt 180 lb 11.2 oz (82 kg)   SpO2 96%   BMI 31.02 kg/m²   Manuel Risk Score: Manuel Scale Score: 12    LABS    CBC:   Lab Results   Component Value Date/Time    WBC 9.6 01/03/2023 06:50 AM    RBC 4.37 01/03/2023 06:50 AM    HGB 12.5 01/03/2023 06:50 AM    HCT 39.8 01/03/2023 06:50 AM    MCV 91.1 01/03/2023 06:50 AM    MCH 28.6 01/03/2023 06:50 AM    MCHC 31.4 01/03/2023 06:50 AM    RDW 16.0 01/03/2023 06:50 AM     01/03/2023 06:50 AM    MPV 13.0 01/03/2023 06:50 AM     CMP:    Lab Results   Component Value Date/Time     01/03/2023 06:50 AM    K 5.0 01/03/2023 06:50 AM    CL 97 01/03/2023 06:50 AM    CO2 30 01/03/2023 06:50 AM    BUN 54 01/03/2023 06:50 AM    CREATININE 4.7 01/03/2023 06:50 AM    GFRAA 8 01/12/2022 01:39 PM    AGRATIO 1.8 10/06/2017 12:15 PM    LABGLOM 9 01/03/2023 06:50 AM    GLUCOSE 89 01/03/2023 06:50 AM    PROT 6.6 12/27/2022 05:00 PM    PROT 7.1 02/16/2011 01:20 PM    LABALBU 4.2 12/27/2022 05:00 PM    CALCIUM 8.9 01/03/2023 06:50 AM    BILITOT 0.2 12/27/2022 05:00 PM    ALKPHOS 102 12/27/2022 05:00 PM    AST 21 12/27/2022 05:00 PM    ALT 14 12/27/2022 05:00 PM     Albumin:    Lab Results   Component Value Date/Time    LABALBU 4.2 12/27/2022 05:00 PM     PT/INR:    Lab Results   Component Value Date/Time    PROTIME 10.7 12/06/2022 12:22 PM    INR 0.83 12/06/2022 12:22 PM     HgBA1c:    Lab Results   Component Value Date/Time    LABA1C 5.5 09/02/2021 10:30 AM         Assessment:     Patient Active Problem List   Diagnosis    CAD (coronary artery disease)    Nausea & vomiting    Hypothyroidism, adult    Acute on chronic renal failure (HCC)    Type 2 diabetes mellitus without complication (HCC)    Hypercalcemia    Chronic venous hypertension (idiopathic) with inflammation of bilateral lower extremity    Fluid overload    Morbid obesity with BMI of 45.0-49.9, adult ([facility]    Hyperglycemia    Chronic fatigue    Solitary pulmonary nodule    Acute metabolic encephalopathy    Chronic diastolic heart failure (HCC)    YRIS (obstructive sleep apnea)    Hiatal hernia    Status post laparoscopic sleeve gastrectomy    Status post bariatric surgery    WD-Chronic buttock pain    Essential hypertension    ESRD (end stage renal disease) (HCC)    ESRD needing dialysis ([facility] [address]    Type 2 diabetes mellitus with hyperglycemia, with long-term current use of insulin (HCC)    Fistula    Cerebrovascular accident (CVA) due to occlusion of precerebral artery (HCC)    Lymphedema of left upper extremity    History of progressive weakness    Acute deep vein thrombosis (DVT) of brachial vein of right upper extremity (HCC)    Severe malnutrition (HCC)    Generalized anxiety disorder    Hospice care       Measurements:  Wound 01/04/23 Sacrum (Active)   Wound Image   01/04/23 1137   Wound Etiology Pressure Stage 2 01/04/23 1137   Dressing Status New dressing applied 01/04/23 1137   Wound Cleansed Cleansed with saline 01/04/23 1137   Dressing/Treatment Collagen;Silicone border 07/93/50 1137   Wound Length (cm) 1.7 cm 01/04/23 1137   Wound Width (cm) 0.7 cm 01/04/23 1137   Wound Depth (cm) 0.1 cm 01/04/23 1137   Wound Surface Area (cm^2) 1.19 cm^2 01/04/23 1137   Wound Volume (cm^3) 0.119 cm^3 01/04/23 1137   Distance Tunneling (cm) 0 cm 01/04/23 1137   Tunneling Position ___ O'Clock 0 01/04/23 1137   Undermining Starts ___ O'Clock 0 01/04/23 1137   Undermining Ends___ O'Clock 0 01/04/23 1137   Undermining Maxium Distance (cm) 0 01/04/23 1137   Wound Assessment Pink/red 01/04/23 1137   Drainage Amount Small 01/04/23 1137   Drainage Description Serosanguinous 01/04/23 1137   Odor None 01/04/23 1137   Elsy-wound Assessment Intact 01/04/23 1137   Margins Defined edges 01/04/23 1137   Wound Thickness Description not for Pressure Injury Partial thickness 01/04/23 1137   Number of days: 0       Response to treatment:  Well tolerated by patient.      Pain Assessment:  Severity:  none  Quality of pain:   Wound Pain Timing/Severity:   Premedicated: no    Plan:     Plan of Care: Wound 01/04/23 Sacrum-Dressing/Treatment: Collagen, Silicone border    Patient in bed agreeable to wound care eval. Pt has a sacral wound pressure injury stage 2. Cleansed with NS. Measured and pictured. Applied collagen and foam border. Heels intact and floated. Pt turned to lt side with pillow support. Atmos air pump placed to the bed. Pt is at high risk for skin breakdown AEB lindy. Follow lindy orders. Ordered a skin guard float mattress. Specialty Bed Required : yes  [x] Low Air Loss   [x] Pressure Redistribution  [] Fluid Immersion  [] Bariatric  [] Total Pressure Relief  [] Other:     Discharge Plan:  Placement for patient upon discharge: tbd  Hospice Care:yes  Patient appropriate for Outpatient [address]: no    Patient/Caregiver Teaching:  Level of patient/caregiver understanding able to: pt not appropriate for teaching. Electronically signed by Skyler Aguilar.  NIKI Cassidy,  on 1/4/2023 at 2:13 PM

## 2023-01-04 NOTE — PROGRESS NOTES
Nephrology Progress Note  1/4/2023 3:52 PM  Subjective: Interval History: Mallie Nyhan is a 81 y.o. female with seen this morning and appears to want to stay at peace with pain management overall and hospice care        Data:   Scheduled Meds:  Continuous Infusions:      CBC   Recent Labs     01/02/23  0600 01/03/23  0650   WBC 8.5 9.6   HGB 11.5* 12.5   HCT 35.7* 39.8    255      BMP   Recent Labs     01/02/23  0600 01/03/23  0650    136   K 5.3* 5.0   CL 97* 97*   CO2 26 30   BUN 87* 54*   CREATININE 5.9* 4.7*     Hepatic: No results for input(s): AST, ALT, ALB, BILITOT, ALKPHOS in the last 72 hours. Troponin: No results for input(s): TROPONINI in the last 72 hours. BNP: No results for input(s): BNP in the last 72 hours. Lipids: No results for input(s): CHOL, HDL in the last 72 hours. Invalid input(s): LDLCALCU  ABGs:   Lab Results   Component Value Date/Time    PO2ART 58 09/21/2017 12:00 AM    MOL1OBX 44.0 09/21/2017 12:00 AM     INR: No results for input(s): INR in the last 72 hours.   Renal Labs  Albumin:    Lab Results   Component Value Date/Time    LABALBU 4.2 12/27/2022 05:00 PM     Calcium:    Lab Results   Component Value Date/Time    CALCIUM 8.9 01/03/2023 06:50 AM     Phosphorus:    Lab Results   Component Value Date/Time    PHOS 7.6 02/17/2021 06:11 AM     U/A:    Lab Results   Component Value Date/Time    NITRU NEGATIVE 12/27/2022 05:45 PM    NITRU Negative 07/10/2019 11:09 AM    COLORU YELLOW 12/27/2022 05:45 PM    PHUR 6.0 07/10/2019 11:09 AM    WBCUA 265 12/27/2022 05:45 PM    RBCUA 48 12/27/2022 05:45 PM    MUCUS RARE 12/27/2022 05:45 PM    TRICHOMONAS NONE SEEN 12/27/2022 05:45 PM    BACTERIA FEW 12/27/2022 05:45 PM    CLARITYU CLOUDY 12/27/2022 05:45 PM    SPECGRAV 1.020 12/27/2022 05:45 PM    UROBILINOGEN 0.2 12/27/2022 05:45 PM    BILIRUBINUR NEGATIVE 12/27/2022 05:45 PM    BLOODU SMALL NUMBER OR AMOUNT OBSERVED 12/27/2022 05:45 PM    GLUCOSEU Negative 07/10/2019 11:09 AM    KETUA NEGATIVE 12/27/2022 05:45 PM     ABG:    Lab Results   Component Value Date/Time    GXY0JWT 44.0 09/21/2017 12:00 AM    PO2ART 58 09/21/2017 12:00 AM    JNA0LMR 28.5 09/21/2017 12:00 AM     HgBA1c:    Lab Results   Component Value Date/Time    LABA1C 5.5 09/02/2021 10:30 AM     Microalbumen/Creatinine ratio:  No components found for: RUCREAT  TSH:    Lab Results   Component Value Date/Time    TSH 2.19 10/06/2017 12:15 PM     IRON:    Lab Results   Component Value Date/Time    IRON 53 12/01/2017 09:15 AM     Iron Saturation:  No components found for: PERCENTFE  TIBC:    Lab Results   Component Value Date/Time    TIBC 367 12/01/2017 09:15 AM     FERRITIN:    Lab Results   Component Value Date/Time    FERRITIN 17 12/01/2017 09:15 AM     RPR:  No results found for: RPR  MADDY:    Lab Results   Component Value Date/Time    MADDY None Detected 06/27/2022 11:00 AM     24 Hour Urine for Creatinine Clearance:  No components found for: CREAT4, UHRS10, UTV10      Objective:   I/O: No intake/output data recorded. I/O last 3 completed shifts: In: 120 [P.O.:120]  Out: -   No intake/output data recorded. Vitals: BP (!) 147/72   Pulse 99   Temp 97.5 °F (36.4 °C) (Oral)   Resp 20   Ht 5' 4\" (1.626 m)   Wt 180 lb 11.2 oz (82 kg)   SpO2 96%   BMI 31.02 kg/m²  {  General appearance: awake weak  HEENT: Head: Normal, normocephalic, atraumatic.   Neck: supple, symmetrical, trachea midline  Lungs: diminished breath sounds bilaterally  Heart: S1, S2 normal  Abdomen: abnormal findings:  soft nt  Extremities: edema trace  Neurologic: Mental status: alertness: alert        Assessment and Plan:      IMP:  #1 end-stage renal disease on dialysis Monday Friday   #2 history of CVA with possible myasthenia gravis   #3  Hypertension   #4 UTI  #5 positive DVT    Plan     #1 stop all further dialysis  #2 no further work-up neuro status  #3 changed to DNR CC comfort care and discharge to inpatient hospice  Will follow-up as needed agree with current treatment plan           Apple High MD, MD

## 2023-01-04 NOTE — PROGRESS NOTES
Daily Progress Note  Subjective:  Patient awake, family at bedside  Patient and family have decided to pursue hospice at this time   SR on tele, HR and BP stable     Attending Note:    She is awake but weak  Hospice has been consulted  DVT right arm on  AC  Hx of ESRD on HD  MG on treatment  Renal note reviewed  Pneumonia noted   Overall prognoss is guarded     Impression and Plan:   DVT    DVT noted in US of Right arm     Eliquis 5mg BID     Weakness    Increase in weakness over the last couple of weeks at [facility]    Workup for MG in progress     Difficulty swallowing now; has been improving; on regular diet    Neuro ordered MRI; result noted     Neuro following      Multifocal PNA    Noted on CT chest    ABX per primary    Negative viral respiratory panel     Treatment per primary     ESRD    Renal following;     HD yesterday     Normal M and F    Patient has elected to stop dialysis and pursue hospice      Hx of CAD    PCI back in 2015 at [facility] to OM    Continue on Aspirin, lipitor, and lopressor    Currently on Ranexa    Troponin elevated; multifactorial; secondary to renal function and infection       Most Recent Echo  Echo 12/30/22   Left ventricular systolic function is normal.   Ejection fraction is visually estimated at 60%. Mild left ventricular hypertrophy. Grade I diastolic dysfunction. Central line visualized terminating in the right atrium. Sclerotic, but non-stenotic aortic valve. Mild tricuspid regurgitation is present. RVSP is 40 mmHg. No evidence of any pericardial effusion. Most Recent Stress test  Stress 2/16/21   Summary    No EKG changes suggestive of ischemia with Lexiscan infusion. Normal perfusion uptake noted    no reversible ischemia noted    gating shows EF 60%       Radiology  US of right arm 12/22    Impression:       1. Acute DVT involving the brachial vein near the antecubital fossa. 2. Acute SVT involving the cephalic and basilic veins. 3. Clotted fistula.    4. Indeterminate 17 x 1.5 x 2.2 cm area of fluid in the right upper extremity. Findings were discussed with Go Martinez at 10:17 p.m. on 2022       Ct abdomen           Impression:         1. Bilateral lower lobe consolidations concerning for multifocal pneumonia. Radiographic follow-up recommended to document resolution following treatment. 2.  Probable constipation. Colonic diverticulosis without diverticulitis. 3.  Mild nonspecific edema within the perirectal fat without appreciable wall   thickening. Correlation with digital rectal exam may be helpful. 4.  Nonspecific edema/skin thickening within the left lateral breast and   chest wall which may be iatrogenic or related to recent trauma. Follow-up   mammography may be useful as clinically warranted. 5.  Additional nonemergent findings, as above. PAST MEDICAL HISTORY:  Acid reflux, anemia, arthritis, CAD, CVA, CHF,  chronic back pain, chronic constipation, chronic kidney disease,  end-stage renal disease, COPD, diabetes, emphysema, hypertension,  hyperlipidemia, rheumatoid arthritis, sleep apnea, thyroid disease. PAST SURGICAL HISTORY:  Appendectomy, AV fistula creation, back surgery,  carpal tunnel release, , coronary angioplasty, dental surgery,  hysterectomy, rotator cuff repair, gastric sleeve, thyroidectomy. SOCIAL HISTORY:  She does not smoke, denies any illicit drugs, does not  use any alcohol. ALLERGIES:  She has allergies to PERCOCET, TRAMADOL, VICODIN, and  NARCAN.       Objective:   BP (!) 147/72   Pulse 99   Temp 97.5 °F (36.4 °C) (Oral)   Resp 20   Ht 5' 4\" (1.626 m)   Wt 180 lb 11.2 oz (82 kg)   SpO2 96%   BMI 31.02 kg/m²   No intake or output data in the 24 hours ending 23 0951    Medications:   Scheduled Meds:   pyridostigmine  60 mg Oral 3 times per day    mirtazapine  15 mg Oral Nightly    sucralfate  1 g Oral 2 times per day    betamethasone dipropionate   Topical Daily apixaban  5 mg Oral BID    atorvastatin  20 mg Oral Nightly    metoprolol tartrate  25 mg Oral BID    cloNIDine  0.1 mg Oral BID    amLODIPine  5 mg Oral Daily    aspirin  81 mg Oral Daily    levothyroxine  100 mcg Oral Daily    montelukast  10 mg Oral Nightly    pantoprazole  40 mg Oral BID AC    predniSONE  20 mg Oral Daily    QUEtiapine  50 mg Oral Nightly    ranolazine  500 mg Oral BID    sevelamer  800 mg Oral TID WC    tiotropium-olodaterol  2 puff Inhalation Daily    sodium chloride flush  5-40 mL IntraVENous 2 times per day    allopurinol  200 mg Oral Daily    levomilnacipran  40 mg Oral Nightly    LORazepam  0.5 mg Oral BID      Infusions:   sodium chloride 25 mL (01/01/23 8277)      PRN Meds:  HYDROmorphone, LORazepam, traMADol, melatonin, ipratropium-albuterol, sodium chloride flush, sodium chloride, ondansetron **OR** ondansetron, polyethylene glycol     Physical Exam:  Vitals:    01/04/23 0755   BP: (!) 147/72   Pulse: 99   Resp: 20   Temp: 97.5 °F (36.4 °C)   SpO2: 96%        General: AAO, NAD  Chest: Nontender  Cardiac: First and Second Heart Sounds are Normal, No Murmurs or Gallops noted  Lungs:Clear to auscultation and percussion. Abdomen: Soft, NT, ND, +BS  Extremities: No clubbing, no edema  Vascular:  Equal 2+ peripheral pulses. Lab Data:  CBC:   Recent Labs     01/02/23  0600 01/03/23  0650   WBC 8.5 9.6   HGB 11.5* 12.5   HCT 35.7* 39.8   MCV 89.7 91.1    255     BMP:   Recent Labs     01/02/23  0600 01/03/23  0650    136   K 5.3* 5.0   CL 97* 97*   CO2 26 30   BUN 87* 54*   CREATININE 5.9* 4.7*     LIVER PROFILE: No results for input(s): AST, ALT, LIPASE, BILIDIR, BILITOT, ALKPHOS in the last 72 hours. Invalid input(s): AMYLASE,  ALB  PT/INR: No results for input(s): PROTIME, INR in the last 72 hours. APTT: No results for input(s): APTT in the last 72 hours. BNP:  No results for input(s): BNP in the last 72 hours.       Assessment:  Patient Active Problem List Diagnosis Date Noted    Moderate malnutrition ([facility] 01/03/2023    Acute deep vein thrombosis (DVT) of brachial vein of right upper extremity ([address] 12/29/2022    History of progressive weakness 12/27/2022    Strain of muscle, fascia and tendon of long head of biceps, left arm, initial encounter 09/23/2021    Lymphedema of left upper extremity 08/21/2021    Aphagia 02/15/2021    Cerebrovascular accident (CVA) due to occlusion of precerebral artery ([address] 10/08/2020    Fistula 05/11/2020    ESRD (end stage renal disease) ([address] 07/17/2019    ESRD needing dialysis ([address] 07/17/2019    Type 2 diabetes mellitus with hyperglycemia, with long-term current use of insulin ([address] 07/17/2019    Class 3 severe obesity due to excess calories with body mass index (BMI) of 40.0 to 44.9 in adult ([address] 07/17/2019    Essential hypertension 06/05/2019    Chronic kidney disease, stage IV (severe) ([address] 01/18/2019    WD-Chronic buttock pain 05/09/2018    Status post bariatric surgery 04/25/2018    Status post laparoscopic sleeve gastrectomy 04/20/2018    Hiatal hernia     YRIS (obstructive sleep apnea) 02/02/2018    Acute renal failure (ARF) ([address] 10/13/2017    Acute encephalopathy 10/13/2017    Acute hypercapnic respiratory failure ([address] 81/73/1350    Acute metabolic encephalopathy 49/61/4585    Chronic diastolic heart failure ([facility] [address] 09/19/2017    Solitary pulmonary nodule     Morbid obesity with BMI of 45.0-49.9, adult ([address] 05/17/2017    Diabetes 1.5, managed as type 2 ([address] 05/17/2017    Hyperglycemia 05/17/2017    Chronic fatigue 05/17/2017    Acute on chronic diastolic heart failure (HCC) 02/04/2017    Fluid overload 02/03/2017    Chronic venous hypertension (idiopathic) with inflammation of bilateral lower extremity 08/05/2016    Hypercalcemia 08/04/2016    Hypertensive kidney disease with CKD stage III ([address] 05/31/2016    Type 2 diabetes mellitus without complication ([facility] 74/71/7541    Acute on chronic renal failure ([facility] 02/18/2015 CAD (coronary artery disease) 02/17/2015    Nausea & vomiting 02/17/2015    Hypothyroidism, adult 02/17/2015       Electronically signed by FIDEL Marsh CNP on 1/4/2023 at 9:51 AM     Electronically signed by Oliverio Lainez MD on 1/4/23 at 11:09 AM EST

## 2023-01-04 NOTE — CONSULTS
[address] [place] [address] Consult Note    Date: 1/4/2023  Name: Jessica Correa  MRN: [id number]  YOB: 1950   Patient's PCP: Edmundo Burkett MD  Consultants during acute care: Cardiology, Neurology, Nephrology   Referring Physician: Dr Deepa Castillo care admission date: 12/27/2022     Informant: Chart reviewed, discussed with hospice nurse liaison. I met with the patient at the bedside. There are no family here at the time of my visit. CC: \"I want to be at peace\"    Ewiiaapaayp: This is a 81 y.o. female resident at [facility] with a history of end stage renal disease on twice weekly hemodialysis (likely due to hypertension and DM), prior CVA in 2020, coronary artery disease with prior PCI, congestive heart failure, hypertension, Klebsiella UTI, prior cervical spine surgery with chronic neck and back pain, COPD, GERD, Rheumatoid Arthritis, Type 2 diabetes mellitus, glaucoma, who was admitted on 12/27/2022 with progressive lower extremity weakness. The patient has been seen by Neurology with concern for myasthenia gravis. The patient has been started on Pyridostigmine while awaiting results of the Myasthenia gravis panel. The patient has had dysphagia and was treated for aspiration pneumonitis and is on oxygen and completing course of Ceftriaxone and Azithromycin. Dr Rissa Gay has followed, and documents discussion with the patient and family regarding hemodialysis, and that the patient wants to stop hemodialysis. The patient has refused oral medications 1/3/23 and she is having some trouble swallowing. The patient is somewhat slow to respond, but does answer my questions. She clearly tells me that she \"wants to be at peace\". When I ask what she means, she states \"no more pain\". She does not want to continue hemodialysis. She was aware that Dr Rissa Gay asked hospice to visit and understood that I am a hospice physician.  She is aware that someone is meeting with her family later today. The patient tells me she has not been able to walk for several months. She has difficulty swallowing. She has generalized pain, and prior cervical spine surgery with chronic pain. She is chronically on Prednisone per the med list. The patient has had 3 doses of Hydromorphone IV in the past 24 hours. The hospice nurse liaison met with the patient's family. The family is agreeable to the patient's decision for comfort care and stopping hemodialysis. Hospice philosophy was discussed regarding care and comfort at the end of life. Questions were answered, and emotional support was provided. It was discussed that [address] is for management of symptoms, and that if the patient stabilizes, alternate arrangements for care will be needed They are aware that Hospice does not provide for the patients 24 hour care giving needs at home or ECF. The patient is DNR-comfort care status.     Past Medical History:   Diagnosis Date    Acid reflux     Anemia     Arthritis     \"Shoulders, Hips And Knees\"    CAD (coronary artery disease)     Sees Dr. Murphy American    Cerebral artery occlusion with cerebral infarction ([facility]     CHF (congestive heart failure) (HCC)     Chronic back pain     Chronic constipation     Chronic kidney disease     Sees Dr. Erica Marie    COPD (chronic obstructive pulmonary disease) ([facility]     Sees Dr. Sally Francisco    Depression     Diabetes mellitus [facility]) Dx 2012    Sees Dr. Satish Calix    Emphysema lung [facility])     Glaucoma     \"Both Eyes\"    Gout     Hemodialysis patient ([facility]     Hiatal hernia     History of blood transfusion 1960's    No Reaction To Blood Transfusion Received    Kake (hard of hearing)     Bilateral Ears    Hyperlipidemia     Hypertension     Dr. Alis Gipson    Itching     \"Whole Body Itches The Majority Of The Time\"    Morbid obesity ([facility]     Rheumatoid arthritis ([facility]     Shortness of breath on exertion     Sleep apnea     Uses CPAP    Teeth missing     Upper And Lower    Thyroid disease     Thyroidectomy In     Urinary incontinence     WD-Chronic buttock pain 2018    Wears glasses        Past Surgical History:   Procedure Laterality Date    APPENDECTOMY  1968    AV FISTULA CREATION Left     BACK SURGERY  2017    Lower Back With Hardware    BACK SURGERY  2016    Cervical Back With Hardware    CARPAL TUNNEL RELEASE Right 1993     SECTION  3-30-68 And 10-17-69    COLONOSCOPY  2017    Polyps Removed    CORONARY ANGIOPLASTY  2016    Heart Stent D Circ    DENTAL SURGERY      Teeth Extracted In Past    DILATION AND CURETTAGE OF UTERUS  Last Done In [address] [place] [address] 281    \"Numerous\"    ENDOSCOPY, COLON, DIAGNOSTIC  2017    HYSTERECTOMY (CERVIX STATUS UNKNOWN)  1990    Partial Abdominal Hysterectomy    IR NONTUNNELED VASCULAR CATHETER  2022    IR NONTUNNELED VASCULAR CATHETER 2022 [facility] SPECIAL PROCEDURES    IR TUNNELED CATHETER PLACEMENT GREATER THAN 5 YEARS  2019    IR TUNNELED CATHETER PLACEMENT GREATER THAN 5 YEARS [facility]    IR TUNNELED CATHETER PLACEMENT GREATER THAN 5 YEARS  3/3/2021    IR TUNNELED CATHETER PLACEMENT GREATER THAN 5 YEARS [facility] SPECIAL PROCEDURES    ROTATOR CUFF REPAIR Left 2010    SLEEVE GASTRECTOMY  2018    robotic assisted gastric sleeve, hiatal hernia repair    THYROIDECTOMY         Social History     Socioeconomic History    Marital status: Single     Spouse name: Not on file    Number of children: 2    Years of education: Not on file    Highest education level: Not on file   Occupational History    Not on file   Tobacco Use    Smoking status: Never    Smokeless tobacco: Never   Vaping Use    Vaping Use: Never used   Substance and Sexual Activity    Alcohol use: No    Drug use: No    Sexual activity: Never   Other Topics Concern    Not on file   Social History Narrative    Not on file     Social Determinants of Health     Financial Resource Strain: Not on file Food Insecurity: Not on file   Transportation Needs: Not on file   Physical Activity: Not on file   Stress: Not on file   Social Connections: Not on file   Intimate Partner Violence: Not on file   Housing Stability: Not on file       Family History   Problem Relation Age of Onset    Kidney Disease Mother         \"Kidney Failure\"    Heart Disease Mother         CHF    Arthritis Mother     Diabetes Mother     Depression Mother     High Blood Pressure Mother     Obesity Mother     Vision Loss Mother     Heart Attack Father     Heart Disease Father         Heart Attack    Diabetes Father     High Blood Pressure Father     High Blood Pressure Daughter        Allergies   Allergen Reactions    Percocet [Oxycodone-Acetaminophen]      hallucinations    Tramadol Itching    Vicodin [Hydrocodone-Acetaminophen] Itching    Narcan [Naloxone Hcl] Anxiety     Feels like out of body, things are speeding       Medication list reviewed  Prior to Admission medications    Medication Sig Start Date End Date Taking?  Authorizing Provider   MUCUS RELIEF 600 MG extended release tablet TAKE 1 TABLET BY MOUTH TWICE A DAY 11/29/22   Jai Kamara MD   diclofenac sodium (VOLTAREN) 1 % GEL APPLY TOPICALLY 2 TIMES DAILY APPLY TO LOW BACK AND FLANK AREA TWICE A DAY 11/28/22   Sidney Bowen MD   sevelamer (RENVELA) 800 MG tablet Take one tablet by mouth three times a day and 1 tablet by mouth with snack 10/5/22   Smitha Poole MD   tiotropium (SPIRIVA RESPIMAT) 2.5 MCG/ACT AERS inhaler Inhale 2 puffs into the lungs daily 8/24/22   Korey Null MD   montelukast (SINGULAIR) 10 MG tablet Take 1 tablet by mouth nightly 8/5/22   Korey Null MD   ondansetron (ZOFRAN) 4 MG tablet Take 1 tablet by mouth every 8 hours as needed for Nausea or Vomiting 7/5/22   Smitha Poole MD   acetaminophen (TYLENOL) 500 MG tablet Take 1 tablet by mouth 3 times daily as needed for Pain 6/15/22   Sidney Bowen MD ipratropium-albuterol (DUONEB) 0.5-2.5 (3) MG/3ML SOLN nebulizer solution Inhale 3 mLs into the lungs every 4 hours 5/23/22   Jai Nolen MD   amLODIPine (NORVASC) 5 MG tablet Take 1 tablet by mouth daily 4/7/22   Reinaldo Mendez MD   promethazine (PHENERGAN) 25 MG tablet Take 1 tablet by mouth daily for 10 days. 4/7/22   Alberto Henderson MD   midodrine (PROAMATINE) 10 MG tablet TAKE 1 TABLET BY MOUTH DAILY TAKE ONE TO TWO TABLET THREE TIMES A WEEK PRIOR TO EACH DIALYSIS TREATMENT 1/18/22   Reinaldo Mendez MD   Adalimumab (HUMIRA PEN) 40 MG/0.4ML PNKT Humira(CF) Pen 40 mg/0.4 mL subcutaneous kit    Historical Provider, MD   benzonatate (TESSALON) 100 MG capsule Take 100 mg by mouth 3 times daily as needed for Cough    Historical Provider, MD   fluocinolone (DERMA-SMOOTHE) 0.01 % external oil Apply topically 3 times daily Apply topically. Historical Provider, MD   hydrOXYzine (ATARAX) 25 MG tablet Take 25 mg by mouth 3 times daily as needed for Itching    Historical Provider, MD   lidocaine viscous hcl (XYLOCAINE) 2 % SOLN solution Take 15 mLs by mouth as needed for Irritation    Historical Provider, MD   mupirocin (BACTROBAN) 2 % ointment Apply topically 3 times daily Apply topically 3 times daily. Historical Provider, MD   predniSONE (DELTASONE) 20 MG tablet Take 20 mg by mouth daily    Historical Provider, MD   sulfacetamide (BLEPH-10) 10 % ophthalmic ointment every 6 hours Every 6 hours. Historical Provider, MD   aspirin (ASPIRIN CHILDRENS) 81 MG chewable tablet Take 1 tablet by mouth daily 2/17/21   Dorothy Singh MD   Tiotropium Bromide-Olodaterol (STIOLTO RESPIMAT) 2.5-2.5 MCG/ACT AERS Inhale 2 puffs into the lungs daily 10/27/20   Chris Buenrostro MD   Multiple Vitamin (DAILY-NICOLE) TABS TAKE ONE TABLET BY MOUTH DAILY 1/28/20   Yefri Matias MD   ketoconazole (NIZORAL) 2 % shampoo Apply topically daily as needed.  7/22/19   Fay Elizondo MD   allopurinol (ZYLOPRIM) 100 MG tablet Take 2 tablets by mouth daily 7/23/19   Tramaine Sandoval MD   promethazine (PHENERGAN) 25 MG tablet Take 25 mg by mouth every 6 hours as needed for Nausea    Historical Provider, MD   REFRESH OPTIVE 0.5-0.9 % SOLN Place 2 drops into both eyes 2 times daily 6/17/19   Historical Provider, MD   levothyroxine (SYNTHROID) 100 MCG tablet Take 100 mcg by mouth daily 7/13/19   Historical Provider, MD   ketoconazole (NIZORAL) 2 % cream Apply topically daily Apply topically daily. Historical Provider, MD   triamcinolone (ARISTOCORT) 0.5 % cream Apply topically 3 times daily Apply topically 3 times daily. Historical Provider, MD   pantoprazole (PROTONIX) 40 MG tablet Take 1 tablet by mouth 2 times daily 4/20/18   FIDEL Shell - CNP   LORazepam (ATIVAN) 0.5 MG tablet Take 0.5 mg by mouth 2 times daily.     Historical Provider, MD   atorvastatin (LIPITOR) 80 MG tablet Take 80 mg by mouth nightly    Historical Provider, MD   ranolazine (RANEXA) 500 MG extended release tablet Take 500 mg by mouth 2 times daily    Historical Provider, MD   Nebulizer MISC Inhale into the lungs as needed    Historical Provider, MD   QUEtiapine (SEROQUEL XR) 300 MG extended release tablet Take 50 mg by mouth nightly     Historical Provider, MD   levomilnacipran (FETZIMA) 40 MG CP24 extended release capsule Take 1 capsule by mouth daily  Patient taking differently: Take 40 mg by mouth nightly 10/15/17   Lexie Campbell MD   nitroGLYCERIN (NITROSTAT) 0.4 MG SL tablet Place 0.4 mg under the tongue every 5 minutes as needed for Chest pain    Historical Provider, MD   fluticasone (FLONASE) 50 MCG/ACT nasal spray 2 sprays by Nasal route every morning     Historical Provider, MD       ROS: As noted in [address], all other systems are negative or as follows:  Constitutional: generally weak, no fever, chills, + weight loss  HEENT:  No acute visual changes, nasal drainage,   CV:  No chest pain, palpitations  PULM:  + cough, occasional shortness of breath, no hemoptysis  GI:  poor appetite  :  ESRD  Musculoskeletal:  right upper extremity AV fistula and right upper extremity DVT  Neuro: diffuse weakness with concerns for Myasthenia gravis and recently started on Pyridostigmine    Weight:    Wt Readings from Last 5 Encounters:   01/02/23 180 lb 11.2 oz (82 kg)   12/30/22 172 lb (78 kg)   12/06/22 180 lb (81.6 kg)   08/24/22 200 lb (90.7 kg)   05/23/22 200 lb (90.7 kg)       Data reviewed 1/4/2023:  Transthoracic Echocardiogram 12/30/22   Left ventricular systolic function is normal.   Ejection fraction is visually estimated at 60%. Mild left ventricular hypertrophy. Grade I diastolic dysfunction. Central line visualized terminating in the right atrium. Sclerotic, but non-stenotic aortic valve. Mild tricuspid regurgitation is present. RVSP is 40 mmHg. No evidence of any pericardial effusion. CT-scan of the brain 12/28/22:  BRAIN/VENTRICLES: There is no acute infarct or acute intracranial hemorrhage present. There is no mass effect or midline shift present. There is no ventriculomegaly or abnormal extra-axial fluid collection present. There is mild periventricular hypoattenuation. US right upper extremity 12/28/22  1. Acute DVT involving the brachial vein near the antecubital fossa. 2. Acute SVT involving the cephalic and basilic veins. 3. Clotted fistula. 4. Indeterminate 17 x 1.5 x 2.2 cm area of fluid in the right upper extremity. MRI C spine 12/30/22  1. Posterior instrumented fusion of C3-T3 with associated laminectomy changes as described above. 2. Mild/moderate multilevel cervical spondylosis. No high-grade spinal canal stenosis. 3. Marrow signal appears T1 hypointense. Findings may related to anemia versus an alternative marrow infiltrative process.      Hepatic Function Panel:    Lab Results   Component Value Date/Time    ALKPHOS 102 12/27/2022 05:00 PM    ALT 14 12/27/2022 05:00 PM    AST 21 12/27/2022 05:00 PM PROT 6.6 12/27/2022 05:00 PM    PROT 7.1 02/16/2011 01:20 PM    BILITOT 0.2 12/27/2022 05:00 PM    BILIDIR 0.2 02/03/2017 06:45 PM    IBILI 0.0 02/03/2017 06:45 PM    LABALBU 4.2 12/27/2022 05:00 PM     CBC:   Recent Labs     01/02/23  0600 01/03/23  0650   WBC 8.5 9.6   HGB 11.5* 12.5   HCT 35.7* 39.8   MCV 89.7 91.1    255     BMP:   Recent Labs     01/02/23  0600 01/03/23  0650    136   K 5.3* 5.0   CL 97* 97*   CO2 26 30   BUN 87* 54*   CREATININE 5.9* 4.7*   GLUCOSE 79 89       Physical Exam:   BP (!) 147/72   Pulse 99   Temp 97.5 °F (36.4 °C) (Oral)   Resp 20   Ht 5' 4\" (1.626 m)   Wt 180 lb 11.2 oz (82 kg)   SpO2 96%   BMI 31.02 kg/m²   General: drowsy but arousable, answers in a soft voice, generally weak  HEENT: Mucous membranes are dry, sclerae are clear   Neck: right dialysis catheter, carotid upstrokes are diminished without bruit, no meningismus   Heart: tachycardic RRR, S1S2, no murmurs  Lungs:  equal coarse breath sounds bilaterally, diminished at the bases, without rales, scattered rhonchi   Abdomen: soft, bowel sounds quietly present, no apparent tenderness, nondistended  Extremities:  No mottling, + edema right arm and feet, right upper extremity AV fistula   Neurologic: generally weak in all extremities able to briefly raise feet off the bed, no clonus    Assessment/Plan:  End stage renal disease with diabetic and hypertensive renal disease choosing to stop hemodialysis. The patient clearly is able to express her wishes and her family agrees. She has diffuse pain, and with dysphagia she is requiring parenteral meds. I will place orders to transition to [facility] for the management of pain, anxiety, congestion, dyspnea. Comfort meds are addressed. Allow comfort feeds as able, and will continue some oral meds as she can tolerate. PPS is 20% and prognosis is guarded.   Hypoxic respiratory failure with aspiration pneumonitis secondary to dysphagia, continue oxygen and comfort meds  Progressive weakness, concern for Myasthenia gravis, continue Pyridostigmine as she can tolerate  Klebsiella UTI, treated  Right upper extremity DVT refusing anticoagulation    Rheumatoid Arthritis, chronically on Prednisone, continue as able  Hypothyroidism, on Levothyroxine as able    Patient Active Problem List   Diagnosis Code    CAD (coronary artery disease) I25.10    Nausea & vomiting R11.2    Hypothyroidism, adult E03.9    Acute on chronic renal failure (HCC) N17.9, N18.9    Hypertensive kidney disease with CKD stage III ([facility]) I12.9, N18.30    Type 2 diabetes mellitus without complication ([facility]) F31.0    Hypercalcemia E83.52    Chronic venous hypertension (idiopathic) with inflammation of bilateral lower extremity I87.323    Fluid overload E87.70    Acute on chronic diastolic heart failure ([facility]) I50.33    Morbid obesity with BMI of 45.0-49.9, adult ([facility] E66.01, Z68.42    Diabetes 1.5, 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    Hyperglycemia R73.9    Chronic fatigue R53.82    Solitary pulmonary nodule R91.1    Acute hypercapnic respiratory failure ([facility]) R59.71    Acute metabolic encephalopathy L99.59    Chronic diastolic heart failure ([facility]) I50.32    Acute renal failure (ARF) ([facility]) N17.9    Acute encephalopathy G93.40    YRIS (obstructive sleep apnea) G47.33    Hiatal hernia K44.9    Status post laparoscopic sleeve gastrectomy Z98.84    Status post bariatric surgery Z98.84    WD-Chronic buttock pain M79.18, G89.29    Chronic kidney disease, stage IV (severe) ([facility]) N18.4    Essential hypertension I10    ESRD (end stage renal disease) ([facility] N18.6    ESRD needing dialysis ([facility] N18.6, Z99.2    Type 2 diabetes mellitus with hyperglycemia, with long-term current use of insulin ([facility]) E11.65, Z79.4    Class 3 severe obesity due to excess calories with body mass index (BMI) of 40.0 to 44.9 in adult ([facility] E66.01, Z68.41    Fistula L98.8    Cerebrovascular accident (CVA) due to occlusion of precerebral artery [facility]) I63.20    Aphagia R13.0    Lymphedema of left upper extremity I89.0    Strain of muscle, fascia and tendon of long head of biceps, left arm, initial encounter S46.112A    History of progressive weakness Z87.898    Acute deep vein thrombosis (DVT) of brachial vein of right upper extremity (HCC) I82.621    Moderate malnutrition (HCC) T81.2     Certification of Terminal Illness: I certify that this patient is eligible for Hospice services for a terminal diagnosis of end stage renal disease stopping hemodialysis with a life expectancy predicted to be less than 6 months if this illness follows its expected course.       Leeanne Adamson MD BMI 16.9. Eating very little inpatient. Reportedly was ambulating well at assisted living facility.  - 1/29 made comfort care. MEWS exempt  - Palliative consulted  - DNR/DNI  - Patient refusing to complete speech and swallow evaluation; recommend thin liquids and mechanical soft diet  - Refused PT    #Agitation. Patient appears uncomfortable  - Refusing all of Dementia, Bipolar, and schizophrenia meds  - Psych consulted, cleared for dc back to nursing home once able to go

## 2023-01-04 NOTE — PROGRESS NOTES
V2.0  [facility] Progress Note      Name:  Natasha Shultz /Age/Sex: 1950  (81 y.o. female)   MRN & CSN:  [id number] & [id number] Encounter Date/Time: 2023 12:01 PM EST    Location:  [address] [place] [address]-A PCP: Tram Cook MD       Hospital Day: 9    Assessment and Plan:   Natasha Shultz is a 81 y.o. female with pmh as noted below who presents with ESRD (end stage renal disease) ([facility] [address]      Plan:    Acute on chronic progressive weakness  Dysphagia - improving  Concern for MG  --Pt w/ progressive weakness in all 4 extremities with difficulty swallowing worsened over past 2 weeks. Requiring assistance with ADLs. --Initial CT head was negative, repeat head CT negative. --Neuro on board, recommended MRI of C-spine, results noted:posterior instrumented fusion of c3-T3 w/ associated laminectomy changes. Mild/moderate multilevel cervical spondylosis, no high grade canal stenosis. Marrow signal appears T1 hypointensive which could be r/t anemia vs an alt marrow infitrative process. --Neuro increased the dose of Mestinon to 60mg PO and will reassess  --MG panel was sent and is currently pending-respiratory vitals ordered  --SLP re-evaluated and recommended soft and bite size diet/thin liquids with aspiration precautions, crush pills and give in puree     Acute hypoxic respiratory failure 2/2 pneumonia, possibly aspiration  --No leukocytosis. On 2L of O2 via NC, wean off as able  --CT A/P showed bilateral lower lob consolidations, Respiratory disease panel negative, started on Vanc/Cefepime in the ED, switched to rocephin and azithromycin as patient is not showing signs of SIRS/sepsis.   --BCx NGTD, she completed abx       UTI  --Urine culture with > 100,000 CFU Klebsiella, 25,000 CFU Enterococcus faecalis  --had some mild dysuria, afebrile without leukocytosis   --Completed abx, no urinary symptoms at this time, continue to monitor     ESRD on HD MF  --Dialyzed on 1/3/23 for 2 hours  -- Patient discusses wanting to stop dialysis, patient and son have decided to change CODE STATUS to [facility], and decided to stop dialysis. Hospice consult initiated, per nephrology note. Reference for further details to nephrology progress note. Patient likely will transfer to hospice services today. Acute right brachial, cephalic, basilic vein DVT  --Started on heparin gtt, switched to eliquis   --Cardiology on board, appreciate recs, recommended to continue Eliquis     Sinus tachycardia - resolved  --Continue metoprolol, monitor and adjust as appropriate. HR wnl at this time on current therapy     Elevated troponin   --No CP, initial Tn mildly elevated ECG without acute ischemic changes, flat trend; repeat EKG ordered for complaints of chest pain. --Continue ASA, statin, ranexa  --cardiology following      Gout  --Continue allopurinol     RA  --Continue home chronic prednisone     Hypothyroidism  --Continue synthroid     Abnormal CT  --CT A/P showed nonspecific edema/skin thickening within the left lateral breast, f/u with mammogram may be useful         Diet ADULT DIET; Regular  ADULT ORAL NUTRITION SUPPLEMENT; Breakfast, Lunch, Dinner; Clear Liquid Oral Supplement   DVT Prophylaxis [] Lovenox, []  Heparin, [] SCDs, [] Ambulation,  [x] Eliquis, [] Xarelto  [] Coumadin   Code Status DNR-CC   Disposition From: [facility]  Expected Disposition: [facility]  Estimated Date of Discharge: 1-2 days  Patient requires continued admission due to outstanding MG panel, hospice consult   Surrogate Decision Maker/ POA Son, self     Subjective:     Chief Complaint: Altered Mental Status (Sent from [address] [place] [address] for altered mental status. ) and Other (Patient says had dialysis Saturday [facility] claims has missed last two treatments. )  Patient seen at bedside this afternoon, patient opens eyes to voice, interacts, but appears fatigued. Patient confirms her wishes to stop dialysis and to speak with hospice.   Emotional support offered/provided. Review of Systems:    Review of Systems   Constitutional:  Positive for fatigue. Cardiovascular:  Positive for leg swelling. Negative for chest pain. Gastrointestinal:  Negative for diarrhea, nausea and vomiting. Musculoskeletal:  Positive for myalgias. All other systems reviewed and are negative. Objective:   No intake or output data in the 24 hours ending 01/04/23 1235       Vitals:   Vitals:    01/04/23 0755   BP: (!) 147/72   Pulse: 99   Resp: 20   Temp: 97.5 °F (36.4 °C)   SpO2: 96%       Physical Exam:     General: NAD, fatigued, not toxic appearing, sleepy  Eyes: EOMI  ENT: neck supple  Cardiovascular: Regular rate. Respiratory: Decreased in bases Bilaterally  Gastrointestinal: Soft, non tender  Genitourinary: no suprapubic tenderness  Musculoskeletal: Nonpitting edema bilateral LE  Skin: warm, dry  Neuro: Alert.   Oriented x3  Psych: Mood tearful at times    Medications:   Medications:    pyridostigmine  60 mg Oral 3 times per day    mirtazapine  15 mg Oral Nightly    sucralfate  1 g Oral 2 times per day    betamethasone dipropionate   Topical Daily    apixaban  5 mg Oral BID    atorvastatin  20 mg Oral Nightly    metoprolol tartrate  25 mg Oral BID    cloNIDine  0.1 mg Oral BID    amLODIPine  5 mg Oral Daily    aspirin  81 mg Oral Daily    levothyroxine  100 mcg Oral Daily    montelukast  10 mg Oral Nightly    pantoprazole  40 mg Oral BID AC    predniSONE  20 mg Oral Daily    QUEtiapine  50 mg Oral Nightly    ranolazine  500 mg Oral BID    sevelamer  800 mg Oral TID WC    tiotropium-olodaterol  2 puff Inhalation Daily    sodium chloride flush  5-40 mL IntraVENous 2 times per day    allopurinol  200 mg Oral Daily    levomilnacipran  40 mg Oral Nightly    LORazepam  0.5 mg Oral BID      Infusions:    sodium chloride 25 mL (01/01/23 8529)     PRN Meds: HYDROmorphone, 0.5 mg, Q4H PRN  LORazepam, 0.5 mg, Q6H PRN  traMADol, 50 mg, Q6H PRN  melatonin, 3 mg, Nightly PRN  ipratropium-albuterol, 1 vial, Q6H PRN  sodium chloride flush, 5-40 mL, PRN  sodium chloride, , PRN  ondansetron, 4 mg, Q8H PRN   Or  ondansetron, 4 mg, Q6H PRN  polyethylene glycol, 17 g, Daily PRN      Labs      Recent Results (from the past 24 hour(s))   EKG 12 Lead    Collection Time: 01/04/23 10:17 AM   Result Value Ref Range    Ventricular Rate 90 BPM    Atrial Rate 90 BPM    P-R Interval 184 ms    QRS Duration 116 ms    Q-T Interval 374 ms    QTc Calculation (Bazett) 457 ms    P Axis 61 degrees    R Axis 45 degrees    T Axis 32 degrees    Diagnosis       Normal sinus rhythm  Possible Anterior infarct (cited on or before 21-OCT-2018)  Abnormal ECG  When compared with ECG of 27-DEC-2022 14:55,  QRS axis shifted right  ST no longer elevated in Inferior leads          Imaging/Diagnostics Last 24 Hours   XR ABDOMEN (KUB) (SINGLE AP VIEW)    Result Date: 1/2/2023  EXAMINATION: ONE SUPINE XRAY VIEW(S) OF THE ABDOMEN 1/2/2023 3:41 pm COMPARISON: None. HISTORY: ORDERING SYSTEM PROVIDED HISTORY: Abdominal pain TECHNOLOGIST PROVIDED HISTORY: Reason for exam:->Abdominal pain Reason for Exam: Abdominal pain Initial evaluation FINDINGS: Two views the abdomen are obtained to include the entire abdominal contents. Monitor wires overlie the abdomen. There is contrast in the bowel likely related to a prior procedure. There is no gross obstruction. No acute abdominal finding by plain film imaging. There has been previous surgery in the lower lumbar spine. Contrast in the colon from a prior procedure. No gross obstruction. No obvious acute finding by plain film imaging.        Electronically signed by FIDEL Corbett CNP on 1/4/2023 at 12:35 PM

## 2023-01-04 NOTE — PROGRESS NOTES
Brief Neurology Note:  Chart was reviewed. Patient and family have elected to pursue hospice care at this time per the patients wishes. Dialysis will be discontinued as well as other aggressive treatments. Neurology will sign off at this time. Please contact us with any new concerns.    FIDEL Song - CNP  01/04/23 7:43 AM

## 2023-01-05 NOTE — PROGRESS NOTES
[facility]  General Inpatient Hospice Progress Note    Date: 1/5/2023  Name: Merry Ramos  MRN: [id number]  YOB: 1950   Patient's PCP: Ozzie Flynn MD  Consultants during acute care: Cardiology, Neurology, Nephrology   Nephrology: Dr Zaid Ramos care admission date: 12/27 to 1/4/2023   Admit to General Inpatient Hospice: 1/4/2023     Subjective: The patient is less responsive. She opens her eyes and nods to questions but no speech. She appears more comfortable than on 1/4/23 but shakes her head no when asked if she is at peace. She is taking some oral meds. There is some upper airway noise. No family are here. I collaborated with the patient's nurse and the hospice nurse. Objective:   Pain is managed with Hydromorphone IV prn with 4 doses in the past 24 hours, Glycopyrrolate IV is available for secretions, and Lorazepam is available for anxiety with 2 doses in the past 24 hours . Data reviewed 1/5/2023:  Transthoracic Echocardiogram 12/30/22   Left ventricular systolic function is normal.   Ejection fraction is visually estimated at 60%. Mild left ventricular hypertrophy. Grade I diastolic dysfunction. Central line visualized terminating in the right atrium. Sclerotic, but non-stenotic aortic valve. Mild tricuspid regurgitation is present. RVSP is 40 mmHg. No evidence of any pericardial effusion. CT-scan of the brain 12/28/22:  BRAIN/VENTRICLES: There is no acute infarct or acute intracranial hemorrhage present. There is no mass effect or midline shift present. There is no ventriculomegaly or abnormal extra-axial fluid collection present. There is mild periventricular hypoattenuation. US right upper extremity 12/28/22  1. Acute DVT involving the brachial vein near the antecubital fossa. 2. Acute SVT involving the cephalic and basilic veins. 3. Clotted fistula.  4. Indeterminate 17 x 1.5 x 2.2 cm area of fluid in the right upper extremity. MRI C spine 12/30/22  1. Posterior instrumented fusion of C3-T3 with associated laminectomy changes as described above. 2. Mild/moderate multilevel cervical spondylosis. No high-grade spinal canal stenosis. 3. Marrow signal appears T1 hypointense. Findings may related to anemia versus an alternative marrow infiltrative process. Hepatic Function Panel:          Lab Results   Component Value Date/Time     ALKPHOS 102 12/27/2022 05:00 PM     ALT 14 12/27/2022 05:00 PM     AST 21 12/27/2022 05:00 PM     PROT 6.6 12/27/2022 05:00 PM     PROT 7.1 02/16/2011 01:20 PM     BILITOT 0.2 12/27/2022 05:00 PM     BILIDIR 0.2 02/03/2017 06:45 PM     IBILI 0.0 02/03/2017 06:45 PM     LABALBU 4.2 12/27/2022 05:00 PM      CBC:        Recent Labs     01/02/23  0600 01/03/23  0650   WBC 8.5 9.6   HGB 11.5* 12.5   HCT 35.7* 39.8   MCV 89.7 91.1    255         BMP:        Recent Labs     01/02/23  0600 01/03/23  0650    136   K 5.3* 5.0   CL 97* 97*   CO2 26 30   BUN 87* 54*   CREATININE 5.9* 4.7*   GLUCOSE 79 89        Physical Exam:   BP (!) 153/61   Pulse (!) 105   Temp 97.6 °F (36.4 °C) (Oral)   Resp 20   SpO2 96%   General: drowsy but arousable, no verbal response, nods to questions, generally weak  HEENT: Mucous membranes are dry  Heart: tachycardic RRR, S1S2, no murmurs  Lungs:  equal coarse breath sounds bilaterally, diminished at the bases, without rales, scattered rhonchi   Abdomen: soft, bowel sounds hypoactive, no apparent tenderness, nondistended  Extremities:  No mottling, feet are cool, right upper extremity AV fistula   Neurologic: generally weak in all extremities able to briefly raise feet off the bed     Assessment/Plan:  End stage renal disease with diabetic and hypertensive renal disease choosing to stop hemodialysis. The patient is continued on [facility] for the management of pain, anxiety, congestion, dyspnea.  Will add scheduled Hydromorphone given frequent use or prn and monitor. Allow comfort feeds as able, and will continue oral meds as she can tolerate. PPS is 20% and prognosis is guarded and likely to worsen off hemodialysis.    Hypoxic respiratory failure with aspiration pneumonitis secondary to dysphagia, continue oxygen and comfort meds  Progressive weakness, concern for Myasthenia gravis, continue Pyridostigmine as she can tolerate  Klebsiella UTI, treated  Right upper extremity DVT refusing anticoagulation    Rheumatoid Arthritis, chronically on Prednisone, continue as able  Hypothyroidism, on Levothyroxine as able      Patient Active Problem List   Diagnosis Code    CAD (coronary artery disease) I25.10    Nausea & vomiting R11.2    Hypothyroidism, adult E03.9    Acute on chronic renal failure (HCC) N17.9, N18.9    Type 2 diabetes mellitus without complication ([facility]) U27.4    Hypercalcemia E83.52    Chronic venous hypertension (idiopathic) with inflammation of bilateral lower extremity I87.323    Fluid overload E87.70    Morbid obesity with BMI of 45.0-49.9, adult ([facility]) E66.01, Z68.42    Hyperglycemia R73.9    Chronic fatigue R53.82    Solitary pulmonary nodule R71.4    Acute metabolic encephalopathy S76.36    Chronic diastolic heart failure ([facility]) I50.32    YRIS (obstructive sleep apnea) G47.33    Hiatal hernia K44.9    Status post laparoscopic sleeve gastrectomy Z98.84    Status post bariatric surgery Z98.84    WD-Chronic buttock pain M79.18, G89.29    Essential hypertension I10    ESRD (end stage renal disease) ([facility] [address] N18.6    ESRD needing dialysis ([facility] [address] N18.6, Z99.2    Type 2 diabetes mellitus with hyperglycemia, with long-term current use of insulin ([facility]) E11.65, Z79.4    Fistula L98.8    Cerebrovascular accident (CVA) due to occlusion of precerebral artery ([facility]) I63.20    Lymphedema of left upper extremity I89.0    History of progressive weakness Z87.898    Acute deep vein thrombosis (DVT) of brachial vein of right upper extremity ([facility]) I82.621    Severe malnutrition ([facility] E43    Generalized anxiety disorder F41.1    Hospice care Z51.5       TSERING Rasheed MD  1/5/2023

## 2023-01-06 NOTE — DISCHARGE SUMMARY
[facility]    Death Summary    Date: 1/6/2023  Name: Tamiko Guy  MRN: [id number]  YOB: 1950     Patient's PCP: Kathy Barraza MD  Consultants during acute care: Cardiology, Neurology, Nephrology   Nephrology: Dr Enoc Zamora care admission date: 12/27 to 1/4/2023   Admit Date: 1/4/2023 to [address]  Date of Death: 1/6/2023  Time: 0704  Admitting Physician: Janeth Hernandez MD  Discharge Physician: Alondra Raymundo MD  Consultation: none during General Inpatient Hospice stay    Invasive procedures: none during General Inpatient Hospice stay    Discharge Diagnoses:  End stage renal disease with diabetic and hypertensive renal disease choosing to stop hemodialysis. .   Hypoxic respiratory failure with aspiration pneumonitis secondary to dysphagia, continue oxygen and comfort meds  Progressive weakness, concern for Myasthenia gravis, continue Pyridostigmine as she can tolerate  Klebsiella UTI, treated  Right upper extremity DVT refusing anticoagulation    Rheumatoid Arthritis, chronically on Prednisone, continue as able  Hypothyroidism, on Levothyroxine as able      Patient Active Problem List   Diagnosis Code    CAD (coronary artery disease) I25.10    Nausea & vomiting R11.2    Hypothyroidism, adult E03.9    Acute on chronic renal failure (HCC) N17.9, N18.9    Type 2 diabetes mellitus without complication (HCC) M49.5    Hypercalcemia E83.52    Chronic venous hypertension (idiopathic) with inflammation of bilateral lower extremity I87.323    Fluid overload E87.70    Morbid obesity with BMI of 45.0-49.9, adult (HCC) E66.01, Z68.42    Hyperglycemia R73.9    Chronic fatigue R53.82    Solitary pulmonary nodule X88.6    Acute metabolic encephalopathy P83.24    Chronic diastolic heart failure (HCC) I50.32    YRIS (obstructive sleep apnea) G47.33    Hiatal hernia K44.9    Status post laparoscopic sleeve gastrectomy Z98.84    Status post bariatric surgery Z98.84 WD-Chronic buttock pain M79.18, G89.29    Essential hypertension I10    ESRD (end stage renal disease) ([facility] [address] N18.6    ESRD needing dialysis ([facility]) N18.6, Z99.2    Type 2 diabetes mellitus with hyperglycemia, with long-term current use of insulin (HCC) E11.65, Z79.4    Fistula L98.8    Cerebrovascular accident (CVA) due to occlusion of precerebral artery ([facility]) I63.20    Lymphedema of left upper extremity I89.0    History of progressive weakness Z87.898    Acute deep vein thrombosis (DVT) of brachial vein of right upper extremity ([facility]) I82.621    Severe malnutrition ([facility]) E43    Generalized anxiety disorder F41.1    Hospice care Z51.5       Brief History, reason for admission: Please see the acute care H+P, discharge summary, consultants notes, and my notes. The patient was admitted to [facility]. This is a 81 y.o. female resident at [facility] with a history of end stage renal disease on twice weekly hemodialysis (likely due to hypertension and DM), prior CVA in 2020, coronary artery disease with prior PCI, congestive heart failure, hypertension, Klebsiella UTI, prior cervical spine surgery with chronic neck and back pain, COPD, GERD, Rheumatoid Arthritis, Type 2 diabetes mellitus, glaucoma, who was admitted on 12/27/2022 with progressive lower extremity weakness. The patient has been seen by Neurology with concern for myasthenia gravis. The patient has been started on Pyridostigmine while awaiting results of the Myasthenia gravis panel. The patient has had dysphagia and was treated for aspiration pneumonitis and is on oxygen and completing course of Ceftriaxone and Azithromycin. Dr Jessi Mullen has followed, and documents discussion with the patient and family regarding hemodialysis, and that the patient wants to stop hemodialysis. The patient has refused oral medications 1/3/23 and she is having some trouble swallowing.         The patient is somewhat slow to respond, but does answer my questions. She clearly tells me that she \"wants to be at peace\". When I ask what she means, she states \"no more pain\". She does not want to continue hemodialysis. She was aware that Dr Patsy Keen asked hospice to visit and understood that I am a hospice physician. She is aware that someone is meeting with her family later today. The patient tells me she has not been able to walk for several months. She has difficulty swallowing. She has generalized pain, and prior cervical spine surgery with chronic pain. She is chronically on Prednisone per the med list. The patient has had 3 doses of Hydromorphone IV in the past 24 hours. The hospice nurse liaison met with the patient's family. The family is agreeable to the patient's decision for comfort care and stopping hemodialysis. Hospice philosophy was discussed regarding care and comfort at the end of life. Questions were answered, and emotional support was provided. It was discussed that [address] [place] [address] is for management of symptoms, and that if the patient stabilizes, alternate arrangements for care will be needed They are aware that Hospice does not provide for the patients 24 hour care giving needs at home or ECF. The patient is DNR-comfort care status. The patient is admitted to [address] [place] [address] for the management of pain, dyspnea, congestion. PPS is 20% and prognosis is quite guarded and likely to continue to decline off hemodialysis. Hospital Course: The patient was admitted to [facility] with the above, for complete details, please see the acute care History and Physical, progress notes, consultant notes and discharge summary. The patient was treated with comfort medications, and symptoms were managed. Emotional and spiritual support was provided to the patient and family. The patient  as noted above.     Cause of death: end stage renal disease     Significant Diagnostic Studies:  See computerized record in Kaci Negrete MD  1/6/2023

## 2023-01-06 NOTE — PROGRESS NOTES
Time of death 36. Confirmed with charge nurse Kenisha Silva RN. Dr Kamilah Rees notified with phone @ [phone]. Son Isac La Notified @ via phone [phone]. Son request  home Nohemy Winston. Donor referral hotline notified, Ref # [id number], donor hotline to call back after speaking with family regarding donor status. House supervisor Mi duke.

## 2023-01-06 NOTE — CONSULTS
CVC dressing changed per protocol. Patient tolerated well. Please consult IV/PICC Team if patient's needs change.

## 2023-01-17 NOTE — DISCHARGE SUMMARY
V2.0  Discharge Summary    Name:  Kiana Mcgrath /Age/Sex: 1950 (89 y.o. female)   Admit Date: 2022  Discharge Date: 23    MRN & CSN:  [id number] & [id number] Encounter Date and Time 23   Attending:  Ankur Valle MD Discharging Provider: FIDEL Cedillo [facility] Course:     Brief HPI: Kiana Mcgrath is a 81 y.o. female who presented with ESRD    Brief Problem Based Course:   Acute on chronic progressive weakness  Dysphagia - improving  Concern for MG  --Pt w/ progressive weakness in all 4 extremities with difficulty swallowing worsened over past 2 weeks. Requiring assistance with ADLs. --Initial CT head was negative, repeat head CT negative. --Neuro on board, recommended MRI of C-spine, results noted:posterior instrumented fusion of c3-T3 w/ associated laminectomy changes. Mild/moderate multilevel cervical spondylosis, no high grade canal stenosis. Marrow signal appears T1 hypointensive which could be r/t anemia vs an alt marrow infitrative process. --Neuro increased the dose of Mestinon to 60mg PO and will reassess  --MG panel was sent and is currently pending-respiratory vitals ordered  --SLP re-evaluated and recommended soft and bite size diet/thin liquids with aspiration precautions, crush pills and give in puree     Acute hypoxic respiratory failure 2/2 pneumonia, possibly aspiration  --No leukocytosis. On 2L of O2 via NC, wean off as able  --CT A/P showed bilateral lower lob consolidations, Respiratory disease panel negative, started on Vanc/Cefepime in the ED, switched to rocephin and azithromycin as patient is not showing signs of SIRS/sepsis.   --BCx NGTD, she completed abx       UTI  --Urine culture with > 100,000 CFU Klebsiella, 25,000 CFU Enterococcus faecalis  --had some mild dysuria, afebrile without leukocytosis   --Completed abx, no urinary symptoms at this time, continue to monitor     ESRD on HD MF  --Dialyzed on 1/3/23 for 2 hours  -- Patient discusses wanting to stop dialysis, patient and son have decided to change CODE STATUS to [facility], and decided to stop dialysis. Hospice consult initiated, per nephrology note. Reference for further details to nephrology progress note. Patient will transfer to hospice services today. Acute right brachial, cephalic, basilic vein DVT  --Started on heparin gtt, switched to eliquis   --Cardiology on board, appreciate recs, recommended to continue Eliquis     Sinus tachycardia - resolved  --Continue metoprolol,     Elevated troponin   --No CP, initial Tn mildly elevated ECG without acute ischemic changes, flat trend; repeat EKG ordered for complaints of chest pain. --Continue ASA, statin, ranexa  --cardiology following      Gout  --Continue allopurinol     RA  --Continue home chronic prednisone     Hypothyroidism  --Continue synthroid     Abnormal CT  --CT A/P showed nonspecific edema/skin thickening within the left lateral breast, f/u with mammogram may be useful         The patient expressed appropriate understanding of, and agreement with the discharge recommendations, medications, and plan.      Consults this admission:  IP CONSULT TO NEPHROLOGY  IP CONSULT TO CARDIOLOGY  IP CONSULT TO IV TEAM  IP CONSULT TO INTERVENTIONAL RADIOLOGY  IP CONSULT TO CARDIOLOGY  IP CONSULT TO NEUROLOGY  IP CONSULT TO HOSPICE    Discharge Diagnosis:   ESRD (end stage renal disease) ([facility] [address]        Discharge Instruction:   Follow up appointments:   Primary care physician: Sharad Marroquin MD within 2 weeks  Diet: renal diet   Activity: activity as tolerated  Disposition: Discharged to:   []Home, []HHC, []SNF, []Acute Rehab, []Other [address], [x]Hospice   Condition on discharge: Stable  Labs and Tests to be Followed up as an outpatient by PCP or Specialist: Patient d/c  to Hospice services    Discharge Medications:        Medication List        CONTINUE taking these medications      Nebulizer Misc            ASK your doctor about these medications      acetaminophen 500 MG tablet  Commonly known as: TYLENOL  Take 1 tablet by mouth 3 times daily as needed for Pain     allopurinol 100 MG tablet  Commonly known as: ZYLOPRIM  Take 2 tablets by mouth daily     amLODIPine 5 MG tablet  Commonly known as: NORVASC  Take 1 tablet by mouth daily     aspirin 81 MG chewable tablet  Commonly known as: Aspirin Childrens  Take 1 tablet by mouth daily     atorvastatin 80 MG tablet  Commonly known as: LIPITOR     benzonatate 100 MG capsule  Commonly known as: TESSALON     Daily-Nir Tabs  TAKE ONE TABLET BY MOUTH DAILY     diclofenac sodium 1 % Gel  Commonly known as: VOLTAREN  APPLY TOPICALLY 2 TIMES DAILY APPLY TO LOW BACK AND FLANK AREA TWICE A DAY     fluocinolone 0.01 % external oil  Commonly known as: DERMA-SMOOTHE     fluticasone 50 MCG/ACT nasal spray  Commonly known as: FLONASE     Humira Pen 40 MG/0.4ML Pnkt  Generic drug: Adalimumab     hydrOXYzine HCl 25 MG tablet  Commonly known as: ATARAX     ipratropium-albuterol 0.5-2.5 (3) MG/3ML Soln nebulizer solution  Commonly known as: DUONEB  Inhale 3 mLs into the lungs every 4 hours     * ketoconazole 2 % shampoo  Commonly known as: NIZORAL  Apply topically daily as needed.      * ketoconazole 2 % cream  Commonly known as: NIZORAL     levomilnacipran 40 MG Cp24 extended release capsule  Commonly known as: Fetzima  Take 1 capsule by mouth daily     levothyroxine 100 MCG tablet  Commonly known as: SYNTHROID     lidocaine viscous hcl 2 % Soln solution  Commonly known as: XYLOCAINE     LORazepam 0.5 MG tablet  Commonly known as: ATIVAN     midodrine 10 MG tablet  Commonly known as: PROAMATINE  TAKE 1 TABLET BY MOUTH DAILY TAKE ONE TO TWO TABLET THREE TIMES A WEEK PRIOR TO EACH DIALYSIS TREATMENT     montelukast 10 MG tablet  Commonly known as: SINGULAIR  Take 1 tablet by mouth nightly     Mucus Relief 600 MG extended release tablet  Generic drug: guaiFENesin  TAKE 1 TABLET BY MOUTH TWICE A DAY mupirocin 2 % ointment  Commonly known as: BACTROBAN     nitroGLYCERIN 0.4 MG SL tablet  Commonly known as: NITROSTAT     ondansetron 4 MG tablet  Commonly known as: ZOFRAN  Take 1 tablet by mouth every 8 hours as needed for Nausea or Vomiting     pantoprazole 40 MG tablet  Commonly known as: Protonix  Take 1 tablet by mouth 2 times daily     predniSONE 20 MG tablet  Commonly known as: DELTASONE     * promethazine 25 MG tablet  Commonly known as: PHENERGAN  Take 1 tablet by mouth daily for 10 days. * promethazine 25 MG tablet  Commonly known as: PHENERGAN     QUEtiapine 300 MG extended release tablet  Commonly known as: SEROQUEL XR     ranolazine 500 MG extended release tablet  Commonly known as: RANEXA     Refresh Optive 0.5-0.9 % Soln  Generic drug: Carboxymethylcellul-Glycerin     sevelamer 800 MG tablet  Commonly known as: RENVELA  Take one tablet by mouth three times a day and 1 tablet by mouth with snack     Stiolto Respimat 2.5-2.5 MCG/ACT Aers  Generic drug: tiotropium-olodaterol  Inhale 2 puffs into the lungs daily     sulfacetamide 10 % ophthalmic ointment  Commonly known as: BLEPH-10     tiotropium 2.5 MCG/ACT Aers inhaler  Commonly known as: Spiriva Respimat  Inhale 2 puffs into the lungs daily     triamcinolone 0.5 % cream  Commonly known as: ARISTOCORT           * This list has 4 medication(s) that are the same as other medications prescribed for you. Read the directions carefully, and ask your doctor or other care provider to review them with you. Objective Findings at Discharge:   BP (!) 147/72   Pulse 99   Temp 97.5 °F (36.4 °C) (Oral)   Resp 20   Ht 5' 4\" (1.626 m)   Wt 180 lb 11.2 oz (82 kg)   SpO2 96%   BMI 31.02 kg/m²       Physical Exam:    General: NAD, fatigued, not toxic appearing, sleepy  Eyes: EOMI  ENT: neck supple  Cardiovascular: Regular rate.   Respiratory: Decreased in bases Bilaterally  Gastrointestinal: Soft, non tender  Genitourinary: no suprapubic tenderness  Musculoskeletal: Nonpitting edema bilateral LE  Skin: warm, dry  Neuro: Alert. Oriented x3  Psych: Mood tearful at times  Labs and Imaging   No results found. CBC: No results for input(s): WBC, HGB, PLT in the last 72 hours. BMP:  No results for input(s): NA, K, CL, CO2, BUN, CREATININE, GLUCOSE in the last 72 hours. Hepatic: No results for input(s): AST, ALT, ALB, BILITOT, ALKPHOS in the last 72 hours. Lipids:   Lab Results   Component Value Date/Time    CHOL 223 09/02/2021 10:30 AM    CHOL 140 09/12/2017 09:45 AM    HDL 49 09/02/2021 10:30 AM    TRIG 154 09/02/2021 10:30 AM     Hemoglobin A1C:   Lab Results   Component Value Date/Time    LABA1C 5.5 09/02/2021 10:30 AM     TSH:   Lab Results   Component Value Date/Time    TSH 2.19 10/06/2017 12:15 PM     Troponin:   Lab Results   Component Value Date/Time    TROPONINT 0.094 12/28/2022 04:56 AM    TROPONINT 0.097 12/27/2022 05:00 PM    TROPONINT 0.046 01/12/2022 01:39 PM     Lactic Acid: No results for input(s): LACTA in the last 72 hours. BNP: No results for input(s): PROBNP in the last 72 hours.   UA:  Lab Results   Component Value Date/Time    NITRU NEGATIVE 12/27/2022 05:45 PM    NITRU Negative 07/10/2019 11:09 AM    COLORU YELLOW 12/27/2022 05:45 PM    PHUR 6.0 07/10/2019 11:09 AM    WBCUA 265 12/27/2022 05:45 PM    RBCUA 48 12/27/2022 05:45 PM    MUCUS RARE 12/27/2022 05:45 PM    TRICHOMONAS NONE SEEN 12/27/2022 05:45 PM    BACTERIA FEW 12/27/2022 05:45 PM    CLARITYU CLOUDY 12/27/2022 05:45 PM    SPECGRAV 1.020 12/27/2022 05:45 PM    LEUKOCYTESUR MODERATE NUMBER OR AMOUNT OBSERVED 12/27/2022 05:45 PM    UROBILINOGEN 0.2 12/27/2022 05:45 PM    BILIRUBINUR NEGATIVE 12/27/2022 05:45 PM    BLOODU SMALL NUMBER OR AMOUNT OBSERVED 12/27/2022 05:45 PM    GLUCOSEU Negative 07/10/2019 11:09 AM    KETUA NEGATIVE 12/27/2022 05:45 PM     Urine Cultures: No results found for: LABURIN  Blood Cultures: No results found for: BC  No results found for: BLOODCULT2  Organism:   Lab Results   Component Value Date/Time    [facility] PSAR 02/03/2017 12:15 PM       Time Spent Discharging patient 35 minutes    Electronically signed by FIDEL Valentino CNP

## 2023-01-17 NOTE — PROGRESS NOTES
Physician Progress Note      Cyndi HARE #:                  [id number]  :                       1950  ADMIT DATE:       2022 1:16 PM  [address] DATE:        2023 2:35 PM  RESPONDING  PROVIDER #:        Jami Atkins MD          QUERY TEXT:    Patient admitted with weakness. Documentation states concern for Myasthenia   gravis. If possible, please document in the progress notes and discharge   summary :    The medical record reflects the following:  Risk Factors: RA,CVA  Clinical Indicators: MG panel Acetylchol binding and blocking Negative,   Striated muscle AB IGG screen Negative, Titin antibody negative  Treatment: Neuro consult, Imaging, labs    Thank you,  Letyloischrist Taylor [phone]  Options provided:  -- Myasthenia gravis confirmed after study  -- Myasthenia gravis ruled out after study  -- Weakness due to ##, . -- Other - I will add my own diagnosis  -- Disagree - Not applicable / Not valid  -- Disagree - Clinically unable to determine / Unknown  -- Refer to Clinical Documentation Reviewer    PROVIDER RESPONSE TEXT:    Myasthenia gravis ruled out after study.     Query created by: Maris Cheek on 1/10/2023 5:57 PM      Electronically signed by:  Jami Atkins MD 2023 8:44 AM

## 2024-10-21 NOTE — PROGRESS NOTES
[address] [place] [address]  HOSPITALIST PROGRESS NOTE                       Name:  Angel Marks /Age/Sex: 1950  (78 y.o. female)   MRN & CSN:  [id number] & [id number] Admission Date/Time: 2/15/2021  5:01 PM   Location:  [address] [place] [address] Attending:  Cory Alvarado MD                                                  HPI  Angel Marks is a 78 y.o. female who presents with chest pain/speech difficulty    SUBJECTIVE  Reports complains of chest pain yesterday and difficulty of speaking 3 days ago. Also this morning had an episode of chest tightness. No fever/chills. 10 point review of systems reviewed and negative unless noted above. ALLERGIES:   Allergies   Allergen Reactions    Percocet [Oxycodone-Acetaminophen]      hallucinations    Tramadol Itching    Vicodin [Hydrocodone-Acetaminophen] Itching    Narcan [Naloxone Hcl] Anxiety     Feels like out of body, things are speeding       PCP: [address] [place] [address], SURGICAL HISTORY, SOCIAL HISTORY and  HOME MEDICATIONS all reviewed. OBJECTIVE  Vitals:    21 0000 21 0200 21 0400 21 0600   BP: (!) 113/59 (!) 147/70 110/64 108/75   Pulse: 97 94 92 96   Resp:  12   Temp:       TempSrc:       SpO2:       Weight:       Height:           PHYSICAL EXAM   GEN Awake female, sitting upright in bed in no apparent distress. EYES Pupils are equally round. No scleral erythema, discharge, or conjunctivitis. HENT Mucous membranes are moist. Oral pharynx without exudates, no evidence of thrush. NECK Supple, no apparent thyromegaly or masses. RESP Unlabored respiration, bilateral rhonchi  CARDIO/VASC S1/S2 auscultated. Regular rate without appreciable murmurs, rubs, or gallops. No JVD or carotid bruits. Peripheral pulses equal bilaterally and palpable. No peripheral edema. GI Abdomen is soft without significant tenderness, masses, or guarding. Bowel sounds are normoactive. Rectal exam deferred.     No costovertebral angle tenderness. Normal appearing external genitalia. HEME/LYMPH No palpable cervical lymphadenopathy and no hepatosplenomegaly. No petechiae or ecchymoses. MSK Spontaneous movement of all extremities. No gross joint deformities. SKIN Normal coloration, warm, dry. NEURO Cranial nerves appear grossly intact, normal speech, no lateralizing weakness. INTAKE: No intake/output data recorded. OUTPUT: No intake/output data recorded. LABS  Recent Labs     02/15/21  1616 02/16/21  0425   WBC 7.6 6.6   HGB 11.0* 11.1*   HCT 33.8* 34.3*    323      Recent Labs     02/15/21  1616 02/16/21  0425   * 136   K 3.9 4.6   CL 95* 97*   CO2 25 23   BUN 26* 32*   CREATININE 5.2* 6.0*     Recent Labs     02/16/21 0425   AST 17   ALT 7*   BILITOT 0.2   ALKPHOS 187*     No results for input(s): INR in the last 72 hours.   Recent Labs     02/15/21  1616   TROPONINT 0.034*          Abnormal labs for today noted      Imaging:     ECHO:    Microbiology:  Blood culture:    Urine culture:    Sputum culture:    Procedures done this admission:    MEDS  Scheduled Meds:   fluticasone  1 spray Each Nostril Daily    torsemide  100 mg Oral BID    LORazepam  1 mg Intravenous Once    allopurinol  200 mg Oral Daily    atorvastatin  80 mg Oral Nightly    buPROPion  300 mg Oral QAM    cetirizine  10 mg Oral Daily    clopidogrel  75 mg Oral Daily    hydroxychloroquine  200 mg Oral BID    levothyroxine  100 mcg Oral Daily    LORazepam  0.5 mg Oral BID    lubiprostone  24 mcg Oral BID    metOLazone  2.5 mg Oral Daily    midodrine  10 mg Oral Once per day on Mon Wed Fri    montelukast  10 mg Oral Nightly    QUEtiapine  50 mg Oral Nightly    ranolazine  500 mg Oral BID    spironolactone  25 mg Oral Daily    sodium chloride flush  10 mL Intravenous 2 times per day    heparin (porcine)  5,000 Units Subcutaneous 3 times per day     Continuous Infusions:  PRN Meds:sodium chloride flush, promethazine **OR** ondansetron, polyethylene glycol, acetaminophen **OR** acetaminophen        ASSESSMENT and PLAN  Hospital Day: 2    1-Chest pain- episodic, with mildly elevated troponin- CXR suggestive of an early left lung infiltrate- serial trop, get cultures/CT chest and consult cardio  2-?Transient slurred speech- CT head non-acute, MRi pending.     Other issues  ESRD- HD per nephro  -HTN  -DM  -RA  -HLD               Disp:     Diet DIET RENAL;   DVT Prophylaxis [] Lovenox, []  Heparin, [] SCDs, [] Ambulation   GI Prophylaxis [] PPI,  [] H2 Blocker,  [] Carafate,  [] Diet/Tube Feeds   Code Status Full Code   Disposition Patient requires continued admission due to chest pain   CMS Level of Risk [] Low, [x] Moderate,[]  High  Patient's risk as above due to chest pain     GERONIMO GREEN MD 2/16/2021 9:03 AM no anesthesia complications RCU. no anesthesia complications